# Patient Record
Sex: FEMALE | Race: WHITE | Employment: OTHER | URBAN - METROPOLITAN AREA
[De-identification: names, ages, dates, MRNs, and addresses within clinical notes are randomized per-mention and may not be internally consistent; named-entity substitution may affect disease eponyms.]

---

## 2017-04-06 PROBLEM — M17.12 PRIMARY OSTEOARTHRITIS OF LEFT KNEE: Status: ACTIVE | Noted: 2017-04-06

## 2017-04-29 PROBLEM — R07.9 CHEST PAIN: Status: ACTIVE | Noted: 2017-04-29

## 2017-04-29 PROBLEM — G45.9 TIA (TRANSIENT ISCHEMIC ATTACK): Status: ACTIVE | Noted: 2017-04-29

## 2017-06-19 PROBLEM — M17.31 POST-TRAUMATIC OSTEOARTHRITIS OF RIGHT KNEE: Status: ACTIVE | Noted: 2017-06-19

## 2018-03-14 ENCOUNTER — TELEPHONE (OUTPATIENT)
Dept: ORTHOPEDIC SURGERY | Age: 58
End: 2018-03-14

## 2018-04-12 ENCOUNTER — TELEPHONE (OUTPATIENT)
Dept: ORTHOPEDIC SURGERY | Age: 58
End: 2018-04-12

## 2018-05-10 ENCOUNTER — HOSPITAL ENCOUNTER (OUTPATIENT)
Dept: GENERAL RADIOLOGY | Age: 58
Discharge: HOME | End: 2018-05-12
Payer: MEDICARE

## 2018-05-10 ENCOUNTER — OFFICE VISIT (OUTPATIENT)
Dept: ORTHOPEDIC SURGERY | Age: 58
End: 2018-05-10
Payer: MEDICARE

## 2018-05-10 VITALS — HEIGHT: 65 IN | HEART RATE: 87 BPM | WEIGHT: 293 LBS | BODY MASS INDEX: 48.82 KG/M2 | RESPIRATION RATE: 20 BRPM

## 2018-05-10 DIAGNOSIS — Z96.641 STATUS POST TOTAL REPLACEMENT OF RIGHT HIP: ICD-10-CM

## 2018-05-10 PROCEDURE — 99212 OFFICE O/P EST SF 10 MIN: CPT | Performed by: ORTHOPAEDIC SURGERY

## 2018-05-10 PROCEDURE — 73502 X-RAY EXAM HIP UNI 2-3 VIEWS: CPT

## 2018-05-18 ENCOUNTER — APPOINTMENT (OUTPATIENT)
Dept: ULTRASOUND IMAGING | Age: 58
End: 2018-05-18
Payer: MEDICAID

## 2018-05-18 ENCOUNTER — APPOINTMENT (OUTPATIENT)
Dept: GENERAL RADIOLOGY | Age: 58
End: 2018-05-18
Payer: MEDICAID

## 2018-05-18 ENCOUNTER — HOSPITAL ENCOUNTER (EMERGENCY)
Age: 58
Discharge: HOME | End: 2018-05-18
Attending: EMERGENCY MEDICINE
Payer: MEDICAID

## 2018-05-18 VITALS
HEART RATE: 83 BPM | OXYGEN SATURATION: 97 % | BODY MASS INDEX: 48.82 KG/M2 | SYSTOLIC BLOOD PRESSURE: 131 MMHG | HEIGHT: 65 IN | TEMPERATURE: 98.5 F | WEIGHT: 293 LBS | DIASTOLIC BLOOD PRESSURE: 78 MMHG | RESPIRATION RATE: 18 BRPM

## 2018-05-18 DIAGNOSIS — N30.01 ACUTE CYSTITIS WITH HEMATURIA: ICD-10-CM

## 2018-05-18 DIAGNOSIS — K92.0 HEMATEMESIS WITH NAUSEA: Primary | ICD-10-CM

## 2018-05-18 LAB
ALBUMIN SERPL-MCNC: 3.6 G/DL (ref 3.5–5.2)
ALP BLD-CCNC: 152 U/L (ref 35–104)
ALT SERPL-CCNC: 17 U/L (ref 0–32)
ANION GAP SERPL CALCULATED.3IONS-SCNC: 15 MMOL/L (ref 7–16)
APTT: 30.8 SEC (ref 24.5–35.1)
AST SERPL-CCNC: 20 U/L (ref 0–31)
BACTERIA: ABNORMAL /HPF
BILIRUB SERPL-MCNC: 0.6 MG/DL (ref 0–1.2)
BILIRUBIN URINE: NEGATIVE
BLOOD, URINE: ABNORMAL
BUN BLDV-MCNC: 9 MG/DL (ref 6–20)
CALCIUM SERPL-MCNC: 9.2 MG/DL (ref 8.6–10.2)
CHLORIDE BLD-SCNC: 95 MMOL/L (ref 98–107)
CLARITY: CLEAR
CO2: 24 MMOL/L (ref 22–29)
COLOR: YELLOW
CREAT SERPL-MCNC: 0.6 MG/DL (ref 0.5–1)
EKG ATRIAL RATE: 81 BPM
EKG P AXIS: 72 DEGREES
EKG P-R INTERVAL: 160 MS
EKG Q-T INTERVAL: 410 MS
EKG QRS DURATION: 98 MS
EKG QTC CALCULATION (BAZETT): 476 MS
EKG R AXIS: 2 DEGREES
EKG T AXIS: 63 DEGREES
EKG VENTRICULAR RATE: 81 BPM
GFR AFRICAN AMERICAN: >60
GFR NON-AFRICAN AMERICAN: >60 ML/MIN/1.73
GLUCOSE BLD-MCNC: 121 MG/DL (ref 74–109)
GLUCOSE URINE: NEGATIVE MG/DL
HCT VFR BLD CALC: 38.4 % (ref 34–48)
HEMOGLOBIN: 12.7 G/DL (ref 11.5–15.5)
INR BLD: 1.2
KETONES, URINE: 40 MG/DL
LACTIC ACID: 0.9 MMOL/L (ref 0.5–2.2)
LEUKOCYTE ESTERASE, URINE: ABNORMAL
LIPASE: 17 U/L (ref 13–60)
MCH RBC QN AUTO: 28.3 PG (ref 26–35)
MCHC RBC AUTO-ENTMCNC: 33.1 % (ref 32–34.5)
MCV RBC AUTO: 85.5 FL (ref 80–99.9)
NITRITE, URINE: POSITIVE
PDW BLD-RTO: 19.9 FL (ref 11.5–15)
PH UA: 6.5 (ref 5–9)
PLATELET # BLD: 255 E9/L (ref 130–450)
PMV BLD AUTO: 9.1 FL (ref 7–12)
POTASSIUM SERPL-SCNC: 4 MMOL/L (ref 3.5–5)
PROTEIN UA: 100 MG/DL
PROTHROMBIN TIME: 13.4 SEC (ref 9.3–12.4)
RBC # BLD: 4.49 E12/L (ref 3.5–5.5)
RBC UA: >20 /HPF (ref 0–2)
SODIUM BLD-SCNC: 134 MMOL/L (ref 132–146)
SPECIFIC GRAVITY UA: 1.02 (ref 1–1.03)
TOTAL PROTEIN: 8 G/DL (ref 6.4–8.3)
UROBILINOGEN, URINE: 0.2 E.U./DL
WBC # BLD: 11 E9/L (ref 4.5–11.5)
WBC UA: >20 /HPF (ref 0–5)

## 2018-05-18 PROCEDURE — 6360000002 HC RX W HCPCS: Performed by: EMERGENCY MEDICINE

## 2018-05-18 PROCEDURE — 87186 SC STD MICRODIL/AGAR DIL: CPT

## 2018-05-18 PROCEDURE — 93005 ELECTROCARDIOGRAM TRACING: CPT | Performed by: EMERGENCY MEDICINE

## 2018-05-18 PROCEDURE — 85027 COMPLETE CBC AUTOMATED: CPT

## 2018-05-18 PROCEDURE — 83690 ASSAY OF LIPASE: CPT

## 2018-05-18 PROCEDURE — 83605 ASSAY OF LACTIC ACID: CPT

## 2018-05-18 PROCEDURE — 81001 URINALYSIS AUTO W/SCOPE: CPT

## 2018-05-18 PROCEDURE — 76770 US EXAM ABDO BACK WALL COMP: CPT

## 2018-05-18 PROCEDURE — 96365 THER/PROPH/DIAG IV INF INIT: CPT

## 2018-05-18 PROCEDURE — 71046 X-RAY EXAM CHEST 2 VIEWS: CPT

## 2018-05-18 PROCEDURE — 96375 TX/PRO/DX INJ NEW DRUG ADDON: CPT

## 2018-05-18 PROCEDURE — 99284 EMERGENCY DEPT VISIT MOD MDM: CPT

## 2018-05-18 PROCEDURE — 96376 TX/PRO/DX INJ SAME DRUG ADON: CPT

## 2018-05-18 PROCEDURE — 85610 PROTHROMBIN TIME: CPT

## 2018-05-18 PROCEDURE — C9113 INJ PANTOPRAZOLE SODIUM, VIA: HCPCS | Performed by: EMERGENCY MEDICINE

## 2018-05-18 PROCEDURE — 2580000003 HC RX 258: Performed by: EMERGENCY MEDICINE

## 2018-05-18 PROCEDURE — 80053 COMPREHEN METABOLIC PANEL: CPT

## 2018-05-18 PROCEDURE — 87088 URINE BACTERIA CULTURE: CPT

## 2018-05-18 PROCEDURE — 85730 THROMBOPLASTIN TIME PARTIAL: CPT

## 2018-05-18 PROCEDURE — 87077 CULTURE AEROBIC IDENTIFY: CPT

## 2018-05-18 RX ORDER — SUCRALFATE ORAL 1 G/10ML
1 SUSPENSION ORAL 4 TIMES DAILY
Status: ON HOLD | COMMUNITY
End: 2018-11-20 | Stop reason: HOSPADM

## 2018-05-18 RX ORDER — ONDANSETRON 2 MG/ML
4 INJECTION INTRAMUSCULAR; INTRAVENOUS ONCE
Status: COMPLETED | OUTPATIENT
Start: 2018-05-18 | End: 2018-05-18

## 2018-05-18 RX ORDER — LEVOTHYROXINE SODIUM 175 UG/1
262.5 TABLET ORAL WEEKLY
COMMUNITY
End: 2018-12-03 | Stop reason: SDUPTHER

## 2018-05-18 RX ORDER — ONDANSETRON 4 MG/1
4 TABLET, ORALLY DISINTEGRATING ORAL EVERY 8 HOURS PRN
Qty: 24 TABLET | Refills: 0 | Status: SHIPPED | OUTPATIENT
Start: 2018-05-18 | End: 2018-11-14

## 2018-05-18 RX ORDER — CEFTRIAXONE 2 G/1
INJECTION, POWDER, FOR SOLUTION INTRAMUSCULAR; INTRAVENOUS
Status: DISCONTINUED
Start: 2018-05-18 | End: 2018-05-18 | Stop reason: HOSPADM

## 2018-05-18 RX ORDER — OMEPRAZOLE 20 MG/1
20 CAPSULE, DELAYED RELEASE ORAL DAILY PRN
Status: ON HOLD | COMMUNITY
End: 2020-05-19 | Stop reason: HOSPADM

## 2018-05-18 RX ORDER — ARIPIPRAZOLE 10 MG/1
10 TABLET ORAL DAILY
COMMUNITY
End: 2021-11-18

## 2018-05-18 RX ORDER — OXYBUTYNIN CHLORIDE 5 MG/1
5 TABLET ORAL 2 TIMES DAILY PRN
COMMUNITY
End: 2018-11-14

## 2018-05-18 RX ORDER — CEPHALEXIN 500 MG/1
500 CAPSULE ORAL 3 TIMES DAILY
Qty: 21 CAPSULE | Refills: 0 | Status: SHIPPED | OUTPATIENT
Start: 2018-05-18 | End: 2018-05-25

## 2018-05-18 RX ORDER — PANTOPRAZOLE SODIUM 40 MG/10ML
40 INJECTION, POWDER, LYOPHILIZED, FOR SOLUTION INTRAVENOUS ONCE
Status: COMPLETED | OUTPATIENT
Start: 2018-05-18 | End: 2018-05-18

## 2018-05-18 RX ORDER — GABAPENTIN 300 MG/1
900 CAPSULE ORAL NIGHTLY
Status: ON HOLD | COMMUNITY
End: 2018-11-20 | Stop reason: HOSPADM

## 2018-05-18 RX ORDER — LEVOTHYROXINE SODIUM 175 UG/1
175 TABLET ORAL
COMMUNITY

## 2018-05-18 RX ADMIN — ONDANSETRON 4 MG: 2 INJECTION INTRAMUSCULAR; INTRAVENOUS at 16:30

## 2018-05-18 RX ADMIN — ONDANSETRON 4 MG: 2 INJECTION INTRAMUSCULAR; INTRAVENOUS at 12:06

## 2018-05-18 RX ADMIN — HYDROMORPHONE HYDROCHLORIDE 1 MG: 1 INJECTION, SOLUTION INTRAMUSCULAR; INTRAVENOUS; SUBCUTANEOUS at 16:30

## 2018-05-18 RX ADMIN — PANTOPRAZOLE SODIUM 40 MG: 40 INJECTION, POWDER, FOR SOLUTION INTRAVENOUS at 12:07

## 2018-05-18 RX ADMIN — CEFTRIAXONE SODIUM 2 G: 2 INJECTION, POWDER, FOR SOLUTION INTRAMUSCULAR; INTRAVENOUS at 16:04

## 2018-05-18 ASSESSMENT — PAIN DESCRIPTION - LOCATION: LOCATION: ABDOMEN

## 2018-05-18 ASSESSMENT — PAIN SCALES - GENERAL
PAINLEVEL_OUTOF10: 9
PAINLEVEL_OUTOF10: 3

## 2018-05-18 ASSESSMENT — PAIN DESCRIPTION - PAIN TYPE: TYPE: ACUTE PAIN

## 2018-05-18 ASSESSMENT — PAIN SCALES - WONG BAKER: WONGBAKER_NUMERICALRESPONSE: 8

## 2018-05-20 LAB
ORGANISM: ABNORMAL
URINE CULTURE, ROUTINE: ABNORMAL
URINE CULTURE, ROUTINE: ABNORMAL

## 2018-06-01 ENCOUNTER — TELEPHONE (OUTPATIENT)
Dept: ORTHOPEDIC SURGERY | Age: 58
End: 2018-06-01

## 2018-06-21 ENCOUNTER — HOSPITAL ENCOUNTER (OUTPATIENT)
Age: 58
Discharge: HOME | End: 2018-06-23
Payer: MEDICAID

## 2018-06-21 LAB
AMORPHOUS: ABNORMAL
BACTERIA: ABNORMAL /HPF
BILIRUBIN URINE: NEGATIVE
BLOOD, URINE: ABNORMAL
CLARITY: ABNORMAL
COLOR: YELLOW
GLUCOSE URINE: NEGATIVE MG/DL
KETONES, URINE: NEGATIVE MG/DL
LEUKOCYTE ESTERASE, URINE: ABNORMAL
NITRITE, URINE: POSITIVE
PH UA: 6.5 (ref 5–9)
PROTEIN UA: NEGATIVE MG/DL
RBC UA: ABNORMAL /HPF (ref 0–2)
SPECIFIC GRAVITY UA: <=1.005 (ref 1–1.03)
UROBILINOGEN, URINE: 0.2 E.U./DL
WBC UA: >20 /HPF (ref 0–5)

## 2018-06-21 PROCEDURE — 87077 CULTURE AEROBIC IDENTIFY: CPT

## 2018-06-21 PROCEDURE — 87088 URINE BACTERIA CULTURE: CPT

## 2018-06-21 PROCEDURE — 81001 URINALYSIS AUTO W/SCOPE: CPT

## 2018-06-21 PROCEDURE — 87186 SC STD MICRODIL/AGAR DIL: CPT

## 2018-06-29 LAB
ORGANISM: ABNORMAL
URINE CULTURE, ROUTINE: ABNORMAL

## 2018-10-18 ENCOUNTER — HOSPITAL ENCOUNTER (OUTPATIENT)
Age: 58
Discharge: HOME | End: 2018-10-20
Payer: MEDICARE

## 2018-10-18 LAB
ALBUMIN SERPL-MCNC: 3.3 G/DL (ref 3.5–5.2)
ALP BLD-CCNC: 92 U/L (ref 35–104)
ALT SERPL-CCNC: 14 U/L (ref 0–32)
ANION GAP SERPL CALCULATED.3IONS-SCNC: 12 MMOL/L (ref 7–16)
AST SERPL-CCNC: 20 U/L (ref 0–31)
BILIRUB SERPL-MCNC: 0.2 MG/DL (ref 0–1.2)
BUN BLDV-MCNC: 11 MG/DL (ref 6–20)
CALCIUM SERPL-MCNC: 8.9 MG/DL (ref 8.6–10.2)
CHLORIDE BLD-SCNC: 101 MMOL/L (ref 98–107)
CO2: 27 MMOL/L (ref 22–29)
CREAT SERPL-MCNC: 0.8 MG/DL (ref 0.5–1)
GFR AFRICAN AMERICAN: >60
GFR NON-AFRICAN AMERICAN: >60 ML/MIN/1.73
GLUCOSE BLD-MCNC: 86 MG/DL (ref 74–109)
POTASSIUM SERPL-SCNC: 4.4 MMOL/L (ref 3.5–5)
SODIUM BLD-SCNC: 140 MMOL/L (ref 132–146)
TOTAL PROTEIN: 6.6 G/DL (ref 6.4–8.3)
TSH SERPL DL<=0.05 MIU/L-ACNC: 0.45 UIU/ML (ref 0.27–4.2)
VITAMIN D 25-HYDROXY: 33 NG/ML (ref 30–100)

## 2018-10-18 PROCEDURE — 84443 ASSAY THYROID STIM HORMONE: CPT

## 2018-10-18 PROCEDURE — 36415 COLL VENOUS BLD VENIPUNCTURE: CPT

## 2018-10-18 PROCEDURE — 80053 COMPREHEN METABOLIC PANEL: CPT

## 2018-10-18 PROCEDURE — 82306 VITAMIN D 25 HYDROXY: CPT

## 2018-11-14 ENCOUNTER — HOSPITAL ENCOUNTER (INPATIENT)
Age: 58
LOS: 6 days | Discharge: NURSING, REHAB OR LONG-TERM CARE | DRG: 481 | End: 2018-11-20
Attending: INTERNAL MEDICINE | Admitting: INTERNAL MEDICINE
Payer: MEDICARE

## 2018-11-14 ENCOUNTER — HOSPITAL ENCOUNTER (OUTPATIENT)
Age: 58
Discharge: HOME | End: 2018-11-14
Payer: MEDICAID

## 2018-11-14 ENCOUNTER — APPOINTMENT (OUTPATIENT)
Dept: GENERAL RADIOLOGY | Age: 58
End: 2018-11-14
Payer: MEDICAID

## 2018-11-14 ENCOUNTER — HOSPITAL ENCOUNTER (EMERGENCY)
Age: 58
Discharge: ANOTHER HOSPITAL | End: 2018-11-14
Attending: EMERGENCY MEDICINE
Payer: MEDICAID

## 2018-11-14 VITALS
DIASTOLIC BLOOD PRESSURE: 85 MMHG | RESPIRATION RATE: 18 BRPM | HEART RATE: 74 BPM | WEIGHT: 293 LBS | OXYGEN SATURATION: 100 % | SYSTOLIC BLOOD PRESSURE: 150 MMHG | HEIGHT: 65 IN | BODY MASS INDEX: 48.82 KG/M2 | TEMPERATURE: 97.8 F

## 2018-11-14 DIAGNOSIS — S82.401A CLOSED FRACTURE OF RIGHT TIBIA AND FIBULA, INITIAL ENCOUNTER: ICD-10-CM

## 2018-11-14 DIAGNOSIS — G35 MS (MULTIPLE SCLEROSIS) (HCC): Chronic | ICD-10-CM

## 2018-11-14 DIAGNOSIS — S72.401A CLOSED FRACTURE OF DISTAL END OF RIGHT FEMUR, UNSPECIFIED FRACTURE MORPHOLOGY, INITIAL ENCOUNTER (HCC): Primary | ICD-10-CM

## 2018-11-14 DIAGNOSIS — S72.91XA CLOSED FRACTURE OF RIGHT FEMUR, UNSPECIFIED FRACTURE MORPHOLOGY, UNSPECIFIED PORTION OF FEMUR, INITIAL ENCOUNTER (HCC): Primary | ICD-10-CM

## 2018-11-14 DIAGNOSIS — S82.101A CLOSED FRACTURE OF PROXIMAL END OF RIGHT TIBIA, UNSPECIFIED FRACTURE MORPHOLOGY, INITIAL ENCOUNTER: ICD-10-CM

## 2018-11-14 DIAGNOSIS — S82.201A CLOSED FRACTURE OF RIGHT TIBIA AND FIBULA, INITIAL ENCOUNTER: ICD-10-CM

## 2018-11-14 PROBLEM — S72.90XA CLOSED FRACTURE OF FEMUR (HCC): Status: ACTIVE | Noted: 2018-11-14

## 2018-11-14 LAB
ANION GAP SERPL CALCULATED.3IONS-SCNC: 11 MMOL/L (ref 7–16)
APTT: 30.1 SEC (ref 24.5–35.1)
BASOPHILS ABSOLUTE: 0.03 E9/L (ref 0–0.2)
BASOPHILS RELATIVE PERCENT: 0.3 % (ref 0–2)
BUN BLDV-MCNC: 10 MG/DL (ref 6–20)
CALCIUM SERPL-MCNC: 9.3 MG/DL (ref 8.6–10.2)
CHLORIDE BLD-SCNC: 98 MMOL/L (ref 98–107)
CO2: 28 MMOL/L (ref 22–29)
CREAT SERPL-MCNC: 0.8 MG/DL (ref 0.5–1)
EOSINOPHILS ABSOLUTE: 0.03 E9/L (ref 0.05–0.5)
EOSINOPHILS RELATIVE PERCENT: 0.3 % (ref 0–6)
GFR AFRICAN AMERICAN: >60
GFR NON-AFRICAN AMERICAN: >60 ML/MIN/1.73
GLUCOSE BLD-MCNC: 104 MG/DL (ref 74–99)
HCT VFR BLD CALC: 35.2 % (ref 34–48)
HEMOGLOBIN: 11.6 G/DL (ref 11.5–15.5)
IMMATURE GRANULOCYTES #: 0.06 E9/L
IMMATURE GRANULOCYTES %: 0.6 % (ref 0–5)
INR BLD: 1.1
LYMPHOCYTES ABSOLUTE: 1.16 E9/L (ref 1.5–4)
LYMPHOCYTES RELATIVE PERCENT: 10.8 % (ref 20–42)
MCH RBC QN AUTO: 30.7 PG (ref 26–35)
MCHC RBC AUTO-ENTMCNC: 33 % (ref 32–34.5)
MCV RBC AUTO: 93.1 FL (ref 80–99.9)
MONOCYTES ABSOLUTE: 0.59 E9/L (ref 0.1–0.95)
MONOCYTES RELATIVE PERCENT: 5.5 % (ref 2–12)
NEUTROPHILS ABSOLUTE: 8.89 E9/L (ref 1.8–7.3)
NEUTROPHILS RELATIVE PERCENT: 82.5 % (ref 43–80)
PDW BLD-RTO: 13.1 FL (ref 11.5–15)
PLATELET # BLD: 299 E9/L (ref 130–450)
PMV BLD AUTO: 9.2 FL (ref 7–12)
POTASSIUM SERPL-SCNC: 4.7 MMOL/L (ref 3.5–5)
PROTHROMBIN TIME: 12.4 SEC (ref 9.3–12.4)
RBC # BLD: 3.78 E12/L (ref 3.5–5.5)
SODIUM BLD-SCNC: 137 MMOL/L (ref 132–146)
WBC # BLD: 10.8 E9/L (ref 4.5–11.5)

## 2018-11-14 PROCEDURE — 73610 X-RAY EXAM OF ANKLE: CPT

## 2018-11-14 PROCEDURE — 85610 PROTHROMBIN TIME: CPT

## 2018-11-14 PROCEDURE — 73552 X-RAY EXAM OF FEMUR 2/>: CPT

## 2018-11-14 PROCEDURE — 29505 APPLICATION LONG LEG SPLINT: CPT

## 2018-11-14 PROCEDURE — 1200000000 HC SEMI PRIVATE

## 2018-11-14 PROCEDURE — 99284 EMERGENCY DEPT VISIT MOD MDM: CPT

## 2018-11-14 PROCEDURE — 6360000002 HC RX W HCPCS: Performed by: PHYSICIAN ASSISTANT

## 2018-11-14 PROCEDURE — 2580000003 HC RX 258: Performed by: INTERNAL MEDICINE

## 2018-11-14 PROCEDURE — 85025 COMPLETE CBC W/AUTO DIFF WBC: CPT

## 2018-11-14 PROCEDURE — 80048 BASIC METABOLIC PNL TOTAL CA: CPT

## 2018-11-14 PROCEDURE — 6370000000 HC RX 637 (ALT 250 FOR IP): Performed by: INTERNAL MEDICINE

## 2018-11-14 PROCEDURE — 73502 X-RAY EXAM HIP UNI 2-3 VIEWS: CPT

## 2018-11-14 PROCEDURE — A0425 GROUND MILEAGE: HCPCS

## 2018-11-14 PROCEDURE — 73590 X-RAY EXAM OF LOWER LEG: CPT

## 2018-11-14 PROCEDURE — 85730 THROMBOPLASTIN TIME PARTIAL: CPT

## 2018-11-14 PROCEDURE — 6370000000 HC RX 637 (ALT 250 FOR IP): Performed by: PHYSICIAN ASSISTANT

## 2018-11-14 PROCEDURE — 73562 X-RAY EXAM OF KNEE 3: CPT

## 2018-11-14 PROCEDURE — A0428 BLS: HCPCS

## 2018-11-14 PROCEDURE — 96374 THER/PROPH/DIAG INJ IV PUSH: CPT

## 2018-11-14 RX ORDER — HYDROCODONE BITARTRATE AND ACETAMINOPHEN 5; 325 MG/1; MG/1
1 TABLET ORAL ONCE
Status: DISCONTINUED | OUTPATIENT
Start: 2018-11-14 | End: 2018-11-14

## 2018-11-14 RX ORDER — GABAPENTIN 300 MG/1
300 CAPSULE ORAL EVERY MORNING
Status: DISCONTINUED | OUTPATIENT
Start: 2018-11-15 | End: 2018-11-20 | Stop reason: HOSPADM

## 2018-11-14 RX ORDER — MORPHINE SULFATE 2 MG/ML
2 INJECTION, SOLUTION INTRAMUSCULAR; INTRAVENOUS EVERY 4 HOURS PRN
Status: DISCONTINUED | OUTPATIENT
Start: 2018-11-14 | End: 2018-11-16

## 2018-11-14 RX ORDER — SENNA PLUS 8.6 MG/1
2 TABLET ORAL NIGHTLY
Status: DISCONTINUED | OUTPATIENT
Start: 2018-11-14 | End: 2018-11-20 | Stop reason: HOSPADM

## 2018-11-14 RX ORDER — CLONAZEPAM 1 MG/1
1 TABLET ORAL NIGHTLY
COMMUNITY
End: 2022-08-17 | Stop reason: ALTCHOICE

## 2018-11-14 RX ORDER — ARIPIPRAZOLE 10 MG/1
10 TABLET ORAL DAILY
Status: DISCONTINUED | OUTPATIENT
Start: 2018-11-15 | End: 2018-11-20 | Stop reason: HOSPADM

## 2018-11-14 RX ORDER — ONDANSETRON 2 MG/ML
4 INJECTION INTRAMUSCULAR; INTRAVENOUS EVERY 6 HOURS PRN
Status: DISCONTINUED | OUTPATIENT
Start: 2018-11-14 | End: 2018-11-16 | Stop reason: SDUPTHER

## 2018-11-14 RX ORDER — OXYCODONE HYDROCHLORIDE AND ACETAMINOPHEN 5; 325 MG/1; MG/1
1 TABLET ORAL ONCE
Status: COMPLETED | OUTPATIENT
Start: 2018-11-14 | End: 2018-11-14

## 2018-11-14 RX ORDER — CYCLOBENZAPRINE HCL 10 MG
10 TABLET ORAL ONCE
Status: COMPLETED | OUTPATIENT
Start: 2018-11-14 | End: 2018-11-14

## 2018-11-14 RX ORDER — PANTOPRAZOLE SODIUM 40 MG/1
40 TABLET, DELAYED RELEASE ORAL EVERY MORNING
Status: DISCONTINUED | OUTPATIENT
Start: 2018-11-15 | End: 2018-11-20 | Stop reason: HOSPADM

## 2018-11-14 RX ORDER — ACETAMINOPHEN 500 MG
1000 TABLET ORAL 2 TIMES DAILY PRN
Status: DISCONTINUED | OUTPATIENT
Start: 2018-11-14 | End: 2018-11-16

## 2018-11-14 RX ORDER — SODIUM CHLORIDE 0.9 % (FLUSH) 0.9 %
10 SYRINGE (ML) INJECTION EVERY 12 HOURS SCHEDULED
Status: DISCONTINUED | OUTPATIENT
Start: 2018-11-14 | End: 2018-11-16 | Stop reason: SDUPTHER

## 2018-11-14 RX ORDER — DULOXETIN HYDROCHLORIDE 60 MG/1
60 CAPSULE, DELAYED RELEASE ORAL 2 TIMES DAILY
Status: DISCONTINUED | OUTPATIENT
Start: 2018-11-14 | End: 2018-11-20 | Stop reason: HOSPADM

## 2018-11-14 RX ORDER — CLONAZEPAM 0.5 MG/1
1 TABLET ORAL 2 TIMES DAILY PRN
Status: DISCONTINUED | OUTPATIENT
Start: 2018-11-14 | End: 2018-11-20 | Stop reason: HOSPADM

## 2018-11-14 RX ORDER — LEVOTHYROXINE SODIUM 175 UG/1
175 TABLET ORAL DAILY
Status: DISCONTINUED | OUTPATIENT
Start: 2018-11-15 | End: 2018-11-20 | Stop reason: HOSPADM

## 2018-11-14 RX ORDER — GABAPENTIN 300 MG/1
900 CAPSULE ORAL ONCE
Status: COMPLETED | OUTPATIENT
Start: 2018-11-14 | End: 2018-11-14

## 2018-11-14 RX ORDER — LANOLIN ALCOHOL/MO/W.PET/CERES
3 CREAM (GRAM) TOPICAL EVERY EVENING
Status: DISCONTINUED | OUTPATIENT
Start: 2018-11-14 | End: 2018-11-20 | Stop reason: HOSPADM

## 2018-11-14 RX ORDER — TRAZODONE HYDROCHLORIDE 50 MG/1
50 TABLET ORAL NIGHTLY
Status: DISCONTINUED | OUTPATIENT
Start: 2018-11-14 | End: 2018-11-20 | Stop reason: HOSPADM

## 2018-11-14 RX ORDER — SUMATRIPTAN 50 MG/1
100 TABLET, FILM COATED ORAL
Status: DISPENSED | OUTPATIENT
Start: 2018-11-14 | End: 2018-11-14

## 2018-11-14 RX ORDER — CYCLOBENZAPRINE HCL 10 MG
10 TABLET ORAL 3 TIMES DAILY
Status: DISCONTINUED | OUTPATIENT
Start: 2018-11-14 | End: 2018-11-20 | Stop reason: HOSPADM

## 2018-11-14 RX ORDER — OXYCODONE HYDROCHLORIDE AND ACETAMINOPHEN 5; 325 MG/1; MG/1
1 TABLET ORAL EVERY 4 HOURS PRN
Status: DISCONTINUED | OUTPATIENT
Start: 2018-11-14 | End: 2018-11-16

## 2018-11-14 RX ORDER — SODIUM CHLORIDE 0.9 % (FLUSH) 0.9 %
10 SYRINGE (ML) INJECTION PRN
Status: DISCONTINUED | OUTPATIENT
Start: 2018-11-14 | End: 2018-11-16 | Stop reason: SDUPTHER

## 2018-11-14 RX ADMIN — MELATONIN 3 MG ORAL TABLET 3 MG: 3 TABLET ORAL at 23:00

## 2018-11-14 RX ADMIN — HYDROMORPHONE HYDROCHLORIDE 0.5 MG: 1 INJECTION, SOLUTION INTRAMUSCULAR; INTRAVENOUS; SUBCUTANEOUS at 19:22

## 2018-11-14 RX ADMIN — TRAZODONE HYDROCHLORIDE 50 MG: 50 TABLET ORAL at 23:22

## 2018-11-14 RX ADMIN — CLONAZEPAM 1 MG: 0.5 TABLET ORAL at 23:05

## 2018-11-14 RX ADMIN — CYCLOBENZAPRINE HYDROCHLORIDE 10 MG: 10 TABLET, FILM COATED ORAL at 20:34

## 2018-11-14 RX ADMIN — SENNOSIDES 17.2 MG: 8.6 TABLET, FILM COATED ORAL at 23:00

## 2018-11-14 RX ADMIN — GABAPENTIN 900 MG: 300 CAPSULE ORAL at 20:35

## 2018-11-14 RX ADMIN — Medication 10 ML: at 23:12

## 2018-11-14 RX ADMIN — CYCLOBENZAPRINE 10 MG: 10 TABLET, FILM COATED ORAL at 23:00

## 2018-11-14 RX ADMIN — OXYCODONE AND ACETAMINOPHEN 1 TABLET: 5; 325 TABLET ORAL at 20:35

## 2018-11-14 ASSESSMENT — PAIN SCALES - GENERAL
PAINLEVEL_OUTOF10: 10

## 2018-11-14 ASSESSMENT — PAIN DESCRIPTION - DESCRIPTORS: DESCRIPTORS: SHARP

## 2018-11-14 ASSESSMENT — PAIN DESCRIPTION - PAIN TYPE: TYPE: ACUTE PAIN

## 2018-11-14 ASSESSMENT — PAIN DESCRIPTION - LOCATION: LOCATION: KNEE

## 2018-11-14 ASSESSMENT — PAIN DESCRIPTION - ORIENTATION: ORIENTATION: RIGHT

## 2018-11-15 ENCOUNTER — APPOINTMENT (OUTPATIENT)
Dept: GENERAL RADIOLOGY | Age: 58
DRG: 481 | End: 2018-11-15
Attending: INTERNAL MEDICINE
Payer: MEDICARE

## 2018-11-15 ENCOUNTER — ANESTHESIA EVENT (OUTPATIENT)
Dept: OPERATING ROOM | Age: 58
DRG: 481 | End: 2018-11-15
Payer: MEDICARE

## 2018-11-15 ENCOUNTER — TELEPHONE (OUTPATIENT)
Dept: ORTHOPEDIC SURGERY | Age: 58
End: 2018-11-15

## 2018-11-15 ENCOUNTER — APPOINTMENT (OUTPATIENT)
Dept: CT IMAGING | Age: 58
DRG: 481 | End: 2018-11-15
Attending: INTERNAL MEDICINE
Payer: MEDICARE

## 2018-11-15 PROBLEM — E66.01 MORBID OBESITY WITH BMI OF 50.0-59.9, ADULT (HCC): Status: ACTIVE | Noted: 2018-11-15

## 2018-11-15 PROBLEM — M17.31 POST-TRAUMATIC OSTEOARTHRITIS OF RIGHT KNEE: Status: RESOLVED | Noted: 2017-06-19 | Resolved: 2018-11-15

## 2018-11-15 PROBLEM — M17.12 PRIMARY OSTEOARTHRITIS OF LEFT KNEE: Status: RESOLVED | Noted: 2017-04-06 | Resolved: 2018-11-15

## 2018-11-15 PROBLEM — G45.9 TIA (TRANSIENT ISCHEMIC ATTACK): Status: RESOLVED | Noted: 2017-04-29 | Resolved: 2018-11-15

## 2018-11-15 PROBLEM — N30.01 ACUTE CYSTITIS WITH HEMATURIA: Status: RESOLVED | Noted: 2018-05-18 | Resolved: 2018-11-15

## 2018-11-15 PROBLEM — R07.9 CHEST PAIN: Status: RESOLVED | Noted: 2017-04-29 | Resolved: 2018-11-15

## 2018-11-15 PROBLEM — K92.0 HEMATEMESIS WITH NAUSEA: Status: RESOLVED | Noted: 2018-05-18 | Resolved: 2018-11-15

## 2018-11-15 LAB
ABO/RH: NORMAL
ANTIBODY SCREEN: NORMAL
BASOPHILS ABSOLUTE: 0.06 E9/L (ref 0–0.2)
BASOPHILS RELATIVE PERCENT: 1 % (ref 0–2)
EKG ATRIAL RATE: 94 BPM
EKG P AXIS: 65 DEGREES
EKG P-R INTERVAL: 154 MS
EKG Q-T INTERVAL: 370 MS
EKG QRS DURATION: 94 MS
EKG QTC CALCULATION (BAZETT): 462 MS
EKG R AXIS: 7 DEGREES
EKG T AXIS: 41 DEGREES
EKG VENTRICULAR RATE: 94 BPM
EOSINOPHILS ABSOLUTE: 0.08 E9/L (ref 0.05–0.5)
EOSINOPHILS RELATIVE PERCENT: 1.3 % (ref 0–6)
HCT VFR BLD CALC: 33.3 % (ref 34–48)
HEMOGLOBIN: 10.6 G/DL (ref 11.5–15.5)
IMMATURE GRANULOCYTES #: 0.02 E9/L
IMMATURE GRANULOCYTES %: 0.3 % (ref 0–5)
LYMPHOCYTES ABSOLUTE: 1.62 E9/L (ref 1.5–4)
LYMPHOCYTES RELATIVE PERCENT: 27 % (ref 20–42)
MCH RBC QN AUTO: 29.5 PG (ref 26–35)
MCHC RBC AUTO-ENTMCNC: 31.8 % (ref 32–34.5)
MCV RBC AUTO: 92.8 FL (ref 80–99.9)
MONOCYTES ABSOLUTE: 0.55 E9/L (ref 0.1–0.95)
MONOCYTES RELATIVE PERCENT: 9.2 % (ref 2–12)
NEUTROPHILS ABSOLUTE: 3.68 E9/L (ref 1.8–7.3)
NEUTROPHILS RELATIVE PERCENT: 61.2 % (ref 43–80)
PDW BLD-RTO: 13.2 FL (ref 11.5–15)
PLATELET # BLD: 307 E9/L (ref 130–450)
PMV BLD AUTO: 9.6 FL (ref 7–12)
RBC # BLD: 3.59 E12/L (ref 3.5–5.5)
WBC # BLD: 6 E9/L (ref 4.5–11.5)

## 2018-11-15 PROCEDURE — 6370000000 HC RX 637 (ALT 250 FOR IP): Performed by: INTERNAL MEDICINE

## 2018-11-15 PROCEDURE — 73700 CT LOWER EXTREMITY W/O DYE: CPT

## 2018-11-15 PROCEDURE — 1200000000 HC SEMI PRIVATE

## 2018-11-15 PROCEDURE — 2580000003 HC RX 258: Performed by: INTERNAL MEDICINE

## 2018-11-15 PROCEDURE — 36415 COLL VENOUS BLD VENIPUNCTURE: CPT

## 2018-11-15 PROCEDURE — 6360000002 HC RX W HCPCS: Performed by: INTERNAL MEDICINE

## 2018-11-15 PROCEDURE — 86850 RBC ANTIBODY SCREEN: CPT

## 2018-11-15 PROCEDURE — 93005 ELECTROCARDIOGRAM TRACING: CPT | Performed by: ORTHOPAEDIC SURGERY

## 2018-11-15 PROCEDURE — 86901 BLOOD TYPING SEROLOGIC RH(D): CPT

## 2018-11-15 PROCEDURE — 85025 COMPLETE CBC W/AUTO DIFF WBC: CPT

## 2018-11-15 PROCEDURE — 86900 BLOOD TYPING SEROLOGIC ABO: CPT

## 2018-11-15 PROCEDURE — 71045 X-RAY EXAM CHEST 1 VIEW: CPT

## 2018-11-15 RX ORDER — SODIUM CHLORIDE 9 MG/ML
INJECTION, SOLUTION INTRAVENOUS CONTINUOUS
Status: DISCONTINUED | OUTPATIENT
Start: 2018-11-16 | End: 2018-11-17

## 2018-11-15 RX ORDER — MORPHINE SULFATE 2 MG/ML
2 INJECTION, SOLUTION INTRAMUSCULAR; INTRAVENOUS ONCE
Status: DISCONTINUED | OUTPATIENT
Start: 2018-11-15 | End: 2018-11-16

## 2018-11-15 RX ADMIN — SENNOSIDES 17.2 MG: 8.6 TABLET, FILM COATED ORAL at 20:05

## 2018-11-15 RX ADMIN — ARIPIPRAZOLE 10 MG: 10 TABLET ORAL at 10:10

## 2018-11-15 RX ADMIN — Medication 10 ML: at 10:12

## 2018-11-15 RX ADMIN — GABAPENTIN 300 MG: 300 CAPSULE ORAL at 10:12

## 2018-11-15 RX ADMIN — CYCLOBENZAPRINE 10 MG: 10 TABLET, FILM COATED ORAL at 10:11

## 2018-11-15 RX ADMIN — Medication 10 ML: at 03:55

## 2018-11-15 RX ADMIN — ENOXAPARIN SODIUM 40 MG: 40 INJECTION SUBCUTANEOUS at 10:11

## 2018-11-15 RX ADMIN — MELATONIN 3 MG ORAL TABLET 3 MG: 3 TABLET ORAL at 20:04

## 2018-11-15 RX ADMIN — DULOXETINE HYDROCHLORIDE 60 MG: 60 CAPSULE, DELAYED RELEASE ORAL at 20:05

## 2018-11-15 RX ADMIN — OXYCODONE AND ACETAMINOPHEN 1 TABLET: 5; 325 TABLET ORAL at 20:05

## 2018-11-15 RX ADMIN — Medication 2 MG: at 04:10

## 2018-11-15 RX ADMIN — OXYCODONE AND ACETAMINOPHEN 1 TABLET: 5; 325 TABLET ORAL at 06:44

## 2018-11-15 RX ADMIN — LEVOTHYROXINE SODIUM 175 MCG: 175 TABLET ORAL at 06:44

## 2018-11-15 RX ADMIN — CYCLOBENZAPRINE 10 MG: 10 TABLET, FILM COATED ORAL at 13:31

## 2018-11-15 RX ADMIN — PANTOPRAZOLE SODIUM 40 MG: 40 TABLET, DELAYED RELEASE ORAL at 10:12

## 2018-11-15 RX ADMIN — Medication 2 MG: at 03:54

## 2018-11-15 RX ADMIN — Medication 10 ML: at 20:05

## 2018-11-15 RX ADMIN — TRAZODONE HYDROCHLORIDE 50 MG: 50 TABLET ORAL at 20:05

## 2018-11-15 RX ADMIN — CYCLOBENZAPRINE 10 MG: 10 TABLET, FILM COATED ORAL at 20:05

## 2018-11-15 RX ADMIN — Medication 2 MG: at 10:13

## 2018-11-15 RX ADMIN — DULOXETINE HYDROCHLORIDE 60 MG: 60 CAPSULE, DELAYED RELEASE ORAL at 10:11

## 2018-11-15 RX ADMIN — OXYCODONE AND ACETAMINOPHEN 1 TABLET: 5; 325 TABLET ORAL at 00:46

## 2018-11-15 ASSESSMENT — PAIN DESCRIPTION - DESCRIPTORS
DESCRIPTORS: ACHING;DISCOMFORT
DESCRIPTORS: DISCOMFORT
DESCRIPTORS: THROBBING

## 2018-11-15 ASSESSMENT — PAIN DESCRIPTION - ORIENTATION
ORIENTATION: RIGHT

## 2018-11-15 ASSESSMENT — PAIN DESCRIPTION - PAIN TYPE
TYPE: ACUTE PAIN

## 2018-11-15 ASSESSMENT — PAIN SCALES - GENERAL
PAINLEVEL_OUTOF10: 10
PAINLEVEL_OUTOF10: 8
PAINLEVEL_OUTOF10: 8
PAINLEVEL_OUTOF10: 9
PAINLEVEL_OUTOF10: 8
PAINLEVEL_OUTOF10: 5
PAINLEVEL_OUTOF10: 9
PAINLEVEL_OUTOF10: 6
PAINLEVEL_OUTOF10: 9
PAINLEVEL_OUTOF10: 6
PAINLEVEL_OUTOF10: 8

## 2018-11-15 ASSESSMENT — PAIN DESCRIPTION - LOCATION
LOCATION: LEG;KNEE
LOCATION: LEG
LOCATION: KNEE;LEG
LOCATION: LEG
LOCATION: KNEE;LEG
LOCATION: KNEE;LEG

## 2018-11-15 ASSESSMENT — PAIN DESCRIPTION - ONSET
ONSET: ON-GOING

## 2018-11-15 ASSESSMENT — PAIN DESCRIPTION - FREQUENCY
FREQUENCY: INTERMITTENT
FREQUENCY: CONTINUOUS

## 2018-11-15 ASSESSMENT — PAIN DESCRIPTION - DIRECTION: RADIATING_TOWARDS: ANKLE

## 2018-11-15 ASSESSMENT — ENCOUNTER SYMPTOMS: SHORTNESS OF BREATH: 1

## 2018-11-15 NOTE — PROGRESS NOTES
Ortho resident here  To splint her leg , medicated with morphine times 2. Patient tolerated fairly well rt leg splinted and ace wrapped .  Able to wiggle to toes freely, warm to touch

## 2018-11-15 NOTE — ANESTHESIA PRE PROCEDURE
Department of Anesthesiology  Preprocedure Note       Name:  Keri Jackson   Age:  60 y.o.  :  1960                                          MRN:  [id number]         Date:  11/15/2018      Surgeon: Candance Pottier):  Maddy Castellon,     Procedure: OPEN REDUCTION INTERNAL FIXATION RIGHT DISTAL FEMUR, RIGHT DISTAL TIBIA (Right )    Medications prior to admission:   Prior to Admission medications    Medication Sig Start Date End Date Taking? Authorizing Provider   clonazePAM (KLONOPIN) 1 MG tablet Take 1 mg by mouth 2 times daily as needed. Tarik Silver Historical Provider, MD   gabapentin (NEURONTIN) 300 MG capsule Take 900 mg by mouth nightly. Tarik Silvre     Historical Provider, MD   ARIPiprazole (ABILIFY) 10 MG tablet Take 10 mg by mouth daily    Historical Provider, MD   levothyroxine (SYNTHROID) 175 MCG tablet Take 175 mcg by mouth Six times weekly Monday through  58 Provider, MD   levothyroxine (SYNTHROID) 175 MCG tablet Take 262.5 mcg by mouth once a week On sundays    Historical Provider, MD   sucralfate (CARAFATE) 1 GM/10ML suspension Take 1 g by mouth 4 times daily    Historical Provider, MD   omeprazole (PRILOSEC) 20 MG delayed release capsule Take 20 mg by mouth daily as needed    Historical Provider, MD   traZODone (DESYREL) 50 MG tablet Take 50 mg by mouth nightly    Historical Provider, MD   cyclobenzaprine (FLEXERIL) 10 MG tablet Take 10 mg by mouth three times daily     Historical Provider, MD   DULoxetine (CYMBALTA) 60 MG extended release capsule Take 60 mg by mouth 2 times daily    Historical Provider, MD   Rectal Cleansers (F[employer] CLEANS[employer]EMA RE) Place 1 Dose rectally daily as needed    Historical Provider, MD   Interferon Beta-1a (AVONEX PEN) 30 MCG/0.5ML AJKT Inject 0.5 mLs into the muscle once a week Saturday    Historical Provider, MD   melatonin 3 MG TABS tablet Take 3 mg by mouth every evening     Historical Provider, MD   senna (SENOKOT) 8.6 MG tablet Take 2 tablets by mouth nightly     Historical Provider, MD   SUMAtriptan (IMITREX) 100 MG tablet Take 100 mg by mouth once as needed for Migraine    Historical Provider, MD   gabapentin (NEURONTIN) 300 MG capsule Take 3 capsules by mouth nightly  Patient taking differently: Take 300 mg by mouth every morning.  . 6/2/16   Panchito Mchugh DO   acetaminophen (TYLENOL) 500 MG tablet Take 1,000 mg by mouth 2 times daily as needed for Pain    Historical Provider, MD       Current medications:    Current Facility-Administered Medications   Medication Dose Route Frequency Provider Last Rate Last Dose    morphine injection 2 mg  2 mg Intravenous Once Will B Beucler, DO        acetaminophen (TYLENOL) tablet 1,000 mg  1,000 mg Oral BID PRN Kendal Quinonez MD        ARIPiprazole (ABILIFY) tablet 10 mg  10 mg Oral Daily Kendal Quinonez MD   10 mg at 11/15/18 1010    clonazePAM (KLONOPIN) tablet 1 mg  1 mg Oral BID PRN Kendal Quinonez MD   1 mg at 11/14/18 2305    cyclobenzaprine (FLEXERIL) tablet 10 mg  10 mg Oral TID Kendal Quinonez MD   10 mg at 11/15/18 1331    DULoxetine (CYMBALTA) extended release capsule 60 mg  60 mg Oral BID Kendal Quinonez MD   60 mg at 11/15/18 1011    gabapentin (NEURONTIN) capsule 300 mg  300 mg Oral QAM Kendal Quinonez MD   300 mg at 11/15/18 1012    levothyroxine (SYNTHROID) tablet 175 mcg  175 mcg Oral Daily Kendal Quinonez MD   175 mcg at 11/15/18 0644    melatonin tablet 3 mg  3 mg Oral QPM Kendal Quinonez MD   3 mg at 11/14/18 2300    pantoprazole (PROTONIX) tablet 40 mg  40 mg Oral QAM Kendal Quinonez MD   40 mg at 11/15/18 1012    senna (SENOKOT) tablet 17.2 mg  2 tablet Oral Nightly Kendal Quinonez MD   17.2 mg at 11/14/18 2300    traZODone (DESYREL) tablet 50 mg  50 mg Oral Nightly Kendal Quinonez MD   50 mg at 11/14/18 2322    sodium chloride flush 0.9 % injection 10 mL  10 mL Intravenous 2 times per day Kendal Quinonez MD   10 mL at 11/15/18 1012    sodium chloride flush 0.9 % injection 10 mL  10 mL Intravenous PRN Kendal Quinonez MD   10 mL at of lower limb 4/17/2014    Thyroid disease     Urinary incontinence     has a indwelling catheter    Varicose veins of lower extremities 4/17/2014       Past Surgical History:        Procedure Laterality Date    BLADDER SURGERY      COLONOSCOPY      DENTAL SURGERY      random teeth extraction    DILATION AND CURETTAGE OF UTERUS      ENDOSCOPY, COLON, DIAGNOSTIC      FRACTURE SURGERY      HERNIA REPAIR      OTHER SURGICAL HISTORY  09/15/2015    LAPAROSCOPIC HIATAL HERNIA REPAIR WITH FUNDOPLICATION WITH MYOFASCIAL FLAP    TIBIA FRACTURE SURGERY Right     TONSILLECTOMY      TOTAL HIP ARTHROPLASTY Right 09/13/2016    Right Total Hip Arthroplasty    UPPER GASTROINTESTINAL ENDOSCOPY         Social History:    Social History   Substance Use Topics    Smoking status: Former Smoker     Packs/day: 0.25     Start date: 5/31/1977     Quit date: 4/17/2004    Smokeless tobacco: Never Used    Alcohol use Yes      Comment: occassional                                Counseling given: Not Answered      Vital Signs (Current):   Vitals:    11/15/18 0603 11/15/18 0830 11/15/18 1013 11/15/18 1115   BP:  109/72  102/72   Pulse:  93  90   Resp:  20  19   Temp:  36.6 °C (97.8 °F)  36.9 °C (98.4 °F)   TempSrc:  Temporal  Oral   SpO2:   95% 91%   Weight: (!) 344 lb 8.4 oz (156.3 kg)      Height: 5' 5\" (1.651 m)                                                 BP Readings from Last 3 Encounters:   11/15/18 102/72   11/14/18 (!) 150/85   05/18/18 131/78       NPO Status:  Pt educated not to eat or drink anything after 23:59 11/15/2018. She verbalized understanding. BMI:   Wt Readings from Last 3 Encounters:   11/15/18 (!) 344 lb 8.4 oz (156.3 kg)   11/14/18 (!) 345 lb (156.5 kg)   05/18/18 (!) 320 lb (145.2 kg)     Body mass index is 57.33 kg/m².     CBC:   Lab Results   Component Value Date    WBC 6.0 11/15/2018    RBC 3.59 11/15/2018    HGB 10.6 ECG reviewed  Rhythm: regular  Rate: normal           Beta Blocker:  Not on Beta Blocker         Neuro/Psych:   (+) neuromuscular disease: multiple sclerosis, headaches: migraine headaches, psychiatric history: stable with treatmentdepression/anxiety              ROS comment: Hx: MS, anxiety, depression   GI/Hepatic/Renal:   (+) GERD: well controlled,           Endo/Other:    (+) hypothyroidism, blood dyscrasia: anemia:., .                 Abdominal:   (+) obese,         Vascular:   + PVD, aortic or cerebral, PE. Anesthesia Plan      general     ASA 3       Induction: intravenous. BIS  MIPS: Postoperative opioids intended and Prophylactic antiemetics administered. Anesthetic plan and risks discussed with patient. Use of blood products discussed with patient whom consented to blood products. Plan discussed with attending and CRNA. Cleopatra Barrientos RN   11/15/2018        Patient seen and examined on DOS. No interval changes in medical condition. I agree with the note written above and have made the appropriate addendums.     Fede Poole  11/16/2018  6:40 AM

## 2018-11-15 NOTE — PROGRESS NOTES
Access Center called @ 2037pm-Patient will be going to [address]/ [phone], relayed message to nurse.

## 2018-11-15 NOTE — CONSULTS
trauma. -TTP to fingers, hand, wrist, forearm, elbow, humerus, shoulder or clavicle. -- Patient able to flex/extend fingers, wrist, elbow and shoulder with active and passive ROM without pain, compartments soft and compressible. · leftLE: No obvious signs of trauma. -TTP to foot, ankle, leg, knee, thigh, hip.-- Patient able to flex/extend toes, ankle, knee and hip with active and passive ROM without pain, compartments soft and compressible. · Pelvis: -TTP, -Log roll     DATA:    CBC:   Lab Results   Component Value Date    WBC 10.8 11/14/2018    RBC 3.78 11/14/2018    HGB 11.6 11/14/2018    HCT 35.2 11/14/2018    MCV 93.1 11/14/2018    MCH 30.7 11/14/2018    MCHC 33.0 11/14/2018    RDW 13.1 11/14/2018     11/14/2018    MPV 9.2 11/14/2018     PT/INR:    Lab Results   Component Value Date    PROTIME 12.4 11/14/2018    INR 1.1 11/14/2018       Radiology Review:  XR right femur  Fracture or the distal femur in supracondylar region. Fracture is in metaphysial region with minimal displacement and no obvious fracture extending into the joint. Osteopenic bone. There is a fracture of proximal fibular diaphysis with  No fracture of proximal tibia  Hardware intact with no signs of failure to medial tibia plateau     XR right hip  No fracture or dislocations    XR right Knee  Fracture or the distal femur in supracondylar region. Fracture is in metaphysial region with minimal displacement and no obvious fracture extending into the joint. Osteopenic bone. There is a fracture of proximal fibular diaphysis with  no fracture of proximal tibia  Hardware intact with no signs of failure to medial tibia plateau    XR right Tib fib    There is a spiral distal third tibia shaft fracture extending into the metaphysis. There is an associated verticle medial malleolus fracture which extends into the plafond. There is a Fracture or the distal femur in supracondylar region.  Fracture is in metaphysial region with minimal displacement and no obvious fracture extending into the joint. Osteopenic bone. There is a fracture of proximal fibular diaphysis with  N=no fracture of proximal tibia  Hardware intact with no signs of failure       XR right ankle  There is a spiral distal third tibia shaft fracture extending into the metaphysis. There is an associated verticle medial malleolus fracture which extends into the plafond    IMPRESSION:  · Right Distal femur fracture  · Right distal tibia fracture   · Right Proximal fibula fracture    PLAN:  · Splint removed, skin and soft tissue examined. Compartments soft and compressible. Pt placed in well padded posterior splint with side slabs. Pt tolerated well with no complications. No change in Neurovascular status. · Will need medical clearance  · Plan for OR tomorrow 11/16 with Dr. Eli Garrett  · NPO after midnight  · Hold anticoags  · Pain control per primary  · NWB RLE  · Discuss with Dr. Rebecca Archuleta Attending    I have seen and evaluated the patient and agree with the above assessment. I have performed the key components of the history and physical examination and concur completely with the findings as documented. CC: Right leg pain    HPI:This is a 62yo female who sustained a mechanical fall at the doctor's office injuring the right leg. States she was getting off the scale and trying to be placed back in a wheelchair when she fell backwards twisting and falling on the left leg. Had immediate pain to the thigh as well as the ankle region. Was initially seen at [email or web address] diagnosed with multiple fracture the right lower extremity, she was splinted and transferred here for definitive management. Patient does have history of underlying MS. Should previous right GUY by myself for AVN in the past as well as previous right medial tibial plateau ORIF by Dr. Mckay Yousif. Her functional status at this point was 40 feet with the parallel bars.  She continues to be morbidly expected outcomes. I reviewed the possible complications from the injury itself despite treatment chosen. We also discussed treatment options including nonoperative managements versus surgical management, along with risks and benefits of each. Discussed how her body habitus and morbid obesity would make mobilization difficult and likely problematic for her femur fracture. They have elected for operative management at this time. Medical admit with surgical optimization  To OR today for right distal femur fracture ORIF, right distal tibia fracture ORIF  Maintain bedrest, splint immobilization, NPO    I have explained the risks and complications of the recommended surgery with the patient at length, as well as discussed potential treatment alternatives including nonoperative management. These risks include but are not limited to death or complication from anesthesia, continued pain, nerve tendon or vascular injury, infection, nonunion or malunion, symptomatic hardware or hardware failure, deep vein thrombosis or pulmonary embolism, and need for further surgery, etc.  Patient understood this, asked appropriate questions, which were all answered, and she has elected to proceed with the procedure.     Electronically signed by   Amanda Krishnamurthy DO  11/16/2018 at 8:53 AM

## 2018-11-16 ENCOUNTER — APPOINTMENT (OUTPATIENT)
Dept: GENERAL RADIOLOGY | Age: 58
DRG: 481 | End: 2018-11-16
Attending: INTERNAL MEDICINE
Payer: MEDICARE

## 2018-11-16 ENCOUNTER — ANESTHESIA (OUTPATIENT)
Dept: OPERATING ROOM | Age: 58
DRG: 481 | End: 2018-11-16
Payer: MEDICARE

## 2018-11-16 VITALS
OXYGEN SATURATION: 100 % | SYSTOLIC BLOOD PRESSURE: 113 MMHG | DIASTOLIC BLOOD PRESSURE: 90 MMHG | RESPIRATION RATE: 12 BRPM | TEMPERATURE: 98.8 F

## 2018-11-16 PROCEDURE — 27513 TREATMENT OF THIGH FRACTURE: CPT | Performed by: ORTHOPAEDIC SURGERY

## 2018-11-16 PROCEDURE — C1713 ANCHOR/SCREW BN/BN,TIS/BN: HCPCS | Performed by: ORTHOPAEDIC SURGERY

## 2018-11-16 PROCEDURE — 6360000002 HC RX W HCPCS: Performed by: STUDENT IN AN ORGANIZED HEALTH CARE EDUCATION/TRAINING PROGRAM

## 2018-11-16 PROCEDURE — 2580000003 HC RX 258: Performed by: INTERNAL MEDICINE

## 2018-11-16 PROCEDURE — 27780 TREATMENT OF FIBULA FRACTURE: CPT | Performed by: ORTHOPAEDIC SURGERY

## 2018-11-16 PROCEDURE — 7100000000 HC PACU RECOVERY - FIRST 15 MIN: Performed by: ORTHOPAEDIC SURGERY

## 2018-11-16 PROCEDURE — 73552 X-RAY EXAM OF FEMUR 2/>: CPT

## 2018-11-16 PROCEDURE — 2580000003 HC RX 258: Performed by: STUDENT IN AN ORGANIZED HEALTH CARE EDUCATION/TRAINING PROGRAM

## 2018-11-16 PROCEDURE — 6370000000 HC RX 637 (ALT 250 FOR IP): Performed by: INTERNAL MEDICINE

## 2018-11-16 PROCEDURE — 6360000002 HC RX W HCPCS: Performed by: ORTHOPAEDIC SURGERY

## 2018-11-16 PROCEDURE — 3209999900 FLUORO FOR SURGICAL PROCEDURES

## 2018-11-16 PROCEDURE — 2709999900 HC NON-CHARGEABLE SUPPLY: Performed by: ORTHOPAEDIC SURGERY

## 2018-11-16 PROCEDURE — C1769 GUIDE WIRE: HCPCS | Performed by: ORTHOPAEDIC SURGERY

## 2018-11-16 PROCEDURE — 73590 X-RAY EXAM OF LOWER LEG: CPT

## 2018-11-16 PROCEDURE — 6360000002 HC RX W HCPCS: Performed by: INTERNAL MEDICINE

## 2018-11-16 PROCEDURE — 1200000000 HC SEMI PRIVATE

## 2018-11-16 PROCEDURE — 2500000003 HC RX 250 WO HCPCS

## 2018-11-16 PROCEDURE — 7100000001 HC PACU RECOVERY - ADDTL 15 MIN: Performed by: ORTHOPAEDIC SURGERY

## 2018-11-16 PROCEDURE — 99221 1ST HOSP IP/OBS SF/LOW 40: CPT | Performed by: ORTHOPAEDIC SURGERY

## 2018-11-16 PROCEDURE — 2720000010 HC SURG SUPPLY STERILE: Performed by: ORTHOPAEDIC SURGERY

## 2018-11-16 PROCEDURE — 6370000000 HC RX 637 (ALT 250 FOR IP): Performed by: ORTHOPAEDIC SURGERY

## 2018-11-16 PROCEDURE — 3600000013 HC SURGERY LEVEL 3 ADDTL 15MIN: Performed by: ORTHOPAEDIC SURGERY

## 2018-11-16 PROCEDURE — 93010 ELECTROCARDIOGRAM REPORT: CPT | Performed by: INTERNAL MEDICINE

## 2018-11-16 PROCEDURE — 6360000002 HC RX W HCPCS

## 2018-11-16 PROCEDURE — 6370000000 HC RX 637 (ALT 250 FOR IP): Performed by: STUDENT IN AN ORGANIZED HEALTH CARE EDUCATION/TRAINING PROGRAM

## 2018-11-16 PROCEDURE — 3700000001 HC ADD 15 MINUTES (ANESTHESIA): Performed by: ORTHOPAEDIC SURGERY

## 2018-11-16 PROCEDURE — 64447 NJX AA&/STRD FEMORAL NRV IMG: CPT | Performed by: ANESTHESIOLOGY

## 2018-11-16 PROCEDURE — 2580000003 HC RX 258

## 2018-11-16 PROCEDURE — 3600000003 HC SURGERY LEVEL 3 BASE: Performed by: ORTHOPAEDIC SURGERY

## 2018-11-16 PROCEDURE — 27758 TREATMENT OF TIBIA FRACTURE: CPT | Performed by: ORTHOPAEDIC SURGERY

## 2018-11-16 PROCEDURE — 3700000000 HC ANESTHESIA ATTENDED CARE: Performed by: ORTHOPAEDIC SURGERY

## 2018-11-16 PROCEDURE — 73610 X-RAY EXAM OF ANKLE: CPT

## 2018-11-16 DEVICE — SCREW BNE L38MM DIA5MM S STL ST LOK FULL THRD T25 STARDRV: Type: IMPLANTABLE DEVICE | Site: FEMUR | Status: FUNCTIONAL

## 2018-11-16 DEVICE — SCREW BNE L36MM DIA5MM S STL ST LOK FULL THRD T25 STARDRV: Type: IMPLANTABLE DEVICE | Site: FEMUR | Status: FUNCTIONAL

## 2018-11-16 DEVICE — SCREW BNE L40MM DIA3.5MM CORT S STL ST LOK FULL THRD: Type: IMPLANTABLE DEVICE | Site: TIBIA | Status: FUNCTIONAL

## 2018-11-16 DEVICE — SCREW BNE L85MM DIA5MM PROX FEM S STL SELF DRL ST CONIC HD: Type: IMPLANTABLE DEVICE | Site: FEMUR | Status: FUNCTIONAL

## 2018-11-16 DEVICE — SCREW BNE L80MM DIA7.3MM PROX FEM S STL SELF DRL ST CONIC: Type: IMPLANTABLE DEVICE | Site: FEMUR | Status: FUNCTIONAL

## 2018-11-16 DEVICE — SCREW BNE L22MM DIA3.5MM CORT S STL ST NONCANNULATED LOK: Type: IMPLANTABLE DEVICE | Site: TIBIA | Status: FUNCTIONAL

## 2018-11-16 DEVICE — SCREW BNE L30MM DIA3.5MM CORT S STL ST NONCANNULATED LOK: Type: IMPLANTABLE DEVICE | Site: TIBIA | Status: FUNCTIONAL

## 2018-11-16 DEVICE — SCREW BNE L46MM DIA3.5MM S STL ST FULL THRD T15 STARDRV: Type: IMPLANTABLE DEVICE | Site: TIBIA | Status: FUNCTIONAL

## 2018-11-16 DEVICE — SCREW BNE L24MM DIA3.5MM CORT S STL ST NONCANNULATED LOK: Type: IMPLANTABLE DEVICE | Site: TIBIA | Status: FUNCTIONAL

## 2018-11-16 DEVICE — IMPLANTABLE DEVICE: Type: IMPLANTABLE DEVICE | Site: TIBIA | Status: FUNCTIONAL

## 2018-11-16 DEVICE — SCREW BNE L70MM DIA5MM S STL ST LOK FULL THRD T25 STARDRV: Type: IMPLANTABLE DEVICE | Site: FEMUR | Status: FUNCTIONAL

## 2018-11-16 DEVICE — SCREW BNE L75MM DIA5MM PROX FEM S STL SELF DRL ST CONIC HD: Type: IMPLANTABLE DEVICE | Site: FEMUR | Status: FUNCTIONAL

## 2018-11-16 DEVICE — SCREW BNE L40MM DIA4.5MM PROX CORT TIB S STL ST LOK FULL: Type: IMPLANTABLE DEVICE | Site: FEMUR | Status: FUNCTIONAL

## 2018-11-16 DEVICE — SCREW BNE L32MM DIA3.5MM CORT S STL ST LOK FULL THRD: Type: IMPLANTABLE DEVICE | Site: TIBIA | Status: FUNCTIONAL

## 2018-11-16 DEVICE — SCREW BNE L24MM DIA3.5MM CORT S STL ST LOK FULL THRD: Type: IMPLANTABLE DEVICE | Site: TIBIA | Status: FUNCTIONAL

## 2018-11-16 DEVICE — SCREW BNE L26MM DIA3.5MM CORT S STL ST NONCANNULATED LOK: Type: IMPLANTABLE DEVICE | Site: TIBIA | Status: FUNCTIONAL

## 2018-11-16 DEVICE — SCREW BNE L42MM DIA4.5MM PROX CORT TIB S STL ST LOK FULL: Type: IMPLANTABLE DEVICE | Site: FEMUR | Status: FUNCTIONAL

## 2018-11-16 DEVICE — IMPLANTABLE DEVICE: Type: IMPLANTABLE DEVICE | Site: FEMUR | Status: FUNCTIONAL

## 2018-11-16 RX ORDER — ACETAMINOPHEN 325 MG/1
650 TABLET ORAL EVERY 4 HOURS PRN
Status: DISCONTINUED | OUTPATIENT
Start: 2018-11-16 | End: 2018-11-20 | Stop reason: HOSPADM

## 2018-11-16 RX ORDER — NEOSTIGMINE METHYLSULFATE 1 MG/ML
INJECTION, SOLUTION INTRAVENOUS PRN
Status: DISCONTINUED | OUTPATIENT
Start: 2018-11-16 | End: 2018-11-16 | Stop reason: SDUPTHER

## 2018-11-16 RX ORDER — HYDROCODONE BITARTRATE AND ACETAMINOPHEN 5; 325 MG/1; MG/1
1 TABLET ORAL EVERY 4 HOURS PRN
Qty: 42 TABLET | Refills: 0 | Status: SHIPPED | OUTPATIENT
Start: 2018-11-16 | End: 2018-11-23

## 2018-11-16 RX ORDER — FENTANYL CITRATE 50 UG/ML
INJECTION, SOLUTION INTRAMUSCULAR; INTRAVENOUS PRN
Status: DISCONTINUED | OUTPATIENT
Start: 2018-11-16 | End: 2018-11-16 | Stop reason: SDUPTHER

## 2018-11-16 RX ORDER — DIAPER,BRIEF,INFANT-TODD,DISP
EACH MISCELLANEOUS PRN
Status: DISCONTINUED | OUTPATIENT
Start: 2018-11-16 | End: 2018-11-20 | Stop reason: HOSPADM

## 2018-11-16 RX ORDER — MORPHINE SULFATE 2 MG/ML
2 INJECTION, SOLUTION INTRAMUSCULAR; INTRAVENOUS EVERY 4 HOURS PRN
Status: DISCONTINUED | OUTPATIENT
Start: 2018-11-16 | End: 2018-11-20 | Stop reason: HOSPADM

## 2018-11-16 RX ORDER — GLYCOPYRROLATE 1 MG/5 ML
SYRINGE (ML) INTRAVENOUS PRN
Status: DISCONTINUED | OUTPATIENT
Start: 2018-11-16 | End: 2018-11-16 | Stop reason: SDUPTHER

## 2018-11-16 RX ORDER — VANCOMYCIN HYDROCHLORIDE 1 G/20ML
INJECTION, POWDER, LYOPHILIZED, FOR SOLUTION INTRAVENOUS PRN
Status: DISCONTINUED | OUTPATIENT
Start: 2018-11-16 | End: 2018-11-20 | Stop reason: HOSPADM

## 2018-11-16 RX ORDER — SODIUM CHLORIDE 9 MG/ML
INJECTION, SOLUTION INTRAVENOUS CONTINUOUS PRN
Status: DISCONTINUED | OUTPATIENT
Start: 2018-11-16 | End: 2018-11-16 | Stop reason: SDUPTHER

## 2018-11-16 RX ORDER — PROPOFOL 10 MG/ML
INJECTION, EMULSION INTRAVENOUS PRN
Status: DISCONTINUED | OUTPATIENT
Start: 2018-11-16 | End: 2018-11-16 | Stop reason: SDUPTHER

## 2018-11-16 RX ORDER — SODIUM CHLORIDE 0.9 % (FLUSH) 0.9 %
10 SYRINGE (ML) INJECTION PRN
Status: DISCONTINUED | OUTPATIENT
Start: 2018-11-16 | End: 2018-11-20 | Stop reason: HOSPADM

## 2018-11-16 RX ORDER — CEFAZOLIN SODIUM 1 G/3ML
INJECTION, POWDER, FOR SOLUTION INTRAMUSCULAR; INTRAVENOUS PRN
Status: DISCONTINUED | OUTPATIENT
Start: 2018-11-16 | End: 2018-11-16 | Stop reason: SDUPTHER

## 2018-11-16 RX ORDER — ONDANSETRON 2 MG/ML
INJECTION INTRAMUSCULAR; INTRAVENOUS PRN
Status: DISCONTINUED | OUTPATIENT
Start: 2018-11-16 | End: 2018-11-16 | Stop reason: SDUPTHER

## 2018-11-16 RX ORDER — ROCURONIUM BROMIDE 10 MG/ML
INJECTION, SOLUTION INTRAVENOUS PRN
Status: DISCONTINUED | OUTPATIENT
Start: 2018-11-16 | End: 2018-11-16 | Stop reason: SDUPTHER

## 2018-11-16 RX ORDER — SODIUM CHLORIDE 0.9 % (FLUSH) 0.9 %
10 SYRINGE (ML) INJECTION EVERY 12 HOURS SCHEDULED
Status: DISCONTINUED | OUTPATIENT
Start: 2018-11-16 | End: 2018-11-20 | Stop reason: HOSPADM

## 2018-11-16 RX ORDER — DOCUSATE SODIUM 100 MG/1
100 CAPSULE, LIQUID FILLED ORAL 2 TIMES DAILY
Status: DISCONTINUED | OUTPATIENT
Start: 2018-11-16 | End: 2018-11-20 | Stop reason: HOSPADM

## 2018-11-16 RX ORDER — DEXAMETHASONE SODIUM PHOSPHATE 10 MG/ML
INJECTION, SOLUTION INTRAMUSCULAR; INTRAVENOUS PRN
Status: DISCONTINUED | OUTPATIENT
Start: 2018-11-16 | End: 2018-11-16 | Stop reason: SDUPTHER

## 2018-11-16 RX ORDER — ONDANSETRON 2 MG/ML
4 INJECTION INTRAMUSCULAR; INTRAVENOUS EVERY 6 HOURS PRN
Status: DISCONTINUED | OUTPATIENT
Start: 2018-11-16 | End: 2018-11-20 | Stop reason: HOSPADM

## 2018-11-16 RX ORDER — HYDROCODONE BITARTRATE AND ACETAMINOPHEN 5; 325 MG/1; MG/1
1 TABLET ORAL EVERY 4 HOURS PRN
Status: DISCONTINUED | OUTPATIENT
Start: 2018-11-16 | End: 2018-11-17

## 2018-11-16 RX ORDER — MORPHINE SULFATE 4 MG/ML
4 INJECTION, SOLUTION INTRAMUSCULAR; INTRAVENOUS EVERY 4 HOURS PRN
Status: DISCONTINUED | OUTPATIENT
Start: 2018-11-16 | End: 2018-11-20 | Stop reason: HOSPADM

## 2018-11-16 RX ORDER — HYDROCODONE BITARTRATE AND ACETAMINOPHEN 5; 325 MG/1; MG/1
2 TABLET ORAL EVERY 4 HOURS PRN
Status: DISCONTINUED | OUTPATIENT
Start: 2018-11-16 | End: 2018-11-17

## 2018-11-16 RX ORDER — LIDOCAINE HYDROCHLORIDE 20 MG/ML
INJECTION, SOLUTION INFILTRATION; PERINEURAL PRN
Status: DISCONTINUED | OUTPATIENT
Start: 2018-11-16 | End: 2018-11-16 | Stop reason: SDUPTHER

## 2018-11-16 RX ADMIN — PHENYLEPHRINE HYDROCHLORIDE 100 MCG: 10 INJECTION INTRAVENOUS at 12:43

## 2018-11-16 RX ADMIN — ENOXAPARIN SODIUM 40 MG: 40 INJECTION SUBCUTANEOUS at 18:56

## 2018-11-16 RX ADMIN — SODIUM CHLORIDE: 9 INJECTION, SOLUTION INTRAVENOUS at 00:02

## 2018-11-16 RX ADMIN — CYCLOBENZAPRINE 10 MG: 10 TABLET, FILM COATED ORAL at 16:51

## 2018-11-16 RX ADMIN — PROPOFOL 130 MG: 10 INJECTION, EMULSION INTRAVENOUS at 10:58

## 2018-11-16 RX ADMIN — SODIUM CHLORIDE: 9 INJECTION, SOLUTION INTRAVENOUS at 10:58

## 2018-11-16 RX ADMIN — CEFAZOLIN SODIUM 3 G: 10 INJECTION, POWDER, FOR SOLUTION INTRAVENOUS at 20:40

## 2018-11-16 RX ADMIN — SODIUM CHLORIDE: 9 INJECTION, SOLUTION INTRAVENOUS at 23:49

## 2018-11-16 RX ADMIN — CEFAZOLIN 3000 MG: 1 INJECTION, POWDER, FOR SOLUTION INTRAVENOUS at 11:10

## 2018-11-16 RX ADMIN — ONDANSETRON HYDROCHLORIDE 4 MG: 2 INJECTION, SOLUTION INTRAMUSCULAR; INTRAVENOUS at 13:01

## 2018-11-16 RX ADMIN — ROCURONIUM BROMIDE 50 MG: 10 INJECTION INTRAVENOUS at 10:58

## 2018-11-16 RX ADMIN — FENTANYL CITRATE 50 MCG: 50 INJECTION, SOLUTION INTRAMUSCULAR; INTRAVENOUS at 10:58

## 2018-11-16 RX ADMIN — FENTANYL CITRATE 50 MCG: 50 INJECTION, SOLUTION INTRAMUSCULAR; INTRAVENOUS at 13:49

## 2018-11-16 RX ADMIN — TRAZODONE HYDROCHLORIDE 50 MG: 50 TABLET ORAL at 20:40

## 2018-11-16 RX ADMIN — Medication 3 MG: at 13:16

## 2018-11-16 RX ADMIN — HYDROCODONE BITARTRATE AND ACETAMINOPHEN 2 TABLET: 5; 325 TABLET ORAL at 23:48

## 2018-11-16 RX ADMIN — LIDOCAINE HYDROCHLORIDE 100 MG: 20 INJECTION, SOLUTION INFILTRATION; PERINEURAL at 10:58

## 2018-11-16 RX ADMIN — CYCLOBENZAPRINE 10 MG: 10 TABLET, FILM COATED ORAL at 20:40

## 2018-11-16 RX ADMIN — OXYCODONE AND ACETAMINOPHEN 1 TABLET: 5; 325 TABLET ORAL at 18:00

## 2018-11-16 RX ADMIN — Medication 10 ML: at 00:02

## 2018-11-16 RX ADMIN — Medication 2 MG: at 04:55

## 2018-11-16 RX ADMIN — DEXAMETHASONE SODIUM PHOSPHATE 10 MG: 10 INJECTION INTRAMUSCULAR; INTRAVENOUS at 10:58

## 2018-11-16 RX ADMIN — MORPHINE SULFATE 4 MG: 4 INJECTION INTRAVENOUS at 21:19

## 2018-11-16 RX ADMIN — Medication 0.6 MG: at 13:16

## 2018-11-16 RX ADMIN — DOCUSATE SODIUM 100 MG: 100 CAPSULE, LIQUID FILLED ORAL at 20:44

## 2018-11-16 RX ADMIN — MELATONIN 3 MG ORAL TABLET 3 MG: 3 TABLET ORAL at 18:56

## 2018-11-16 RX ADMIN — FENTANYL CITRATE 50 MCG: 50 INJECTION, SOLUTION INTRAMUSCULAR; INTRAVENOUS at 11:28

## 2018-11-16 RX ADMIN — SENNOSIDES 17.2 MG: 8.6 TABLET, FILM COATED ORAL at 20:40

## 2018-11-16 RX ADMIN — DULOXETINE HYDROCHLORIDE 60 MG: 60 CAPSULE, DELAYED RELEASE ORAL at 20:40

## 2018-11-16 RX ADMIN — Medication 2 MG: at 16:50

## 2018-11-16 RX ADMIN — SODIUM CHLORIDE: 9 INJECTION, SOLUTION INTRAVENOUS at 13:02

## 2018-11-16 RX ADMIN — FENTANYL CITRATE 50 MCG: 50 INJECTION, SOLUTION INTRAMUSCULAR; INTRAVENOUS at 11:25

## 2018-11-16 ASSESSMENT — PULMONARY FUNCTION TESTS
PIF_VALUE: 17
PIF_VALUE: 3
PIF_VALUE: 15
PIF_VALUE: 33
PIF_VALUE: 32
PIF_VALUE: 37
PIF_VALUE: 35
PIF_VALUE: 25
PIF_VALUE: 26
PIF_VALUE: 38
PIF_VALUE: 29
PIF_VALUE: 30
PIF_VALUE: 27
PIF_VALUE: 30
PIF_VALUE: 2
PIF_VALUE: 25
PIF_VALUE: 37
PIF_VALUE: 30
PIF_VALUE: 36
PIF_VALUE: 29
PIF_VALUE: 30
PIF_VALUE: 32
PIF_VALUE: 37
PIF_VALUE: 27
PIF_VALUE: 35
PIF_VALUE: 28
PIF_VALUE: 31
PIF_VALUE: 32
PIF_VALUE: 34
PIF_VALUE: 29
PIF_VALUE: 31
PIF_VALUE: 2
PIF_VALUE: 30
PIF_VALUE: 35
PIF_VALUE: 22
PIF_VALUE: 26
PIF_VALUE: 34
PIF_VALUE: 31
PIF_VALUE: 36
PIF_VALUE: 31
PIF_VALUE: 32
PIF_VALUE: 30
PIF_VALUE: 2
PIF_VALUE: 33
PIF_VALUE: 39
PIF_VALUE: 29
PIF_VALUE: 32
PIF_VALUE: 28
PIF_VALUE: 31
PIF_VALUE: 35
PIF_VALUE: 30
PIF_VALUE: 37
PIF_VALUE: 33
PIF_VALUE: 31
PIF_VALUE: 35
PIF_VALUE: 35
PIF_VALUE: 1
PIF_VALUE: 0
PIF_VALUE: 2
PIF_VALUE: 36
PIF_VALUE: 29
PIF_VALUE: 31
PIF_VALUE: 29
PIF_VALUE: 2
PIF_VALUE: 31
PIF_VALUE: 26
PIF_VALUE: 32
PIF_VALUE: 31
PIF_VALUE: 38
PIF_VALUE: 32
PIF_VALUE: 32
PIF_VALUE: 31
PIF_VALUE: 32
PIF_VALUE: 31
PIF_VALUE: 25
PIF_VALUE: 1
PIF_VALUE: 27
PIF_VALUE: 26
PIF_VALUE: 30
PIF_VALUE: 25
PIF_VALUE: 32
PIF_VALUE: 25
PIF_VALUE: 34
PIF_VALUE: 32
PIF_VALUE: 31
PIF_VALUE: 29
PIF_VALUE: 32
PIF_VALUE: 33
PIF_VALUE: 30
PIF_VALUE: 31
PIF_VALUE: 31
PIF_VALUE: 30
PIF_VALUE: 29
PIF_VALUE: 29
PIF_VALUE: 27
PIF_VALUE: 30
PIF_VALUE: 28
PIF_VALUE: 29
PIF_VALUE: 33
PIF_VALUE: 15
PIF_VALUE: 25
PIF_VALUE: 25
PIF_VALUE: 15
PIF_VALUE: 30
PIF_VALUE: 29
PIF_VALUE: 31
PIF_VALUE: 19
PIF_VALUE: 26
PIF_VALUE: 29
PIF_VALUE: 37
PIF_VALUE: 29
PIF_VALUE: 30
PIF_VALUE: 31
PIF_VALUE: 33
PIF_VALUE: 31
PIF_VALUE: 31
PIF_VALUE: 36
PIF_VALUE: 27
PIF_VALUE: 30
PIF_VALUE: 25
PIF_VALUE: 31
PIF_VALUE: 29
PIF_VALUE: 34
PIF_VALUE: 25
PIF_VALUE: 1
PIF_VALUE: 15
PIF_VALUE: 2
PIF_VALUE: 31
PIF_VALUE: 29
PIF_VALUE: 2
PIF_VALUE: 25
PIF_VALUE: 27
PIF_VALUE: 29
PIF_VALUE: 31
PIF_VALUE: 20
PIF_VALUE: 20
PIF_VALUE: 31
PIF_VALUE: 26
PIF_VALUE: 30
PIF_VALUE: 40
PIF_VALUE: 31
PIF_VALUE: 27
PIF_VALUE: 29
PIF_VALUE: 30
PIF_VALUE: 20
PIF_VALUE: 35
PIF_VALUE: 30
PIF_VALUE: 31
PIF_VALUE: 27
PIF_VALUE: 25
PIF_VALUE: 3
PIF_VALUE: 1
PIF_VALUE: 2
PIF_VALUE: 31
PIF_VALUE: 26
PIF_VALUE: 40
PIF_VALUE: 33
PIF_VALUE: 25
PIF_VALUE: 29
PIF_VALUE: 7
PIF_VALUE: 26
PIF_VALUE: 1
PIF_VALUE: 25
PIF_VALUE: 13

## 2018-11-16 ASSESSMENT — PAIN SCALES - GENERAL
PAINLEVEL_OUTOF10: 6
PAINLEVEL_OUTOF10: 3
PAINLEVEL_OUTOF10: 0
PAINLEVEL_OUTOF10: 9
PAINLEVEL_OUTOF10: 0
PAINLEVEL_OUTOF10: 0
PAINLEVEL_OUTOF10: 6
PAINLEVEL_OUTOF10: 8
PAINLEVEL_OUTOF10: 8
PAINLEVEL_OUTOF10: 5

## 2018-11-16 ASSESSMENT — PAIN DESCRIPTION - DESCRIPTORS
DESCRIPTORS: CONSTANT;SHARP;SHOOTING
DESCRIPTORS: ACHING;CONSTANT;SHOOTING
DESCRIPTORS: ACHING;DISCOMFORT;THROBBING
DESCRIPTORS: SHARP;SHOOTING;CONSTANT

## 2018-11-16 ASSESSMENT — PAIN DESCRIPTION - PAIN TYPE
TYPE: SURGICAL PAIN
TYPE: ACUTE PAIN
TYPE: SURGICAL PAIN

## 2018-11-16 ASSESSMENT — PAIN DESCRIPTION - LOCATION
LOCATION: LEG

## 2018-11-16 ASSESSMENT — PAIN DESCRIPTION - ORIENTATION
ORIENTATION: RIGHT

## 2018-11-16 ASSESSMENT — PAIN DESCRIPTION - FREQUENCY
FREQUENCY: INTERMITTENT
FREQUENCY: INTERMITTENT

## 2018-11-16 ASSESSMENT — PAIN DESCRIPTION - ONSET
ONSET: AWAKENED FROM SLEEP
ONSET: AWAKENED FROM SLEEP

## 2018-11-16 NOTE — PROGRESS NOTES
Subjective: The patient is awake and alert. No problems overnight. Denies chest pain, angina, and dyspnea. Denies abdominal pain. NPO for surgery. No nausea or vomiting. Objective:    BP (!) 114/58   Pulse 77   Temp 98.1 °F (36.7 °C) (Temporal)   Resp 18   Ht 5' 5\" (1.651 m)   Wt (!) 344 lb 8.4 oz (156.3 kg)   SpO2 94%   BMI 57.33 kg/m²     Current medications that patient is taking have been reviewed. Heart:  RRR, no murmurs, gallops, or rubs.   Lungs:  CTA bilaterally, no wheeze, rales or rhonchi  Abd: bowel sounds present, nontender, nondistended, no masses  Extrem:  No clubbing, cyanosis, or edema    CBC with Differential:    Lab Results   Component Value Date    WBC 6.0 11/15/2018    RBC 3.59 11/15/2018    HGB 10.6 11/15/2018    HCT 33.3 11/15/2018     11/15/2018    MCV 92.8 11/15/2018    MCH 29.5 11/15/2018    MCHC 31.8 11/15/2018    RDW 13.2 11/15/2018    SEGSPCT 70 02/27/2014    LYMPHOPCT 27.0 11/15/2018    MONOPCT 9.2 11/15/2018    BASOPCT 1.0 11/15/2018    MONOSABS 0.55 11/15/2018    LYMPHSABS 1.62 11/15/2018    EOSABS 0.08 11/15/2018    BASOSABS 0.06 11/15/2018     CMP:    Lab Results   Component Value Date     11/14/2018    K 4.7 11/14/2018    CL 98 11/14/2018    CO2 28 11/14/2018    BUN 10 11/14/2018    CREATININE 0.8 11/14/2018    GFRAA >60 11/14/2018    LABGLOM >60 11/14/2018    GLUCOSE 104 11/14/2018    GLUCOSE 94 05/30/2012    PROT 6.6 10/18/2018    LABALBU 3.3 10/18/2018    CALCIUM 9.3 11/14/2018    BILITOT 0.2 10/18/2018    ALKPHOS 92 10/18/2018    AST 20 10/18/2018    ALT 14 10/18/2018     BMP:    Lab Results   Component Value Date     11/14/2018    K 4.7 11/14/2018    CL 98 11/14/2018    CO2 28 11/14/2018    BUN 10 11/14/2018    LABALBU 3.3 10/18/2018    CREATININE 0.8 11/14/2018    CALCIUM 9.3 11/14/2018    GFRAA >60 11/14/2018    LABGLOM >60 11/14/2018    GLUCOSE 104 11/14/2018    GLUCOSE 94 05/30/2012     Magnesium:  No results found for: MG  Phosphorus: No results found for: PHOS  PT/INR:    Lab Results   Component Value Date    PROTIME 12.4 11/14/2018    INR 1.1 11/14/2018     PTT:    Lab Results   Component Value Date    APTT 30.1 11/14/2018   [APTT}     Assessment:    Patient Active Problem List   Diagnosis    MS (multiple sclerosis) ([facility]    History of pulmonary embolus (PE)    Acquired hypothyroidism    Closed fracture of femur ([facility]    Morbid obesity with BMI of 50.0-59.9, adult ([facility]       Plan:  Stable. DVT prophylaxis. Continue synthroid. Pt/Ot, pain management as per ortho. For surgery today. Continue to encourage weight loss.   Pt/Ot evaluations for discharge planning    Dann Madrigal MD  11:25 AM  11/16/2018

## 2018-11-17 LAB
ANION GAP SERPL CALCULATED.3IONS-SCNC: 13 MMOL/L (ref 7–16)
BACTERIA: ABNORMAL /HPF
BASOPHILS ABSOLUTE: 0.01 E9/L (ref 0–0.2)
BASOPHILS RELATIVE PERCENT: 0.2 % (ref 0–2)
BILIRUBIN URINE: NEGATIVE
BLOOD, URINE: NEGATIVE
BUN BLDV-MCNC: 8 MG/DL (ref 6–20)
CALCIUM SERPL-MCNC: 8.2 MG/DL (ref 8.6–10.2)
CHLORIDE BLD-SCNC: 100 MMOL/L (ref 98–107)
CLARITY: CLEAR
CO2: 24 MMOL/L (ref 22–29)
COLOR: YELLOW
CREAT SERPL-MCNC: 0.7 MG/DL (ref 0.5–1)
EOSINOPHILS ABSOLUTE: 0 E9/L (ref 0.05–0.5)
EOSINOPHILS RELATIVE PERCENT: 0 % (ref 0–6)
GFR AFRICAN AMERICAN: >60
GFR NON-AFRICAN AMERICAN: >60 ML/MIN/1.73
GLUCOSE BLD-MCNC: 117 MG/DL (ref 74–99)
GLUCOSE URINE: NEGATIVE MG/DL
HCT VFR BLD CALC: 25.2 % (ref 34–48)
HEMOGLOBIN: 8.1 G/DL (ref 11.5–15.5)
IMMATURE GRANULOCYTES #: 0.02 E9/L
IMMATURE GRANULOCYTES %: 0.3 % (ref 0–5)
KETONES, URINE: ABNORMAL MG/DL
LEUKOCYTE ESTERASE, URINE: ABNORMAL
LYMPHOCYTES ABSOLUTE: 0.8 E9/L (ref 1.5–4)
LYMPHOCYTES RELATIVE PERCENT: 13.8 % (ref 20–42)
MAGNESIUM: 1.9 MG/DL (ref 1.6–2.6)
MCH RBC QN AUTO: 30 PG (ref 26–35)
MCHC RBC AUTO-ENTMCNC: 32.1 % (ref 32–34.5)
MCV RBC AUTO: 93.3 FL (ref 80–99.9)
MONOCYTES ABSOLUTE: 0.76 E9/L (ref 0.1–0.95)
MONOCYTES RELATIVE PERCENT: 13.1 % (ref 2–12)
NEUTROPHILS ABSOLUTE: 4.22 E9/L (ref 1.8–7.3)
NEUTROPHILS RELATIVE PERCENT: 72.6 % (ref 43–80)
NITRITE, URINE: POSITIVE
PDW BLD-RTO: 13.2 FL (ref 11.5–15)
PH UA: 7.5 (ref 5–9)
PLATELET # BLD: 227 E9/L (ref 130–450)
PMV BLD AUTO: 9.9 FL (ref 7–12)
POTASSIUM SERPL-SCNC: 4 MMOL/L (ref 3.5–5)
PROTEIN UA: ABNORMAL MG/DL
RBC # BLD: 2.7 E12/L (ref 3.5–5.5)
RBC UA: ABNORMAL /HPF (ref 0–2)
SODIUM BLD-SCNC: 137 MMOL/L (ref 132–146)
SPECIFIC GRAVITY UA: 1.01 (ref 1–1.03)
UROBILINOGEN, URINE: 0.2 E.U./DL
WBC # BLD: 5.8 E9/L (ref 4.5–11.5)
WBC UA: ABNORMAL /HPF (ref 0–5)

## 2018-11-17 PROCEDURE — 97165 OT EVAL LOW COMPLEX 30 MIN: CPT

## 2018-11-17 PROCEDURE — G8987 SELF CARE CURRENT STATUS: HCPCS

## 2018-11-17 PROCEDURE — 36415 COLL VENOUS BLD VENIPUNCTURE: CPT

## 2018-11-17 PROCEDURE — 2580000003 HC RX 258: Performed by: STUDENT IN AN ORGANIZED HEALTH CARE EDUCATION/TRAINING PROGRAM

## 2018-11-17 PROCEDURE — 81001 URINALYSIS AUTO W/SCOPE: CPT

## 2018-11-17 PROCEDURE — 80048 BASIC METABOLIC PNL TOTAL CA: CPT

## 2018-11-17 PROCEDURE — 83735 ASSAY OF MAGNESIUM: CPT

## 2018-11-17 PROCEDURE — 2700000000 HC OXYGEN THERAPY PER DAY

## 2018-11-17 PROCEDURE — G8988 SELF CARE GOAL STATUS: HCPCS

## 2018-11-17 PROCEDURE — 87088 URINE BACTERIA CULTURE: CPT

## 2018-11-17 PROCEDURE — 6370000000 HC RX 637 (ALT 250 FOR IP): Performed by: INTERNAL MEDICINE

## 2018-11-17 PROCEDURE — 97530 THERAPEUTIC ACTIVITIES: CPT

## 2018-11-17 PROCEDURE — 97162 PT EVAL MOD COMPLEX 30 MIN: CPT

## 2018-11-17 PROCEDURE — 1200000000 HC SEMI PRIVATE

## 2018-11-17 PROCEDURE — 85025 COMPLETE CBC W/AUTO DIFF WBC: CPT

## 2018-11-17 PROCEDURE — 6370000000 HC RX 637 (ALT 250 FOR IP): Performed by: STUDENT IN AN ORGANIZED HEALTH CARE EDUCATION/TRAINING PROGRAM

## 2018-11-17 PROCEDURE — 6360000002 HC RX W HCPCS: Performed by: STUDENT IN AN ORGANIZED HEALTH CARE EDUCATION/TRAINING PROGRAM

## 2018-11-17 RX ORDER — METRONIDAZOLE 7.5 MG/G
GEL TOPICAL 2 TIMES DAILY
Status: DISCONTINUED | OUTPATIENT
Start: 2018-11-17 | End: 2018-11-17

## 2018-11-17 RX ORDER — OXYCODONE HYDROCHLORIDE AND ACETAMINOPHEN 5; 325 MG/1; MG/1
1 TABLET ORAL EVERY 4 HOURS PRN
Status: DISCONTINUED | OUTPATIENT
Start: 2018-11-17 | End: 2018-11-20 | Stop reason: HOSPADM

## 2018-11-17 RX ORDER — DIAPER,BRIEF,INFANT-TODD,DISP
EACH MISCELLANEOUS 2 TIMES DAILY
Status: DISCONTINUED | OUTPATIENT
Start: 2018-11-17 | End: 2018-11-20 | Stop reason: HOSPADM

## 2018-11-17 RX ADMIN — Medication 10 ML: at 14:03

## 2018-11-17 RX ADMIN — LEVOTHYROXINE SODIUM 175 MCG: 175 TABLET ORAL at 06:19

## 2018-11-17 RX ADMIN — HYDROCORTISONE: 1 CREAM TOPICAL at 20:45

## 2018-11-17 RX ADMIN — DULOXETINE HYDROCHLORIDE 60 MG: 60 CAPSULE, DELAYED RELEASE ORAL at 08:48

## 2018-11-17 RX ADMIN — Medication 10 ML: at 20:46

## 2018-11-17 RX ADMIN — CEFAZOLIN SODIUM 3 G: 10 INJECTION, POWDER, FOR SOLUTION INTRAVENOUS at 13:28

## 2018-11-17 RX ADMIN — OXYCODONE AND ACETAMINOPHEN 1 TABLET: 5; 325 TABLET ORAL at 18:10

## 2018-11-17 RX ADMIN — DOCUSATE SODIUM 100 MG: 100 CAPSULE, LIQUID FILLED ORAL at 20:45

## 2018-11-17 RX ADMIN — PANTOPRAZOLE SODIUM 40 MG: 40 TABLET, DELAYED RELEASE ORAL at 08:48

## 2018-11-17 RX ADMIN — SENNOSIDES 17.2 MG: 8.6 TABLET, FILM COATED ORAL at 20:45

## 2018-11-17 RX ADMIN — TRAZODONE HYDROCHLORIDE 50 MG: 50 TABLET ORAL at 20:45

## 2018-11-17 RX ADMIN — ARIPIPRAZOLE 10 MG: 10 TABLET ORAL at 08:48

## 2018-11-17 RX ADMIN — GABAPENTIN 300 MG: 300 CAPSULE ORAL at 08:48

## 2018-11-17 RX ADMIN — DULOXETINE HYDROCHLORIDE 60 MG: 60 CAPSULE, DELAYED RELEASE ORAL at 20:45

## 2018-11-17 RX ADMIN — ENOXAPARIN SODIUM 40 MG: 40 INJECTION SUBCUTANEOUS at 08:48

## 2018-11-17 RX ADMIN — HYDROCORTISONE: 1 CREAM TOPICAL at 12:22

## 2018-11-17 RX ADMIN — DOCUSATE SODIUM 100 MG: 100 CAPSULE, LIQUID FILLED ORAL at 08:48

## 2018-11-17 RX ADMIN — Medication 10 ML: at 19:46

## 2018-11-17 RX ADMIN — OXYCODONE AND ACETAMINOPHEN 1 TABLET: 5; 325 TABLET ORAL at 10:50

## 2018-11-17 RX ADMIN — MORPHINE SULFATE 4 MG: 4 INJECTION INTRAVENOUS at 14:03

## 2018-11-17 RX ADMIN — MELATONIN 3 MG ORAL TABLET 3 MG: 3 TABLET ORAL at 17:54

## 2018-11-17 RX ADMIN — MORPHINE SULFATE 4 MG: 4 INJECTION INTRAVENOUS at 07:48

## 2018-11-17 RX ADMIN — CYCLOBENZAPRINE 10 MG: 10 TABLET, FILM COATED ORAL at 13:35

## 2018-11-17 RX ADMIN — CYCLOBENZAPRINE 10 MG: 10 TABLET, FILM COATED ORAL at 07:48

## 2018-11-17 RX ADMIN — MORPHINE SULFATE 4 MG: 4 INJECTION INTRAVENOUS at 19:45

## 2018-11-17 RX ADMIN — HYDROCODONE BITARTRATE AND ACETAMINOPHEN 2 TABLET: 5; 325 TABLET ORAL at 04:49

## 2018-11-17 RX ADMIN — CYCLOBENZAPRINE 10 MG: 10 TABLET, FILM COATED ORAL at 20:45

## 2018-11-17 RX ADMIN — CEFAZOLIN SODIUM 3 G: 10 INJECTION, POWDER, FOR SOLUTION INTRAVENOUS at 04:41

## 2018-11-17 ASSESSMENT — PAIN SCALES - GENERAL
PAINLEVEL_OUTOF10: 5
PAINLEVEL_OUTOF10: 6
PAINLEVEL_OUTOF10: 6
PAINLEVEL_OUTOF10: 3
PAINLEVEL_OUTOF10: 8
PAINLEVEL_OUTOF10: 5
PAINLEVEL_OUTOF10: 4
PAINLEVEL_OUTOF10: 9
PAINLEVEL_OUTOF10: 8
PAINLEVEL_OUTOF10: 4
PAINLEVEL_OUTOF10: 8
PAINLEVEL_OUTOF10: 2
PAINLEVEL_OUTOF10: 8

## 2018-11-17 ASSESSMENT — PAIN DESCRIPTION - PAIN TYPE
TYPE: SURGICAL PAIN

## 2018-11-17 ASSESSMENT — PAIN DESCRIPTION - DESCRIPTORS
DESCRIPTORS: ACHING;CONSTANT;DISCOMFORT;SORE;SHOOTING
DESCRIPTORS: CONSTANT;CRAMPING;SPASM
DESCRIPTORS: ACHING;CONSTANT;DISCOMFORT;SHARP;SORE
DESCRIPTORS: ACHING;SHARP;SHOOTING
DESCRIPTORS: ACHING;CONSTANT;SHARP

## 2018-11-17 ASSESSMENT — PAIN DESCRIPTION - LOCATION
LOCATION: LEG

## 2018-11-17 ASSESSMENT — PAIN DESCRIPTION - ORIENTATION
ORIENTATION: RIGHT

## 2018-11-17 ASSESSMENT — PAIN DESCRIPTION - FREQUENCY
FREQUENCY: INTERMITTENT
FREQUENCY: INTERMITTENT

## 2018-11-17 ASSESSMENT — PAIN DESCRIPTION - ONSET
ONSET: ON-GOING
ONSET: ON-GOING

## 2018-11-18 LAB
ANION GAP SERPL CALCULATED.3IONS-SCNC: 12 MMOL/L (ref 7–16)
BUN BLDV-MCNC: 10 MG/DL (ref 6–20)
CALCIUM SERPL-MCNC: 8.3 MG/DL (ref 8.6–10.2)
CHLORIDE BLD-SCNC: 99 MMOL/L (ref 98–107)
CO2: 26 MMOL/L (ref 22–29)
CREAT SERPL-MCNC: 0.7 MG/DL (ref 0.5–1)
GFR AFRICAN AMERICAN: >60
GFR NON-AFRICAN AMERICAN: >60 ML/MIN/1.73
GLUCOSE BLD-MCNC: 98 MG/DL (ref 74–99)
HCT VFR BLD CALC: 24.6 % (ref 34–48)
HEMOGLOBIN: 7.7 G/DL (ref 11.5–15.5)
POTASSIUM SERPL-SCNC: 4.2 MMOL/L (ref 3.5–5)
SODIUM BLD-SCNC: 137 MMOL/L (ref 132–146)

## 2018-11-18 PROCEDURE — 85014 HEMATOCRIT: CPT

## 2018-11-18 PROCEDURE — 1200000000 HC SEMI PRIVATE

## 2018-11-18 PROCEDURE — 6370000000 HC RX 637 (ALT 250 FOR IP): Performed by: STUDENT IN AN ORGANIZED HEALTH CARE EDUCATION/TRAINING PROGRAM

## 2018-11-18 PROCEDURE — 36415 COLL VENOUS BLD VENIPUNCTURE: CPT

## 2018-11-18 PROCEDURE — 80048 BASIC METABOLIC PNL TOTAL CA: CPT

## 2018-11-18 PROCEDURE — 6370000000 HC RX 637 (ALT 250 FOR IP): Performed by: INTERNAL MEDICINE

## 2018-11-18 PROCEDURE — 85018 HEMOGLOBIN: CPT

## 2018-11-18 PROCEDURE — 6360000002 HC RX W HCPCS: Performed by: STUDENT IN AN ORGANIZED HEALTH CARE EDUCATION/TRAINING PROGRAM

## 2018-11-18 PROCEDURE — 2580000003 HC RX 258: Performed by: STUDENT IN AN ORGANIZED HEALTH CARE EDUCATION/TRAINING PROGRAM

## 2018-11-18 RX ADMIN — DOCUSATE SODIUM 100 MG: 100 CAPSULE, LIQUID FILLED ORAL at 21:39

## 2018-11-18 RX ADMIN — OXYCODONE AND ACETAMINOPHEN 1 TABLET: 5; 325 TABLET ORAL at 19:22

## 2018-11-18 RX ADMIN — CLONAZEPAM 1 MG: 0.5 TABLET ORAL at 21:38

## 2018-11-18 RX ADMIN — OXYCODONE AND ACETAMINOPHEN 1 TABLET: 5; 325 TABLET ORAL at 04:38

## 2018-11-18 RX ADMIN — DOCUSATE SODIUM 100 MG: 100 CAPSULE, LIQUID FILLED ORAL at 10:43

## 2018-11-18 RX ADMIN — Medication 10 ML: at 02:10

## 2018-11-18 RX ADMIN — HYDROCORTISONE: 1 CREAM TOPICAL at 10:44

## 2018-11-18 RX ADMIN — SENNOSIDES 17.2 MG: 8.6 TABLET, FILM COATED ORAL at 21:39

## 2018-11-18 RX ADMIN — MORPHINE SULFATE 4 MG: 4 INJECTION INTRAVENOUS at 21:39

## 2018-11-18 RX ADMIN — TRAZODONE HYDROCHLORIDE 50 MG: 50 TABLET ORAL at 21:39

## 2018-11-18 RX ADMIN — HYDROCORTISONE: 1 CREAM TOPICAL at 21:39

## 2018-11-18 RX ADMIN — ENOXAPARIN SODIUM 40 MG: 40 INJECTION SUBCUTANEOUS at 10:43

## 2018-11-18 RX ADMIN — PANTOPRAZOLE SODIUM 40 MG: 40 TABLET, DELAYED RELEASE ORAL at 10:44

## 2018-11-18 RX ADMIN — Medication 10 ML: at 10:44

## 2018-11-18 RX ADMIN — DULOXETINE HYDROCHLORIDE 60 MG: 60 CAPSULE, DELAYED RELEASE ORAL at 21:39

## 2018-11-18 RX ADMIN — CYCLOBENZAPRINE 10 MG: 10 TABLET, FILM COATED ORAL at 21:39

## 2018-11-18 RX ADMIN — MORPHINE SULFATE 4 MG: 4 INJECTION INTRAVENOUS at 02:10

## 2018-11-18 RX ADMIN — Medication 10 ML: at 06:21

## 2018-11-18 RX ADMIN — OXYCODONE AND ACETAMINOPHEN 1 TABLET: 5; 325 TABLET ORAL at 13:59

## 2018-11-18 RX ADMIN — CLONAZEPAM 1 MG: 0.5 TABLET ORAL at 02:15

## 2018-11-18 RX ADMIN — Medication 10 ML: at 21:39

## 2018-11-18 RX ADMIN — GABAPENTIN 300 MG: 300 CAPSULE ORAL at 10:43

## 2018-11-18 RX ADMIN — MELATONIN 3 MG ORAL TABLET 3 MG: 3 TABLET ORAL at 21:39

## 2018-11-18 RX ADMIN — DULOXETINE HYDROCHLORIDE 60 MG: 60 CAPSULE, DELAYED RELEASE ORAL at 10:43

## 2018-11-18 RX ADMIN — LEVOTHYROXINE SODIUM 175 MCG: 175 TABLET ORAL at 06:21

## 2018-11-18 RX ADMIN — ARIPIPRAZOLE 10 MG: 10 TABLET ORAL at 10:43

## 2018-11-18 RX ADMIN — CYCLOBENZAPRINE 10 MG: 10 TABLET, FILM COATED ORAL at 10:43

## 2018-11-18 RX ADMIN — MORPHINE SULFATE 4 MG: 4 INJECTION INTRAVENOUS at 06:21

## 2018-11-18 RX ADMIN — CYCLOBENZAPRINE 10 MG: 10 TABLET, FILM COATED ORAL at 14:02

## 2018-11-18 RX ADMIN — MELATONIN 3 MG ORAL TABLET 3 MG: 3 TABLET ORAL at 19:22

## 2018-11-18 ASSESSMENT — PAIN DESCRIPTION - FREQUENCY
FREQUENCY: INTERMITTENT
FREQUENCY: CONTINUOUS
FREQUENCY: INTERMITTENT

## 2018-11-18 ASSESSMENT — PAIN SCALES - GENERAL
PAINLEVEL_OUTOF10: 6
PAINLEVEL_OUTOF10: 10
PAINLEVEL_OUTOF10: 5
PAINLEVEL_OUTOF10: 6
PAINLEVEL_OUTOF10: 4
PAINLEVEL_OUTOF10: 10
PAINLEVEL_OUTOF10: 4
PAINLEVEL_OUTOF10: 5
PAINLEVEL_OUTOF10: 8
PAINLEVEL_OUTOF10: 4
PAINLEVEL_OUTOF10: 8

## 2018-11-18 ASSESSMENT — PAIN DESCRIPTION - ONSET
ONSET: ON-GOING
ONSET: AWAKENED FROM SLEEP
ONSET: ON-GOING

## 2018-11-18 ASSESSMENT — PAIN DESCRIPTION - LOCATION
LOCATION: LEG

## 2018-11-18 ASSESSMENT — PAIN DESCRIPTION - DESCRIPTORS
DESCRIPTORS: ACHING;CONSTANT;DISCOMFORT;SORE
DESCRIPTORS: ACHING;CONSTANT;DISCOMFORT;SHARP;SORE
DESCRIPTORS: ACHING;DISCOMFORT
DESCRIPTORS: ACHING;CONSTANT;DISCOMFORT;SORE

## 2018-11-18 ASSESSMENT — PAIN DESCRIPTION - PAIN TYPE
TYPE: SURGICAL PAIN

## 2018-11-18 ASSESSMENT — PAIN DESCRIPTION - ORIENTATION
ORIENTATION: RIGHT

## 2018-11-19 PROBLEM — D62 ACUTE BLOOD LOSS AS CAUSE OF POSTOPERATIVE ANEMIA: Status: ACTIVE | Noted: 2018-11-19

## 2018-11-19 LAB
ABO/RH: NORMAL
ANTIBODY SCREEN: NORMAL
HCT VFR BLD CALC: 23.2 % (ref 34–48)
HEMOGLOBIN: 7.3 G/DL (ref 11.5–15.5)
URINE CULTURE, ROUTINE: NORMAL

## 2018-11-19 PROCEDURE — 97530 THERAPEUTIC ACTIVITIES: CPT

## 2018-11-19 PROCEDURE — 85014 HEMATOCRIT: CPT

## 2018-11-19 PROCEDURE — 2500000003 HC RX 250 WO HCPCS: Performed by: INTERNAL MEDICINE

## 2018-11-19 PROCEDURE — 36415 COLL VENOUS BLD VENIPUNCTURE: CPT

## 2018-11-19 PROCEDURE — 86850 RBC ANTIBODY SCREEN: CPT

## 2018-11-19 PROCEDURE — 1200000000 HC SEMI PRIVATE

## 2018-11-19 PROCEDURE — 0QSG04Z REPOSITION RIGHT TIBIA WITH INTERNAL FIXATION DEVICE, OPEN APPROACH: ICD-10-PCS | Performed by: ORTHOPAEDIC SURGERY

## 2018-11-19 PROCEDURE — 86900 BLOOD TYPING SEROLOGIC ABO: CPT

## 2018-11-19 PROCEDURE — 85018 HEMOGLOBIN: CPT

## 2018-11-19 PROCEDURE — 2580000003 HC RX 258: Performed by: STUDENT IN AN ORGANIZED HEALTH CARE EDUCATION/TRAINING PROGRAM

## 2018-11-19 PROCEDURE — 86901 BLOOD TYPING SEROLOGIC RH(D): CPT

## 2018-11-19 PROCEDURE — 6370000000 HC RX 637 (ALT 250 FOR IP): Performed by: INTERNAL MEDICINE

## 2018-11-19 PROCEDURE — 6360000002 HC RX W HCPCS: Performed by: STUDENT IN AN ORGANIZED HEALTH CARE EDUCATION/TRAINING PROGRAM

## 2018-11-19 PROCEDURE — 0QSJ04Z REPOSITION RIGHT FIBULA WITH INTERNAL FIXATION DEVICE, OPEN APPROACH: ICD-10-PCS | Performed by: ORTHOPAEDIC SURGERY

## 2018-11-19 PROCEDURE — 6370000000 HC RX 637 (ALT 250 FOR IP): Performed by: STUDENT IN AN ORGANIZED HEALTH CARE EDUCATION/TRAINING PROGRAM

## 2018-11-19 PROCEDURE — 0QSB04Z REPOSITION RIGHT LOWER FEMUR WITH INTERNAL FIXATION DEVICE, OPEN APPROACH: ICD-10-PCS | Performed by: ORTHOPAEDIC SURGERY

## 2018-11-19 RX ADMIN — DOCUSATE SODIUM 100 MG: 100 CAPSULE, LIQUID FILLED ORAL at 10:47

## 2018-11-19 RX ADMIN — TRAZODONE HYDROCHLORIDE 50 MG: 50 TABLET ORAL at 21:09

## 2018-11-19 RX ADMIN — HYDROCORTISONE: 1 CREAM TOPICAL at 10:30

## 2018-11-19 RX ADMIN — ARIPIPRAZOLE 10 MG: 10 TABLET ORAL at 10:21

## 2018-11-19 RX ADMIN — CYCLOBENZAPRINE 10 MG: 10 TABLET, FILM COATED ORAL at 21:09

## 2018-11-19 RX ADMIN — Medication 10 ML: at 21:10

## 2018-11-19 RX ADMIN — HYDROCORTISONE: 1 CREAM TOPICAL at 21:10

## 2018-11-19 RX ADMIN — OXYCODONE AND ACETAMINOPHEN 1 TABLET: 5; 325 TABLET ORAL at 04:50

## 2018-11-19 RX ADMIN — CYCLOBENZAPRINE 10 MG: 10 TABLET, FILM COATED ORAL at 17:37

## 2018-11-19 RX ADMIN — DULOXETINE HYDROCHLORIDE 60 MG: 60 CAPSULE, DELAYED RELEASE ORAL at 21:09

## 2018-11-19 RX ADMIN — OXYCODONE AND ACETAMINOPHEN 1 TABLET: 5; 325 TABLET ORAL at 17:37

## 2018-11-19 RX ADMIN — MELATONIN 3 MG ORAL TABLET 3 MG: 3 TABLET ORAL at 21:09

## 2018-11-19 RX ADMIN — Medication 10 ML: at 10:48

## 2018-11-19 RX ADMIN — MICONAZOLE NITRATE: 20.6 POWDER TOPICAL at 17:37

## 2018-11-19 RX ADMIN — LEVOTHYROXINE SODIUM 175 MCG: 175 TABLET ORAL at 06:44

## 2018-11-19 RX ADMIN — GABAPENTIN 300 MG: 300 CAPSULE ORAL at 10:27

## 2018-11-19 RX ADMIN — CYCLOBENZAPRINE 10 MG: 10 TABLET, FILM COATED ORAL at 10:27

## 2018-11-19 RX ADMIN — DULOXETINE HYDROCHLORIDE 60 MG: 60 CAPSULE, DELAYED RELEASE ORAL at 10:27

## 2018-11-19 RX ADMIN — ENOXAPARIN SODIUM 40 MG: 40 INJECTION SUBCUTANEOUS at 10:47

## 2018-11-19 RX ADMIN — MICONAZOLE NITRATE: 20.6 POWDER TOPICAL at 21:10

## 2018-11-19 RX ADMIN — CLONAZEPAM 1 MG: 0.5 TABLET ORAL at 21:21

## 2018-11-19 RX ADMIN — PANTOPRAZOLE SODIUM 40 MG: 40 TABLET, DELAYED RELEASE ORAL at 10:47

## 2018-11-19 RX ADMIN — DOCUSATE SODIUM 100 MG: 100 CAPSULE, LIQUID FILLED ORAL at 21:10

## 2018-11-19 RX ADMIN — OXYCODONE AND ACETAMINOPHEN 1 TABLET: 5; 325 TABLET ORAL at 10:46

## 2018-11-19 RX ADMIN — SENNOSIDES 17.2 MG: 8.6 TABLET, FILM COATED ORAL at 21:10

## 2018-11-19 ASSESSMENT — PAIN DESCRIPTION - FREQUENCY: FREQUENCY: CONTINUOUS

## 2018-11-19 ASSESSMENT — PAIN DESCRIPTION - DESCRIPTORS
DESCRIPTORS: ACHING;DISCOMFORT
DESCRIPTORS: ACHING;DISCOMFORT

## 2018-11-19 ASSESSMENT — PAIN SCALES - GENERAL
PAINLEVEL_OUTOF10: 6
PAINLEVEL_OUTOF10: 4
PAINLEVEL_OUTOF10: 6
PAINLEVEL_OUTOF10: 8

## 2018-11-19 ASSESSMENT — PAIN DESCRIPTION - LOCATION
LOCATION: LEG
LOCATION: LEG

## 2018-11-19 ASSESSMENT — PAIN DESCRIPTION - ONSET: ONSET: ON-GOING

## 2018-11-19 ASSESSMENT — PAIN DESCRIPTION - ORIENTATION
ORIENTATION: RIGHT
ORIENTATION: RIGHT

## 2018-11-19 ASSESSMENT — PAIN DESCRIPTION - PROGRESSION: CLINICAL_PROGRESSION: NOT CHANGED

## 2018-11-19 ASSESSMENT — PAIN DESCRIPTION - PAIN TYPE
TYPE: SURGICAL PAIN
TYPE: SURGICAL PAIN

## 2018-11-19 NOTE — CARE COORDINATION
I went in and talked to the patient about Skilled care and offered a list and she declined it and chose 1) [facility] they declined the patient 2) [facility] ref made I will await to see if accepted and then she will require a precert with her insurance.  I will continue to follow Thanks Patience-Dennise

## 2018-11-19 NOTE — PROGRESS NOTES
closely  3) will need ALEIDA on discharge    11/19/18-  Inner dry to skin folds  Miconazole powder to skin folds  Follow H&H  Patient agrees to transfusion if needed  Stool for occult blood            [address]  10:16 AM  11/19/2018

## 2018-11-19 NOTE — PROGRESS NOTES
Physical Therapy    Facility/Department: [address] NEURO SPINE  Treatment Note   NAME: Cornelia Edward  : 1960  MRN: [id number]     Date of Service: 2018     Patient Diagnosis(es): The primary encounter diagnosis was Closed fracture of distal end of right femur, unspecified fracture morphology, initial encounter ([facility] A diagnosis of Closed fracture of proximal end of right tibia, unspecified fracture morphology, initial encounter was also pertinent to this visit.      has a past medical history of Acquired hypothyroidism; Anemia; Anticoagulant long-term use; Anxiety; Arthritis; Blood transfusion; Chronic back pain; Depression; GERD (gastroesophageal reflux disease); Hx of blood clots; Lymphedema of lower extremity; Migraine; Multiple sclerosis ([facility] Obesity; Osteoarthritis; PE (pulmonary thromboembolism) ([facility] Peripheral vascular disease ([facility] Prominent abdominal aortic pulse; Pulmonary embolism ([facility] Swelling of lower limb; Thyroid disease; Urinary incontinence; and Varicose veins of lower extremities. has a past surgical history that includes Bladder surgery; hernia repair; Dental surgery; Tonsillectomy; Dilation and curettage of uterus; other surgical history (09/15/2015); Tibia fracture surgery (Right); fracture surgery; Upper gastrointestinal endoscopy; Colonoscopy; Endoscopy, colon, diagnostic; and Total hip arthroplasty (Right, 2016).    Evaluating Therapist: Fiona Regalado PT        Room #: 2361/8773-E  DIAGNOSIS: fall, Right Distal femur fracture, Right distal tibia fracture extending into articular surface, Right Proximal fibula fracture  Procedure:  OPEN REDUCTION INTERNAL FIXATION RIGHT DISTAL FEMUR, RIGHT DISTAL TIBIA (Right )     PRECAUTIONS: fall risk, NWB right LE, knee immobilizer right LE     Social:  Pt lives with her son in a 2 floor plan 3 steps to enter. Bed is on first floor. Pt reported she is unable to get into the restroom.   Pt reported she has a lift that she

## 2018-11-19 NOTE — OP NOTE
outlined in detail. The patient opted for operative  intervention understanding that due to her morbid obesity mobilization  of any form will be very difficult for the distal femur fracture. Risks  and benefits of the surgery were outlined in detail with the patient. She verbalized understanding of all that was discussed. All of her  questions were addressed to her satisfaction. She did elect to proceed  with the procedure as outlined. DESCRIPTION OF PROCEDURE:  The patient was brought to the operating  suite, where she was placed on the operating table in supine position. She received a general anesthetic by the Department of Anesthesia as  well as 2 gm of Ancef intravenously. The right lower extremity was now  sterilely prepped and draped out in the usual fashion using Chloraprep  scrub. Surgical timeout was now performed per protocol by all members  of the surgical team.  The leg was placed over a radiolucent triangle. Approached again for the right distal femur fracture. A lateral  longitudinal incision was made. Dissection was taken down to the  iliotibial band and vastus lateralis, bone elevated anteriorly. Distal  footprint was exposed on the distal femur. The patient had a comminuted  transverse fracture, large anterior butterfly fragment as well as  intercondylar extension down into intercondylar notch.  _____ muscularly  using the Aiming Arm. This was positioned in the distal femoral block. Compression across the articular surfaces. Provisional wires were  placed across the distal femoral articular block. Plate was now pinned  into position onto the articular block. Appropriate position was  confirmed by fluoroscopy.  _____ wires utilized to visualize the  appropriate coronal and sagittal plane alignments. Two additional large  plates were used to place into the articular segment. Leg was then  manipulated with traction.   The plate was now fixated to the intact  shaft proximally onto  the hospital [facility] to the postanesthesia care unit in stable condition. POSTOPERATIVE PLAN:  The patient will be strict nonweightbearing on the  right lower extremity. She will maintain her dressings and will change  as needed. She will be started on chemical DVT prophylaxis on  postoperative day #1. Once she is discharged from the hospital, she  will follow up in the orthopedic office in two weeks for repeat  evaluation.         Sepideh Larsen DO    D: 11/17/2018 10:28:39       T: 11/17/2018 20:50:47     [email or web address]  Job#: [id number]     Doc#: [id number]    CC:

## 2018-11-20 VITALS
BODY MASS INDEX: 48.82 KG/M2 | HEIGHT: 65 IN | RESPIRATION RATE: 16 BRPM | DIASTOLIC BLOOD PRESSURE: 82 MMHG | OXYGEN SATURATION: 98 % | TEMPERATURE: 98.2 F | SYSTOLIC BLOOD PRESSURE: 123 MMHG | WEIGHT: 293 LBS | HEART RATE: 82 BPM

## 2018-11-20 PROBLEM — S82.244A CLOSED NONDISPLACED SPIRAL FRACTURE OF SHAFT OF RIGHT TIBIA: Status: ACTIVE | Noted: 2018-11-20

## 2018-11-20 LAB
HCT VFR BLD CALC: 23.4 % (ref 34–48)
HEMOGLOBIN: 7.4 G/DL (ref 11.5–15.5)

## 2018-11-20 PROCEDURE — 85014 HEMATOCRIT: CPT

## 2018-11-20 PROCEDURE — 6360000002 HC RX W HCPCS: Performed by: STUDENT IN AN ORGANIZED HEALTH CARE EDUCATION/TRAINING PROGRAM

## 2018-11-20 PROCEDURE — 36415 COLL VENOUS BLD VENIPUNCTURE: CPT

## 2018-11-20 PROCEDURE — 6370000000 HC RX 637 (ALT 250 FOR IP): Performed by: INTERNAL MEDICINE

## 2018-11-20 PROCEDURE — 2580000003 HC RX 258: Performed by: STUDENT IN AN ORGANIZED HEALTH CARE EDUCATION/TRAINING PROGRAM

## 2018-11-20 PROCEDURE — 6370000000 HC RX 637 (ALT 250 FOR IP): Performed by: STUDENT IN AN ORGANIZED HEALTH CARE EDUCATION/TRAINING PROGRAM

## 2018-11-20 PROCEDURE — 85018 HEMOGLOBIN: CPT

## 2018-11-20 RX ORDER — PETROLATUM 42 G/100G
OINTMENT TOPICAL 2 TIMES DAILY PRN
Status: DISCONTINUED | OUTPATIENT
Start: 2018-11-20 | End: 2018-11-20 | Stop reason: HOSPADM

## 2018-11-20 RX ADMIN — MICONAZOLE NITRATE: 20.6 POWDER TOPICAL at 09:30

## 2018-11-20 RX ADMIN — ARIPIPRAZOLE 10 MG: 10 TABLET ORAL at 09:29

## 2018-11-20 RX ADMIN — CYCLOBENZAPRINE 10 MG: 10 TABLET, FILM COATED ORAL at 06:22

## 2018-11-20 RX ADMIN — OXYCODONE AND ACETAMINOPHEN 1 TABLET: 5; 325 TABLET ORAL at 06:22

## 2018-11-20 RX ADMIN — GABAPENTIN 300 MG: 300 CAPSULE ORAL at 09:29

## 2018-11-20 RX ADMIN — DOCUSATE SODIUM 100 MG: 100 CAPSULE, LIQUID FILLED ORAL at 09:29

## 2018-11-20 RX ADMIN — LEVOTHYROXINE SODIUM 175 MCG: 175 TABLET ORAL at 06:22

## 2018-11-20 RX ADMIN — MORPHINE SULFATE 4 MG: 4 INJECTION INTRAVENOUS at 00:34

## 2018-11-20 RX ADMIN — PANTOPRAZOLE SODIUM 40 MG: 40 TABLET, DELAYED RELEASE ORAL at 09:29

## 2018-11-20 RX ADMIN — DULOXETINE HYDROCHLORIDE 60 MG: 60 CAPSULE, DELAYED RELEASE ORAL at 09:29

## 2018-11-20 RX ADMIN — HYDROCORTISONE: 1 CREAM TOPICAL at 09:31

## 2018-11-20 RX ADMIN — OXYCODONE AND ACETAMINOPHEN 1 TABLET: 5; 325 TABLET ORAL at 10:55

## 2018-11-20 RX ADMIN — OXYCODONE AND ACETAMINOPHEN 1 TABLET: 5; 325 TABLET ORAL at 14:56

## 2018-11-20 RX ADMIN — CYCLOBENZAPRINE 10 MG: 10 TABLET, FILM COATED ORAL at 14:56

## 2018-11-20 RX ADMIN — ENOXAPARIN SODIUM 40 MG: 40 INJECTION SUBCUTANEOUS at 09:30

## 2018-11-20 RX ADMIN — Medication 10 ML: at 00:34

## 2018-11-20 RX ADMIN — Medication 10 ML: at 09:29

## 2018-11-20 ASSESSMENT — PAIN DESCRIPTION - ORIENTATION
ORIENTATION: RIGHT

## 2018-11-20 ASSESSMENT — PAIN DESCRIPTION - LOCATION
LOCATION: LEG

## 2018-11-20 ASSESSMENT — PAIN SCALES - GENERAL
PAINLEVEL_OUTOF10: 0
PAINLEVEL_OUTOF10: 0
PAINLEVEL_OUTOF10: 10
PAINLEVEL_OUTOF10: 0
PAINLEVEL_OUTOF10: 6

## 2018-11-20 ASSESSMENT — PAIN DESCRIPTION - PAIN TYPE
TYPE: SURGICAL PAIN

## 2018-11-20 ASSESSMENT — PAIN DESCRIPTION - DESCRIPTORS: DESCRIPTORS: ACHING;SPASM

## 2018-11-20 NOTE — CARE COORDINATION
11/20/2018 social work transition of care/discharge planning  Sw notified by [employer] ,Pamela Martínez, that transportation is set up for 3:30 via [facility] ambulance to [facility]. Sw notified nursing, pt,facility liaison,left message for Kingsley Mcmahan and spoke to Vanita Grijalva.    Electronically signed by PRABHJOT Crump on 11/20/2018 at 1:16 PM

## 2018-11-20 NOTE — PROGRESS NOTES
Nurse to nurse called to [facility].

## 2018-11-20 NOTE — PLAN OF CARE
Problem: Falls - Risk of:  Goal: Will remain free from falls  Will remain free from falls    Outcome: Met This Shift      Problem: Safety:  Goal: Free from accidental physical injury  Free from accidental physical injury   Outcome: Met This Shift      Problem: Pain:  Goal: Control of acute pain  Control of acute pain   Outcome: Met This Shift      Problem: Skin Integrity:  Goal: Skin integrity will stabilize  Skin integrity will stabilize   Outcome: Met This Shift

## 2018-11-20 NOTE — DISCHARGE INSTR - COC
Discharge: Stable    Rehab Potential (if transferring to Rehab): Fair    Recommended Labs or Other Treatments After Discharge: CBC, CMP weekly ×3    Physician Certification: I certify the above information and transfer of Martha Dominguez  is necessary for the continuing treatment of the diagnosis listed and that she requires [facility] for less 30 days. Update Admission H&P: No change in H&P    PHYSICIAN SIGNATURE: Electronically signed by Maribeth Elils MD on 11/20/2018 at 5:38 AM    Follow up with dr Sebastian Garcia in 1 week. Follow up with dr Olya Ryder in 1 week.

## 2018-11-29 ENCOUNTER — TELEPHONE (OUTPATIENT)
Dept: ORTHOPEDIC SURGERY | Age: 58
End: 2018-11-29

## 2018-11-29 DIAGNOSIS — S72.354D CLOSED NONDISPLACED COMMINUTED FRACTURE OF SHAFT OF RIGHT FEMUR WITH ROUTINE HEALING, SUBSEQUENT ENCOUNTER: ICD-10-CM

## 2018-11-29 DIAGNOSIS — S82.244D CLOSED NONDISPLACED SPIRAL FRACTURE OF SHAFT OF RIGHT TIBIA WITH ROUTINE HEALING, SUBSEQUENT ENCOUNTER: Primary | ICD-10-CM

## 2018-11-29 NOTE — TELEPHONE ENCOUNTER
Ferdinand @ [facility] called re: ARJUN surgical dressing & brace. He reports patient has appt on 12/3/18. After reviewing patient's record returned the call and spoke to [place]. He reports patient has a hinged knee brace on and leg is wrapped with an ace. He stated that the ace is clean with no sign of drainage. Does it need to be changed? Communicated to him that AVS states to keep dressings clean and dry. Op note states to change dressings as needed. He stated patient just went to PT for upper extremities and he will assess the leg when she comes back. If everything is good he will just wrap leg back up with the ace.

## 2018-11-30 NOTE — DISCHARGE SUMMARY
2018 at 1719 FINDINGS: Partially imaged distal femoral side plate and multiple screws. Redemonstrated medial tibial fixation plate and multiple screws, extending from tibial plateau to mid diaphyseal shaft. Newly apparent medial mid to distal tibial fixation plate and multiple screws, extending from mid-distal diaphyseal shaft to medial malleolar base. Previously noted proximal fibular diaphyseal shaft fracture reduced into grossly anatomic alignment. Osseous detail obscured partially by overlying cast material. Images should be seen directly for osseous and hardware configuration and alignment. Status post interval distal tibial ORIF. Xr Tibia Fibula Right (2 Views)    Result Date: 2018  Patient MRN:  [id number] : 1960 Age: 60 years Gender: Female Order Date:  2018 4:45 PM EXAM: XR TIBIA FIBULA RIGHT (2 VIEWS) NUMBER OF IMAGES:  5 views INDICATION: Right lower leg pain following injury COMPARISON: Ankle images were obtained contemporaneously, and reported separately FINDINGS: There is an oblique fracture through the proximal fibular shaft, which is very minimally displaced with slight telescoping/shortening, and there is a nondisplaced oblique fracture through the distal metadiaphysis of the tibia. The proximal tibial subarticular fixation with a medial fixation plate and numerous screws is intact. No focal soft tissue abnormalities are identified. Relatively large habitus could obscure localized soft tissue swelling at the fracture sites. Proximal fibular shaft fracture and distal tibial shaft fracture with negligible displacement. Fractures may indicate a tear of the syndesmosis. ALERT:  THIS IS AN ABNORMAL REPORT-tibial and fibular fractures.     Xr Ankle Right (min 3 Views)    Result Date: 2018  Patient MRN: [id number] : 1960 Age:  60 years Gender: Female Order Date: 2018 6:15 PM Exam: XR ANKLE RIGHT (MIN 3 VIEWS) Number of Views: 4 Indication:  Postoperative Pelvis Right    Result Date: 2018  Patient MRN: [id number] : 1960 Age:  60 years Gender: Female Order Date: 2018 6:30 PM Exam: XR HIP 2-3 VW W PELVIS RIGHT Number of Images: 4 views Indication:  Fall, injury, pain. COMPARISON: 5/10/2018 FINDINGS: Status post right total hip arthroplasty. No obvious new interval hardware loosening, migration, or breakage appreciable. No definite acute fracture or dislocation seen. Partially imaged left hip degenerative arthritic change and lower lumbar spondylosis changes. Question generalized low bone density. No acute osseous abnormality appreciable. Chronic and degenerative findings as noted. Continued follow-up advised should symptoms persist in any event. Discharge Exam:  See progress note from today    Discharge Medication List as of 2018  3:03 PM      START taking these medications    Details   HYDROcodone-acetaminophen (NORCO) 5-325 MG per tablet Take 1 tablet by mouth every 4 hours as needed for Pain for up to 7 days. Intended supply: 7 days. Take lowest dose possible to manage pain., Disp-42 tablet, R-0Print      enoxaparin (LOVENOX) 40 MG/0.4ML injection Inject 0.4 mLs into the skin daily for 28 days, Disp-28 Syringe, R-0Print         CONTINUE these medications which have NOT CHANGED    Details   clonazePAM (KLONOPIN) 1 MG tablet Take 1 mg by mouth 2 times daily as needed. Cris Morris Historical Med      ARIPiprazole (ABILIFY) 10 MG tablet Take 10 mg by mouth dailyHistorical Med      !! levothyroxine (SYNTHROID) 175 MCG tablet Take 175 mcg by mouth Six times weekly Monday through  [facility]      !! levothyroxine (SYNTHROID) 175 MCG tablet Take 262.5 mcg by mouth once a week On sundaysHistorical Med      omeprazole (PRILOSEC) 20 MG delayed release capsule Take 20 mg by mouth daily as neededHistorical Med      traZODone (DESYREL) 50 MG tablet Take 50 mg by mouth nightlyHistorical Med      cyclobenzaprine (FLEXERIL) 10 MG tablet Take 10 mg by

## 2018-12-03 ENCOUNTER — HOSPITAL ENCOUNTER (OUTPATIENT)
Dept: GENERAL RADIOLOGY | Age: 58
Discharge: HOME | End: 2018-12-05
Payer: MEDICARE

## 2018-12-03 ENCOUNTER — HOSPITAL ENCOUNTER (OUTPATIENT)
Dept: ULTRASOUND IMAGING | Age: 58
Discharge: HOME | End: 2018-12-05
Payer: MEDICARE

## 2018-12-03 ENCOUNTER — OFFICE VISIT (OUTPATIENT)
Dept: ORTHOPEDIC SURGERY | Age: 58
End: 2018-12-03
Payer: MEDICARE

## 2018-12-03 VITALS — HEART RATE: 83 BPM | SYSTOLIC BLOOD PRESSURE: 107 MMHG | DIASTOLIC BLOOD PRESSURE: 71 MMHG | TEMPERATURE: 97.2 F

## 2018-12-03 DIAGNOSIS — S82.244D CLOSED NONDISPLACED SPIRAL FRACTURE OF SHAFT OF RIGHT TIBIA WITH ROUTINE HEALING, SUBSEQUENT ENCOUNTER: ICD-10-CM

## 2018-12-03 DIAGNOSIS — S82.244D CLOSED NONDISPLACED SPIRAL FRACTURE OF SHAFT OF RIGHT TIBIA WITH ROUTINE HEALING, SUBSEQUENT ENCOUNTER: Primary | ICD-10-CM

## 2018-12-03 DIAGNOSIS — S72.354D CLOSED NONDISPLACED COMMINUTED FRACTURE OF SHAFT OF RIGHT FEMUR WITH ROUTINE HEALING, SUBSEQUENT ENCOUNTER: ICD-10-CM

## 2018-12-03 DIAGNOSIS — M79.661 PAIN OF RIGHT CALF: ICD-10-CM

## 2018-12-03 DIAGNOSIS — S82.444D: ICD-10-CM

## 2018-12-03 DIAGNOSIS — Z96.641 STATUS POST TOTAL REPLACEMENT OF RIGHT HIP: ICD-10-CM

## 2018-12-03 DIAGNOSIS — S72.491D OTHER CLOSED FRACTURE OF DISTAL END OF RIGHT FEMUR WITH ROUTINE HEALING, SUBSEQUENT ENCOUNTER: ICD-10-CM

## 2018-12-03 PROBLEM — S72.401D CLOSED FRACTURE OF DISTAL END OF RIGHT FEMUR WITH ROUTINE HEALING: Status: ACTIVE | Noted: 2018-11-14

## 2018-12-03 PROCEDURE — 99024 POSTOP FOLLOW-UP VISIT: CPT | Performed by: NURSE PRACTITIONER

## 2018-12-03 PROCEDURE — 99214 OFFICE O/P EST MOD 30 MIN: CPT

## 2018-12-03 PROCEDURE — 73552 X-RAY EXAM OF FEMUR 2/>: CPT

## 2018-12-03 PROCEDURE — 93971 EXTREMITY STUDY: CPT

## 2018-12-03 PROCEDURE — 73610 X-RAY EXAM OF ANKLE: CPT

## 2018-12-03 PROCEDURE — 73590 X-RAY EXAM OF LOWER LEG: CPT

## 2018-12-03 PROCEDURE — 99213 OFFICE O/P EST LOW 20 MIN: CPT

## 2019-01-02 DIAGNOSIS — S82.444D: ICD-10-CM

## 2019-01-02 DIAGNOSIS — S72.491D OTHER CLOSED FRACTURE OF DISTAL END OF RIGHT FEMUR WITH ROUTINE HEALING, SUBSEQUENT ENCOUNTER: Primary | ICD-10-CM

## 2019-01-04 ENCOUNTER — OFFICE VISIT (OUTPATIENT)
Dept: ORTHOPEDIC SURGERY | Age: 59
End: 2019-01-04
Payer: MEDICAID

## 2019-01-04 ENCOUNTER — HOSPITAL ENCOUNTER (OUTPATIENT)
Dept: GENERAL RADIOLOGY | Age: 59
Discharge: HOME | End: 2019-01-06
Payer: MEDICAID

## 2019-01-04 VITALS
HEART RATE: 82 BPM | RESPIRATION RATE: 18 BRPM | DIASTOLIC BLOOD PRESSURE: 67 MMHG | TEMPERATURE: 97.6 F | SYSTOLIC BLOOD PRESSURE: 109 MMHG

## 2019-01-04 DIAGNOSIS — S82.244G CLOSED NONDISPLACED SPIRAL FRACTURE OF SHAFT OF RIGHT TIBIA WITH DELAYED HEALING, SUBSEQUENT ENCOUNTER: ICD-10-CM

## 2019-01-04 DIAGNOSIS — S82.444D: ICD-10-CM

## 2019-01-04 DIAGNOSIS — S72.491D OTHER CLOSED FRACTURE OF DISTAL END OF RIGHT FEMUR WITH ROUTINE HEALING, SUBSEQUENT ENCOUNTER: ICD-10-CM

## 2019-01-04 DIAGNOSIS — S72.491D OTHER CLOSED FRACTURE OF DISTAL END OF RIGHT FEMUR WITH ROUTINE HEALING, SUBSEQUENT ENCOUNTER: Primary | ICD-10-CM

## 2019-01-04 PROCEDURE — 73610 X-RAY EXAM OF ANKLE: CPT

## 2019-01-04 PROCEDURE — 99212 OFFICE O/P EST SF 10 MIN: CPT | Performed by: ORTHOPAEDIC SURGERY

## 2019-01-04 PROCEDURE — 73552 X-RAY EXAM OF FEMUR 2/>: CPT

## 2019-01-04 PROCEDURE — 73590 X-RAY EXAM OF LOWER LEG: CPT

## 2019-01-04 PROCEDURE — 99024 POSTOP FOLLOW-UP VISIT: CPT | Performed by: ORTHOPAEDIC SURGERY

## 2019-02-15 ENCOUNTER — TELEPHONE (OUTPATIENT)
Dept: ORTHOPEDIC SURGERY | Age: 59
End: 2019-02-15

## 2019-02-15 NOTE — TELEPHONE ENCOUNTER
Margo Smith said she was being transferred to the office to reschedule missed post-op from this morning but the call dropped. Call transferred to front office.

## 2019-03-04 ENCOUNTER — HOSPITAL ENCOUNTER (OUTPATIENT)
Dept: GENERAL RADIOLOGY | Age: 59
Discharge: HOME | End: 2019-03-06
Payer: MEDICARE

## 2019-03-04 ENCOUNTER — OFFICE VISIT (OUTPATIENT)
Dept: ORTHOPEDIC SURGERY | Age: 59
End: 2019-03-04
Payer: MEDICARE

## 2019-03-04 VITALS
HEART RATE: 93 BPM | BODY MASS INDEX: 48.82 KG/M2 | DIASTOLIC BLOOD PRESSURE: 62 MMHG | SYSTOLIC BLOOD PRESSURE: 128 MMHG | WEIGHT: 293 LBS | HEIGHT: 65 IN

## 2019-03-04 DIAGNOSIS — S82.244K: ICD-10-CM

## 2019-03-04 DIAGNOSIS — S72.491K OTHER CLOSED FRACTURE OF DISTAL END OF RIGHT FEMUR WITH NONUNION, SUBSEQUENT ENCOUNTER: ICD-10-CM

## 2019-03-04 DIAGNOSIS — S72.491D OTHER CLOSED FRACTURE OF DISTAL END OF RIGHT FEMUR WITH ROUTINE HEALING, SUBSEQUENT ENCOUNTER: ICD-10-CM

## 2019-03-04 DIAGNOSIS — S82.444D: Primary | ICD-10-CM

## 2019-03-04 PROBLEM — S72.401K CLOSED FRACTURE OF DISTAL END OF RIGHT FEMUR WITH NONUNION: Status: ACTIVE | Noted: 2018-11-14

## 2019-03-04 PROCEDURE — 73600 X-RAY EXAM OF ANKLE: CPT

## 2019-03-04 PROCEDURE — G8427 DOCREV CUR MEDS BY ELIG CLIN: HCPCS | Performed by: ORTHOPAEDIC SURGERY

## 2019-03-04 PROCEDURE — 99213 OFFICE O/P EST LOW 20 MIN: CPT | Performed by: ORTHOPAEDIC SURGERY

## 2019-03-04 PROCEDURE — 73590 X-RAY EXAM OF LOWER LEG: CPT

## 2019-03-04 PROCEDURE — 73552 X-RAY EXAM OF FEMUR 2/>: CPT

## 2019-03-04 PROCEDURE — G8417 CALC BMI ABV UP PARAM F/U: HCPCS | Performed by: ORTHOPAEDIC SURGERY

## 2019-03-04 PROCEDURE — 1036F TOBACCO NON-USER: CPT | Performed by: ORTHOPAEDIC SURGERY

## 2019-03-04 PROCEDURE — 99212 OFFICE O/P EST SF 10 MIN: CPT

## 2019-03-04 PROCEDURE — G8484 FLU IMMUNIZE NO ADMIN: HCPCS | Performed by: ORTHOPAEDIC SURGERY

## 2019-03-04 PROCEDURE — 3017F COLORECTAL CA SCREEN DOC REV: CPT | Performed by: ORTHOPAEDIC SURGERY

## 2019-05-17 ENCOUNTER — HOSPITAL ENCOUNTER (OUTPATIENT)
Dept: GENERAL RADIOLOGY | Age: 59
Discharge: HOME | End: 2019-05-19
Payer: MEDICARE

## 2019-05-17 ENCOUNTER — OFFICE VISIT (OUTPATIENT)
Dept: ORTHOPEDIC SURGERY | Age: 59
End: 2019-05-17
Payer: MEDICARE

## 2019-05-17 VITALS
WEIGHT: 293 LBS | TEMPERATURE: 98.4 F | DIASTOLIC BLOOD PRESSURE: 85 MMHG | HEART RATE: 70 BPM | HEIGHT: 65 IN | RESPIRATION RATE: 20 BRPM | BODY MASS INDEX: 48.82 KG/M2 | SYSTOLIC BLOOD PRESSURE: 140 MMHG

## 2019-05-17 DIAGNOSIS — S72.491K OTHER CLOSED FRACTURE OF DISTAL END OF RIGHT FEMUR WITH NONUNION, SUBSEQUENT ENCOUNTER: ICD-10-CM

## 2019-05-17 DIAGNOSIS — S82.444D: ICD-10-CM

## 2019-05-17 DIAGNOSIS — S82.244K: ICD-10-CM

## 2019-05-17 DIAGNOSIS — S82.244K: Primary | ICD-10-CM

## 2019-05-17 PROCEDURE — 99212 OFFICE O/P EST SF 10 MIN: CPT | Performed by: PHYSICIAN ASSISTANT

## 2019-05-17 PROCEDURE — 73590 X-RAY EXAM OF LOWER LEG: CPT

## 2019-05-17 PROCEDURE — G8417 CALC BMI ABV UP PARAM F/U: HCPCS | Performed by: PHYSICIAN ASSISTANT

## 2019-05-17 PROCEDURE — 1036F TOBACCO NON-USER: CPT | Performed by: PHYSICIAN ASSISTANT

## 2019-05-17 PROCEDURE — G8427 DOCREV CUR MEDS BY ELIG CLIN: HCPCS | Performed by: PHYSICIAN ASSISTANT

## 2019-05-17 PROCEDURE — 73610 X-RAY EXAM OF ANKLE: CPT

## 2019-05-17 PROCEDURE — 99213 OFFICE O/P EST LOW 20 MIN: CPT | Performed by: PHYSICIAN ASSISTANT

## 2019-05-17 PROCEDURE — 73552 X-RAY EXAM OF FEMUR 2/>: CPT

## 2019-05-17 PROCEDURE — 3017F COLORECTAL CA SCREEN DOC REV: CPT | Performed by: PHYSICIAN ASSISTANT

## 2019-05-17 NOTE — PATIENT INSTRUCTIONS
Orders for [facility]:   Reapply brace/boot combination, continue to use and remove for skin checks, bathing and therapy. Boot/Brace combo MUST be on for any weightbearing or ambulation  WB:Therapy may start to progress patient towards Partial Weightbearing on RLE. Recommend starting with for transfers, progressing patient to being able to stand, then if able to FWB on LLE and maintain PWB on RLE can work with gait training. Patient has NO restrictions to the left leg. IF patient having significant increase pain at fracture site with increased WB, please back off WB status to pain free amount. PT: Therapy to work with patient on knee and ankle ROM, A/PROM. Continue to work global BLE strengthening and conditioning. Strongly recommend that patient continues with therapy for upper extremity    Work on safety training with patient on maintaining no more than PWB on RLE at this time   OK to use modalities with patient as well as standing assist machine  DVT: Prophylaxis per facility physician but strongly recommended that patient continue DVT prophlyaxis due to immobility and history of PE  Can increase calcium so patient gets a total of 750mg - 1000 mg daily. Continue with Vitamin D supplementation.   Continue to use bone stimulator- reviewed with patient locations to use  Pain control per facility physician     Patient to follow up in 8 weeks 7/12/19 at 8:00 AM  Call sooner with any questions or clarifications

## 2019-05-20 NOTE — PROGRESS NOTES
OP: DATE OF PROCEDURE:  11/16/2018           PROVIDER: Lindsay Parish DO    OPERATION PERFORMED:  1.  Right supracondylar femur fracture with intercondylar extension open reduction and internal fixation. 2.  Right distal tibial metadiaphyseal fracture, open reduction with internal fixation. 3.  Closed treatment of right fibular fracture. Subjective:  Diane Reid is approximately 6 months follow-up from the above surgery. Patient is NWB on that extremity. Patient has not really ambulated since her surgery. Patient has not been bearing weight on her LEFT leg despite no restrictions to this. Patient states that she minimally ambulated prior to her fall. Patient has history of MS. She is in a knee brace, [employer] boot combo with 0-90 degrees as todays settings. Pain is controlled with current medications. Patient states she can now actively lift her leg off the bed a few inches and has been working on actively bending her knee Patient continues to have decreased sensation in her right leg. Denies calf pain, chest pain, shortness of breath. She denies fevers or chills. Patient denies any new falls or injuries. Patient has been using calcium, vitamin D as well has bone stimulator. The patient's facility comprehensive medical history transfer sheets were evaluated, and their history, medications, allergies, treatments were updated in [facility] today.      Review of Systems -    General ROS: negative for - chills, fatigue, fever or night sweats  Respiratory ROS: no cough, shortness of breath, or wheezing  Cardiovascular ROS: no chest pain or dyspnea on exertion  Gastrointestinal ROS: no abdominal pain, nausea, vomiting, diarrhea, constipation,or black or bloody stools  Genitourinary: no hematuria, dysuria, or incontinence   Musculoskeletal ROS: negative for -back or neck pain or stiffness, also see HPI  Neurological ROS: no TIA or stroke symptoms       Objective:    General: Alert and oriented X 3, normocephalic atraumatic, external ears and eye normal, sclera clear, no acute distress, respirations easy and unlabored with no audible wheezes, skin warm and dry, speech and dress appropriate for noted age, affect euthymic. Extremity:  Right Lower Extremity  Skin clean dry and intact, without signs of infection  Incisions well healed without signs of redness, warmth or drainage  Moderated edema noted BLE  Compartments supple throughout thigh and leg,   Calf supple and nontender  Demonstrates active knee flexion/extension minimally, ankle plantar/dorsiflexion/great toe extension. States sensation intact to touch in sural/deep peroneal/superficial peroneal/saphenous/posterior tibial nerve distributions to foot/ankle. Palpable dorsalis pedis and posterior tibialis pulses, cap refill brisk in toes, foot warm/perfused. Patient's brace set 0-90 today  No TTP over distal femur and distal tibia shaft fracture sites    BP (!) 140/85 (Site: Left Upper Arm, Position: Sitting, Cuff Size: Medium Adult)   Pulse 70   Temp 98.4 °F (36.9 °C) (Oral)   Resp 20   Ht 5' 5\" (1.651 m)   Wt (!) 350 lb (158.8 kg)   BMI 58.24 kg/m²     XR:   X-rays of right femur, right tibia and fibula, right ankle demonstrate tibial shaft fracture maintaining alignment, hardware is intact. There is no signs of hardware failure. Femur x-rays demonstrate total hip arthroplasty seated well in the joint without evidence of subsidence. Distal femur fracture without change in alignment. Small amount of healing noted on todays xray  There is diffuse disuse osteopenia throughout the right lower extremity    Assessment:   Diagnosis Orders   1. Closed nondisplaced spiral fracture of shaft of right fibula with routine healing       Plan:  Orders for [facility]:   Reapply brace/boot combination, continue to use and remove for skin checks, bathing and therapy.  Boot/Brace combo MUST be on for any weightbearing or ambulation  WB:Therapy may start to progress patient towards Partial Weightbearing on RLE. Recommend starting with for transfers, progressing patient to being able to stand, then if able to FWB on LLE and maintain PWB on RLE can work with gait training. Patient has NO restrictions to the left leg. IF patient having significant increase pain at fracture site with increased WB, please back off WB status to pain free amount. PT: Therapy to work with patient on knee and ankle ROM, A/PROM. Continue to work global BLE strengthening and conditioning. Strongly recommend that patient continues with therapy for upper extremity    Work on safety training with patient on maintaining no more than PWB on RLE at this time   OK to use modalities with patient as well as standing assist machine  DVT: Prophylaxis per facility physician but strongly recommended that patient continue DVT prophlyaxis due to immobility and history of PE  Can increase calcium so patient gets a total of 750mg - 1000 mg daily. Continue with Vitamin D supplementation. Continue to use bone stimulator- reviewed with patient locations to use  Pain control per facility physician     Patient to follow up in 8 weeks 7/12/19 at 8:00 AM  Call sooner with any questions or clarifications     Electronically signed by Lizbeth Wesley PA-C on 5/20/2019 at 7:27 AM  Note: This report was completed using computerize voiced recognition Claude 86 effort has been made to ensure accuracy; however, inadvertent computerized transcription errors may be present.

## 2019-06-28 ENCOUNTER — APPOINTMENT (OUTPATIENT)
Dept: GENERAL RADIOLOGY | Age: 59
End: 2019-06-28
Payer: MEDICARE

## 2019-06-28 ENCOUNTER — HOSPITAL ENCOUNTER (OUTPATIENT)
Age: 59
Setting detail: OBSERVATION
Discharge: NURSING, REHAB OR LONG-TERM CARE | End: 2019-06-29
Attending: EMERGENCY MEDICINE | Admitting: INTERNAL MEDICINE
Payer: MEDICARE

## 2019-06-28 DIAGNOSIS — R07.9 CHEST PAIN, UNSPECIFIED TYPE: Primary | ICD-10-CM

## 2019-06-28 PROCEDURE — 84484 ASSAY OF TROPONIN QUANT: CPT

## 2019-06-28 PROCEDURE — 71045 X-RAY EXAM CHEST 1 VIEW: CPT

## 2019-06-28 PROCEDURE — 83880 ASSAY OF NATRIURETIC PEPTIDE: CPT

## 2019-06-28 PROCEDURE — 93005 ELECTROCARDIOGRAM TRACING: CPT | Performed by: EMERGENCY MEDICINE

## 2019-06-28 PROCEDURE — 85025 COMPLETE CBC W/AUTO DIFF WBC: CPT

## 2019-06-28 PROCEDURE — 80048 BASIC METABOLIC PNL TOTAL CA: CPT

## 2019-06-28 PROCEDURE — 99285 EMERGENCY DEPT VISIT HI MDM: CPT

## 2019-06-28 ASSESSMENT — ENCOUNTER SYMPTOMS
BACK PAIN: 0
BLOOD IN STOOL: 0
NAUSEA: 0
SHORTNESS OF BREATH: 1
VOMITING: 0
COUGH: 0
ABDOMINAL PAIN: 0

## 2019-06-28 ASSESSMENT — PAIN DESCRIPTION - PAIN TYPE: TYPE: ACUTE PAIN

## 2019-06-28 ASSESSMENT — PAIN DESCRIPTION - LOCATION: LOCATION: CHEST

## 2019-06-28 ASSESSMENT — PAIN SCALES - GENERAL: PAINLEVEL_OUTOF10: 5

## 2019-06-29 VITALS
RESPIRATION RATE: 18 BRPM | WEIGHT: 293 LBS | HEIGHT: 65 IN | SYSTOLIC BLOOD PRESSURE: 140 MMHG | TEMPERATURE: 98.3 F | HEART RATE: 77 BPM | DIASTOLIC BLOOD PRESSURE: 61 MMHG | OXYGEN SATURATION: 97 % | BODY MASS INDEX: 48.82 KG/M2

## 2019-06-29 PROBLEM — R07.9 CHEST PAIN: Status: ACTIVE | Noted: 2019-06-29

## 2019-06-29 LAB
ANION GAP SERPL CALCULATED.3IONS-SCNC: 10 MMOL/L (ref 7–16)
ANISOCYTOSIS: ABNORMAL
BASOPHILS ABSOLUTE: 0.07 E9/L (ref 0–0.2)
BASOPHILS RELATIVE PERCENT: 1.6 % (ref 0–2)
BUN BLDV-MCNC: 16 MG/DL (ref 8–23)
CALCIUM SERPL-MCNC: 9.1 MG/DL (ref 8.6–10.2)
CHLORIDE BLD-SCNC: 94 MMOL/L (ref 98–107)
CO2: 29 MMOL/L (ref 22–29)
CREAT SERPL-MCNC: 0.9 MG/DL (ref 0.5–1)
EOSINOPHILS ABSOLUTE: 0.3 E9/L (ref 0.05–0.5)
EOSINOPHILS RELATIVE PERCENT: 7 % (ref 0–6)
GFR AFRICAN AMERICAN: >60
GFR NON-AFRICAN AMERICAN: >60 ML/MIN/1.73
GLUCOSE BLD-MCNC: 103 MG/DL (ref 74–99)
HCT VFR BLD CALC: 32.4 % (ref 34–48)
HEMOGLOBIN: 10.1 G/DL (ref 11.5–15.5)
IMMATURE GRANULOCYTES #: 0.02 E9/L
IMMATURE GRANULOCYTES %: 0.5 % (ref 0–5)
LYMPHOCYTES ABSOLUTE: 1.45 E9/L (ref 1.5–4)
LYMPHOCYTES RELATIVE PERCENT: 33.8 % (ref 20–42)
MCH RBC QN AUTO: 25.3 PG (ref 26–35)
MCHC RBC AUTO-ENTMCNC: 31.2 % (ref 32–34.5)
MCV RBC AUTO: 81 FL (ref 80–99.9)
MONOCYTES ABSOLUTE: 0.47 E9/L (ref 0.1–0.95)
MONOCYTES RELATIVE PERCENT: 11 % (ref 2–12)
NEUTROPHILS ABSOLUTE: 1.98 E9/L (ref 1.8–7.3)
NEUTROPHILS RELATIVE PERCENT: 46.1 % (ref 43–80)
OVALOCYTES: ABNORMAL
PDW BLD-RTO: 23.6 FL (ref 11.5–15)
PLATELET # BLD: 332 E9/L (ref 130–450)
PMV BLD AUTO: 8.5 FL (ref 7–12)
POIKILOCYTES: ABNORMAL
POLYCHROMASIA: ABNORMAL
POTASSIUM SERPL-SCNC: 4.3 MMOL/L (ref 3.5–5)
PRO-BNP: 32 PG/ML (ref 0–125)
RBC # BLD: 4 E12/L (ref 3.5–5.5)
SODIUM BLD-SCNC: 133 MMOL/L (ref 132–146)
TROPONIN: <0.01 NG/ML (ref 0–0.03)
TROPONIN: <0.01 NG/ML (ref 0–0.03)
WBC # BLD: 4.3 E9/L (ref 4.5–11.5)

## 2019-06-29 PROCEDURE — 2580000003 HC RX 258: Performed by: EMERGENCY MEDICINE

## 2019-06-29 PROCEDURE — 84484 ASSAY OF TROPONIN QUANT: CPT

## 2019-06-29 PROCEDURE — 36415 COLL VENOUS BLD VENIPUNCTURE: CPT

## 2019-06-29 PROCEDURE — G0378 HOSPITAL OBSERVATION PER HR: HCPCS

## 2019-06-29 PROCEDURE — 6370000000 HC RX 637 (ALT 250 FOR IP): Performed by: INTERNAL MEDICINE

## 2019-06-29 RX ORDER — GABAPENTIN 300 MG/1
300 CAPSULE ORAL EVERY MORNING
Status: DISCONTINUED | OUTPATIENT
Start: 2019-06-29 | End: 2019-06-29 | Stop reason: HOSPADM

## 2019-06-29 RX ORDER — PANTOPRAZOLE SODIUM 40 MG/1
40 TABLET, DELAYED RELEASE ORAL
Status: DISCONTINUED | OUTPATIENT
Start: 2019-06-29 | End: 2019-06-29 | Stop reason: HOSPADM

## 2019-06-29 RX ORDER — ERGOCALCIFEROL 1.25 MG/1
50000 CAPSULE ORAL
Status: DISCONTINUED | OUTPATIENT
Start: 2019-07-02 | End: 2019-06-29 | Stop reason: HOSPADM

## 2019-06-29 RX ORDER — OYSTER SHELL CALCIUM WITH VITAMIN D 500; 200 MG/1; [IU]/1
1 TABLET, FILM COATED ORAL 2 TIMES DAILY
Status: DISCONTINUED | OUTPATIENT
Start: 2019-06-29 | End: 2019-06-29 | Stop reason: HOSPADM

## 2019-06-29 RX ORDER — ARIPIPRAZOLE 10 MG/1
10 TABLET ORAL DAILY
Status: DISCONTINUED | OUTPATIENT
Start: 2019-06-29 | End: 2019-06-29 | Stop reason: HOSPADM

## 2019-06-29 RX ORDER — CYCLOBENZAPRINE HCL 10 MG
10 TABLET ORAL 3 TIMES DAILY
Status: DISCONTINUED | OUTPATIENT
Start: 2019-06-29 | End: 2019-06-29 | Stop reason: HOSPADM

## 2019-06-29 RX ORDER — SUMATRIPTAN 50 MG/1
100 TABLET, FILM COATED ORAL
Status: COMPLETED | OUTPATIENT
Start: 2019-06-29 | End: 2019-06-29

## 2019-06-29 RX ORDER — SODIUM CHLORIDE 0.9 % (FLUSH) 0.9 %
10 SYRINGE (ML) INJECTION EVERY 12 HOURS SCHEDULED
Status: DISCONTINUED | OUTPATIENT
Start: 2019-06-29 | End: 2019-06-29 | Stop reason: HOSPADM

## 2019-06-29 RX ORDER — TRAZODONE HYDROCHLORIDE 50 MG/1
50 TABLET ORAL NIGHTLY
Status: DISCONTINUED | OUTPATIENT
Start: 2019-06-29 | End: 2019-06-29 | Stop reason: HOSPADM

## 2019-06-29 RX ORDER — SODIUM CHLORIDE 0.9 % (FLUSH) 0.9 %
10 SYRINGE (ML) INJECTION PRN
Status: DISCONTINUED | OUTPATIENT
Start: 2019-06-29 | End: 2019-06-29 | Stop reason: HOSPADM

## 2019-06-29 RX ORDER — HYDROXYZINE HYDROCHLORIDE 25 MG/1
25 TABLET, FILM COATED ORAL EVERY 8 HOURS PRN
Status: ON HOLD | COMMUNITY
End: 2020-05-17

## 2019-06-29 RX ORDER — CLONAZEPAM 0.5 MG/1
1 TABLET ORAL DAILY
Status: DISCONTINUED | OUTPATIENT
Start: 2019-06-29 | End: 2019-06-29 | Stop reason: HOSPADM

## 2019-06-29 RX ORDER — ONDANSETRON 4 MG/1
4 TABLET, FILM COATED ORAL EVERY 8 HOURS PRN
Status: DISCONTINUED | OUTPATIENT
Start: 2019-06-29 | End: 2019-06-29 | Stop reason: HOSPADM

## 2019-06-29 RX ORDER — ACETAMINOPHEN 500 MG
1000 TABLET ORAL EVERY 4 HOURS PRN
Status: DISCONTINUED | OUTPATIENT
Start: 2019-06-29 | End: 2019-06-29 | Stop reason: HOSPADM

## 2019-06-29 RX ORDER — ONDANSETRON 4 MG/1
4 TABLET, FILM COATED ORAL EVERY 8 HOURS PRN
COMMUNITY
End: 2021-11-18

## 2019-06-29 RX ORDER — SENNA PLUS 8.6 MG/1
2 TABLET ORAL NIGHTLY
Status: DISCONTINUED | OUTPATIENT
Start: 2019-06-29 | End: 2019-06-29 | Stop reason: HOSPADM

## 2019-06-29 RX ORDER — POLYETHYLENE GLYCOL 3350 17 G/17G
17 POWDER, FOR SOLUTION ORAL DAILY PRN
Status: DISCONTINUED | OUTPATIENT
Start: 2019-06-29 | End: 2019-06-29 | Stop reason: HOSPADM

## 2019-06-29 RX ORDER — ACETAMINOPHEN 325 MG/1
650 TABLET ORAL EVERY 4 HOURS PRN
Status: DISCONTINUED | OUTPATIENT
Start: 2019-06-29 | End: 2019-06-29

## 2019-06-29 RX ORDER — ERGOCALCIFEROL 1.25 MG/1
50000 CAPSULE ORAL
Status: DISCONTINUED | OUTPATIENT
Start: 2019-06-29 | End: 2019-06-29 | Stop reason: CLARIF

## 2019-06-29 RX ORDER — OYSTER SHELL CALCIUM WITH VITAMIN D 500; 200 MG/1; [IU]/1
1 TABLET, FILM COATED ORAL 2 TIMES DAILY
COMMUNITY
End: 2022-08-17 | Stop reason: ALTCHOICE

## 2019-06-29 RX ORDER — POLYETHYLENE GLYCOL 3350 17 G/17G
17 POWDER, FOR SOLUTION ORAL DAILY PRN
COMMUNITY

## 2019-06-29 RX ORDER — HYDROXYZINE HYDROCHLORIDE 10 MG/1
25 TABLET, FILM COATED ORAL EVERY 8 HOURS PRN
Status: DISCONTINUED | OUTPATIENT
Start: 2019-06-29 | End: 2019-06-29 | Stop reason: HOSPADM

## 2019-06-29 RX ORDER — DULOXETIN HYDROCHLORIDE 60 MG/1
60 CAPSULE, DELAYED RELEASE ORAL 2 TIMES DAILY
Status: DISCONTINUED | OUTPATIENT
Start: 2019-06-29 | End: 2019-06-29 | Stop reason: HOSPADM

## 2019-06-29 RX ORDER — LANOLIN ALCOHOL/MO/W.PET/CERES
3 CREAM (GRAM) TOPICAL EVERY EVENING
Status: DISCONTINUED | OUTPATIENT
Start: 2019-06-29 | End: 2019-06-29 | Stop reason: HOSPADM

## 2019-06-29 RX ORDER — CHOLECALCIFEROL (VITAMIN D3) 1250 MCG
50000 CAPSULE ORAL
Status: ON HOLD | COMMUNITY
End: 2020-05-17

## 2019-06-29 RX ORDER — ACETAMINOPHEN 325 MG/1
650 TABLET ORAL EVERY 4 HOURS PRN
Status: DISCONTINUED | OUTPATIENT
Start: 2019-06-29 | End: 2019-06-29 | Stop reason: HOSPADM

## 2019-06-29 RX ORDER — LEVOTHYROXINE SODIUM 175 UG/1
262.5 TABLET ORAL WEEKLY
COMMUNITY

## 2019-06-29 RX ADMIN — SUMATRIPTAN SUCCINATE 100 MG: 50 TABLET ORAL at 09:59

## 2019-06-29 RX ADMIN — CYCLOBENZAPRINE HYDROCHLORIDE 10 MG: 10 TABLET, FILM COATED ORAL at 10:02

## 2019-06-29 RX ADMIN — ARIPIPRAZOLE 10 MG: 10 TABLET ORAL at 10:04

## 2019-06-29 RX ADMIN — CALCIUM CARBONATE-VITAMIN D TAB 500 MG-200 UNIT 1 TABLET: 500-200 TAB at 10:04

## 2019-06-29 RX ADMIN — LEVOTHYROXINE SODIUM 175 MCG: 125 TABLET ORAL at 10:01

## 2019-06-29 RX ADMIN — DULOXETINE HYDROCHLORIDE 60 MG: 60 CAPSULE, DELAYED RELEASE ORAL at 10:04

## 2019-06-29 RX ADMIN — Medication 10 ML: at 09:59

## 2019-06-29 RX ADMIN — RIVAROXABAN 20 MG: 20 TABLET, FILM COATED ORAL at 10:04

## 2019-06-29 RX ADMIN — PANTOPRAZOLE SODIUM 40 MG: 40 TABLET, DELAYED RELEASE ORAL at 10:02

## 2019-06-29 RX ADMIN — CLONAZEPAM 1 MG: 0.5 TABLET ORAL at 10:02

## 2019-06-29 RX ADMIN — GABAPENTIN 300 MG: 300 CAPSULE ORAL at 10:02

## 2019-06-29 ASSESSMENT — PAIN DESCRIPTION - ONSET: ONSET: GRADUAL

## 2019-06-29 ASSESSMENT — PAIN SCALES - GENERAL
PAINLEVEL_OUTOF10: 0
PAINLEVEL_OUTOF10: 8

## 2019-06-29 ASSESSMENT — PAIN - FUNCTIONAL ASSESSMENT: PAIN_FUNCTIONAL_ASSESSMENT: ACTIVITIES ARE NOT PREVENTED

## 2019-06-29 ASSESSMENT — PAIN DESCRIPTION - LOCATION: LOCATION: HEAD

## 2019-06-29 ASSESSMENT — PAIN DESCRIPTION - DESCRIPTORS: DESCRIPTORS: HEADACHE

## 2019-06-29 ASSESSMENT — PAIN DESCRIPTION - PAIN TYPE: TYPE: ACUTE PAIN

## 2019-06-29 ASSESSMENT — PAIN DESCRIPTION - FREQUENCY: FREQUENCY: CONTINUOUS

## 2019-06-29 ASSESSMENT — PAIN DESCRIPTION - ORIENTATION: ORIENTATION: INNER

## 2019-06-29 NOTE — PROGRESS NOTES
Message left with Dr Yaron Starr answering service notifying of cardiology having cleared patient for discharge and requesting diet order.

## 2019-06-29 NOTE — PROGRESS NOTES
Anticipating discharge today. Per cardiology, patient can be discharged if second troponin is negative. Bedside RN notified. Notified SW of patient's transportation needs.

## 2019-06-29 NOTE — CONSULTS
The [address]    Name: Peder Dandy    Age: 60 y.o. Date of Admission: 6/28/2019 10:35 PM    Date of Service: 6/29/2019    Reason for Consultation: Chest pressure    Referring Physician: Dr. Best Britton    History of Present Illness: The patient is a 60y.o. year old female with known history of prior pulmonary embolism maintained on chronic anticoagulation, who is morbidly obese and functionally deconditioned multiple sclerosis and November 2018 had a fall and sustained right femur fracture, right tibia and fibula fracture and underwent orthopedic surgery at that time. Has resided at [facility] over the last couple of months undergoing limited physical therapy as tolerated. She reports intermittent chest symptoms over the last week or 10 days That occur at rest and no associated diaphoresis or shortness of breath when she has the chest symptoms. She describes both chest pressure and a pinching feeling. No indication of  for associated nausea or vomiting or bleeding issues. By her report she denies any known cardiovascular disease, no CAD or stroke. Past Medical History: Multiple sclerosis, prior history of pulmonary embolism. Past surgical history: Right hip surgery, tonsillectomy hernia repair bladder surgery    Review of Systems:     Constitutional: No fever, chills, sweats  Cardiac: As per HPI  Pulmonary: No cough, wheeze, hemoptysis  HEENT: No visual disturbances or difficult swallowing  GI: No nausea, vomiting, diarrhea, abdominal pain, rectal bleeding  : No dysuria or hematuria  Endocrine: No excessive thirst, heat or cold intolerance. Musculoskeletal: No joint pain or muscle aches.  No claudication  Skin: No skin breakdown or rashes  Neuro: No headache, confusion, or seizures  Psych: No depression, anxiety      Family History:  Family History   Problem Relation Age of Onset    Cancer Mother     Dementia Mother     Obesity mg Oral Daily with breakfast Sheree Dane, DO   20 mg at 06/29/19 1004    senna (SENOKOT) tablet 17.2 mg  2 tablet Oral Nightly Sheree Dane, DO        traZODone (DESYREL) tablet 50 mg  50 mg Oral Nightly Sheree Dane, DO        acetaminophen (TYLENOL) tablet 650 mg  650 mg Oral Q4H PRN Sheree Dane, DO        [START ON 7/2/2019] vitamin D (ERGOCALCIFEROL) capsule 50,000 Units  50,000 Units Oral Q7 Days Sheree Dane, DO         Facility-Administered Medications Ordered in Other Encounters   Medication Dose Route Frequency Provider Last Rate Last Dose    0.9 % sodium chloride bolus  250 mL Intravenous Once Sam Leigh MD             Physical Exam:  BP (!) 140/61   Pulse 77   Temp 98.3 °F (36.8 °C) (Oral)   Resp 18   Ht 5' 5\" (1.651 m)   Wt (!) 360 lb (163.3 kg)   SpO2 97%   BMI 59.91 kg/m²   Weight change: Wt Readings from Last 3 Encounters:   06/28/19 (!) 360 lb (163.3 kg)   05/17/19 (!) 350 lb (158.8 kg)   03/04/19 (!) 350 lb (158.8 kg)         General: Awake, alert, oriented x3, no acute distress  HEENT: Unremarkable  Neck: No JVD or bruits. Cardiac: Regular rate and rhythm, normal S1 and S2, no extra heart sounds, murmurs, heaves, thrills  Resp: Lungs clear without wheezing or crackles. No accessory muscle use or retraction  Abdomen: soft, nontender, nondistended, no gross organomegaly or mass  Skin: Warm and dry, no cyanosis.   Musculoskeletal: normal tone and strength in the upper and lower extremities bilaterally  Neuro: Grossly unremarkable  Psych: Cooperative, and normal affect    Intake/Output:    Intake/Output Summary (Last 24 hours) at 6/29/2019 1124  Last data filed at 6/29/2019 0846  Gross per 24 hour   Intake --   Output 1950 ml   Net -1950 ml     I/O this shift:  In: -   Out: 1950 [Urine:1950]    Laboratory Tests:  Lab Results   Component Value Date    CREATININE 0.9 06/28/2019    BUN 16 06/28/2019     06/28/2019    K 4.3 06/28/2019    CL 94 (L) 06/28/2019    CO2 29

## 2019-06-29 NOTE — PROGRESS NOTES
Dr. Isabel Ortiz who is covering for Dr. Adrianne Rossi called for admission orders. Awaiting call back at this time.

## 2019-06-29 NOTE — CARE COORDINATION
Social Work / Discharge Planning :SW was able to [employer] transport through [employer]. Patient will be discharged to [facility] today at 3:30 via [address] ambulance. Charge RN ,RN and Ebony from [facility]. JASON to follow.  Electronically signed by PRABHJOT Arriola on 6/29/19 at 10:49 AM Social Work / Discharge Planning : Patient will be discharged ICF back to [place] today. However,SW did have to pre-cert her transport before [facility] ambulance would approve. SW called [facility] Med trans [phone]. SW spoke to [place] who did not understand how to approve patient for trip and after 15  minutes with him, he transferred SW and SW continues on Hold. Without approval through patient insurance, ambulance CANNOT be set up SW continues to be on hold. Electronically signed by PRABHJOT Lanza on 6/29/19 at 10:31 AM    Addendum : SW got disconnected from [facility] med trans. SW had to call back Med trans to pre-cert trip: SW continues on hold. If SW cannot speak to someone in regards to ambulance pre-cert, [facility] or any other ambulance cannot transport and patient will remain in hospital.////////////////////////////////////////////////

## 2019-06-29 NOTE — ED PROVIDER NOTES
note and vitals reviewed. Procedures    MDM  Number of Diagnoses or Management Options  Chest pain, unspecified type:   Diagnosis management comments: Patient presented with chest pain. Plan is for labs, imaging, EKG. She was given aspirin, nitro by EMS improving symptoms. EKG with no ischemic changes. Labs reviewed with troponin less than 0.01 and BNP within appropriate range. Patient has not had any cardiac evaluation. Plan is for admission for chest pain rule out. Dr. Sebastian Montemayor consulted and Dr. Tilda Boast returned phone call and accepts admission. ED Course as of Jun 29 0102 Fri Jun 28, 2019   6949 EKG: This EKG is signed and interpreted by me. Rate: 76  Rhythm: Sinus  Interpretation: NSR  Comparison: stable as compared to patient's most recent EKG      [JA]      ED Course User Index  [JA] Boogie Hills MD       --------------------------------------------- PAST HISTORY ---------------------------------------------  Past Medical History:  has a past medical history of Acquired hypothyroidism, Acute blood loss as cause of postoperative anemia, Anemia, Anticoagulant long-term use, Anxiety, Arthritis, Blood transfusion, Chronic back pain, Depression, GERD (gastroesophageal reflux disease), Hx of blood clots, Lymphedema of lower extremity, Migraine, Multiple sclerosis ([address] Obesity, Osteoarthritis, PE (pulmonary thromboembolism) ([address] Peripheral vascular disease ([address] Prominent abdominal aortic pulse, Pulmonary embolism (HCC), Swelling of lower limb, Thyroid disease, Urinary incontinence, and Varicose veins of lower extremities. Past Surgical History:  has a past surgical history that includes Bladder surgery; hernia repair; Dental surgery; Tonsillectomy; Dilation and curettage of uterus; other surgical history (09/15/2015); Tibia fracture surgery (Right); fracture surgery; Upper gastrointestinal endoscopy; Colonoscopy; Endoscopy, colon, diagnostic;  Total hip arthroplasty (Right, 09/13/2016); and open treatment fracture distal tibia & fibula (Right, 11/16/2018). Social History:  reports that she quit smoking about 15 years ago. She started smoking about 42 years ago. She smoked 0.25 packs per day. She has never used smokeless tobacco. She reports that she drinks alcohol. She reports that she does not use drugs. Family History: family history includes Arthritis in her sister; Cancer in her mother; Dementia in her mother; Diabetes in her father and sister; High Blood Pressure in her father and sister; Lupus in her sister; Obesity in her father; Stroke in her father. The patients home medications have been reviewed. Allergies: Fentanyl and Ferritin    -------------------------------------------------- RESULTS -------------------------------------------------    LABS:  Results for orders placed or performed during the hospital encounter of 06/28/19   CBC Auto Differential   Result Value Ref Range    WBC 4.3 (L) 4.5 - 11.5 E9/L    RBC 4.00 3.50 - 5.50 E12/L    Hemoglobin 10.1 (L) 11.5 - 15.5 g/dL    Hematocrit 32.4 (L) 34.0 - 48.0 %    MCV 81.0 80.0 - 99.9 fL    MCH 25.3 (L) 26.0 - 35.0 pg    MCHC 31.2 (L) 32.0 - 34.5 %    RDW 23.6 (H) 11.5 - 15.0 fL    Platelets 761 669 - 775 E9/L    MPV 8.5 7.0 - 12.0 fL   Basic Metabolic Panel   Result Value Ref Range    Sodium 133 132 - 146 mmol/L    Potassium 4.3 3.5 - 5.0 mmol/L    Chloride 94 (L) 98 - 107 mmol/L    CO2 29 22 - 29 mmol/L    Anion Gap 10 7 - 16 mmol/L    Glucose 103 (H) 74 - 99 mg/dL    BUN 16 8 - 23 mg/dL    CREATININE 0.9 0.5 - 1.0 mg/dL    GFR Non-African American >60 >=60 mL/min/1.73    GFR African American >60     Calcium 9.1 8.6 - 10.2 mg/dL   Troponin   Result Value Ref Range    Troponin <0.01 0.00 - 0.03 ng/mL   Brain Natriuretic Peptide   Result Value Ref Range    Pro-BNP 32 0 - 125 pg/mL       RADIOLOGY:  XR CHEST PORTABLE   Final Result      Chronic scarring versus atelectasis left lung base. EKG:   This EKG is signed and interpreted by me. Rate: 76  Rhythm: Sinus  Interpretation: NSR  Comparison: stable as compared to patient's most recent EKG      ------------------------- NURSING NOTES AND VITALS REVIEWED ---------------------------  Date / Time Roomed:  6/28/2019 10:35 PM  ED Bed Assignment:  24/24    The nursing notes within the ED encounter and vital signs as below have been reviewed. Patient Vitals for the past 24 hrs:   BP Temp Temp src Pulse Resp SpO2 Height Weight   06/29/19 0027 124/78 97.8 °F (36.6 °C) Oral 77 16 96 % -- --   06/28/19 2247 118/70 -- -- -- -- -- -- --   06/28/19 2244 -- 97.5 °F (36.4 °C) Oral 82 16 95 % 5' 5\" (1.651 m) (!) 360 lb (163.3 kg)       Oxygen Saturation Interpretation: Normal    ------------------------------------------ PROGRESS NOTES ------------------------------------------  Re-evaluation(s):  Time: 6075  Patients symptoms show no change  Repeat physical examination is not changed    Counseling:  I have spoken with the patient and discussed todays results, in addition to providing specific details for the plan of care and counseling regarding the diagnosis and prognosis. Their questions are answered at this time and they are agreeable with the plan of admission.    --------------------------------- ADDITIONAL PROVIDER NOTES ---------------------------------  Consultations:  Time: 0100. Spoke with Dr. Lázaro Canada.  Discussed case. They will admit the patient. This patient's ED course included: a personal history and physicial examination, multiple bedside re-evaluations, IV medications, cardiac monitoring, continuous pulse oximetry and complex medical decision making and emergency management    This patient has remained hemodynamically stable during their ED course. Diagnosis:  1. Chest pain, unspecified type        Disposition:  Patient's disposition: Admit to telemetry  Patient's condition is stable.        Leidy Haas DO  Resident  06/29/19 0911

## 2019-06-30 LAB
EKG ATRIAL RATE: 76 BPM
EKG P AXIS: 75 DEGREES
EKG P-R INTERVAL: 174 MS
EKG Q-T INTERVAL: 422 MS
EKG QRS DURATION: 100 MS
EKG QTC CALCULATION (BAZETT): 474 MS
EKG R AXIS: 3 DEGREES
EKG T AXIS: 50 DEGREES
EKG VENTRICULAR RATE: 76 BPM

## 2019-06-30 PROCEDURE — 93010 ELECTROCARDIOGRAM REPORT: CPT | Performed by: INTERNAL MEDICINE

## 2019-07-02 NOTE — DISCHARGE SUMMARY
Patient ID:  Tashi Jackman  [id number]  61 y.o.  1960    Admit date: 6/28/2019    Discharge date and time: 6/29/2019  4:45 PM     Admission Diagnoses: Chest pain [R07.9]  Chest pain [R07.9]    Discharge Diagnoses: Active Problems:    Chest pain  Resolved Problems:    * No resolved hospital problems. *        Consults: cardiology    Procedures: none    Hospital Course: Patient admitted with atypical chest pain from SNF. She has history of pulmonary embolism and is on Xarelto. She was brought to observation. She had several sets of cardiac enyzmes that were negative. She was seen by cardiology and felt no further work up needed. She was discharged back to [facility] in stable condition.     Discharge Exam:  See progress note from today    Disposition: SNF    Patient Instructions:   Discharge Medication List as of 6/29/2019  5:08 PM      CONTINUE these medications which have NOT CHANGED    Details   Menthol (BENGAY ULTRA STRENGTH) 5 % PTCH Apply 1 patch topically every 8 hours as needed Indications: Right knee for painHistorical Med      hydrOXYzine (ATARAX) 25 MG tablet Take 25 mg by mouth every 8 hours as needed for Itching or AnxietyHistorical Med      !! levothyroxine (SYNTHROID) 200 MCG tablet Take 262.5 mcg by mouth once a week Indications: SundaysHistorical Med      polyethylene glycol (GLYCOLAX) packet Take 17 g by mouth daily as needed for ConstipationHistorical Med      ondansetron (ZOFRAN) 4 MG tablet Take 4 mg by mouth every 8 hours as needed for Nausea or VomitingHistorical Med      calcium-vitamin D (OSCAL-500) 500-200 MG-UNIT per tablet Take 1 tablet by mouth 2 times dailyHistorical Med      Cholecalciferol (VITAMIN D3) 70783 units CAPS Take 50,000 Units by mouth every 7 daysHistorical Med      rivaroxaban (XARELTO) 20 MG TABS tablet Take 20 mg by mouth daily (with breakfast)Historical Med      Bone Growth Stimulator (E) MISC Starting Mon 3/4/2019, Disp-1 each, R-0, PrintExogen      clonazePAM (Paulie Chaves) 1 MG tablet Take 1 mg by mouth daily. Historical Med      ARIPiprazole (ABILIFY) 10 MG tablet Take 10 mg by mouth dailyHistorical Med      !! levothyroxine (SYNTHROID) 175 MCG tablet Take 175 mcg by mouth Six times weekly Monday through saturdayHistorical Med      omeprazole (PRILOSEC) 20 MG delayed release capsule Take 20 mg by mouth daily as neededHistorical Med      traZODone (DESYREL) 50 MG tablet Take 50 mg by mouth nightlyHistorical Med      cyclobenzaprine (FLEXERIL) 10 MG tablet Take 10 mg by mouth three times daily Historical Med      DULoxetine (CYMBALTA) 60 MG extended release capsule Take 60 mg by mouth 2 times dailyHistorical Med      Rectal Cleansers (FLEET [employer]URALS CLEANSING ENEMA RE) Place 1 Dose rectally daily as neededHistorical Med      Interferon Beta-1a (AVONEX PEN) 30 MCG/0.5ML AJKT Inject 0.5 mLs into the muscle once a week SaturdayHistorical Med      melatonin 3 MG TABS tablet Take 3 mg by mouth every evening Historical Med      senna (SENOKOT) 8.6 MG tablet Take 2 tablets by mouth nightly Historical Med      SUMAtriptan (IMITREX) 100 MG tablet Take 100 mg by mouth once as needed for MigraineHistorical Med      gabapentin (NEURONTIN) 300 MG capsule Take 3 capsules by mouth nightly, Disp-90 capsule, R-3Normal      acetaminophen (TYLENOL) 500 MG tablet Take 1,000 mg by mouth 2 times daily as needed for PainHistorical Med       !! - Potential duplicate medications found. Please discuss with provider. Activity: activity as tolerated  Diet: regular diet    Follow-up with SNF doctor in 2 days.     Note that over 30 minutes was spent in preparing discharge papers, discussing discharge with patient, medication review, etc.    Signed:  Anu La  7/2/2019  12:36 PM

## 2019-07-30 ENCOUNTER — HOSPITAL ENCOUNTER (OUTPATIENT)
Dept: GENERAL RADIOLOGY | Age: 59
Discharge: HOME | End: 2019-08-01
Payer: MEDICARE

## 2019-07-30 ENCOUNTER — TELEPHONE (OUTPATIENT)
Dept: ORTHOPEDIC SURGERY | Age: 59
End: 2019-07-30

## 2019-07-30 ENCOUNTER — APPOINTMENT (OUTPATIENT)
Dept: ORTHOPEDIC SURGERY | Age: 59
End: 2019-07-30
Payer: MEDICARE

## 2019-07-30 DIAGNOSIS — S82.244K: ICD-10-CM

## 2019-07-30 DIAGNOSIS — S72.491K OTHER CLOSED FRACTURE OF DISTAL END OF RIGHT FEMUR WITH NONUNION, SUBSEQUENT ENCOUNTER: ICD-10-CM

## 2019-07-30 DIAGNOSIS — S82.444D: ICD-10-CM

## 2019-07-30 PROCEDURE — 73590 X-RAY EXAM OF LOWER LEG: CPT

## 2019-07-30 PROCEDURE — 73552 X-RAY EXAM OF FEMUR 2/>: CPT

## 2019-07-30 NOTE — TELEPHONE ENCOUNTER
[facility] transport brought Foster Dinero for appointment. Per transport, they got another call and had to leave with her. Foster Dinero did get x-rays but was not seen by a provider.

## 2019-08-09 DIAGNOSIS — S72.491K OTHER CLOSED FRACTURE OF DISTAL END OF RIGHT FEMUR WITH NONUNION, SUBSEQUENT ENCOUNTER: Primary | ICD-10-CM

## 2019-08-09 DIAGNOSIS — S72.491K OTHER CLOSED FRACTURE OF DISTAL END OF RIGHT FEMUR WITH NONUNION, SUBSEQUENT ENCOUNTER: ICD-10-CM

## 2019-08-29 ENCOUNTER — OFFICE VISIT (OUTPATIENT)
Dept: ORTHOPEDIC SURGERY | Age: 59
End: 2019-08-29
Payer: MEDICARE

## 2019-08-29 ENCOUNTER — HOSPITAL ENCOUNTER (OUTPATIENT)
Dept: GENERAL RADIOLOGY | Age: 59
Discharge: HOME | End: 2019-08-31
Payer: MEDICARE

## 2019-08-29 VITALS
HEIGHT: 65 IN | HEART RATE: 82 BPM | DIASTOLIC BLOOD PRESSURE: 72 MMHG | TEMPERATURE: 98 F | WEIGHT: 293 LBS | SYSTOLIC BLOOD PRESSURE: 126 MMHG | BODY MASS INDEX: 48.82 KG/M2

## 2019-08-29 DIAGNOSIS — S72.491K OTHER CLOSED FRACTURE OF DISTAL END OF RIGHT FEMUR WITH NONUNION, SUBSEQUENT ENCOUNTER: ICD-10-CM

## 2019-08-29 DIAGNOSIS — S72.491K OTHER CLOSED FRACTURE OF DISTAL END OF RIGHT FEMUR WITH NONUNION, SUBSEQUENT ENCOUNTER: Primary | ICD-10-CM

## 2019-08-29 DIAGNOSIS — S82.444D: ICD-10-CM

## 2019-08-29 PROCEDURE — 73552 X-RAY EXAM OF FEMUR 2/>: CPT

## 2019-08-29 PROCEDURE — 99212 OFFICE O/P EST SF 10 MIN: CPT | Performed by: NURSE PRACTITIONER

## 2019-08-29 PROCEDURE — 99213 OFFICE O/P EST LOW 20 MIN: CPT | Performed by: NURSE PRACTITIONER

## 2019-08-29 PROCEDURE — 73590 X-RAY EXAM OF LOWER LEG: CPT

## 2019-10-14 ENCOUNTER — HOSPITAL ENCOUNTER (EMERGENCY)
Age: 59
Discharge: HOME | End: 2019-10-15
Attending: EMERGENCY MEDICINE
Payer: MEDICARE

## 2019-10-14 DIAGNOSIS — R10.31 RIGHT LOWER QUADRANT ABDOMINAL PAIN: Primary | ICD-10-CM

## 2019-10-14 DIAGNOSIS — R11.0 NAUSEA: ICD-10-CM

## 2019-10-14 DIAGNOSIS — N30.00 ACUTE CYSTITIS WITHOUT HEMATURIA: ICD-10-CM

## 2019-10-14 DIAGNOSIS — R19.7 DIARRHEA, UNSPECIFIED TYPE: ICD-10-CM

## 2019-10-14 PROCEDURE — 99285 EMERGENCY DEPT VISIT HI MDM: CPT

## 2019-10-14 RX ORDER — 0.9 % SODIUM CHLORIDE 0.9 %
1000 INTRAVENOUS SOLUTION INTRAVENOUS ONCE
Status: COMPLETED | OUTPATIENT
Start: 2019-10-14 | End: 2019-10-15

## 2019-10-14 RX ORDER — MORPHINE SULFATE 4 MG/ML
4 INJECTION, SOLUTION INTRAMUSCULAR; INTRAVENOUS ONCE
Status: COMPLETED | OUTPATIENT
Start: 2019-10-14 | End: 2019-10-15

## 2019-10-14 RX ORDER — ONDANSETRON 2 MG/ML
4 INJECTION INTRAMUSCULAR; INTRAVENOUS EVERY 6 HOURS PRN
Status: DISCONTINUED | OUTPATIENT
Start: 2019-10-14 | End: 2019-10-15 | Stop reason: HOSPADM

## 2019-10-14 ASSESSMENT — PAIN SCALES - GENERAL: PAINLEVEL_OUTOF10: 8

## 2019-10-14 ASSESSMENT — PAIN DESCRIPTION - FREQUENCY: FREQUENCY: INTERMITTENT

## 2019-10-14 ASSESSMENT — PAIN DESCRIPTION - DESCRIPTORS: DESCRIPTORS: DISCOMFORT

## 2019-10-14 ASSESSMENT — PAIN DESCRIPTION - ONSET: ONSET: ON-GOING

## 2019-10-14 ASSESSMENT — PAIN DESCRIPTION - PAIN TYPE: TYPE: ACUTE PAIN

## 2019-10-14 ASSESSMENT — PAIN DESCRIPTION - ORIENTATION: ORIENTATION: RIGHT;LOWER

## 2019-10-14 ASSESSMENT — PAIN DESCRIPTION - PROGRESSION: CLINICAL_PROGRESSION: GRADUALLY WORSENING

## 2019-10-14 ASSESSMENT — PAIN DESCRIPTION - LOCATION: LOCATION: ABDOMEN

## 2019-10-15 ENCOUNTER — APPOINTMENT (OUTPATIENT)
Dept: CT IMAGING | Age: 59
End: 2019-10-15
Payer: MEDICARE

## 2019-10-15 VITALS
SYSTOLIC BLOOD PRESSURE: 114 MMHG | TEMPERATURE: 97.8 F | OXYGEN SATURATION: 98 % | RESPIRATION RATE: 16 BRPM | BODY MASS INDEX: 48.82 KG/M2 | HEIGHT: 65 IN | DIASTOLIC BLOOD PRESSURE: 65 MMHG | HEART RATE: 90 BPM | WEIGHT: 293 LBS

## 2019-10-15 LAB
ALBUMIN SERPL-MCNC: 3.8 G/DL (ref 3.5–5.2)
ALP BLD-CCNC: 100 U/L (ref 35–104)
ALT SERPL-CCNC: 33 U/L (ref 0–32)
ANION GAP SERPL CALCULATED.3IONS-SCNC: 13 MMOL/L (ref 7–16)
AST SERPL-CCNC: 37 U/L (ref 0–31)
BACTERIA: ABNORMAL /HPF
BASOPHILS ABSOLUTE: 0.07 E9/L (ref 0–0.2)
BASOPHILS RELATIVE PERCENT: 0.8 % (ref 0–2)
BILIRUB SERPL-MCNC: 0.4 MG/DL (ref 0–1.2)
BILIRUBIN DIRECT: <0.2 MG/DL (ref 0–0.3)
BILIRUBIN URINE: NEGATIVE
BILIRUBIN, INDIRECT: ABNORMAL MG/DL (ref 0–1)
BLOOD, URINE: ABNORMAL
BUN BLDV-MCNC: 10 MG/DL (ref 8–23)
CALCIUM SERPL-MCNC: 9.4 MG/DL (ref 8.6–10.2)
CHLORIDE BLD-SCNC: 97 MMOL/L (ref 98–107)
CLARITY: CLEAR
CO2: 22 MMOL/L (ref 22–29)
COLOR: YELLOW
CREAT SERPL-MCNC: 0.6 MG/DL (ref 0.5–1)
EOSINOPHILS ABSOLUTE: 0.06 E9/L (ref 0.05–0.5)
EOSINOPHILS RELATIVE PERCENT: 0.7 % (ref 0–6)
GFR AFRICAN AMERICAN: >60
GFR NON-AFRICAN AMERICAN: >60 ML/MIN/1.73
GLUCOSE BLD-MCNC: 103 MG/DL (ref 74–99)
GLUCOSE URINE: NEGATIVE MG/DL
HCT VFR BLD CALC: 32.8 % (ref 34–48)
HEMOGLOBIN: 10.5 G/DL (ref 11.5–15.5)
IMMATURE GRANULOCYTES #: 0.04 E9/L
IMMATURE GRANULOCYTES %: 0.5 % (ref 0–5)
KETONES, URINE: ABNORMAL MG/DL
LEUKOCYTE ESTERASE, URINE: ABNORMAL
LYMPHOCYTES ABSOLUTE: 1.17 E9/L (ref 1.5–4)
LYMPHOCYTES RELATIVE PERCENT: 13.4 % (ref 20–42)
MCH RBC QN AUTO: 26.3 PG (ref 26–35)
MCHC RBC AUTO-ENTMCNC: 32 % (ref 32–34.5)
MCV RBC AUTO: 82.2 FL (ref 80–99.9)
MONOCYTES ABSOLUTE: 0.58 E9/L (ref 0.1–0.95)
MONOCYTES RELATIVE PERCENT: 6.6 % (ref 2–12)
NEUTROPHILS ABSOLUTE: 6.83 E9/L (ref 1.8–7.3)
NEUTROPHILS RELATIVE PERCENT: 78 % (ref 43–80)
NITRITE, URINE: NEGATIVE
PDW BLD-RTO: 14.7 FL (ref 11.5–15)
PH UA: 6.5 (ref 5–9)
PLATELET # BLD: 504 E9/L (ref 130–450)
PMV BLD AUTO: 8.9 FL (ref 7–12)
POTASSIUM REFLEX MAGNESIUM: 3.8 MMOL/L (ref 3.5–5)
PROTEIN UA: NEGATIVE MG/DL
RBC # BLD: 3.99 E12/L (ref 3.5–5.5)
RBC UA: ABNORMAL /HPF (ref 0–2)
SODIUM BLD-SCNC: 132 MMOL/L (ref 132–146)
SPECIFIC GRAVITY UA: <=1.005 (ref 1–1.03)
TOTAL PROTEIN: 7.9 G/DL (ref 6.4–8.3)
UROBILINOGEN, URINE: 0.2 E.U./DL
WBC # BLD: 8.8 E9/L (ref 4.5–11.5)
WBC UA: ABNORMAL /HPF (ref 0–5)

## 2019-10-15 PROCEDURE — 87088 URINE BACTERIA CULTURE: CPT

## 2019-10-15 PROCEDURE — 85025 COMPLETE CBC W/AUTO DIFF WBC: CPT

## 2019-10-15 PROCEDURE — 2580000003 HC RX 258: Performed by: GENERAL PRACTICE

## 2019-10-15 PROCEDURE — 74177 CT ABD & PELVIS W/CONTRAST: CPT

## 2019-10-15 PROCEDURE — 80048 BASIC METABOLIC PNL TOTAL CA: CPT

## 2019-10-15 PROCEDURE — 81001 URINALYSIS AUTO W/SCOPE: CPT

## 2019-10-15 PROCEDURE — 87186 SC STD MICRODIL/AGAR DIL: CPT

## 2019-10-15 PROCEDURE — 96374 THER/PROPH/DIAG INJ IV PUSH: CPT

## 2019-10-15 PROCEDURE — 6360000004 HC RX CONTRAST MEDICATION: Performed by: RADIOLOGY

## 2019-10-15 PROCEDURE — 80076 HEPATIC FUNCTION PANEL: CPT

## 2019-10-15 PROCEDURE — 87077 CULTURE AEROBIC IDENTIFY: CPT

## 2019-10-15 PROCEDURE — 6360000002 HC RX W HCPCS: Performed by: GENERAL PRACTICE

## 2019-10-15 RX ORDER — CEFDINIR 300 MG/1
300 CAPSULE ORAL 2 TIMES DAILY
Qty: 14 CAPSULE | Refills: 0 | Status: SHIPPED | OUTPATIENT
Start: 2019-10-15 | End: 2019-10-22

## 2019-10-15 RX ORDER — ONDANSETRON 2 MG/ML
4 INJECTION INTRAMUSCULAR; INTRAVENOUS ONCE
Status: DISCONTINUED | OUTPATIENT
Start: 2019-10-15 | End: 2019-10-15

## 2019-10-15 RX ORDER — SODIUM CHLORIDE 0.9 % (FLUSH) 0.9 %
10 SYRINGE (ML) INJECTION ONCE
Status: DISCONTINUED | OUTPATIENT
Start: 2019-10-15 | End: 2019-10-15 | Stop reason: HOSPADM

## 2019-10-15 RX ADMIN — SODIUM CHLORIDE 1000 ML: 9 INJECTION, SOLUTION INTRAVENOUS at 00:24

## 2019-10-15 RX ADMIN — MORPHINE SULFATE 4 MG: 4 INJECTION, SOLUTION INTRAMUSCULAR; INTRAVENOUS at 00:16

## 2019-10-15 RX ADMIN — IOPAMIDOL 110 ML: 755 INJECTION, SOLUTION INTRAVENOUS at 03:00

## 2019-10-15 RX ADMIN — ONDANSETRON 4 MG: 2 INJECTION INTRAMUSCULAR; INTRAVENOUS at 00:17

## 2019-10-15 RX ADMIN — ONDANSETRON 4 MG: 2 INJECTION INTRAMUSCULAR; INTRAVENOUS at 09:31

## 2019-10-15 ASSESSMENT — ENCOUNTER SYMPTOMS
CHOKING: 0
WHEEZING: 0
VOMITING: 0
SINUS PAIN: 0
EYE PAIN: 0
CHEST TIGHTNESS: 0
DIARRHEA: 1
SHORTNESS OF BREATH: 0
COUGH: 0
SORE THROAT: 0
ABDOMINAL PAIN: 1
NAUSEA: 1

## 2019-10-15 ASSESSMENT — PAIN DESCRIPTION - PROGRESSION: CLINICAL_PROGRESSION: GRADUALLY WORSENING

## 2019-10-15 ASSESSMENT — PAIN SCALES - GENERAL: PAINLEVEL_OUTOF10: 9

## 2019-10-19 LAB
ORGANISM: ABNORMAL
ORGANISM: ABNORMAL
URINE CULTURE, ROUTINE: ABNORMAL
URINE CULTURE, ROUTINE: ABNORMAL

## 2019-10-29 DIAGNOSIS — S82.444D: Primary | ICD-10-CM

## 2019-10-29 DIAGNOSIS — S82.244K: ICD-10-CM

## 2019-10-29 DIAGNOSIS — S72.491K OTHER CLOSED FRACTURE OF DISTAL END OF RIGHT FEMUR WITH NONUNION, SUBSEQUENT ENCOUNTER: ICD-10-CM

## 2020-05-17 ENCOUNTER — APPOINTMENT (OUTPATIENT)
Dept: CT IMAGING | Age: 60
DRG: 381 | End: 2020-05-17
Payer: MEDICARE

## 2020-05-17 ENCOUNTER — HOSPITAL ENCOUNTER (INPATIENT)
Age: 60
LOS: 1 days | Discharge: HOME | DRG: 381 | End: 2020-05-19
Attending: EMERGENCY MEDICINE | Admitting: INTERNAL MEDICINE
Payer: MEDICARE

## 2020-05-17 ENCOUNTER — APPOINTMENT (OUTPATIENT)
Dept: GENERAL RADIOLOGY | Age: 60
DRG: 381 | End: 2020-05-17
Payer: MEDICARE

## 2020-05-17 PROBLEM — K92.0 HEMATEMESIS: Status: ACTIVE | Noted: 2020-05-17

## 2020-05-17 LAB
ALBUMIN SERPL-MCNC: 3.4 G/DL (ref 3.5–5.2)
ALP BLD-CCNC: 94 U/L (ref 35–104)
ALT SERPL-CCNC: 11 U/L (ref 0–32)
ANION GAP SERPL CALCULATED.3IONS-SCNC: 14 MMOL/L (ref 7–16)
APTT: 29.3 SEC (ref 24.5–35.1)
AST SERPL-CCNC: 20 U/L (ref 0–31)
BASOPHILS ABSOLUTE: 0.09 E9/L (ref 0–0.2)
BASOPHILS RELATIVE PERCENT: 1.4 % (ref 0–2)
BILIRUB SERPL-MCNC: 0.4 MG/DL (ref 0–1.2)
BILIRUBIN DIRECT: <0.2 MG/DL (ref 0–0.3)
BILIRUBIN, INDIRECT: ABNORMAL MG/DL (ref 0–1)
BUN BLDV-MCNC: 8 MG/DL (ref 8–23)
CALCIUM SERPL-MCNC: 9 MG/DL (ref 8.6–10.2)
CHLORIDE BLD-SCNC: 96 MMOL/L (ref 98–107)
CO2: 22 MMOL/L (ref 22–29)
CREAT SERPL-MCNC: 0.5 MG/DL (ref 0.5–1)
EOSINOPHILS ABSOLUTE: 0.09 E9/L (ref 0.05–0.5)
EOSINOPHILS RELATIVE PERCENT: 1.4 % (ref 0–6)
GFR AFRICAN AMERICAN: >60
GFR NON-AFRICAN AMERICAN: >60 ML/MIN/1.73
GLUCOSE BLD-MCNC: 92 MG/DL (ref 74–99)
HCT VFR BLD CALC: 37.2 % (ref 34–48)
HEMOGLOBIN: 12.2 G/DL (ref 11.5–15.5)
IMMATURE GRANULOCYTES #: 0.02 E9/L
IMMATURE GRANULOCYTES %: 0.3 % (ref 0–5)
INR BLD: 1
LACTIC ACID: 0.9 MMOL/L (ref 0.5–2.2)
LIPASE: 32 U/L (ref 13–60)
LYMPHOCYTES ABSOLUTE: 1.56 E9/L (ref 1.5–4)
LYMPHOCYTES RELATIVE PERCENT: 23.9 % (ref 20–42)
MCH RBC QN AUTO: 28.6 PG (ref 26–35)
MCHC RBC AUTO-ENTMCNC: 32.8 % (ref 32–34.5)
MCV RBC AUTO: 87.1 FL (ref 80–99.9)
MONOCYTES ABSOLUTE: 0.54 E9/L (ref 0.1–0.95)
MONOCYTES RELATIVE PERCENT: 8.3 % (ref 2–12)
NEUTROPHILS ABSOLUTE: 4.23 E9/L (ref 1.8–7.3)
NEUTROPHILS RELATIVE PERCENT: 64.7 % (ref 43–80)
PDW BLD-RTO: 16.6 FL (ref 11.5–15)
PLATELET # BLD: 290 E9/L (ref 130–450)
PMV BLD AUTO: 9.1 FL (ref 7–12)
POTASSIUM SERPL-SCNC: 3.4 MMOL/L (ref 3.5–5)
PROTHROMBIN TIME: 11.7 SEC (ref 9.3–12.4)
RBC # BLD: 4.27 E12/L (ref 3.5–5.5)
SODIUM BLD-SCNC: 132 MMOL/L (ref 132–146)
TOTAL PROTEIN: 7.7 G/DL (ref 6.4–8.3)
WBC # BLD: 6.5 E9/L (ref 4.5–11.5)

## 2020-05-17 PROCEDURE — 85730 THROMBOPLASTIN TIME PARTIAL: CPT

## 2020-05-17 PROCEDURE — 99285 EMERGENCY DEPT VISIT HI MDM: CPT

## 2020-05-17 PROCEDURE — 80076 HEPATIC FUNCTION PANEL: CPT

## 2020-05-17 PROCEDURE — 2580000003 HC RX 258: Performed by: INTERNAL MEDICINE

## 2020-05-17 PROCEDURE — 6370000000 HC RX 637 (ALT 250 FOR IP): Performed by: INTERNAL MEDICINE

## 2020-05-17 PROCEDURE — 83690 ASSAY OF LIPASE: CPT

## 2020-05-17 PROCEDURE — 71045 X-RAY EXAM CHEST 1 VIEW: CPT

## 2020-05-17 PROCEDURE — 93005 ELECTROCARDIOGRAM TRACING: CPT | Performed by: EMERGENCY MEDICINE

## 2020-05-17 PROCEDURE — 85025 COMPLETE CBC W/AUTO DIFF WBC: CPT

## 2020-05-17 PROCEDURE — 6360000002 HC RX W HCPCS: Performed by: EMERGENCY MEDICINE

## 2020-05-17 PROCEDURE — 83605 ASSAY OF LACTIC ACID: CPT

## 2020-05-17 PROCEDURE — 74176 CT ABD & PELVIS W/O CONTRAST: CPT

## 2020-05-17 PROCEDURE — 6370000000 HC RX 637 (ALT 250 FOR IP): Performed by: EMERGENCY MEDICINE

## 2020-05-17 PROCEDURE — 94760 N-INVAS EAR/PLS OXIMETRY 1: CPT

## 2020-05-17 PROCEDURE — C9113 INJ PANTOPRAZOLE SODIUM, VIA: HCPCS | Performed by: EMERGENCY MEDICINE

## 2020-05-17 PROCEDURE — 96375 TX/PRO/DX INJ NEW DRUG ADDON: CPT

## 2020-05-17 PROCEDURE — 80048 BASIC METABOLIC PNL TOTAL CA: CPT

## 2020-05-17 PROCEDURE — G0378 HOSPITAL OBSERVATION PER HR: HCPCS

## 2020-05-17 PROCEDURE — 96374 THER/PROPH/DIAG INJ IV PUSH: CPT

## 2020-05-17 PROCEDURE — 85610 PROTHROMBIN TIME: CPT

## 2020-05-17 RX ORDER — ARIPIPRAZOLE 10 MG/1
10 TABLET ORAL DAILY
Status: DISCONTINUED | OUTPATIENT
Start: 2020-05-18 | End: 2020-05-19 | Stop reason: HOSPADM

## 2020-05-17 RX ORDER — ACETAMINOPHEN 325 MG/1
650 TABLET ORAL EVERY 4 HOURS PRN
Status: DISCONTINUED | OUTPATIENT
Start: 2020-05-17 | End: 2020-05-19 | Stop reason: HOSPADM

## 2020-05-17 RX ORDER — CLONAZEPAM 0.5 MG/1
1 TABLET ORAL NIGHTLY
Status: DISCONTINUED | OUTPATIENT
Start: 2020-05-17 | End: 2020-05-19 | Stop reason: HOSPADM

## 2020-05-17 RX ORDER — ONDANSETRON 2 MG/ML
4 INJECTION INTRAMUSCULAR; INTRAVENOUS ONCE
Status: COMPLETED | OUTPATIENT
Start: 2020-05-17 | End: 2020-05-17

## 2020-05-17 RX ORDER — POLYETHYLENE GLYCOL 3350 17 G/17G
17 POWDER, FOR SOLUTION ORAL DAILY PRN
Status: DISCONTINUED | OUTPATIENT
Start: 2020-05-17 | End: 2020-05-19 | Stop reason: HOSPADM

## 2020-05-17 RX ORDER — GABAPENTIN 300 MG/1
300 CAPSULE ORAL EVERY MORNING
Status: DISCONTINUED | OUTPATIENT
Start: 2020-05-18 | End: 2020-05-18

## 2020-05-17 RX ORDER — TRAZODONE HYDROCHLORIDE 50 MG/1
50 TABLET ORAL NIGHTLY
Status: DISCONTINUED | OUTPATIENT
Start: 2020-05-17 | End: 2020-05-19 | Stop reason: HOSPADM

## 2020-05-17 RX ORDER — DULOXETIN HYDROCHLORIDE 60 MG/1
60 CAPSULE, DELAYED RELEASE ORAL 2 TIMES DAILY
Status: DISCONTINUED | OUTPATIENT
Start: 2020-05-17 | End: 2020-05-19 | Stop reason: HOSPADM

## 2020-05-17 RX ORDER — PANTOPRAZOLE SODIUM 40 MG/10ML
40 INJECTION, POWDER, LYOPHILIZED, FOR SOLUTION INTRAVENOUS ONCE
Status: COMPLETED | OUTPATIENT
Start: 2020-05-17 | End: 2020-05-17

## 2020-05-17 RX ORDER — SODIUM CHLORIDE 0.9 % (FLUSH) 0.9 %
10 SYRINGE (ML) INJECTION EVERY 12 HOURS SCHEDULED
Status: DISCONTINUED | OUTPATIENT
Start: 2020-05-17 | End: 2020-05-19 | Stop reason: HOSPADM

## 2020-05-17 RX ORDER — PANTOPRAZOLE SODIUM 40 MG/1
40 TABLET, DELAYED RELEASE ORAL
Status: DISCONTINUED | OUTPATIENT
Start: 2020-05-18 | End: 2020-05-18

## 2020-05-17 RX ORDER — ONDANSETRON 4 MG/1
4 TABLET, ORALLY DISINTEGRATING ORAL EVERY 8 HOURS PRN
Qty: 10 TABLET | Refills: 0 | Status: SHIPPED | OUTPATIENT
Start: 2020-05-17 | End: 2020-05-19 | Stop reason: SDUPTHER

## 2020-05-17 RX ORDER — SENNA PLUS 8.6 MG/1
2 TABLET ORAL NIGHTLY
Status: DISCONTINUED | OUTPATIENT
Start: 2020-05-17 | End: 2020-05-19 | Stop reason: HOSPADM

## 2020-05-17 RX ORDER — ACETAMINOPHEN 325 MG/1
650 TABLET ORAL 2 TIMES DAILY
Status: DISCONTINUED | OUTPATIENT
Start: 2020-05-17 | End: 2020-05-19 | Stop reason: HOSPADM

## 2020-05-17 RX ORDER — PANTOPRAZOLE SODIUM 40 MG/1
40 TABLET, DELAYED RELEASE ORAL 2 TIMES DAILY
Qty: 20 TABLET | Refills: 0 | Status: SHIPPED | OUTPATIENT
Start: 2020-05-17 | End: 2020-05-19 | Stop reason: SDUPTHER

## 2020-05-17 RX ORDER — POTASSIUM CHLORIDE 20 MEQ/1
40 TABLET, EXTENDED RELEASE ORAL ONCE
Status: COMPLETED | OUTPATIENT
Start: 2020-05-17 | End: 2020-05-17

## 2020-05-17 RX ORDER — SODIUM CHLORIDE 0.9 % (FLUSH) 0.9 %
10 SYRINGE (ML) INJECTION PRN
Status: DISCONTINUED | OUTPATIENT
Start: 2020-05-17 | End: 2020-05-19 | Stop reason: HOSPADM

## 2020-05-17 RX ORDER — ONDANSETRON 4 MG/1
4 TABLET, FILM COATED ORAL EVERY 8 HOURS PRN
Status: DISCONTINUED | OUTPATIENT
Start: 2020-05-17 | End: 2020-05-19 | Stop reason: HOSPADM

## 2020-05-17 RX ORDER — SUCRALFATE 1 G/1
1 TABLET ORAL 4 TIMES DAILY
Qty: 120 TABLET | Refills: 3 | Status: SHIPPED | OUTPATIENT
Start: 2020-05-17 | End: 2020-05-19 | Stop reason: SDUPTHER

## 2020-05-17 RX ADMIN — Medication 10 ML: at 23:44

## 2020-05-17 RX ADMIN — TRAZODONE HYDROCHLORIDE 50 MG: 50 TABLET ORAL at 23:44

## 2020-05-17 RX ADMIN — ONDANSETRON 4 MG: 2 INJECTION INTRAMUSCULAR; INTRAVENOUS at 17:02

## 2020-05-17 RX ADMIN — CLONAZEPAM 1 MG: 0.5 TABLET ORAL at 23:44

## 2020-05-17 RX ADMIN — PANTOPRAZOLE SODIUM 40 MG: 40 INJECTION, POWDER, FOR SOLUTION INTRAVENOUS at 17:02

## 2020-05-17 RX ADMIN — POTASSIUM CHLORIDE 40 MEQ: 20 TABLET, EXTENDED RELEASE ORAL at 18:58

## 2020-05-17 ASSESSMENT — ENCOUNTER SYMPTOMS
SORE THROAT: 0
BLOOD IN STOOL: 0
BACK PAIN: 0
SHORTNESS OF BREATH: 0
COUGH: 0
ABDOMINAL PAIN: 1
VOMITING: 1
NAUSEA: 1
DIARRHEA: 0

## 2020-05-17 ASSESSMENT — PAIN SCALES - GENERAL: PAINLEVEL_OUTOF10: 8

## 2020-05-17 NOTE — ED NOTES
Bed: 28  Expected date:   Expected time:   Means of arrival:   Comments:  EMS     [facility].  Marcial James RN  05/17/20 [id number]

## 2020-05-17 NOTE — ED PROVIDER NOTES
68-year-old female presents to the emergency department with epigastric pain and nausea vomiting with coffee-ground emesis. Patient states she has been seen before for similar symptoms and they thought she had an ulcer at that time. Patient states she has had nausea and vomiting for 3 days and over the last 2 days has developed this coffee-ground emesis. Denies chest pain or shortness of breath she denies history of liver issues she denies history of esophageal varices. The patient also states no diarrhea or constipation no use anticoagulation and no leg swelling. The history is provided by the patient. Nausea & Vomiting   Severity:  Mild  Duration:  3 days  Timing:  Intermittent  Quality:  Coffee grounds  Progression:  Unchanged  Chronicity:  New  Recent urination:  Normal  Relieved by:  Nothing  Worsened by:  Nothing  Ineffective treatments:  None tried  Associated symptoms: abdominal pain    Associated symptoms: no arthralgias, no chills, no cough, no diarrhea, no fever, no headaches and no sore throat    Abdominal pain:     Location:  Epigastric    Quality: aching      Severity:  Mild    Onset quality:  Gradual    Duration:  3 days    Timing:  Intermittent    Progression:  Waxing and waning    Chronicity:  New  Risk factors: no suspect food intake and no travel to endemic areas         Review of Systems   Constitutional: Negative for chills and fever. HENT: Negative for sore throat. Respiratory: Negative for cough and shortness of breath. Cardiovascular: Negative for chest pain. Gastrointestinal: Positive for abdominal pain, nausea and vomiting. Negative for blood in stool and diarrhea. Genitourinary: Negative for dysuria and frequency. Musculoskeletal: Negative for arthralgias and back pain. Skin: Negative for rash. Neurological: Negative for weakness and headaches. All other systems reviewed and are negative.        Physical Exam  Constitutional:       Appearance: Normal appearance. Comments: Patient holding bag with what appears to be coffee-ground emesis. HENT:      Head: Normocephalic and atraumatic. Nose: Nose normal.      Mouth/Throat:      Mouth: Mucous membranes are moist.   Eyes:      Extraocular Movements: Extraocular movements intact. Pupils: Pupils are equal, round, and reactive to light. Cardiovascular:      Rate and Rhythm: Normal rate and regular rhythm. Pulses: Normal pulses. Heart sounds: Normal heart sounds. No murmur. Pulmonary:      Effort: Pulmonary effort is normal.      Breath sounds: Normal breath sounds. Abdominal:      General: Abdomen is flat. Bowel sounds are normal. There is no distension. Palpations: Abdomen is soft. Tenderness: There is abdominal tenderness in the epigastric area. Neurological:      Mental Status: She is alert and oriented to person, place, and time. Procedures     MDM  Number of Diagnoses or Management Options  Abdominal pain, epigastric:   Hematemesis with nausea:   Hiatal hernia:   Diagnosis management comments: Patient seen and examined labs imaging Protonix and Zofran were initiated. Work-up was found to be reassuring. However after patient was tried with a p.o. challenge she had worsening of nausea. Given patient's recurrence of symptoms I am uncomfortable discharging the patient case was discussed with Dr. Anastasia Soto. Given patient's recurrence of symptoms and was discussed with Dr. Mally Beckwith who will admit for Dr. Giselle Gupta. ED Course as of May 17 1733   Sun May 17, 2020   1732 Patient seen and examined concern is for gastric ulcer but cannot rule out things such as esophageal varices and other concerns. Patient appears to be resting comfortably she is given antiemetic and Protonix labs and imaging were ordered.     [CF]      ED Course User Index  [CF] Lorin Gosselin,         --------------------------------------------- PAST HISTORY ---------------------------------------------  Past Medical History:  has a past medical history of Acquired hypothyroidism, Acute blood loss as cause of postoperative anemia, Anemia, Anticoagulant long-term use, Anxiety, Arthritis, Blood transfusion, Chronic back pain, Depression, GERD (gastroesophageal reflux disease), Hx of blood clots, Lymphedema of lower extremity, Migraine, Multiple sclerosis ([facility] Obesity, Osteoarthritis, PE (pulmonary thromboembolism) ([facility] Peripheral vascular disease ([facility] Prominent abdominal aortic pulse, Pulmonary embolism (HCC), Swelling of lower limb, Thyroid disease, Urinary incontinence, and Varicose veins of lower extremities. Past Surgical History:  has a past surgical history that includes Bladder surgery; hernia repair; Dental surgery; Tonsillectomy; Dilation and curettage of uterus; other surgical history (09/15/2015); Tibia fracture surgery (Right); fracture surgery; Upper gastrointestinal endoscopy; Colonoscopy; Endoscopy, colon, diagnostic; Total hip arthroplasty (Right, 09/13/2016); and open treatment fracture distal tibia & fibula (Right, 11/16/2018). Social History:  reports that she quit smoking about 16 years ago. Her smoking use included cigarettes. She started smoking about 42 years ago. She smoked 0.25 packs per day. She has never used smokeless tobacco. She reports current alcohol use. She reports that she does not use drugs. Family History: family history includes Arthritis in her sister; Cancer in her mother; Dementia in her mother; Diabetes in her father and sister; High Blood Pressure in her father and sister; Lupus in her sister; Obesity in her father; Stroke in her father. The patients home medications have been reviewed.     Allergies: Fentanyl and Ferritin    -------------------------------------------------- RESULTS -------------------------------------------------    LABS:  Results for orders placed or performed during the hospital encounter of 05/17/20   CBC Auto Differential   Result Value Ref Range    WBC 6.5 4.5 - 11.5 E9/L    RBC 4.27 3.50 - 5.50 E12/L    Hemoglobin 12.2 11.5 - 15.5 g/dL    Hematocrit 37.2 34.0 - 48.0 %    MCV 87.1 80.0 - 99.9 fL    MCH 28.6 26.0 - 35.0 pg    MCHC 32.8 32.0 - 34.5 %    RDW 16.6 (H) 11.5 - 15.0 fL    Platelets 598 729 - 991 E9/L    MPV 9.1 7.0 - 12.0 fL    Neutrophils % 64.7 43.0 - 80.0 %    Immature Granulocytes % 0.3 0.0 - 5.0 %    Lymphocytes % 23.9 20.0 - 42.0 %    Monocytes % 8.3 2.0 - 12.0 %    Eosinophils % 1.4 0.0 - 6.0 %    Basophils % 1.4 0.0 - 2.0 %    Neutrophils Absolute 4.23 1.80 - 7.30 E9/L    Immature Granulocytes # 0.02 E9/L    Lymphocytes Absolute 1.56 1.50 - 4.00 E9/L    Monocytes Absolute 0.54 0.10 - 0.95 E9/L    Eosinophils Absolute 0.09 0.05 - 0.50 E9/L    Basophils Absolute 0.09 0.00 - 0.20 B0/F   Basic Metabolic Panel   Result Value Ref Range    Sodium 132 132 - 146 mmol/L    Potassium 3.4 (L) 3.5 - 5.0 mmol/L    Chloride 96 (L) 98 - 107 mmol/L    CO2 22 22 - 29 mmol/L    Anion Gap 14 7 - 16 mmol/L    Glucose 92 74 - 99 mg/dL    BUN 8 8 - 23 mg/dL    CREATININE 0.5 0.5 - 1.0 mg/dL    GFR Non-African American >60 >=60 mL/min/1.73    GFR African American >60     Calcium 9.0 8.6 - 10.2 mg/dL   Hepatic Function Panel   Result Value Ref Range    Total Protein 7.7 6.4 - 8.3 g/dL    Alb 3.4 (L) 3.5 - 5.2 g/dL    Alkaline Phosphatase 94 35 - 104 U/L    ALT 11 0 - 32 U/L    AST 20 0 - 31 U/L    Total Bilirubin 0.4 0.0 - 1.2 mg/dL    Bilirubin, Direct <0.2 0.0 - 0.3 mg/dL    Bilirubin, Indirect see below 0.0 - 1.0 mg/dL   Protime-INR   Result Value Ref Range    Protime 11.7 9.3 - 12.4 sec    INR 1.0    APTT   Result Value Ref Range    aPTT 29.3 24.5 - 35.1 sec   Lipase   Result Value Ref Range    Lipase 32 13 - 60 U/L   Lactic Acid, Plasma   Result Value Ref Range    Lactic Acid 0.9 0.5 - 2.2 mmol/L   EKG 12 Lead   Result Value Ref Range    Ventricular Rate 72 BPM    Atrial Rate 72 BPM    P-R Interval 162 ms    QRS Duration 98 ms    Q-T Interval 422 ms    QTc Calculation (Bazett) 462 ms    P Axis 79 degrees    T Axis 45 degrees       RADIOLOGY:  Ct Abdomen Pelvis Wo Contrast Additional Contrast? None    Result Date: 2020  Patient MRN: [id number] : 1960 Age:  60 years Gender: Female Order Date: 2020 5:45 PM Exam: CT ABDOMEN PELVIS WO CONTRAST Number of Images: 882 views Indication:   epigastric abdominal pain epigastric abdominal pain Comparison: None. Technique: The CT of the scan of the abdomen and pelvis was obtained with axial images from base of the diaphragm to the pubic symphysis with multiplanar reformat. The study was obtained without IV contrast. Radiation Output: CTDIvol 24.17 (mGy); DLP 1493.55 (mGy-cm) Finding: The lung bases are clear. The heart and pericardium appear normal. There is moderate to large hiatal hernia with air-fluid level. The non-opacified liver demonstrates no evidence of parenchymal lesions or intrahepatic biliary dilatation. The gallbladder is well distended without radiopaque stones. The spleen, pancreas and adrenal glands do not show any appreciable abnormality. The kidneys show normal findings. There is no stone, mass or hydronephrosis. The urinary bladder is not fully distended. No evidence of intestinal obstruction is seen. There is no evidence of appendicitis seen. No retroperitoneal or mesenteric lymphadenopathy is detected. The abdominal aorta and iliac arteries demonstrate no evidence of atherosclerotic changes or aneurysmal dilatation. The osseous structures of the abdomen and pelvis demonstrate grade 2 compression fracture of superior endplate of L2 vertebral body. There is mild levoscoliosis. Patient is status post left hip arthroplasty which appears to be anatomical position. . The urinary bladder is not well seen due to Lloyd catheter in place. The GYN structures appears to be normal. There is no mass or free fluid seen in the pelvis. .     NO ACUTE included: a personal history and physicial examination, re-evaluation prior to disposition, multiple bedside re-evaluations, IV medications, cardiac monitoring and continuous pulse oximetry    This patient has remained hemodynamically stable during their ED course. Diagnosis:  1. Abdominal pain, epigastric    2. Hematemesis with nausea    3. Hiatal hernia        Disposition:  Patient's disposition: Admit to telemetry  Patient's condition is stable.            Sharon Rolling, DO  05/17/20 Jimmy 7, DO  05/17/20 [id number]

## 2020-05-18 ENCOUNTER — ANESTHESIA EVENT (OUTPATIENT)
Dept: ENDOSCOPY | Age: 60
DRG: 381 | End: 2020-05-18
Payer: MEDICARE

## 2020-05-18 ENCOUNTER — ANESTHESIA (OUTPATIENT)
Dept: ENDOSCOPY | Age: 60
DRG: 381 | End: 2020-05-18
Payer: MEDICARE

## 2020-05-18 VITALS — SYSTOLIC BLOOD PRESSURE: 168 MMHG | OXYGEN SATURATION: 97 % | DIASTOLIC BLOOD PRESSURE: 77 MMHG

## 2020-05-18 LAB
ANION GAP SERPL CALCULATED.3IONS-SCNC: 13 MMOL/L (ref 7–16)
BUN BLDV-MCNC: 8 MG/DL (ref 8–23)
CALCIUM SERPL-MCNC: 8.9 MG/DL (ref 8.6–10.2)
CHLORIDE BLD-SCNC: 96 MMOL/L (ref 98–107)
CO2: 23 MMOL/L (ref 22–29)
CREAT SERPL-MCNC: 0.4 MG/DL (ref 0.5–1)
EKG ATRIAL RATE: 72 BPM
EKG P AXIS: 79 DEGREES
EKG P-R INTERVAL: 162 MS
EKG Q-T INTERVAL: 422 MS
EKG QRS DURATION: 98 MS
EKG QTC CALCULATION (BAZETT): 462 MS
EKG T AXIS: 45 DEGREES
EKG VENTRICULAR RATE: 72 BPM
GFR AFRICAN AMERICAN: >60
GFR NON-AFRICAN AMERICAN: >60 ML/MIN/1.73
GLUCOSE BLD-MCNC: 86 MG/DL (ref 74–99)
HCT VFR BLD CALC: 35.9 % (ref 34–48)
HEMOGLOBIN: 11.9 G/DL (ref 11.5–15.5)
MCH RBC QN AUTO: 28.9 PG (ref 26–35)
MCHC RBC AUTO-ENTMCNC: 33.1 % (ref 32–34.5)
MCV RBC AUTO: 87.1 FL (ref 80–99.9)
PDW BLD-RTO: 16.5 FL (ref 11.5–15)
PLATELET # BLD: 280 E9/L (ref 130–450)
PMV BLD AUTO: 9.3 FL (ref 7–12)
POTASSIUM SERPL-SCNC: 4.1 MMOL/L (ref 3.5–5)
RBC # BLD: 4.12 E12/L (ref 3.5–5.5)
SODIUM BLD-SCNC: 132 MMOL/L (ref 132–146)
T4 TOTAL: 7.7 MCG/DL (ref 4.5–11.7)
TSH SERPL DL<=0.05 MIU/L-ACNC: 2.84 UIU/ML (ref 0.27–4.2)
WBC # BLD: 6.7 E9/L (ref 4.5–11.5)

## 2020-05-18 PROCEDURE — 85027 COMPLETE CBC AUTOMATED: CPT

## 2020-05-18 PROCEDURE — G0378 HOSPITAL OBSERVATION PER HR: HCPCS

## 2020-05-18 PROCEDURE — 88305 TISSUE EXAM BY PATHOLOGIST: CPT

## 2020-05-18 PROCEDURE — 6360000002 HC RX W HCPCS: Performed by: INTERNAL MEDICINE

## 2020-05-18 PROCEDURE — 6370000000 HC RX 637 (ALT 250 FOR IP): Performed by: INTERNAL MEDICINE

## 2020-05-18 PROCEDURE — 0DB78ZX EXCISION OF STOMACH, PYLORUS, VIA NATURAL OR ARTIFICIAL OPENING ENDOSCOPIC, DIAGNOSTIC: ICD-10-PCS | Performed by: SURGERY

## 2020-05-18 PROCEDURE — 3700000000 HC ANESTHESIA ATTENDED CARE: Performed by: SURGERY

## 2020-05-18 PROCEDURE — 7100000010 HC PHASE II RECOVERY - FIRST 15 MIN: Performed by: SURGERY

## 2020-05-18 PROCEDURE — 93010 ELECTROCARDIOGRAM REPORT: CPT | Performed by: INTERNAL MEDICINE

## 2020-05-18 PROCEDURE — 88342 IMHCHEM/IMCYTCHM 1ST ANTB: CPT

## 2020-05-18 PROCEDURE — 7100000011 HC PHASE II RECOVERY - ADDTL 15 MIN: Performed by: SURGERY

## 2020-05-18 PROCEDURE — 3609012400 HC EGD TRANSORAL BIOPSY SINGLE/MULTIPLE: Performed by: SURGERY

## 2020-05-18 PROCEDURE — 84443 ASSAY THYROID STIM HORMONE: CPT

## 2020-05-18 PROCEDURE — 80048 BASIC METABOLIC PNL TOTAL CA: CPT

## 2020-05-18 PROCEDURE — 2580000003 HC RX 258: Performed by: NURSE ANESTHETIST, CERTIFIED REGISTERED

## 2020-05-18 PROCEDURE — 2709999900 HC NON-CHARGEABLE SUPPLY: Performed by: SURGERY

## 2020-05-18 PROCEDURE — 6370000000 HC RX 637 (ALT 250 FOR IP): Performed by: STUDENT IN AN ORGANIZED HEALTH CARE EDUCATION/TRAINING PROGRAM

## 2020-05-18 PROCEDURE — 6360000002 HC RX W HCPCS: Performed by: NURSE ANESTHETIST, CERTIFIED REGISTERED

## 2020-05-18 PROCEDURE — 36415 COLL VENOUS BLD VENIPUNCTURE: CPT

## 2020-05-18 PROCEDURE — 96376 TX/PRO/DX INJ SAME DRUG ADON: CPT

## 2020-05-18 PROCEDURE — 2580000003 HC RX 258: Performed by: INTERNAL MEDICINE

## 2020-05-18 PROCEDURE — 3700000001 HC ADD 15 MINUTES (ANESTHESIA): Performed by: SURGERY

## 2020-05-18 PROCEDURE — 84436 ASSAY OF TOTAL THYROXINE: CPT

## 2020-05-18 RX ORDER — GABAPENTIN 300 MG/1
900 CAPSULE ORAL NIGHTLY
Status: DISCONTINUED | OUTPATIENT
Start: 2020-05-18 | End: 2020-05-19 | Stop reason: HOSPADM

## 2020-05-18 RX ORDER — SODIUM CHLORIDE 9 MG/ML
INJECTION, SOLUTION INTRAVENOUS CONTINUOUS PRN
Status: DISCONTINUED | OUTPATIENT
Start: 2020-05-18 | End: 2020-05-18 | Stop reason: SDUPTHER

## 2020-05-18 RX ORDER — PROPOFOL 10 MG/ML
INJECTION, EMULSION INTRAVENOUS PRN
Status: DISCONTINUED | OUTPATIENT
Start: 2020-05-18 | End: 2020-05-18 | Stop reason: SDUPTHER

## 2020-05-18 RX ORDER — FLUCONAZOLE 100 MG/1
400 TABLET ORAL DAILY
Status: DISCONTINUED | OUTPATIENT
Start: 2020-05-18 | End: 2020-05-19 | Stop reason: HOSPADM

## 2020-05-18 RX ORDER — ONDANSETRON 2 MG/ML
4 INJECTION INTRAMUSCULAR; INTRAVENOUS EVERY 6 HOURS PRN
Status: DISCONTINUED | OUTPATIENT
Start: 2020-05-18 | End: 2020-05-19 | Stop reason: HOSPADM

## 2020-05-18 RX ORDER — SUCRALFATE 1 G/1
1 TABLET ORAL EVERY 6 HOURS SCHEDULED
Status: DISCONTINUED | OUTPATIENT
Start: 2020-05-18 | End: 2020-05-19 | Stop reason: HOSPADM

## 2020-05-18 RX ORDER — GABAPENTIN 300 MG/1
300 CAPSULE ORAL EVERY MORNING
Status: DISCONTINUED | OUTPATIENT
Start: 2020-05-18 | End: 2020-05-19 | Stop reason: HOSPADM

## 2020-05-18 RX ORDER — PANTOPRAZOLE SODIUM 40 MG/1
40 TABLET, DELAYED RELEASE ORAL
Status: DISCONTINUED | OUTPATIENT
Start: 2020-05-18 | End: 2020-05-19 | Stop reason: HOSPADM

## 2020-05-18 RX ADMIN — SODIUM CHLORIDE: 9 INJECTION, SOLUTION INTRAVENOUS at 10:20

## 2020-05-18 RX ADMIN — PROPOFOL 140 MG: 10 INJECTION, EMULSION INTRAVENOUS at 10:33

## 2020-05-18 RX ADMIN — Medication 10 ML: at 07:35

## 2020-05-18 RX ADMIN — CLONAZEPAM 1 MG: 0.5 TABLET ORAL at 20:39

## 2020-05-18 RX ADMIN — FLUCONAZOLE 400 MG: 100 TABLET ORAL at 15:15

## 2020-05-18 RX ADMIN — SUCRALFATE 1 G: 1 TABLET ORAL at 20:39

## 2020-05-18 RX ADMIN — Medication 10 ML: at 20:39

## 2020-05-18 RX ADMIN — OYSTER SHELL CALCIUM WITH VITAMIN D 1 TABLET: 500; 200 TABLET, FILM COATED ORAL at 20:39

## 2020-05-18 RX ADMIN — ACETAMINOPHEN 650 MG: 325 TABLET ORAL at 20:39

## 2020-05-18 RX ADMIN — GABAPENTIN 900 MG: 300 CAPSULE ORAL at 20:38

## 2020-05-18 RX ADMIN — PANTOPRAZOLE SODIUM 40 MG: 40 TABLET, DELAYED RELEASE ORAL at 15:15

## 2020-05-18 RX ADMIN — DULOXETINE HYDROCHLORIDE 60 MG: 60 CAPSULE, DELAYED RELEASE ORAL at 20:39

## 2020-05-18 RX ADMIN — SENNOSIDES 17.2 MG: 8.6 TABLET, FILM COATED ORAL at 20:38

## 2020-05-18 RX ADMIN — TRAZODONE HYDROCHLORIDE 50 MG: 50 TABLET ORAL at 20:39

## 2020-05-18 RX ADMIN — ONDANSETRON 4 MG: 2 INJECTION INTRAMUSCULAR; INTRAVENOUS at 20:52

## 2020-05-18 RX ADMIN — ONDANSETRON 4 MG: 2 INJECTION INTRAMUSCULAR; INTRAVENOUS at 13:55

## 2020-05-18 RX ADMIN — SUCRALFATE 1 G: 1 TABLET ORAL at 13:55

## 2020-05-18 ASSESSMENT — ENCOUNTER SYMPTOMS: SHORTNESS OF BREATH: 1

## 2020-05-18 ASSESSMENT — PAIN SCALES - GENERAL
PAINLEVEL_OUTOF10: 8
PAINLEVEL_OUTOF10: 0

## 2020-05-18 ASSESSMENT — PAIN DESCRIPTION - LOCATION: LOCATION: ABDOMEN

## 2020-05-18 ASSESSMENT — PAIN - FUNCTIONAL ASSESSMENT: PAIN_FUNCTIONAL_ASSESSMENT: PREVENTS OR INTERFERES SOME ACTIVE ACTIVITIES AND ADLS

## 2020-05-18 ASSESSMENT — PAIN DESCRIPTION - ONSET: ONSET: ON-GOING

## 2020-05-18 ASSESSMENT — PAIN DESCRIPTION - FREQUENCY: FREQUENCY: INTERMITTENT

## 2020-05-18 ASSESSMENT — PAIN DESCRIPTION - PAIN TYPE: TYPE: ACUTE PAIN

## 2020-05-18 ASSESSMENT — PAIN DESCRIPTION - PROGRESSION: CLINICAL_PROGRESSION: GRADUALLY WORSENING

## 2020-05-18 ASSESSMENT — PAIN DESCRIPTION - DESCRIPTORS: DESCRIPTORS: BURNING

## 2020-05-18 NOTE — ANESTHESIA POSTPROCEDURE EVALUATION
Department of Anesthesiology  Postprocedure Note    Patient: Silva Tinajero  MRN: [id number]  YOB: 1960  Date of evaluation: 5/18/2020  Time:  1:30 PM     Procedure Summary     Date:  05/18/20 Room / Location:  [facility] 02 / [facility]    Anesthesia Start:  1030 Anesthesia Stop:  0432    Procedure:  EGD BIOPSY (N/A ) Diagnosis:  (/)    Surgeon:  Mary Clark MD Responsible Provider:  Baudilio Dhaliwal DO    Anesthesia Type:  MAC ASA Status:  3          Anesthesia Type: MAC    Jessee Phase I:      Jessee Phase II: Jessee Score: 10    Last vitals: Reviewed and per EMR flowsheets.        Anesthesia Post Evaluation    Patient location during evaluation: PACU  Patient participation: complete - patient participated  Level of consciousness: awake and alert  Airway patency: patent  Nausea & Vomiting: no nausea and no vomiting  Complications: no  Cardiovascular status: hemodynamically stable  Respiratory status: acceptable  Hydration status: euvolemic

## 2020-05-18 NOTE — ANESTHESIA PRE PROCEDURE
Department of Anesthesiology  Preprocedure Note       Name:  Faisal Mulligan   Age:  60 y.o.  :  1960                                          MRN:  [id number]         Date:  2020      Surgeon: Irasema Reeder):  Phoebe Coello MD    Procedure: EGD ESOPHAGOGASTRODUODENOSCOPY (N/A )    Medications prior to admission:   Prior to Admission medications    Medication Sig Start Date End Date Taking? Authorizing Provider   pantoprazole (PROTONIX) 40 MG tablet Take 1 tablet by mouth 2 times daily for 10 days 20 Yes Olga Greer DO   sucralfate (CARAFATE) 1 GM tablet Take 1 tablet by mouth 4 times daily 20  Yes Olga Greer DO   ondansetron (ZOFRAN ODT) 4 MG disintegrating tablet Take 1 tablet by mouth every 8 hours as needed for Nausea or Vomiting 20 Yes Olga Greer DO   levothyroxine (SYNTHROID) 200 MCG tablet Take 262.5 mcg by mouth once a week Indications: Sundays    Historical Provider, MD   polyethylene glycol (GLYCOLAX) packet Take 17 g by mouth daily as needed for Constipation    Historical Provider, MD   ondansetron (ZOFRAN) 4 MG tablet Take 4 mg by mouth every 8 hours as needed for Nausea or Vomiting    Historical Provider, MD   calcium-vitamin D (OSCAL-500) 500-200 MG-UNIT per tablet Take 1 tablet by mouth 2 times daily    Historical Provider, MD   clonazePAM (KLONOPIN) 1 MG tablet Take 1 mg by mouth nightly.      Historical Provider, MD   ARIPiprazole (ABILIFY) 10 MG tablet Take 10 mg by mouth daily    Historical Provider, MD   levothyroxine (SYNTHROID) 175 MCG tablet Take 175 mcg by mouth Six times weekly Monday through  58 Provider, MD   omeprazole (PRILOSEC) 20 MG delayed release capsule Take 20 mg by mouth daily as needed    Historical Provider, MD   traZODone (DESYREL) 50 MG tablet Take 50 mg by mouth nightly    Historical Provider, MD   cyclobenzaprine (FLEXERIL) 10 MG tablet Take 10 mg by mouth three times daily     Historical Provider, MD   DULoxetine (CYMBALTA) 60 MG extended release capsule Take 60 mg by mouth 2 times daily    Historical Provider, MD   Rectal Cleansers (FLEET [employer]UR[employer] CLEANSING ENEMA RE) Place 1 Dose rectally daily as needed    Historical Provider, MD   Interferon Beta-1a (AVONEX PEN) 30 MCG/0.5ML AJKT Inject 0.5 mLs into the muscle once a week Saturday    Historical Provider, MD   senna (SENOKOT) 8.6 MG tablet Take 2 tablets by mouth nightly     Historical Provider, MD   SUMAtriptan (IMITREX) 100 MG tablet Take 100 mg by mouth once as needed for Migraine    Historical Provider, MD   gabapentin (NEURONTIN) 300 MG capsule Take 3 capsules by mouth nightly  Patient taking differently: Take 300 mg by mouth every morning.  . 6/2/16   Radha Favors, DO   acetaminophen (TYLENOL) 500 MG tablet Take 650 mg by mouth 2 times daily     Historical Provider, MD       Current medications:    Current Facility-Administered Medications   Medication Dose Route Frequency Provider Last Rate Last Dose    gabapentin (NEURONTIN) capsule 300 mg  300 mg Oral QAM Juan Pablo London MD   Stopped at 05/18/20 0734    gabapentin (NEURONTIN) capsule 900 mg  900 mg Oral Nightly Juan Pablo London MD        sodium chloride flush 0.9 % injection 10 mL  10 mL Intravenous 2 times per day Amara Guerra MD   10 mL at 05/18/20 0735    sodium chloride flush 0.9 % injection 10 mL  10 mL Intravenous PRN Amara Guerra MD        acetaminophen (TYLENOL) tablet 650 mg  650 mg Oral Q4H PRN Amara Guerra MD        acetaminophen (TYLENOL) tablet 650 mg  650 mg Oral BID Amara Guerra MD   Stopped at 05/18/20 0734    ARIPiprazole (ABILIFY) tablet 10 mg  10 mg Oral Daily Amara Guerra MD   Stopped at 05/18/20 0734    calcium-vitamin D 500-200 MG-UNIT per tablet 1 tablet  1 tablet Oral BID Amara Guerra MD   Stopped at 05/18/20 0734    clonazePAM (KLONOPIN) tablet 1 mg  1 mg Oral Nightly Amara Guerra MD   1 mg at 05/17/20 5210    DULoxetine (CYMBALTA) extended release capsule 60 mg  60 mg Oral BID Omayra Florez MD   Stopped at 05/18/20 4310    levothyroxine (SYNTHROID) tablet 262.5 mcg  262.5 mcg Oral Weekly Omayra Florez MD        pantoprazole (PROTONIX) tablet 40 mg  40 mg Oral QAM AC Omayra Florez MD        ondansetron [facility]) tablet 4 mg  4 mg Oral Q8H PRN Omayra Florez MD        polyethylene glycol [facility]) packet 17 g  17 g Oral Daily PRN Omayra Florez MD        [facility]) tablet 17.2 mg  2 tablet Oral Nightly Omayra Florez MD        traZODone (DESYREL) tablet 50 mg  50 mg Oral Nightly Omayra Florez MD   50 mg at 05/17/20 2344    levothyroxine (SYNTHROID) tablet 175 mcg  175 mcg Oral Once per day on Mon Tue Wed Thu Fri Sat Omayra Florez MD         Facility-Administered Medications Ordered in Other Encounters   Medication Dose Route Frequency Provider Last Rate Last Dose    0.9 % sodium chloride bolus  250 mL Intravenous Once Chuyamelia Martinez MD           Allergies: Allergies   Allergen Reactions    Fentanyl Itching     Fentanyl patch--- itchy and red \"dots\" all over back and arms    Ferritin Other (See Comments)     Broke out \"into a sweat, my blood pressure shot up, I felt like lead on my chest and hard to breath. 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 E68.659H    Closed nondisplaced spiral fracture of shaft of right fibula with routine healing S82.444D    Chest pain R07.9    Hematemesis K92.0       Past Medical History:        Diagnosis Date    Acquired hypothyroidism 9/14/2016    Acute blood loss as cause of postoperative anemia 2018    Anemia     Anticoagulant long-term use     Anxiety     Arthritis     Blood transfusion     Chronic back pain     Depression     GERD (gastroesophageal reflux disease)     Hx of blood clots     Lymphedema of lower extremity 2014    Migraine     [facility]     Obesity     Osteoarthritis     PE (pulmonary thromboembolism) ([facility] 2016    Peripheral vascular disease (HCC)     vericose veins    Prominent abdominal aortic pulse 2014    Pulmonary embolism ([facility] 2016    Swelling of lower limb 2014    Thyroid disease     Urinary incontinence     has a indwelling catheter    Varicose veins of lower extremities 2014       Past Surgical History:        Procedure Laterality Date    BLADDER SURGERY      COLONOSCOPY      DENTAL SURGERY      random teeth extraction    DILATION AND CURETTAGE OF UTERUS      ENDOSCOPY, COLON, DIAGNOSTIC      FRACTURE SURGERY      HERNIA REPAIR      OPEN TREATMENT FRACTURE DISTAL TIBIA & FIBULA Right 2018    OPEN REDUCTION INTERNAL FIXATION RIGHT DISTAL FEMUR, RIGHT DISTAL TIBIA performed by Shaji Covarrubias DO at [place] [id number]  09/15/2015    LAPAROSCOPIC HIATAL HERNIA REPAIR WITH FUNDOPLICATION WITH MYOFASCIAL FLAP    TIBIA FRACTURE SURGERY Right     TONSILLECTOMY      TOTAL HIP ARTHROPLASTY Right 2016    Right Total Hip Arthroplasty    UPPER GASTROINTESTINAL ENDOSCOPY         Social History:    Social History     Tobacco Use    Smoking status: Former Smoker     Packs/day: 0.25     Types: Cigarettes     Start date: 1977     Last attempt to quit: 2004     Years since quittin.0    Smokeless tobacco: Never Used   Substance Use Topics    Alcohol use: Yes     Comment: occassional                                Counseling given: Not Answered      Vital Signs (Current):   Vitals:    20 1945 20 2058 20 2200 20 0730   BP: (!) 153/86

## 2020-05-18 NOTE — CONSULTS
GENERAL SURGERY  CONSULT NOTE  5/18/2020    Physician Consulted: Dr. Cody Perez  Reason for Consult: epigastric adriel  Referring Physician: Dr. Milinda Spurling    HPI  Chelsea Henderson is a 60 y.o. female w/ PMH of anemia, GERD, PE on Xarelto, MS presents for evaluation of epigastric pain. She states she has had pain for a couple of weeks but over the past 2 days she has had worsening pain and not able to take anything PO. She denies bloody or black BMs but yesterday had emesis that she states was blood tinged. She had a previous lap HH repair in 2015 and EGDs before that just showing some gastritis.  She denies taking ibuprofen or ASA      Past Medical History:   Diagnosis Date    Acquired hypothyroidism 9/14/2016    Acute blood loss as cause of postoperative anemia 11/19/2018    Anemia     Anticoagulant long-term use     Anxiety     Arthritis     Blood transfusion     Chronic back pain     Depression     GERD (gastroesophageal reflux disease)     Hx of blood clots     Lymphedema of lower extremity 4/17/2014    Migraine     Multiple sclerosis ([address] 2000    Obesity     Osteoarthritis     PE (pulmonary thromboembolism) ([address] 02/2016    Peripheral vascular disease (HCC)     vericose veins    Prominent abdominal aortic pulse 4/17/2014    Pulmonary embolism ([address] 05/2016    Swelling of lower limb 4/17/2014    Thyroid disease     Urinary incontinence     has a indwelling catheter    Varicose veins of lower extremities 4/17/2014       Past Surgical History:   Procedure Laterality Date    BLADDER SURGERY      COLONOSCOPY      DENTAL SURGERY      random teeth extraction    DILATION AND CURETTAGE OF UTERUS      ENDOSCOPY, COLON, DIAGNOSTIC      FRACTURE SURGERY      HERNIA REPAIR      OPEN TREATMENT FRACTURE DISTAL TIBIA & FIBULA Right 11/16/2018    OPEN REDUCTION INTERNAL FIXATION RIGHT DISTAL FEMUR, RIGHT DISTAL TIBIA performed by Margot Morris DO at [address],[facility] [address] 09/15/2015    LAPAROSCOPIC HIATAL HERNIA REPAIR WITH FUNDOPLICATION WITH MYOFASCIAL FLAP    TIBIA FRACTURE SURGERY Right     TONSILLECTOMY      TOTAL HIP ARTHROPLASTY Right 09/13/2016    Right Total Hip Arthroplasty    UPPER GASTROINTESTINAL ENDOSCOPY         Medications Prior to Admission:    Prior to Admission medications    Medication Sig Start Date End Date Taking? Authorizing Provider   pantoprazole (PROTONIX) 40 MG tablet Take 1 tablet by mouth 2 times daily for 10 days 5/17/20 5/27/20 Yes Jelena Ates, DO   sucralfate (CARAFATE) 1 GM tablet Take 1 tablet by mouth 4 times daily 5/17/20  Yes Jelena Ates, DO   ondansetron (ZOFRAN ODT) 4 MG disintegrating tablet Take 1 tablet by mouth every 8 hours as needed for Nausea or Vomiting 5/17/20 5/17/21 Yes Jelena Ates, DO   levothyroxine (SYNTHROID) 200 MCG tablet Take 262.5 mcg by mouth once a week Indications: Sundays    Historical Provider, MD   polyethylene glycol (GLYCOLAX) packet Take 17 g by mouth daily as needed for Constipation    Historical Provider, MD   ondansetron (ZOFRAN) 4 MG tablet Take 4 mg by mouth every 8 hours as needed for Nausea or Vomiting    Historical Provider, MD   calcium-vitamin D (OSCAL-500) 500-200 MG-UNIT per tablet Take 1 tablet by mouth 2 times daily    Historical Provider, MD   clonazePAM (KLONOPIN) 1 MG tablet Take 1 mg by mouth nightly.      Historical Provider, MD   ARIPiprazole (ABILIFY) 10 MG tablet Take 10 mg by mouth daily    Historical Provider, MD   levothyroxine (SYNTHROID) 175 MCG tablet Take 175 mcg by mouth Six times weekly Monday through [address] Provider, MD   omeprazole (PRILOSEC) 20 MG delayed release capsule Take 20 mg by mouth daily as needed    Historical Provider, MD   traZODone (DESYREL) 50 MG tablet Take 50 mg by mouth nightly    Historical Provider, MD   cyclobenzaprine (FLEXERIL) 10 MG tablet Take 10 mg by mouth three times daily     Historical Provider, MD   DULoxetine (CYMBALTA) 60 bruising  Endocrine ROS: negative  lethargy, mood swings, palpitations or polydipsia/polyuria  Respiratory ROS: negative sputum changes, stridor, tachypnea or wheezing  Cardiovascular ROS: negative for - loss of consciousness, murmur or orthopnea  Gastrointestinal ROS: epigastric pain, hematemesis   Genito-Urinary ROS: negative for -  genital discharge or hematuria  Musculoskeletal ROS: negative for - focal weakness, gangrene  Psych/Neuro ROS: negative for - visual or auditory hallucinations, suicidal ideation      PHYSICAL EXAM:    Vitals:    20 2200   BP: 135/77   Pulse: 76   Resp: 18   Temp: 98.6 °F (37 °C)   SpO2: 97%       General appearance:  NAD, appears stated age  Head: NCAT, PERRLA, EOMI, red conjunctiva  Neck: supple, no masses, trachea midline  Lungs: Equal chest rise bilateral, no retractions, no wheezing  Heart: Reg rate  Abdomen: soft, mild epigastric tenderness, non distended, obese  Skin; warm and dry, no cyanosis  Gu: no cva tenderness  Extremities: atraumatic, no focal motor deficits, no open wounds  Psych: No tremor, visual hallucinations    LABS:    CBC  Recent Labs     20  0430   WBC 6.7   HGB 11.9   HCT 35.9        BMP  Recent Labs     20  0430      K 4.1   CL 96*   CO2 23   BUN 8   CREATININE 0.4*   CALCIUM 8.9     Liver Function  Recent Labs     20  1732   LIPASE 32   BILITOT 0.4   BILIDIR <0.2   AST 20   ALT 11   ALKPHOS 94   PROT 7.7   LABALBU 3.4*     No results for input(s): LACTATE in the last 72 hours. Recent Labs     20  1732   INR 1.0       RADIOLOGY  Ct Abdomen Pelvis Wo Contrast Additional Contrast? None    Result Date: 2020  Patient MRN: [id number] : 1960 Age:  60 years Gender: Female Order Date: 2020 5:45 PM Exam: CT ABDOMEN PELVIS WO CONTRAST Number of Images: 115 views Indication:   epigastric abdominal pain epigastric abdominal pain Comparison: None. Technique:  The CT of the scan of the abdomen and pelvis was obtained with axial images from base of the diaphragm to the pubic symphysis with multiplanar reformat. The study was obtained without IV contrast. Radiation Output: CTDIvol 24.17 (mGy); DLP 1493.55 (mGy-cm) Finding: The lung bases are clear. The heart and pericardium appear normal. There is moderate to large hiatal hernia with air-fluid level. The non-opacified liver demonstrates no evidence of parenchymal lesions or intrahepatic biliary dilatation. The gallbladder is well distended without radiopaque stones. The spleen, pancreas and adrenal glands do not show any appreciable abnormality. The kidneys show normal findings. There is no stone, mass or hydronephrosis. The urinary bladder is not fully distended. No evidence of intestinal obstruction is seen. There is no evidence of appendicitis seen. No retroperitoneal or mesenteric lymphadenopathy is detected. The abdominal aorta and iliac arteries demonstrate no evidence of atherosclerotic changes or aneurysmal dilatation. The osseous structures of the abdomen and pelvis demonstrate grade 2 compression fracture of superior endplate of L2 vertebral body. There is mild levoscoliosis. Patient is status post left hip arthroplasty which appears to be anatomical position. . The urinary bladder is not well seen due to Lloyd catheter in place. The GYN structures appears to be normal. There is no mass or free fluid seen in the pelvis. .     NO ACUTE PATHOLOGY SEEN IN ABDOMEN OR PELVIS Moderate to large hiatal hernia Grade 2 compression fracture superior endplate of L2 vertebral body which is amenable for vertebral body augmentation. [facility] Chest Portable    Result Date: 2020  Patient MRN: [id number] : 1960 Age:  60 years Gender: Female Order Date: 2020 5:00 PM Exam: XR CHEST PORTABLE Number of Images: 1 view Indication:   chest pain chest pain Comparison: Prior study from 2019 is available. Findings: The lungs are clear.   There is no evidence of pulmonary infiltrate or pleural effusion. The pulmonary vascularity is unremarkable. The cardiac, hilar and mediastinal silhouettes are satisfactory. The bony thorax demonstrates osteopenia.      NO ACUTE CARDIOPULMONARY PROCESS         ASSESSMENT:  60 y.o. female with epigastric pain, hematemesis     PLAN:  PPI BID  Plan for EGD today  Discussed with Dr. Melissa Daniel     Electronically signed by Alexus Leigh DO on 5/18/20 at 6:44 AM EDT

## 2020-05-18 NOTE — PROGRESS NOTES
Nursing Transfer Note    Data:  Summary of patients progress: general anesthesia recovery    Reason for transfer: PACU discharge criteria met, transferred to next level of care. Action:  Explained reason for transfer to Patient/Family  Report given to: RN, using RN Handoff Navigator.   Mode of transportation: Cart    Response:  RN Recommendations:CONTINUATION OF CARE

## 2020-05-18 NOTE — PLAN OF CARE
Problem: Pain:  Goal: Pain level will decrease  Description: Pain level will decrease  Outcome: Met This Shift  Goal: Control of acute pain  Description: Control of acute pain  Outcome: Met This Shift  Goal: Control of chronic pain  Description: Control of chronic pain  Outcome: Met This Shift     Problem: Falls - Risk of:  Goal: Will remain free from falls  Description: Will remain free from falls  Outcome: Met This Shift  Goal: Absence of physical injury  Description: Absence of physical injury  Outcome: Met This Shift     Problem: Fluid Volume:  Goal: Ability to achieve a balanced intake and output will improve  Description: Ability to achieve a balanced intake and output will improve  Outcome: Met This Shift     Problem: Physical Regulation:  Goal: Ability to maintain clinical measurements within normal limits will improve  Description: Ability to maintain clinical measurements within normal limits will improve  Outcome: Met This Shift  Goal: Will show no signs and symptoms of electrolyte imbalance  Description: Will show no signs and symptoms of electrolyte imbalance  Outcome: Met This Shift     Problem: Skin Integrity:  Goal: Will show no infection signs and symptoms  Description: Will show no infection signs and symptoms  Outcome: Met This Shift  Goal: Absence of new skin breakdown  Description: Absence of new skin breakdown  Outcome: Met This Shift

## 2020-05-18 NOTE — H&P
[address] [address]                              HISTORY AND PHYSICAL    PATIENT NAME: Sean Pendleton                 :        1960  MED REC NO:   [id number]                            ROOM:       [id number]  ACCOUNT NO:   [de-identified]                           ADMIT DATE: 2020  PROVIDER:     Margret Welch MD    CHIEF COMPLAINT:  Abdominal pain and coffee-ground emesis. HISTORY OF PRESENT ILLNESS:  This is a 69-year-old woman who presented  to the emergency room with several days of midepigastric discomfort with  finding of some mild coffee-ground emesis on the day of admission. This  prompted her trip to the emergency room. At the time of evaluation in  the emergency room, her hemoglobin was noted to be about 12 and is 11.9  today. Previously, her hemoglobin, which has been chronically low, was  about 10.5. At this point, there does not appear to be any evidence of  brisk bleeding. She was on omeprazole in the past as well as Carafate,  but that she has not taken these medicines in over six weeks. They were  not helpful for some of her queasiness that she was taking for. She  does not use any anti-inflammatories in the form of Advil or _____. She  does use medication Tylenol for discomfort. She denies any chest pain,  palpitations, shortness of breath, or worsening leg swelling. She has a  history of chronic MS and has been on Avonex for years, although has  been unable to get a dose for this in the last several weeks due to  insurance not covering it. She is basically bed and electric wheelchair  bound due to a combination of her MS, obesity, and significant injuries  to her right leg, which have included an ankle fracture, a right tibial  fracture, and a prior right hip replacement. This morning, she is  sitting in bed comfortably. She is not in any acute distress.     PAST MEDICAL HISTORY: Significant for multiple sclerosis, chronic  depression, history of gastroesophageal reflux disease, hypothyroidism,  and arthritis. ALLERGIES:  INCLUDE IRON PRODUCTS. PAST SURGICAL HISTORY:  Includes ORIF of her right ankle and right tibia  and right total hip arthroplasty. She has also had a gastric volvulus  repair in the past.    SOCIAL HISTORY:  She does not smoke. She does not drink any significant  amounts of alcohol. She lives at home. MEDICATIONS:  Include baclofen 10 mg b.i.d.; Cymbalta 60 mg two tablets  daily; trazodone 50 mg at h.s.; Abilify 10 mg daily; Avonex, which she  is supposed to take weekly; levothyroxine 175 mcg one tablet Monday  through Saturday, 1-1/2 tablets on Sunday; clonazepam 1 mg at h.s.;  gabapentin 300 mg one capsule in the a.m., three capsules at h.s. REVIEW OF SYSTEMS:  SKIN:  Reveals no new or changing moles, rashes, or lesions. LUNGS:  No shortness breath, cough, or wheezing. CARDIOVASCULAR:  No history of angina, MI, or CHF. No orthopnea or PND  or worsening leg swelling. She does have some mild chronic leg swelling  due to venous insufficiency. GI:  As above. :  No problems with dysuria or hematuria. She does have some  occasional urinary tract infections. She said she was treated with an  antibiotic, perhaps Macrodantin, approximately two weeks ago. MUSCULOSKELETAL:  She has had significant musculoskeletal issues with  the right total hip arthroplasty the past.  She had a traumatic injury  to her right leg from a fall resulting in need for internal fixation of  a right ankle fracture and a right tibial fracture. NEUROLOGIC:  No history of CVA or TIA symptoms. No paresthesias. No  headaches. She does have a history of multiple sclerosis. PHYSICAL EXAMINATION:  GENERAL:  This is a middle-aged woman lying in bed comfortably. She is  not presently in any acute distress. VITAL SIGNS:  Stable. She is afebrile.   HEAD:  Normocephalic, atraumatic. EYES:  PERRLA. Extraocular movements intact without nystagmus. THROAT:  Oral and buccal mucosa moist without oral ulcer, exudate, or  lesion. NECK:  No goiter, bruit, or lymphadenopathy. CHEST:  Symmetrical excursions with inspiration. BACK:  No CVA or spine tenderness. HEART:  Has a regular rate and rhythm without murmur, rub, gallop, or  JVD. LUNGS:  Clear to auscultation and percussion bilaterally with no  wheezes, rubs, rales, or use of accessory respiratory muscles. ABDOMEN:  Obese, soft. Positive bowel sounds in all four quadrants. She does have some mild tenderness in her midepigastrium without  guarding or rebound. EXTREMITIES:  No clubbing or cyanosis. She has trace lower extremity  edema. She does have some chronic edema due to the chronic venous  insufficiency. She has limited range of motion in her right leg. NEUROLOGIC:  Cranial nerves II through XII are grossly intact. She does  not ambulate, so obviously her gait was not assessed. She does have  chronic right leg injury, some weakness in her leg due to her injuries  and prior surgeries. Otherwise, no focal weakness was appreciated. ASSESSMENT AND PLAN:  1. Upper GI bleed. The patient has evidence of coffee-ground emesis. This is associated with some midepigastric discomfort and the likelihood  of gastric _____. She does not use any anti-inflammatories. She used  to be on omeprazole and sucralfate, but has not taken either of these  for at least the last six weeks or so. Dr. Dorie Marr has been consulted  and she will likely need at least an EGD. Her hemoglobin is presently  11.9 and this is stable. 2.  Midepigastric abdominal pain as above. 3.  Hypothyroidism, clinically euthyroid. Continue her usual dose of  Synthroid.         Gilda Cotto MD    D: 05/18/2020 7:33:26       T: 05/18/2020 7:39:29     [email or web address]  Job#: [id number]     Doc#: [id number]    CC:

## 2020-05-19 VITALS
HEART RATE: 91 BPM | WEIGHT: 293 LBS | BODY MASS INDEX: 48.82 KG/M2 | SYSTOLIC BLOOD PRESSURE: 110 MMHG | OXYGEN SATURATION: 94 % | DIASTOLIC BLOOD PRESSURE: 60 MMHG | TEMPERATURE: 98.4 F | HEIGHT: 65 IN | RESPIRATION RATE: 18 BRPM

## 2020-05-19 PROBLEM — K92.2 UPPER GI BLEED: Status: ACTIVE | Noted: 2020-05-17

## 2020-05-19 PROBLEM — K22.10 ESOPHAGEAL ULCER: Status: ACTIVE | Noted: 2020-05-19

## 2020-05-19 LAB
ANION GAP SERPL CALCULATED.3IONS-SCNC: 11 MMOL/L (ref 7–16)
BUN BLDV-MCNC: 10 MG/DL (ref 8–23)
CALCIUM SERPL-MCNC: 9 MG/DL (ref 8.6–10.2)
CHLORIDE BLD-SCNC: 100 MMOL/L (ref 98–107)
CO2: 24 MMOL/L (ref 22–29)
CREAT SERPL-MCNC: 0.6 MG/DL (ref 0.5–1)
GFR AFRICAN AMERICAN: >60
GFR NON-AFRICAN AMERICAN: >60 ML/MIN/1.73
GLUCOSE BLD-MCNC: 97 MG/DL (ref 74–99)
HCT VFR BLD CALC: 36.2 % (ref 34–48)
HEMOGLOBIN: 12 G/DL (ref 11.5–15.5)
MCH RBC QN AUTO: 29.3 PG (ref 26–35)
MCHC RBC AUTO-ENTMCNC: 33.1 % (ref 32–34.5)
MCV RBC AUTO: 88.5 FL (ref 80–99.9)
PDW BLD-RTO: 16.7 FL (ref 11.5–15)
PLATELET # BLD: 266 E9/L (ref 130–450)
PMV BLD AUTO: 9.3 FL (ref 7–12)
POTASSIUM SERPL-SCNC: 3.9 MMOL/L (ref 3.5–5)
RBC # BLD: 4.09 E12/L (ref 3.5–5.5)
SODIUM BLD-SCNC: 135 MMOL/L (ref 132–146)
WBC # BLD: 5.9 E9/L (ref 4.5–11.5)

## 2020-05-19 PROCEDURE — 6360000002 HC RX W HCPCS: Performed by: INTERNAL MEDICINE

## 2020-05-19 PROCEDURE — 80048 BASIC METABOLIC PNL TOTAL CA: CPT

## 2020-05-19 PROCEDURE — 1200000000 HC SEMI PRIVATE

## 2020-05-19 PROCEDURE — 96376 TX/PRO/DX INJ SAME DRUG ADON: CPT

## 2020-05-19 PROCEDURE — 36415 COLL VENOUS BLD VENIPUNCTURE: CPT

## 2020-05-19 PROCEDURE — 6370000000 HC RX 637 (ALT 250 FOR IP): Performed by: INTERNAL MEDICINE

## 2020-05-19 PROCEDURE — 85027 COMPLETE CBC AUTOMATED: CPT

## 2020-05-19 PROCEDURE — G0378 HOSPITAL OBSERVATION PER HR: HCPCS

## 2020-05-19 PROCEDURE — 6370000000 HC RX 637 (ALT 250 FOR IP): Performed by: STUDENT IN AN ORGANIZED HEALTH CARE EDUCATION/TRAINING PROGRAM

## 2020-05-19 PROCEDURE — 2580000003 HC RX 258: Performed by: INTERNAL MEDICINE

## 2020-05-19 RX ORDER — PANTOPRAZOLE SODIUM 40 MG/1
40 TABLET, DELAYED RELEASE ORAL 2 TIMES DAILY
Qty: 20 TABLET | Refills: 0 | Status: SHIPPED | OUTPATIENT
Start: 2020-05-19 | End: 2022-08-17

## 2020-05-19 RX ORDER — SUCRALFATE 1 G/1
1 TABLET ORAL 4 TIMES DAILY
Qty: 120 TABLET | Refills: 3 | Status: SHIPPED | OUTPATIENT
Start: 2020-05-19 | End: 2022-03-17

## 2020-05-19 RX ORDER — FLUCONAZOLE 200 MG/1
400 TABLET ORAL DAILY
Qty: 14 TABLET | Refills: 0 | Status: SHIPPED | OUTPATIENT
Start: 2020-05-19 | End: 2020-05-26

## 2020-05-19 RX ORDER — ONDANSETRON 4 MG/1
4 TABLET, ORALLY DISINTEGRATING ORAL EVERY 8 HOURS PRN
Qty: 10 TABLET | Refills: 0 | Status: SHIPPED | OUTPATIENT
Start: 2020-05-19 | End: 2021-05-19

## 2020-05-19 RX ADMIN — PANTOPRAZOLE SODIUM 40 MG: 40 TABLET, DELAYED RELEASE ORAL at 15:09

## 2020-05-19 RX ADMIN — ONDANSETRON 4 MG: 2 INJECTION INTRAMUSCULAR; INTRAVENOUS at 08:25

## 2020-05-19 RX ADMIN — FLUCONAZOLE 400 MG: 100 TABLET ORAL at 08:24

## 2020-05-19 RX ADMIN — PANTOPRAZOLE SODIUM 40 MG: 40 TABLET, DELAYED RELEASE ORAL at 06:40

## 2020-05-19 RX ADMIN — ACETAMINOPHEN 650 MG: 325 TABLET ORAL at 08:25

## 2020-05-19 RX ADMIN — LEVOTHYROXINE SODIUM 175 MCG: 125 TABLET ORAL at 06:40

## 2020-05-19 RX ADMIN — Medication 10 ML: at 08:25

## 2020-05-19 RX ADMIN — GABAPENTIN 300 MG: 300 CAPSULE ORAL at 08:25

## 2020-05-19 RX ADMIN — DULOXETINE HYDROCHLORIDE 60 MG: 60 CAPSULE, DELAYED RELEASE ORAL at 08:24

## 2020-05-19 RX ADMIN — SUCRALFATE 1 G: 1 TABLET ORAL at 02:32

## 2020-05-19 RX ADMIN — OYSTER SHELL CALCIUM WITH VITAMIN D 1 TABLET: 500; 200 TABLET, FILM COATED ORAL at 08:24

## 2020-05-19 RX ADMIN — SUCRALFATE 1 G: 1 TABLET ORAL at 08:24

## 2020-05-19 RX ADMIN — ARIPIPRAZOLE 10 MG: 10 TABLET ORAL at 08:24

## 2020-05-19 RX ADMIN — SUCRALFATE 1 G: 1 TABLET ORAL at 15:00

## 2020-05-19 ASSESSMENT — PAIN SCALES - GENERAL: PAINLEVEL_OUTOF10: 4

## 2020-05-19 NOTE — PROGRESS NOTES
Patients bojorquez due to be changed today by Tory Samano. Dr Sara Jaramillo ordered to change bojorquez prior to discharge.  New bojorquez placed

## 2020-05-19 NOTE — DISCHARGE SUMMARY
are planned and her hemoglobin is  stable. She is stable for discharge to home. She will have an  outpatient transitional care visit _____ likely be done via  telemedicine. DISCHARGE MEDICATIONS:  Are as follows: She is on Zofran 4 mg every six  hours as needed, baclofen 10 mg b.i.d., Cymbalta 60 mg two tablets  daily, trazodone 50 mg at h.s., Avonex weekly for her MS, although she  states she has been unable to get this medicine for a while due to  insurance costs, Abilify 10 mg daily, Klonopin 1 mg at h.s., gabapentin  300 mg one tablet in the morning, three at bedtime, levothyroxine 175  mcg one daily with an extra half tablet on Sunday. Additional  medications at the time of discharge include Diflucan 200 mg two tablets  daily for seven days, pantoprazole 40 mg b.i.d., and sucralfate 1 gm  q.i.d.        Thais Horton MD    D: 05/19/2020 7:41:29       T: 05/19/2020 7:46:28     [email or web address]  Job#: [id number]     Doc#: [id number]    CC:   Henna Cevallos MD

## 2020-05-19 NOTE — PATIENT CARE CONFERENCE
[facility] Quality Flow/Interdisciplinary Rounds Progress Note        Quality Flow Rounds held on May 19, 2020    Disciplines Attending:  Bedside Nurse, ,  and Nursing Unit Leadership    Chepe Quiroz was admitted on 5/17/2020  4:45 PM    Anticipated Discharge Date:       Disposition:    Agustin Score:  Agustin Scale Score: 13    Readmission Score:         Discussed patient goal for the day, patient clinical progression, and barriers to discharge.   The following Goal(s) of the Day/Commitment(s) have been identified:  Discharge planning       Jay Speaker  May 19, 2020

## 2020-05-19 NOTE — PROGRESS NOTES
Subjective: The patient is awake and alert. No problems overnight. Denies chest pain, angina, and dyspnea. Denies abdominal pain. Tolerating diet. Some nausea. No vomiting. No further hematemesis. EGD showed distal esophageal ulcers and some candida. She s on pantoprazole and diflucan. Hb is stable at 12.0. No evidence of ongoing bleeding. Objective:    /82   Pulse 81   Temp 99.2 °F (37.3 °C) (Oral)   Resp 18   Ht 5' 5\" (1.651 m)   Wt (!) 333 lb 3 oz (151.1 kg)   SpO2 96%   BMI 55.45 kg/m²   Neck: No goiter, bruit, or LA  Heart:  RRR, no murmurs, gallops, or rubs.   Lungs:  CTA bilaterally, no wheeze, rales or rhonchi  Abd: bowel sounds present, nontender, nondistended, no masses  Extrem:  No clubbing, cyanosis,1+ lower legr edema, 2+ peripheral pulses, FROM    CBC:   Lab Results   Component Value Date    WBC 5.9 05/19/2020    RBC 4.09 05/19/2020    HGB 12.0 05/19/2020    HCT 36.2 05/19/2020    MCV 88.5 05/19/2020    MCH 29.3 05/19/2020    MCHC 33.1 05/19/2020    RDW 16.7 05/19/2020     05/19/2020    MPV 9.3 05/19/2020     CMP:    Lab Results   Component Value Date     05/19/2020    K 3.9 05/19/2020    K 3.8 10/15/2019     05/19/2020    CO2 24 05/19/2020    BUN 10 05/19/2020    CREATININE 0.6 05/19/2020    GFRAA >60 05/19/2020    LABGLOM >60 05/19/2020    GLUCOSE 97 05/19/2020    GLUCOSE 94 05/30/2012    PROT 7.7 05/17/2020    LABALBU 3.4 05/17/2020    CALCIUM 9.0 05/19/2020    BILITOT 0.4 05/17/2020    ALKPHOS 94 05/17/2020    AST 20 05/17/2020    ALT 11 05/17/2020          Current Facility-Administered Medications:     gabapentin (NEURONTIN) capsule 300 mg, 300 mg, Oral, Maricruz COLLIER MD, Stopped at 05/18/20 0734    gabapentin (NEURONTIN) capsule 900 mg, 900 mg, Oral, Nightly, Maricruz Scales MD, 900 mg at 05/18/20 2038    ondansetron [facility]) injection 4 mg, 4 mg, Intravenous, Q6H PRN, Maricruz Scales MD, 4 mg at 05/18/20 2052    pantoprazole (PROTONIX) tablet 40 mg, 40 mg, Oral, BID AC, Angela Okeefe DO, 40 mg at 05/19/20 0640    sucralfate (CARAFATE) tablet 1 g, 1 g, Oral, 4 times per day, Angela Okeefe DO, 1 g at 05/19/20 0232    fluconazole (DIFLUCAN) tablet 400 mg, 400 mg, Oral, Daily, Angela Okeefe DO, 400 mg at 05/18/20 1515    sodium chloride flush 0.9 % injection 10 mL, 10 mL, Intravenous, 2 times per day, Kathryne Epley, MD, 10 mL at 05/18/20 2039    sodium chloride flush 0.9 % injection 10 mL, 10 mL, Intravenous, PRN, Kathryne Epley, MD Margene Raymond  acetaminophen (TYLENOL) tablet 650 mg, 650 mg, Oral, Q4H PRN, Kathryne Epley, MD    acetaminophen (TYLENOL) tablet 650 mg, 650 mg, Oral, BID, Kathryne Epley, MD, 650 mg at 05/18/20 2039    ARIPiprazole (ABILIFY) tablet 10 mg, 10 mg, Oral, Daily, Kathryne Epley, MD, Stopped at 05/18/20 0734    calcium-vitamin D 500-200 MG-UNIT per tablet 1 tablet, 1 tablet, Oral, BID, Kathryne Epley, MD, 1 tablet at 05/18/20 2039    clonazePAM (KLONOPIN) tablet 1 mg, 1 mg, Oral, Nightly, Kathryne Epley, MD, 1 mg at 05/18/20 2039    DULoxetine (CYMBALTA) extended release capsule 60 mg, 60 mg, Oral, BID, Kathryne Epley, MD, 60 mg at 05/18/20 2039    levothyroxine (SYNTHROID) tablet 262.5 mcg, 262.5 mcg, Oral, Weekly, Kathryne Epley, MD    ondansetron [facility]) tablet 4 mg, 4 mg, Oral, Q8H PRN, Kathryne Epley, MD    polyethylene glycol [facility]) packet 17 g, 17 g, Oral, Daily PRN, Kathryne Epley, MD    [facility]) tablet 17.2 mg, 2 tablet, Oral, Nightly, Kathryne Epley, MD, 17.2 mg at 05/18/20 2038    traZODone (DESYREL) tablet 50 mg, 50 mg, Oral, Nightly, Kathryne Epley, MD, 50 mg at 05/18/20 2039    levothyroxine (SYNTHROID) tablet 175 mcg, 175 mcg, Oral, Once per day on Mon Tue Wed Thu Fri Sat, Kathryne Epley, MD, 175 mcg at 05/19/20 8087    Facility-Administered Medications Ordered in Other Encounters:     0.9 % sodium chloride bolus, 250 mL, Intravenous, Once, Yenifer Mckinley MD    Assessment:    Patient Active Problem List   Diagnosis    MS (multiple [address]) [address]    History of pulmonary embolus (PE)    Acquired hypothyroidism    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 UGI bleed- no evidence of ongoing bleeding, Hb is stable  2. Esophageal ulcers- treate with BID pantoprazole. Avoid all NSAID's (she has been). 3. Esophageal candidiasis- treat with fluconazole  4. MS- stable  5. D/C home.  Will set her up for a transitional care OV next week via telemedicine format        Alycia Voss  7:12 AM  5/19/2020

## 2020-05-19 NOTE — PROGRESS NOTES
GENERAL SURGERY  DAILY PROGRESS NOTE  5/19/2020    Subjective:  Patient reports she is doing better today. Had nausea last night but is tolerating her diet. No acute overnight events. Objective:  /82   Pulse 81   Temp 99.2 °F (37.3 °C) (Oral)   Resp 18   Ht 5' 5\" (1.651 m)   Wt (!) 333 lb 3 oz (151.1 kg)   SpO2 96%   BMI 55.45 kg/m²     GENERAL:  Laying in bed, no apparent distress, alert and oriented  LUNGS:  No increased work of breathing, no cyanosis, no wheezing  CARDIOVASCULAR:  Extremities warm and well perfused, regular rate  ABDOMEN:  Soft, non-tender, non-distended, no guarding or rigidity  SKIN: Warm and dry    Assessment/Plan:  60 y.o. female with hematemesis secondary to esophageal ulcer s/p EGD with candida esophagus and recurrent hiatal hernia.   - continue diflucan daily and protonix BID  - on PUD  - pending surgical pathology     Electronically signed by Jose Juan Feliz MD on 5/19/2020 at 7:03 AM

## 2020-05-19 NOTE — CARE COORDINATION
Discharge order noted; pt to transport via stretcher (physician's ambulance) 5pm today. Transport forms on chart. Pt aware. Rhea Lacy.

## 2020-06-22 ENCOUNTER — APPOINTMENT (OUTPATIENT)
Dept: GENERAL RADIOLOGY | Age: 60
End: 2020-06-22
Payer: MEDICARE

## 2020-06-22 ENCOUNTER — HOSPITAL ENCOUNTER (EMERGENCY)
Age: 60
Discharge: HOME | End: 2020-06-23
Attending: EMERGENCY MEDICINE
Payer: MEDICARE

## 2020-06-22 ENCOUNTER — APPOINTMENT (OUTPATIENT)
Dept: CT IMAGING | Age: 60
End: 2020-06-22
Payer: MEDICARE

## 2020-06-22 LAB
ALBUMIN SERPL-MCNC: 3.1 G/DL (ref 3.5–5.2)
ALP BLD-CCNC: 86 U/L (ref 35–104)
ALT SERPL-CCNC: 18 U/L (ref 0–32)
ANION GAP SERPL CALCULATED.3IONS-SCNC: 10 MMOL/L (ref 7–16)
AST SERPL-CCNC: 43 U/L (ref 0–31)
BACTERIA: ABNORMAL /HPF
BASOPHILS ABSOLUTE: 0.05 E9/L (ref 0–0.2)
BASOPHILS RELATIVE PERCENT: 0.5 % (ref 0–2)
BILIRUB SERPL-MCNC: 0.4 MG/DL (ref 0–1.2)
BILIRUBIN URINE: NEGATIVE
BLOOD, URINE: NEGATIVE
BUN BLDV-MCNC: 5 MG/DL (ref 8–23)
CALCIUM SERPL-MCNC: 9.1 MG/DL (ref 8.6–10.2)
CHLORIDE BLD-SCNC: 90 MMOL/L (ref 98–107)
CLARITY: ABNORMAL
CO2: 25 MMOL/L (ref 22–29)
COLOR: YELLOW
CREAT SERPL-MCNC: 0.6 MG/DL (ref 0.5–1)
EOSINOPHILS ABSOLUTE: 0.05 E9/L (ref 0.05–0.5)
EOSINOPHILS RELATIVE PERCENT: 0.5 % (ref 0–6)
GFR AFRICAN AMERICAN: >60
GFR NON-AFRICAN AMERICAN: >60 ML/MIN/1.73
GLUCOSE BLD-MCNC: 102 MG/DL (ref 74–99)
GLUCOSE URINE: NEGATIVE MG/DL
HCT VFR BLD CALC: 35.6 % (ref 34–48)
HEMOGLOBIN: 11.5 G/DL (ref 11.5–15.5)
IMMATURE GRANULOCYTES #: 0.05 E9/L
IMMATURE GRANULOCYTES %: 0.5 % (ref 0–5)
INR BLD: 1.1
KETONES, URINE: NEGATIVE MG/DL
LEUKOCYTE ESTERASE, URINE: ABNORMAL
LYMPHOCYTES ABSOLUTE: 1.05 E9/L (ref 1.5–4)
LYMPHOCYTES RELATIVE PERCENT: 11.2 % (ref 20–42)
MCH RBC QN AUTO: 29.6 PG (ref 26–35)
MCHC RBC AUTO-ENTMCNC: 32.3 % (ref 32–34.5)
MCV RBC AUTO: 91.5 FL (ref 80–99.9)
MONOCYTES ABSOLUTE: 0.71 E9/L (ref 0.1–0.95)
MONOCYTES RELATIVE PERCENT: 7.6 % (ref 2–12)
NEUTROPHILS ABSOLUTE: 7.46 E9/L (ref 1.8–7.3)
NEUTROPHILS RELATIVE PERCENT: 79.7 % (ref 43–80)
NITRITE, URINE: POSITIVE
PDW BLD-RTO: 15.3 FL (ref 11.5–15)
PH UA: 7.5 (ref 5–9)
PLATELET # BLD: 297 E9/L (ref 130–450)
PMV BLD AUTO: 9.9 FL (ref 7–12)
POTASSIUM SERPL-SCNC: 5.1 MMOL/L (ref 3.5–5)
PROTEIN UA: NEGATIVE MG/DL
PROTHROMBIN TIME: 12.2 SEC (ref 9.3–12.4)
RBC # BLD: 3.89 E12/L (ref 3.5–5.5)
RBC UA: ABNORMAL /HPF (ref 0–2)
SODIUM BLD-SCNC: 125 MMOL/L (ref 132–146)
SPECIFIC GRAVITY UA: 1.01 (ref 1–1.03)
TOTAL PROTEIN: 7.6 G/DL (ref 6.4–8.3)
TROPONIN: <0.01 NG/ML (ref 0–0.03)
UROBILINOGEN, URINE: 0.2 E.U./DL
WBC # BLD: 9.4 E9/L (ref 4.5–11.5)
WBC UA: ABNORMAL /HPF (ref 0–5)

## 2020-06-22 PROCEDURE — 85610 PROTHROMBIN TIME: CPT

## 2020-06-22 PROCEDURE — 80053 COMPREHEN METABOLIC PANEL: CPT

## 2020-06-22 PROCEDURE — 93005 ELECTROCARDIOGRAM TRACING: CPT | Performed by: EMERGENCY MEDICINE

## 2020-06-22 PROCEDURE — 2580000003 HC RX 258: Performed by: EMERGENCY MEDICINE

## 2020-06-22 PROCEDURE — 6360000002 HC RX W HCPCS: Performed by: EMERGENCY MEDICINE

## 2020-06-22 PROCEDURE — 70450 CT HEAD/BRAIN W/O DYE: CPT

## 2020-06-22 PROCEDURE — 96374 THER/PROPH/DIAG INJ IV PUSH: CPT

## 2020-06-22 PROCEDURE — 81001 URINALYSIS AUTO W/SCOPE: CPT

## 2020-06-22 PROCEDURE — 85025 COMPLETE CBC W/AUTO DIFF WBC: CPT

## 2020-06-22 PROCEDURE — 73502 X-RAY EXAM HIP UNI 2-3 VIEWS: CPT

## 2020-06-22 PROCEDURE — 84484 ASSAY OF TROPONIN QUANT: CPT

## 2020-06-22 PROCEDURE — 71045 X-RAY EXAM CHEST 1 VIEW: CPT

## 2020-06-22 PROCEDURE — 99285 EMERGENCY DEPT VISIT HI MDM: CPT

## 2020-06-22 RX ORDER — MORPHINE SULFATE 4 MG/ML
4 INJECTION, SOLUTION INTRAMUSCULAR; INTRAVENOUS ONCE
Status: COMPLETED | OUTPATIENT
Start: 2020-06-22 | End: 2020-06-22

## 2020-06-22 RX ORDER — SODIUM CHLORIDE 9 MG/ML
INJECTION, SOLUTION INTRAVENOUS CONTINUOUS
Status: DISCONTINUED | OUTPATIENT
Start: 2020-06-22 | End: 2020-06-23 | Stop reason: HOSPADM

## 2020-06-22 RX ORDER — ONDANSETRON 2 MG/ML
4 INJECTION INTRAMUSCULAR; INTRAVENOUS EVERY 6 HOURS PRN
Status: DISCONTINUED | OUTPATIENT
Start: 2020-06-22 | End: 2020-06-23 | Stop reason: HOSPADM

## 2020-06-22 RX ADMIN — SODIUM CHLORIDE: 9 INJECTION, SOLUTION INTRAVENOUS at 22:00

## 2020-06-22 RX ADMIN — ONDANSETRON 4 MG: 2 INJECTION INTRAMUSCULAR; INTRAVENOUS at 21:57

## 2020-06-22 RX ADMIN — MORPHINE SULFATE 4 MG: 4 INJECTION, SOLUTION INTRAMUSCULAR; INTRAVENOUS at 21:57

## 2020-06-22 ASSESSMENT — PAIN SCALES - GENERAL
PAINLEVEL_OUTOF10: 9
PAINLEVEL_OUTOF10: 8

## 2020-06-23 ENCOUNTER — TELEPHONE (OUTPATIENT)
Dept: ORTHOPEDIC SURGERY | Age: 60
End: 2020-06-23

## 2020-06-23 VITALS
RESPIRATION RATE: 15 BRPM | TEMPERATURE: 98.6 F | SYSTOLIC BLOOD PRESSURE: 135 MMHG | HEIGHT: 65 IN | HEART RATE: 86 BPM | DIASTOLIC BLOOD PRESSURE: 79 MMHG | WEIGHT: 293 LBS | OXYGEN SATURATION: 98 % | BODY MASS INDEX: 48.82 KG/M2

## 2020-06-23 LAB
ANION GAP SERPL CALCULATED.3IONS-SCNC: 11 MMOL/L (ref 7–16)
BUN BLDV-MCNC: 4 MG/DL (ref 8–23)
CALCIUM SERPL-MCNC: 9.3 MG/DL (ref 8.6–10.2)
CHLORIDE BLD-SCNC: 94 MMOL/L (ref 98–107)
CO2: 24 MMOL/L (ref 22–29)
CREAT SERPL-MCNC: 0.7 MG/DL (ref 0.5–1)
EKG ATRIAL RATE: 74 BPM
EKG P AXIS: 69 DEGREES
EKG P-R INTERVAL: 172 MS
EKG Q-T INTERVAL: 426 MS
EKG QRS DURATION: 108 MS
EKG QTC CALCULATION (BAZETT): 472 MS
EKG R AXIS: 4 DEGREES
EKG T AXIS: 38 DEGREES
EKG VENTRICULAR RATE: 74 BPM
GFR AFRICAN AMERICAN: >60
GFR NON-AFRICAN AMERICAN: >60 ML/MIN/1.73
GLUCOSE BLD-MCNC: 120 MG/DL (ref 74–99)
POTASSIUM SERPL-SCNC: 4.4 MMOL/L (ref 3.5–5)
SODIUM BLD-SCNC: 129 MMOL/L (ref 132–146)

## 2020-06-23 PROCEDURE — 6370000000 HC RX 637 (ALT 250 FOR IP): Performed by: EMERGENCY MEDICINE

## 2020-06-23 PROCEDURE — 93010 ELECTROCARDIOGRAM REPORT: CPT | Performed by: INTERNAL MEDICINE

## 2020-06-23 PROCEDURE — 80048 BASIC METABOLIC PNL TOTAL CA: CPT

## 2020-06-23 RX ORDER — CEFDINIR 300 MG/1
300 CAPSULE ORAL 2 TIMES DAILY
Qty: 14 CAPSULE | Refills: 0 | Status: SHIPPED | OUTPATIENT
Start: 2020-06-23 | End: 2020-06-30

## 2020-06-23 RX ORDER — OXYCODONE HYDROCHLORIDE AND ACETAMINOPHEN 5; 325 MG/1; MG/1
1 TABLET ORAL ONCE
Status: COMPLETED | OUTPATIENT
Start: 2020-06-23 | End: 2020-06-23

## 2020-06-23 RX ADMIN — OXYCODONE HYDROCHLORIDE AND ACETAMINOPHEN 1 TABLET: 5; 325 TABLET ORAL at 00:46

## 2020-06-23 ASSESSMENT — PAIN SCALES - GENERAL
PAINLEVEL_OUTOF10: 10
PAINLEVEL_OUTOF10: 3

## 2020-06-23 NOTE — TELEPHONE ENCOUNTER
Orders faxed to [facility];     Richard Teresa to let her know that [facility] would be contacting her for a CT scan. She will call us once the apt has been set up.        Electronically signed by Luann Akbarcherna on 6/23/2020 at 2:26 PM

## 2020-06-23 NOTE — ED PROVIDER NOTES
HPI:  6/22/20, Time: 9:02 PM EDT         Cliff Vasquez is a 63 y.o. female presenting to the ED for history of fall with right hip pain, beginning short time ago. The complaint has been persistent, moderate in severity, and worsened by movement of right hip. She reports she was standing with Hyun strap around her. Patient reporting that she got dizzy and fell and landed on her right hip. Patient reporting no head injury she reports no neck pain. Patient reports she fell back and landed on wheelchair. Patient reporting no pleuritic chest pain she reports no shortness of breath. There is no fever chills or cough. There is no abdominal pain or vomiting or diarrhea. ROS:   Pertinent positives and negatives are stated within HPI, all other systems reviewed and are negative.  --------------------------------------------- PAST HISTORY ---------------------------------------------  Past Medical History:  has a past medical history of Acquired hypothyroidism, Acute blood loss as cause of postoperative anemia, Anemia, Anticoagulant long-term use, Anxiety, Arthritis, Blood transfusion, Chronic back pain, Depression, GERD (gastroesophageal reflux disease), Hx of blood clots, Lymphedema of lower extremity, Migraine, Multiple sclerosis ([address] Obesity, Osteoarthritis, PE (pulmonary thromboembolism) ([address] Peripheral vascular disease ([address] Prominent abdominal aortic pulse, Pulmonary embolism (HCC), Swelling of lower limb, Thyroid disease, Urinary incontinence, and Varicose veins of lower extremities. Past Surgical History:  has a past surgical history that includes Bladder surgery; hernia repair; Dental surgery; Tonsillectomy; Dilation and curettage of uterus; other surgical history (09/15/2015); Tibia fracture surgery (Right); fracture surgery; Upper gastrointestinal endoscopy; Colonoscopy; Endoscopy, colon, diagnostic;  Total hip arthroplasty (Right, 09/13/2016); open treatment fracture distal tibia & fibula -------------------------------------------------  I have personally reviewed all laboratory and imaging results for this patient. Results are listed below.      LABS:  Results for orders placed or performed during the hospital encounter of 06/22/20   CBC auto differential   Result Value Ref Range    WBC 9.4 4.5 - 11.5 E9/L    RBC 3.89 3.50 - 5.50 E12/L    Hemoglobin 11.5 11.5 - 15.5 g/dL    Hematocrit 35.6 34.0 - 48.0 %    MCV 91.5 80.0 - 99.9 fL    MCH 29.6 26.0 - 35.0 pg    MCHC 32.3 32.0 - 34.5 %    RDW 15.3 (H) 11.5 - 15.0 fL    Platelets 942 113 - 759 E9/L    MPV 9.9 7.0 - 12.0 fL    Neutrophils % 79.7 43.0 - 80.0 %    Immature Granulocytes % 0.5 0.0 - 5.0 %    Lymphocytes % 11.2 (L) 20.0 - 42.0 %    Monocytes % 7.6 2.0 - 12.0 %    Eosinophils % 0.5 0.0 - 6.0 %    Basophils % 0.5 0.0 - 2.0 %    Neutrophils Absolute 7.46 (H) 1.80 - 7.30 E9/L    Immature Granulocytes # 0.05 E9/L    Lymphocytes Absolute 1.05 (L) 1.50 - 4.00 E9/L    Monocytes Absolute 0.71 0.10 - 0.95 E9/L    Eosinophils Absolute 0.05 0.05 - 0.50 E9/L    Basophils Absolute 0.05 0.00 - 0.20 E9/L   Comprehensive Metabolic Panel   Result Value Ref Range    Sodium 125 (L) 132 - 146 mmol/L    Potassium 5.1 (H) 3.5 - 5.0 mmol/L    Chloride 90 (L) 98 - 107 mmol/L    CO2 25 22 - 29 mmol/L    Anion Gap 10 7 - 16 mmol/L    Glucose 102 (H) 74 - 99 mg/dL    BUN 5 (L) 8 - 23 mg/dL    CREATININE 0.6 0.5 - 1.0 mg/dL    GFR Non-African American >60 >=60 mL/min/1.73    GFR African American >60     Calcium 9.1 8.6 - 10.2 mg/dL    Total Protein 7.6 6.4 - 8.3 g/dL    Alb 3.1 (L) 3.5 - 5.2 g/dL    Total Bilirubin 0.4 0.0 - 1.2 mg/dL    Alkaline Phosphatase 86 35 - 104 U/L    ALT 18 0 - 32 U/L    AST 43 (H) 0 - 31 U/L   Protime-INR   Result Value Ref Range    Protime 12.2 9.3 - 12.4 sec    INR 1.1    Troponin   Result Value Ref Range    Troponin <0.01 0.00 - 0.03 ng/mL   Urinalysis   Result Value Ref Range    Color, UA Yellow Straw/Yellow    Clarity, UA SL CLOUDY Clear --------------------------------- IMPRESSION AND DISPOSITION ---------------------------------    IMPRESSION  1. Right hip pain    2. Dizziness    3. Urinary tract infection without hematuria, site unspecified        DISPOSITION  Disposition: Discharged home  Patient condition is stable        NOTE: This report was transcribed using voice recognition software.  Every effort was made to ensure accuracy; however, inadvertent computerized transcription errors may be present          Marie Hi MD  06/22/20 [id number]       Marie Hi MD  06/23/20 0062

## 2020-06-23 NOTE — TELEPHONE ENCOUNTER
Definitely looks like fracture, she just had CT abdomen in May, unsure why she wouldn't be able to get one now? Would likely treat nonop as I do not think she is an ambulator.

## 2020-06-24 ENCOUNTER — TELEPHONE (OUTPATIENT)
Dept: ORTHOPEDIC SURGERY | Age: 60
End: 2020-06-24

## 2020-07-07 ENCOUNTER — TELEPHONE (OUTPATIENT)
Dept: ORTHOPEDIC SURGERY | Age: 60
End: 2020-07-07

## 2020-07-07 NOTE — TELEPHONE ENCOUNTER
Patient called office to advise she has transportation issues and could not make it to her CT scan. CT was rescheduled for this Saturday. Future Appointments   Date Time Provider Karlie Masters   7/11/2020 12:30 PM [facility] CT2 [facility]     Recommended patient call office with any further questions or concerns.

## 2020-07-19 ENCOUNTER — HOSPITAL ENCOUNTER (OUTPATIENT)
Dept: CT IMAGING | Age: 60
Discharge: HOME | End: 2020-07-21
Payer: MEDICARE

## 2020-07-19 PROCEDURE — 73700 CT LOWER EXTREMITY W/O DYE: CPT

## 2020-07-21 ENCOUNTER — TELEPHONE (OUTPATIENT)
Dept: ORTHOPEDIC SURGERY | Age: 60
End: 2020-07-21

## 2020-07-21 NOTE — TELEPHONE ENCOUNTER
Apt made for 07/30 at 1:15 on [facility] schedule.        Electronically signed by Neda Tripp on 7/21/2020 at 9:00 AM

## 2020-07-21 NOTE — TELEPHONE ENCOUNTER
I'd like patient to come to office next Thursday to see Dr Harpal Dorman for follow up.  Patient to remain NWB on the RLE until seen if office  Electronically signed by Juma Clark PA-C on 7/21/2020 at 7:32 AM

## 2020-07-22 ENCOUNTER — TELEPHONE (OUTPATIENT)
Dept: ORTHOPEDIC SURGERY | Age: 60
End: 2020-07-22

## 2020-07-22 NOTE — TELEPHONE ENCOUNTER
Notified patient of her CT results and she will be on 7-30-20 for a follow up appointment.   Electronically signed by Cody Bowen MA on 7/22/2020 at 11:47 AM

## 2020-07-22 NOTE — TELEPHONE ENCOUNTER
CTwas read as no fracture by radiologist, still clinically suspicious of one given description of injury, requested that patient have follow up appointment on 7/30 with Dr. Rhoda Andujar in office from previous telephone encounter  Electronically signed by Lamar Eng PA-C on 7/22/2020 at 11:08 AM

## 2020-07-22 NOTE — TELEPHONE ENCOUNTER
Yelena Elaine was told she would be called with the results from her CT of her hip. She has not heard from anyone and would like a call back with those results. Please advise.   Electronically signed by Gricelda Bowen MA on 7/22/2020 at 10:14 AM

## 2020-07-30 ENCOUNTER — TELEPHONE (OUTPATIENT)
Dept: ORTHOPEDIC SURGERY | Age: 60
End: 2020-07-30

## 2020-08-11 ENCOUNTER — OFFICE VISIT (OUTPATIENT)
Dept: ORTHOPEDIC SURGERY | Age: 60
End: 2020-08-11
Payer: MEDICARE

## 2020-08-11 ENCOUNTER — HOSPITAL ENCOUNTER (OUTPATIENT)
Dept: GENERAL RADIOLOGY | Age: 60
Discharge: HOME | End: 2020-08-13
Payer: MEDICARE

## 2020-08-11 VITALS — HEART RATE: 74 BPM | TEMPERATURE: 97.8 F | SYSTOLIC BLOOD PRESSURE: 132 MMHG | DIASTOLIC BLOOD PRESSURE: 80 MMHG

## 2020-08-11 PROCEDURE — 27238 TREAT THIGH FRACTURE: CPT | Performed by: ORTHOPAEDIC SURGERY

## 2020-08-11 PROCEDURE — G8417 CALC BMI ABV UP PARAM F/U: HCPCS | Performed by: ORTHOPAEDIC SURGERY

## 2020-08-11 PROCEDURE — 73502 X-RAY EXAM HIP UNI 2-3 VIEWS: CPT

## 2020-08-11 PROCEDURE — 3017F COLORECTAL CA SCREEN DOC REV: CPT | Performed by: ORTHOPAEDIC SURGERY

## 2020-08-11 PROCEDURE — 99213 OFFICE O/P EST LOW 20 MIN: CPT | Performed by: ORTHOPAEDIC SURGERY

## 2020-08-11 PROCEDURE — 99212 OFFICE O/P EST SF 10 MIN: CPT | Performed by: ORTHOPAEDIC SURGERY

## 2020-08-11 PROCEDURE — 1036F TOBACCO NON-USER: CPT | Performed by: ORTHOPAEDIC SURGERY

## 2020-08-11 PROCEDURE — G8427 DOCREV CUR MEDS BY ELIG CLIN: HCPCS | Performed by: ORTHOPAEDIC SURGERY

## 2020-08-17 NOTE — PROGRESS NOTES
Ortho Clinic Note    Subjective:  Hema Thompson is a patient well-known to my office. She had multiple surgical seizures on the right leg including distal femur ORIF and right total hip arthroplasty. More recently back in June patient apparently fell at her facility. She is significantly debilitated due to her underlying MS and global weakness and morbid obesity. She is maximal assist to stand for transfers even prior to recent fall. Imaging was obtained at her facility but due to her obesity was difficult to conclude fracture of the proximal femur. Therefore a CT scan was obtained and she is here today for her first follow-up for this. Imaging does demonstrate a periprosthetic fracture about a previous total hip arthroplasty on the right. Pain is tolerable at this point. She denies any other issues or injuries. Son accompanies her today. Review of Systems -    General ROS: negative for - chills, fatigue, fever or night sweats  Respiratory ROS: no cough, shortness of breath, or wheezing  Cardiovascular ROS: no chest pain or dyspnea on exertion  Gastrointestinal ROS: no abdominal pain, nausea, vomiting, diarrhea, constipation,or black or bloody stools  Genitourinary: no hematuria, dysuria, or incontinence   Musculoskeletal ROS: negative for -back or neck pain or stiffness, also see HPI  Neurological ROS: no TIA or stroke symptoms       Objective:    General: Alert and oriented X 3, normocephalic atraumatic, external ears and eye normal, sclera clear, no acute distress, respirations easy and unlabored with no audible wheezes, skin warm and dry, speech and dress appropriate for noted age, affect euthymic. Morbidly obese.     Extremity:  Right Lower Extremity  Skin clean dry and intact, without signs of infection  Incisions well healed without signs of redness, warmth or drainage  Moderated edema noted BLE  Compartments supple throughout thigh and leg,   Calf supple and nontender  Global weakness throughout the entire limb including hip knee and ankle joints  States sensation intact to touch in sural/deep peroneal/superficial peroneal/saphenous/posterior tibial nerve distributions to foot/ankle. Palpable dorsalis pedis and posterior tibialis pulses, cap refill brisk in toes, foot warm/perfused. Discomfort with attempted logroll difficult to obtain due to her obesity  Remainder of limb is nontender    /80 (Site: Left Lower Arm, Position: Supine)   Pulse 74   Temp 97.8 °F (36.6 °C)     XR:   Right femur [place] B periprosthetic fracture about previous ML stem of a GUY. There is varus alignment of the fracture. Assessment:   Diagnosis Orders   1. 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 Discussed he does have a periprosthetic fracture of the hip. Did discuss there is non-anatomic alignment of her fracture. We did discuss her medical comorbidities and limited mobility prior to recent fracture. Discussed plans to try and treat this conservatively to see if this will heal on its own otherwise we would be looking at an involved revision surgery of the GUY components as well as ORIF of her fracture, and she is at high risk for perioperative complications due to her medical comorbidities and obesity and frankly she is nonambulatory and has been that way. They verbalized understanding and are in full agreement with the plans. Patient to continue with nonweightbearing and follow-up in office in 6 weeks for repeat evaluation with repeat x-rays of the right hip or sooner if needed    Electronically signed by Otto Horta DO on 8/11/2020     Note: This report was completed using [employer] voiced recognition software.  Every effort has been made to ensure accuracy; however, inadvertent computerized transcription errors may be present.

## 2020-10-01 ENCOUNTER — HOSPITAL ENCOUNTER (OUTPATIENT)
Dept: GENERAL RADIOLOGY | Age: 60
Discharge: HOME | End: 2020-10-03
Payer: MEDICARE

## 2020-10-01 ENCOUNTER — OFFICE VISIT (OUTPATIENT)
Dept: ORTHOPEDIC SURGERY | Age: 60
End: 2020-10-01
Payer: MEDICARE

## 2020-10-01 VITALS — HEART RATE: 65 BPM | DIASTOLIC BLOOD PRESSURE: 87 MMHG | SYSTOLIC BLOOD PRESSURE: 143 MMHG

## 2020-10-01 PROCEDURE — 99212 OFFICE O/P EST SF 10 MIN: CPT | Performed by: ORTHOPAEDIC SURGERY

## 2020-10-01 PROCEDURE — 73552 X-RAY EXAM OF FEMUR 2/>: CPT

## 2020-10-01 PROCEDURE — 73502 X-RAY EXAM HIP UNI 2-3 VIEWS: CPT

## 2020-10-01 PROCEDURE — 99024 POSTOP FOLLOW-UP VISIT: CPT | Performed by: ORTHOPAEDIC SURGERY

## 2020-10-01 PROCEDURE — 73590 X-RAY EXAM OF LOWER LEG: CPT

## 2020-10-05 NOTE — PROGRESS NOTES
Ortho Clinic Note    Subjective:  Susu Leblanc is a patient well-known to my office. She had multiple surgical procedures on the right leg including distal femur ORIF and right total hip arthroplasty. More recently back in June patient apparently fell at her facility. She is significantly debilitated due to her underlying MS and global weakness and morbid obesity. She is maximal assist to stand for transfers even prior to recent fall. Imaging was obtained at her facility but due to her obesity was difficult to conclude fracture of the proximal femur. This was best demonstrated on CT. WE decided to treat this fracture nonoperatively. Patient is here today for follow-up. Denies any significant pain. Son accompanies her again today. She denies any other issues or injuries. Review of Systems -    General ROS: negative for - chills, fatigue, fever or night sweats  Respiratory ROS: no cough, shortness of breath, or wheezing  Cardiovascular ROS: no chest pain or dyspnea on exertion  Gastrointestinal ROS: no abdominal pain, nausea, vomiting, diarrhea, constipation,or black or bloody stools  Genitourinary: no hematuria, dysuria, or incontinence   Musculoskeletal ROS: negative for -back or neck pain or stiffness, also see HPI  Neurological ROS: no TIA or stroke symptoms       Objective:    General: Alert and oriented X 3, normocephalic atraumatic, external ears and eye normal, sclera clear, no acute distress, respirations easy and unlabored with no audible wheezes, skin warm and dry, speech and dress appropriate for noted age, affect euthymic. Morbidly obese.     Extremity:  Right Lower Extremity  Skin clean dry and intact, without signs of infection  Incisions well healed without signs of redness, warmth or drainage  Moderated edema noted BLE  Compartments supple throughout thigh and leg,   Calf supple and nontender  Global weakness throughout the entire limb including hip knee and ankle joints  States sensation intact to touch in sural/deep peroneal/superficial peroneal/saphenous/posterior tibial nerve distributions to foot/ankle. Palpable dorsalis pedis and posterior tibialis pulses, cap refill brisk in toes, foot warm/perfused. Discomfort with attempted logroll difficult to obtain due to her obesity  Remainder of limb is nontender    BP (!) 143/87   Pulse 65     XR:   Right femur [place] B periprosthetic fracture about previous ML stem of a GUY. There is varus alignment of the fracture, interval callus     Assessment:   Diagnosis Orders   1. Other closed fracture of distal end of right femur with nonunion, subsequent encounter  [facility]:  Reviewed imaging and diagnosis with patient and her son in detail today. Discussed the does appear to be some interval callus and healing however I do not feel this is healed enough for her to attempt any weightbearing for transfers  Recommend continued therapy at her facility, can work on range of motion strengthening 3 other extremities and gentle range of motion for the affected extremity  Patient to continue with pain management DVT prophylaxis per medical physician  She will see his back in office in 4-6 weeks for repeat evaluation with repeat x-rays of the right hip or sooner if needed. They verbalized understanding and are in full agreement with the plans. Electronically signed by Florina Downey DO on 10/1/2020     Note: This report was completed using [employer] voiced recognition software.  Every effort has been made to ensure accuracy; however, inadvertent computerized transcription errors may be present.

## 2020-10-28 ENCOUNTER — HOSPITAL ENCOUNTER (OUTPATIENT)
Age: 60
Discharge: HOME | End: 2020-10-30
Payer: MEDICARE

## 2020-10-28 PROCEDURE — 80053 COMPREHEN METABOLIC PANEL: CPT

## 2020-10-28 PROCEDURE — 85027 COMPLETE CBC AUTOMATED: CPT

## 2020-10-28 PROCEDURE — 84443 ASSAY THYROID STIM HORMONE: CPT

## 2020-10-29 LAB
ALBUMIN SERPL-MCNC: 3.3 G/DL (ref 3.5–5.2)
ALP BLD-CCNC: 102 U/L (ref 35–104)
ALT SERPL-CCNC: 15 U/L (ref 0–32)
ANION GAP SERPL CALCULATED.3IONS-SCNC: 11 MMOL/L (ref 7–16)
AST SERPL-CCNC: 17 U/L (ref 0–31)
BILIRUB SERPL-MCNC: 0.3 MG/DL (ref 0–1.2)
BUN BLDV-MCNC: 9 MG/DL (ref 8–23)
CALCIUM SERPL-MCNC: 9.6 MG/DL (ref 8.6–10.2)
CHLORIDE BLD-SCNC: 96 MMOL/L (ref 98–107)
CO2: 30 MMOL/L (ref 22–29)
CREAT SERPL-MCNC: 0.6 MG/DL (ref 0.5–1)
GFR AFRICAN AMERICAN: >60
GFR NON-AFRICAN AMERICAN: >60 ML/MIN/1.73
GLUCOSE BLD-MCNC: 65 MG/DL (ref 74–99)
HCT VFR BLD CALC: 35.7 % (ref 34–48)
HEMOGLOBIN: 11.4 G/DL (ref 11.5–15.5)
MCH RBC QN AUTO: 30.2 PG (ref 26–35)
MCHC RBC AUTO-ENTMCNC: 31.9 % (ref 32–34.5)
MCV RBC AUTO: 94.7 FL (ref 80–99.9)
PDW BLD-RTO: 16.7 FL (ref 11.5–15)
PLATELET # BLD: 337 E9/L (ref 130–450)
PMV BLD AUTO: 10.6 FL (ref 7–12)
POTASSIUM SERPL-SCNC: 3.9 MMOL/L (ref 3.5–5)
RBC # BLD: 3.77 E12/L (ref 3.5–5.5)
SODIUM BLD-SCNC: 137 MMOL/L (ref 132–146)
TOTAL PROTEIN: 7.2 G/DL (ref 6.4–8.3)
TSH SERPL DL<=0.05 MIU/L-ACNC: 2.02 UIU/ML (ref 0.27–4.2)
WBC # BLD: 5.4 E9/L (ref 4.5–11.5)

## 2020-12-10 ENCOUNTER — HOSPITAL ENCOUNTER (OUTPATIENT)
Dept: GENERAL RADIOLOGY | Age: 60
Discharge: HOME | End: 2020-12-12
Payer: MEDICARE

## 2020-12-10 ENCOUNTER — OFFICE VISIT (OUTPATIENT)
Dept: ORTHOPEDIC SURGERY | Age: 60
End: 2020-12-10
Payer: MEDICARE

## 2020-12-10 VITALS
HEART RATE: 71 BPM | HEIGHT: 65 IN | RESPIRATION RATE: 14 BRPM | SYSTOLIC BLOOD PRESSURE: 117 MMHG | WEIGHT: 293 LBS | TEMPERATURE: 97.3 F | DIASTOLIC BLOOD PRESSURE: 76 MMHG | BODY MASS INDEX: 48.82 KG/M2

## 2020-12-10 PROCEDURE — 1036F TOBACCO NON-USER: CPT | Performed by: ORTHOPAEDIC SURGERY

## 2020-12-10 PROCEDURE — 99212 OFFICE O/P EST SF 10 MIN: CPT

## 2020-12-10 PROCEDURE — G8484 FLU IMMUNIZE NO ADMIN: HCPCS | Performed by: ORTHOPAEDIC SURGERY

## 2020-12-10 PROCEDURE — G8417 CALC BMI ABV UP PARAM F/U: HCPCS | Performed by: ORTHOPAEDIC SURGERY

## 2020-12-10 PROCEDURE — 99213 OFFICE O/P EST LOW 20 MIN: CPT | Performed by: ORTHOPAEDIC SURGERY

## 2020-12-10 PROCEDURE — G8427 DOCREV CUR MEDS BY ELIG CLIN: HCPCS | Performed by: ORTHOPAEDIC SURGERY

## 2020-12-10 PROCEDURE — 3017F COLORECTAL CA SCREEN DOC REV: CPT | Performed by: ORTHOPAEDIC SURGERY

## 2020-12-10 PROCEDURE — 73502 X-RAY EXAM HIP UNI 2-3 VIEWS: CPT

## 2020-12-10 NOTE — PATIENT INSTRUCTIONS
Can continue with your sit to stand with your nursing aide at your household. Can follow-up with us in 3 months for repeat evaluation.

## 2020-12-10 NOTE — PROGRESS NOTES
Ortho Clinic Note    Subjective:  Jacqueline Millan is a patient well-known to my office. She had multiple surgical procedures on the right leg including distal femur ORIF and right total hip arthroplasty. More recently back in June patient apparently fell at her facility. She is significantly debilitated due to her underlying MS and global weakness and morbid obesity. She is maximal assist to stand for transfers even prior to recent fall. Imaging was obtained at her facility but due to her obesity was difficult to conclude fracture of the proximal femur. This was best demonstrated on CT. We decided to treat this fracture nonoperatively. Patient is here today for follow-up. Denies any significant pain. She is at home now states she has been doing her sit to stand with the nursing aide daily and has been having no issues or pain with this. She denies any other issues or injuries. Review of Systems -    General ROS: negative for - chills, fatigue, fever or night sweats  Respiratory ROS: no cough, shortness of breath, or wheezing  Cardiovascular ROS: no chest pain or dyspnea on exertion  Gastrointestinal ROS: no abdominal pain, nausea, vomiting, diarrhea, constipation,or black or bloody stools  Genitourinary: no hematuria, dysuria, or incontinence   Musculoskeletal ROS: negative for -back or neck pain or stiffness, also see HPI  Neurological ROS: no TIA or stroke symptoms       Objective:    General: Alert and oriented X 3, normocephalic atraumatic, external ears and eye normal, sclera clear, no acute distress, respirations easy and unlabored with no audible wheezes, skin warm and dry, speech and dress appropriate for noted age, affect euthymic. Morbidly obese.     Extremity:  Right Lower Extremity  Skin clean dry and intact, without signs of infection  Incisions well healed without signs of redness, warmth or drainage  Moderated edema noted BLE  Compartments supple throughout thigh and leg,   Calf

## 2021-04-09 DIAGNOSIS — S72.491K OTHER CLOSED FRACTURE OF DISTAL END OF RIGHT FEMUR WITH NONUNION, SUBSEQUENT ENCOUNTER: Primary | ICD-10-CM

## 2021-05-07 NOTE — PROGRESS NOTES
Occupational Therapy  Date:2018  Patient Name: Hilda Caldwell  MRN: [id number]  : 1960  Room: [address] [place] [address]-A     OT order received and appreciated. Chart reviewed. Hold OT evaluation, pt scheduled for surgery today . Will follow.     Jade Phelan OTR/L #[id number] Yes - the patient is able to be screened

## 2021-06-25 ENCOUNTER — TELEPHONE (OUTPATIENT)
Dept: ORTHOPEDIC SURGERY | Age: 61
End: 2021-06-25

## 2021-06-25 NOTE — TELEPHONE ENCOUNTER
If patient is agreeable we could consider [facility] mobile x-rays to be done. Patient unable to transfer and we have no Hyun lift capabilities so she has to be transported on a gurney.

## 2021-06-25 NOTE — TELEPHONE ENCOUNTER
Patient rescheduled missed appt on 6/24/21 to 7/15/21 @ 1:30 pm.  She indicated that transport told her they cannot bring her to her appt by Ambulance d/t no approval from insurance and that the provider needs to submit an auth to the insurance. She indicated the reason she has to be transported that way is because she cannot stand for x-rays. Review needed and call back required.

## 2021-07-01 NOTE — TELEPHONE ENCOUNTER
I can call her insurance and see if they will approve transportation for her. I don't know if there has to be a certain diagnosis or what criteria she must meet for transportation to be approved or not.

## 2021-07-01 NOTE — TELEPHONE ENCOUNTER
Patient called back, advised patient Margie Quiroz is reaching out to insurance co. To find out if transportation can be covered/ BTR X-ray will be considered if insurance doesn't approve. Wants to speak w/ Dr. Cecilia Bey re: pain in tailbone when sitting in chair. She has to take extra Tylenol to relieve the pain.

## 2021-07-07 NOTE — TELEPHONE ENCOUNTER
Spoke to [facility] @ [address] [facility] [address] #[phone]. Patient does have transportation benefits on her plan but she does not know how many trips are left. Advised to call Stefano Garcia since that is the  for transportation. I called [employer] and spoke to  Kyree Gorman. Phone # [phone]. Patient has unlimited number of trips allowed. I asked for a gurney to be available to transport her to her appointment here and return trip back home. Confirmation number for 7- trip [id number].  will pick her up between 12:15 pm and 12:45 pm for scheduled time of 12:30 pm. She needs to be ready by 12:15 pm. Appointment time with Dr. Christofer Contreras is 1:30 pm. Transportation is going to pick her up at our office @ 2:30 pm scheduled time to return her back home.

## 2021-07-07 NOTE — TELEPHONE ENCOUNTER
Called patient and advised: Confirmation number for 7- trip [id number].  will pick her up between 12:15 pm and 12:45 pm for scheduled time of 12:30 pm. She needs to be ready by 12:15 pm. Appointment time with Dr. Jl Hayden is 1:30 pm. Transportation is going to pick her up at our office @ 2:30 pm scheduled time to return her back home.

## 2021-07-07 NOTE — TELEPHONE ENCOUNTER
Please contact patient to let her know of the insurance approval and to let her know what time she needs to be ready for pick-up.

## 2021-07-15 ENCOUNTER — HOSPITAL ENCOUNTER (OUTPATIENT)
Dept: GENERAL RADIOLOGY | Age: 61
Discharge: HOME | End: 2021-07-17
Payer: MEDICARE

## 2021-07-15 ENCOUNTER — OFFICE VISIT (OUTPATIENT)
Dept: ORTHOPEDIC SURGERY | Age: 61
End: 2021-07-15
Payer: MEDICARE

## 2021-07-15 VITALS — TEMPERATURE: 98.5 F

## 2021-07-15 DIAGNOSIS — S72.491K OTHER CLOSED FRACTURE OF DISTAL END OF RIGHT FEMUR WITH NONUNION, SUBSEQUENT ENCOUNTER: ICD-10-CM

## 2021-07-15 DIAGNOSIS — S82.244K: Primary | ICD-10-CM

## 2021-07-15 DIAGNOSIS — S82.244K: ICD-10-CM

## 2021-07-15 PROCEDURE — 3017F COLORECTAL CA SCREEN DOC REV: CPT | Performed by: PHYSICIAN ASSISTANT

## 2021-07-15 PROCEDURE — G8427 DOCREV CUR MEDS BY ELIG CLIN: HCPCS | Performed by: PHYSICIAN ASSISTANT

## 2021-07-15 PROCEDURE — 99214 OFFICE O/P EST MOD 30 MIN: CPT | Performed by: PHYSICIAN ASSISTANT

## 2021-07-15 PROCEDURE — 99213 OFFICE O/P EST LOW 20 MIN: CPT | Performed by: PHYSICIAN ASSISTANT

## 2021-07-15 PROCEDURE — 1036F TOBACCO NON-USER: CPT | Performed by: PHYSICIAN ASSISTANT

## 2021-07-15 PROCEDURE — 73502 X-RAY EXAM HIP UNI 2-3 VIEWS: CPT

## 2021-07-15 PROCEDURE — G8417 CALC BMI ABV UP PARAM F/U: HCPCS | Performed by: PHYSICIAN ASSISTANT

## 2021-07-15 PROCEDURE — 73552 X-RAY EXAM OF FEMUR 2/>: CPT

## 2021-07-15 PROCEDURE — 73590 X-RAY EXAM OF LOWER LEG: CPT

## 2021-07-15 NOTE — PROGRESS NOTES
Radha Horta is a 57 y.o. female who presents for follow up of R STAN    SURGEON: Dr. Alex Mcdonald DO  Date of Injury/Surgery: 11/17/2018  Date last seen in office: 12/10/2020    Symptoms: unchanged  New complaints: patient is experiencing pain on her tailbone that makes her unable to sit up in the wheelchair. She states that she has some pain in the right hip and knee at times. Weightbearing: right lower Non-weight bearing      Assistive device No Device  Participating in therapy (location if yes)?  no    Refills Needed: None  Order/Referral Needed: N/A
noted age, affect euthymic. Extremity:  Right Lower Extremity  Skin clean dry and intact, without signs of infection  Incisions well approximated without signs of redness, warmth or drainage- sutures intact  Mild edema noted   Compartments supple throughout thigh and leg  Calf supple and nontender  Demonstrates active knee flexion/extension, ankle plantar/dorsiflexion/great toe extension. States sensation intact to touch in sural/deep peroneal/superficial peroneal/saphenous/posterior tibial nerve distributions to foot/ankle. Palpable dorsalis pedis and posterior tibialis pulses, cap refill brisk in toes, foot warm/perfused. No skin breakdown or signs of infection at the R leg and no TTP about the R leg or knee  Mild TTP about the anterior R hip   Patient presents on gourney today due to morbid obesity and limited ambulation at baseline and is impossible to turn her to assess skin breakdown. There is no susan lift in the office to transfer her to the bed. Temp 98.5 °F (36.9 °C) (Oral)     XR: R tib/fib, R hip and pelvis, R femur 7/15/21:      Right hip and femur: Anatomically aligned right hip arthroplasty. Subtrochanteric fracture at the right femur demonstrates substantial healing   which has progressed since the prior study.  No pelvic findings.  Intact   fixation plate extending from the proximal femoral shaft through the condyles   with a stable appearing substantially healed distal metaphyseal fracture. There is osteoarthritis at the right knee.       Right tib fib: 2 intact fixation plates at the tibia with substantially   healed underlying fractures and no appreciable change in appearance.  Healed   fibular fracture. Assessment:   Diagnosis Orders   1.  Closed nondisplaced spiral fracture of shaft of right tibia with nonunion  XR TIBIA FIBULA RIGHT (2 VIEWS)       Plan:   Reviewed x-rays with patient today in office    Patient nonambulatory at baseline   Continue susan for

## 2021-11-18 ENCOUNTER — TELEPHONE (OUTPATIENT)
Dept: NEUROLOGY | Age: 61
End: 2021-11-18

## 2021-11-18 ENCOUNTER — OFFICE VISIT (OUTPATIENT)
Dept: NEUROLOGY | Age: 61
End: 2021-11-18
Payer: MEDICARE

## 2021-11-18 VITALS
BODY MASS INDEX: 48.82 KG/M2 | OXYGEN SATURATION: 99 % | HEART RATE: 81 BPM | TEMPERATURE: 97.3 F | HEIGHT: 65 IN | WEIGHT: 293 LBS | SYSTOLIC BLOOD PRESSURE: 149 MMHG | DIASTOLIC BLOOD PRESSURE: 88 MMHG

## 2021-11-18 DIAGNOSIS — R13.10 DYSPHAGIA, UNSPECIFIED TYPE: ICD-10-CM

## 2021-11-18 DIAGNOSIS — G35 MULTIPLE SCLEROSIS (HCC): Primary | ICD-10-CM

## 2021-11-18 PROCEDURE — 1036F TOBACCO NON-USER: CPT | Performed by: NURSE PRACTITIONER

## 2021-11-18 PROCEDURE — 99204 OFFICE O/P NEW MOD 45 MIN: CPT | Performed by: NURSE PRACTITIONER

## 2021-11-18 PROCEDURE — G8417 CALC BMI ABV UP PARAM F/U: HCPCS | Performed by: NURSE PRACTITIONER

## 2021-11-18 PROCEDURE — G8427 DOCREV CUR MEDS BY ELIG CLIN: HCPCS | Performed by: NURSE PRACTITIONER

## 2021-11-18 PROCEDURE — G8484 FLU IMMUNIZE NO ADMIN: HCPCS | Performed by: NURSE PRACTITIONER

## 2021-11-18 PROCEDURE — 3017F COLORECTAL CA SCREEN DOC REV: CPT | Performed by: NURSE PRACTITIONER

## 2021-11-18 NOTE — TELEPHONE ENCOUNTER
Referral for MRI's C spine and Brain sent to [facility] open.   .Electronically signed by Holly Williamson MA on 11/18/21 at 2:20 PM EST

## 2021-11-18 NOTE — PROGRESS NOTES
at [facility] ([facility]  09/15/2015    LAPAROSCOPIC HIATAL HERNIA REPAIR WITH FUNDOPLICATION WITH MYOFASCIAL FLAP    TIBIA FRACTURE SURGERY Right     TONSILLECTOMY      TOTAL HIP ARTHROPLASTY Right 09/13/2016    Right Total Hip Arthroplasty    UPPER GASTROINTESTINAL ENDOSCOPY      UPPER GASTROINTESTINAL ENDOSCOPY N/A 5/18/2020    EGD BIOPSY performed by Florin Castellanos MD at [facility] ENDOSCOPY     Allergies:       Fentanyl and Ferritin    Medications:     Prior to Admission medications    Medication Sig Start Date End Date Taking? Authorizing Provider   pantoprazole (PROTONIX) 40 MG tablet Take 1 tablet by mouth 2 times daily for 10 days . 5/19/20 11/18/21 Yes Yohana Simpson MD   sucralfate (CARAFATE) 1 GM tablet Take 1 tablet by mouth 4 times daily . 5/19/20  Yes Yohana Simpson MD   levothyroxine (SYNTHROID) 200 MCG tablet Take 262.5 mcg by mouth once a week Indications: Sundays   Yes Historical Provider, MD   polyethylene glycol (GLYCOLAX) packet Take 17 g by mouth daily as needed for Constipation   Yes Historical Provider, MD   calcium-vitamin D (OSCAL-500) 500-200 MG-UNIT per tablet Take 1 tablet by mouth 2 times daily   Yes Historical Provider, MD   clonazePAM (KLONOPIN) 1 MG tablet Take 1 mg by mouth nightly.     Yes Historical Provider, MD   levothyroxine (SYNTHROID) 175 MCG tablet Take 175 mcg by mouth Six times weekly Monday through saturday   Yes Historical Provider, MD   traZODone (DESYREL) 50 MG tablet Take 50 mg by mouth nightly   Yes Historical Provider, MD   cyclobenzaprine (FLEXERIL) 10 MG tablet Take 10 mg by mouth three times daily    Yes Historical Provider, MD   DULoxetine (CYMBALTA) 60 MG extended release capsule Take 60 mg by mouth 2 times daily   Yes Historical Provider, MD   Rectal Cleansers (FLEET [employer]UR[employer] CLEANSING ENEMA RE) Place 1 Dose rectally daily as needed   Yes Historical Provider, MD   senna (SENOKOT) 8.6 MG tablet Take 2 tablets by mouth nightly    Yes Historical Provider, MD   SUMAtriptan (IMITREX) 100 MG tablet Take 100 mg by mouth once as needed for Migraine   Yes Historical Provider, MD   gabapentin (NEURONTIN) 300 MG capsule Take 3 capsules by mouth nightly  Patient taking differently: Take 300 mg by mouth every morning. . 6/2/16  Yes Sudha Broussard,    acetaminophen (TYLENOL) 500 MG tablet Take 650 mg by mouth 2 times daily    Yes Historical Provider, MD     Social History:       She reports that she quit smoking about 17 years ago. Her smoking use included cigarettes. She started smoking about 44 years ago. She smoked 0.25 packs per day. She has never used smokeless tobacco. She reports current alcohol use. She reports that she does not use drugs. Review of Systems:     + difficulty swallowing  No chest pain or palpitations  No SOB  + incontinence of bowels and bladder  + numbness and tingling   + right side weakness    ROS is otherwise negative    Family History:     Family History   Problem Relation Age of Onset    Cancer Mother     Dementia Mother     Obesity Father     Diabetes Father     High Blood Pressure Father     Stroke Father     Lupus Sister     High Blood Pressure Sister     Arthritis Sister     Diabetes Sister         History of Present Illness:     Patient referred for further management of history of MS. She was a longtime patient of Dr. Benedict Black in [place]. Previous to that she was seeing Dr. Ana Franco at the [place] [facility]. She was diagnosed with MS back in March 16, 2000. For the past 19 years she was on Avelox and steroids. She is no longer on DMT therapy. She believes her last MS exacerbation was 6 weeks ago when her vision became blurry and her right side became numb and tingly. She also had some issues with numbness to the right side of her tongue but she did not seek medical treatment and her symptoms did resolve. Patient has been in a wheelchair for the past 5 years.   She is able to move her bilateral upper extremities but her right arm is weaker than her left. She is unable to lift or move her right leg but she is able to lift and move her left leg. She notes no vision changes since being diagnosed with MS and no red desaturation. She does have bowel/bladder incontinence due to her MS. She has seen urology in the past along with having a bladder sling but still notes incontinence at times. She can tell when she has to use bathroom she just has no control with holding it in. She lives with her son and daughter-in-law, her daughter-in-law is her caregiver. Her last MRI brain was back in April 2017 which was noted to have several MS lesions bilaterally but no active lesions. She cannot remember the last time she had any MRIs of her spine, but does note that she does have MS lesions in her cervical spine. She also has complaints of difficulty swallowing. She has not really noticed with types of textures that she has difficulty swallowing. She has had a couple coughing bouts after eating, with concerns from her son.     Objective:     BP (!) 149/88 (Site: Right Upper Arm)   Pulse 81   Temp 97.3 °F (36.3 °C)   Ht 5' 5\" (1.651 m)   Wt (!) 340 lb (154.2 kg)   SpO2 99%   BMI 56.58 kg/m²     General appearance: alert, appears stated age, cooperative and in no distress, laying in scooter  Head: normocephalic, without obvious abnormality, atraumatic  Eyes: conjunctivae/corneas clear; no drainage  Neck: supple, symmetrical, trachea midline   Lungs: Diminished to auscultation bilaterally  Heart: regular rate and rhythm  Abdomen: Obese, bowel sounds hypoactive  Skin:  color, texture, turgor normal--no rashes or lesions      Mental Status: alert and oriented x 4    Appropriate attention/concentration  Intact fundus of knowledge      Speech: no dysarthria  Language: no aphasias    Cranial Nerves:  I: smell    II: visual acuity  No red desaturation   II: visual fields Full    II: pupils SHIRA   III,VII: ptosis None III,IV,VI: extraocular muscles  EOMI without nystagmus   V: mastication Normal   V: facial light touch sensation  Normal   V,VII: corneal reflex     VII: facial muscle function - upper  Normal   VII: facial muscle function - lower Normal   VIII: hearing Normal   IX: soft palate elevation  Normal   IX,X: gag reflex    XI: trapezius strength  5/5   XI: sternocleidomastoid strength 5/5   XI: neck extension strength  5/5   XII: tongue strength  Normal     Motor:  4/5 RUE strength, 5/5 strength LUE strength, 1/5 strength RLE, 4/5 strength LLE   Obese bulk and tone  No drift to BUE  No abnormal movements    Sensory:  LT normal  Vibration normal throughout except diminished at right ankle    Coordination:   FN, FFM and NAKIA normal  HS unable to perform    DTR:   BE throughout    Laboratory/Radiology:  ry/Radiology:     No recent labs or imaging studies to review at this time    Assessment:     History of MS > 20 years  --- Not currently on DMT therapy  --- Was on Avonex and steroids  --- EDSS 8.5--- in wheelchair/scooter bound for most of the day and is cared for  --- Incontinent of bladder/bowel  --- Right side weakness > left side  --- We will obtain medical records from previous neurologist  --- Recent MS exacerbation versus stroke will need to rule out with MRI brain    Dysphagia related to MS  --- Difficulty swallowing  --- Need to check with swallow study    Plan:     MRI brain with/without contrast    MRI of C-spine with and without contrast    Swallow study ordered    CMP and CBC with differential    Obtain records from previous neurologist    Follow-up in 3 months    Call with any questions or concerns      ERICA Young CNP  1:19 PM  11/18/2021    I spent 45 minutes with this patient obtaining the HPI and discussing the exam with greater than 50% of the time providing counseling and education on medications and other treatment plans. All questions were answered prior to leaving my office.

## 2021-11-23 ENCOUNTER — TELEPHONE (OUTPATIENT)
Dept: NEUROLOGY | Age: 61
End: 2021-11-23

## 2021-11-23 NOTE — TELEPHONE ENCOUNTER
Patient scheduled for MRI Cervical spine on 12/2/21 at 1:15pm [place] open [facility].   Electronically signed by Giovanna Virk MA on 11/23/21 at 8:50 AM EST

## 2021-12-13 ENCOUNTER — TELEPHONE (OUTPATIENT)
Dept: NEUROLOGY | Age: 61
End: 2021-12-13

## 2021-12-13 DIAGNOSIS — G35 MULTIPLE SCLEROSIS (HCC): ICD-10-CM

## 2021-12-13 NOTE — TELEPHONE ENCOUNTER
Patient notified of Terra's response.   Electronically signed by Rk Hurley MA on 12/13/21 at 11:40 AM EST

## 2021-12-13 NOTE — RESULT ENCOUNTER NOTE
Let patient know her MRI reports no enhancing MS lesions but I would still like to see them myself if she can provide the MRI disc for me to review.   Thanks

## 2022-02-02 ENCOUNTER — TELEPHONE (OUTPATIENT)
Dept: ADMINISTRATIVE | Age: 62
End: 2022-02-02

## 2022-03-03 ENCOUNTER — APPOINTMENT (OUTPATIENT)
Dept: MRI IMAGING | Age: 62
End: 2022-03-03
Payer: MEDICAID

## 2022-03-03 ENCOUNTER — HOSPITAL ENCOUNTER (OUTPATIENT)
Dept: GENERAL RADIOLOGY | Age: 62
Discharge: HOME | End: 2022-03-05
Payer: MEDICAID

## 2022-03-03 DIAGNOSIS — R13.10 DYSPHAGIA, UNSPECIFIED TYPE: ICD-10-CM

## 2022-03-03 PROCEDURE — 92526 ORAL FUNCTION THERAPY: CPT

## 2022-03-03 PROCEDURE — 74230 X-RAY XM SWLNG FUNCJ C+: CPT

## 2022-03-03 PROCEDURE — 92611 MOTION FLUOROSCOPY/SWALLOW: CPT

## 2022-03-03 NOTE — PROGRESS NOTES
SPEECH/LANGUAGE PATHOLOGY  VIDEOFLUOROSCOPIC STUDY OF SWALLOWING (MBS)   and PLAN OF CARE    PATIENT NAME:  Camacho Helms  (female)     MRN:  [id number]    :  1960  (57 y.o.)  STATUS:  Outpatient    TODAY'S DATE:  3/3/2022  REFERRING PROVIDER:   Nery QUINTERO   SPECIFIC PROVIDER ORDER: FL modified barium swallow with video  Date of order:  22   REASON FOR REFERRAL: dysphagia due to MS   EVALUATING THERAPIST: Melodie Morin SLP      RESULTS:      DYSPHAGIA DIAGNOSIS:  normal swallow function     DIET RECOMMENDATIONS:  Easy to chew consistency solids (IDDSI level 7, transitional) with  thin liquids (IDDSI level 0)    FEEDING RECOMMENDATIONS:    Assistance level:  No assistance needed     Compensatory strategies recommended: No strategies are recommended at this time     Discussed recommendations with nursing and/or faxed report to referring provider: Yes    SPEECH THERAPY  PLAN OF CARE   The dysphagia POC is established based on physician order and dysphagia diagnosis    Dysphagia therapy is not recommended       Conditions Requiring Skilled Therapeutic Intervention for dysphagia:    not applicable    SPECIFIC DYSPHAGIA INTERVENTIONS TO INCLUDE:     Not applicable    Specific instructions for next treatment:  not applicable   Treatment Goals:    Short Term Goals:  Not applicable no therapy warranted     Long Term Goals:   Not applicable no therapy warranted      Patient/family Goal:    not applicable                    ADMITTING DIAGNOSIS: Dysphagia, unspecified type [R13.10]     VISIT DIAGNOSIS:   Visit Diagnoses       Codes    Dysphagia, unspecified type     R13.10              PATIENT REPORT/COMPLAINT: occasional cough    PRIOR LEVEL OF SWALLOW FUNCTION:    Past History of Dysphagia?:  none reported    Home diet: Regular consistency solids (IDDSI level 7) with  thin liquids (IDDSI level 0)    PROCEDURE:  Consistencies Administered During the Evaluation   Liquids: thin liquid and nectar thick liquid   Solids:  pureed foods and solid foods      Method of Intake:   cup, straw, spoon  Self fed, Fed by clinician      Position:   Seated, upright, Lateral plane    INSTRUMENTAL ASSESSMENT:    ORAL PREPARATION PHASE:      Oral dysphagia secondary to dentition    ORAL PHASE:   Within functional limits    PHARYNGEAL PHASE:     ONSET TIME       Onset time of the pharyngeal swallow was adequate       PHARYNGEAL RESIDUALS        Vallecula/Pharyngeal Wall           No significant residuals were noted in the vallecula      Pyriform Sinuses      No significant residuals were noted in the pyriform sinuses     LARYNGEAL PENETRATION   Laryngeal penetration was not present during this evaluation    ASPIRATION  Aspiration was not present during this evaluation    PENETRATION-ASPIRATION SCALE (PAS):  THIN 1 = Material does not enter the airway  MILDLY THICK 1 = Material does not enter the airway  MODERATELY THICK item not administered  PUREE 1 = Material does not enter the airway  HARD SOLID 1 = Material does not enter the airway       COMPENSATORY STRATEGIES    Compensatory strategies were not attempted      STRUCTURAL/FUNCTIONAL ANOMALIES   No structural/functional anomalies were noted    CERVICAL ESOPHAGEAL STAGE :     The cervical esophagus appeared adequate          ___________    Cognition:   Within functional limits for this exam    Oral Peripheral Examination   Generalized oral weakness    Current Respiratory Status   room air     Parameters of Speech Production  Respiration:  Adequate for speech production  Quality:   Within functional limits  Intensity: Within functional limits    Pain: No pain reported. EDUCATION:   The Speech Language Pathologist (SLP) completed education regarding results of evaluation and that intervention is not warranted at this time.   Learner: Patient  Education: Reviewed results and recommendations of this evaluation and Reviewed diet and strategies  Evaluation of Education: Verbalizes understanding    This plan may be re-evaluated and revised as warranted. Evaluation Time includes thorough review of current medical information, gathering information on past medical history/social history and prior level of function, completion of standardized testing/informal observation of tasks, assessment of data and education on plan of care and goals. [x]The admitting diagnosis and active problem list, have been reviewed prior to initiation of this evaluation.     CPT Code: 63948  dysphagia study    ACTIVE PROBLEM LIST:   Patient Active Problem List   Diagnosis    MS (multiple sclerosis) ([facility] [address]    History of pulmonary embolus (PE)    Acquired hypothyroidism    Closed fracture of distal end of right femur with nonunion    Morbid obesity with BMI of 50.0-59.9, adult ([facility] [address]    Acute blood loss as cause of postoperative anemia    Multiple sclerosis (HCC)    Closed nondisplaced spiral fracture of shaft of right tibia with nonunion    Closed nondisplaced spiral fracture of shaft of right fibula with routine healing    Chest pain    Upper GI bleed    Esophageal ulcer

## 2022-03-17 ENCOUNTER — HOSPITAL ENCOUNTER (EMERGENCY)
Age: 62
Discharge: HOME | End: 2022-03-18
Attending: STUDENT IN AN ORGANIZED HEALTH CARE EDUCATION/TRAINING PROGRAM
Payer: MEDICAID

## 2022-03-17 ENCOUNTER — APPOINTMENT (OUTPATIENT)
Dept: CT IMAGING | Age: 62
End: 2022-03-17
Payer: MEDICAID

## 2022-03-17 ENCOUNTER — APPOINTMENT (OUTPATIENT)
Dept: GENERAL RADIOLOGY | Age: 62
End: 2022-03-17
Payer: MEDICAID

## 2022-03-17 DIAGNOSIS — K92.0 HEMATEMESIS WITH NAUSEA: Primary | ICD-10-CM

## 2022-03-17 DIAGNOSIS — R10.13 EPIGASTRIC PAIN: ICD-10-CM

## 2022-03-17 DIAGNOSIS — K44.9 HIATAL HERNIA: ICD-10-CM

## 2022-03-17 DIAGNOSIS — D64.9 ANEMIA, UNSPECIFIED TYPE: ICD-10-CM

## 2022-03-17 DIAGNOSIS — R91.8 LUNG NODULES: ICD-10-CM

## 2022-03-17 DIAGNOSIS — R06.02 SHORTNESS OF BREATH: ICD-10-CM

## 2022-03-17 DIAGNOSIS — Z87.81 HISTORY OF COMPRESSION FRACTURE OF SPINE: ICD-10-CM

## 2022-03-17 LAB
ALBUMIN SERPL-MCNC: 3.5 G/DL (ref 3.5–5.2)
ALP BLD-CCNC: 109 U/L (ref 35–104)
ALT SERPL-CCNC: 28 U/L (ref 0–32)
ANION GAP SERPL CALCULATED.3IONS-SCNC: 11 MMOL/L (ref 7–16)
AST SERPL-CCNC: 30 U/L (ref 0–31)
BASOPHILS ABSOLUTE: 0.07 E9/L (ref 0–0.2)
BASOPHILS RELATIVE PERCENT: 1.5 % (ref 0–2)
BILIRUB SERPL-MCNC: 0.3 MG/DL (ref 0–1.2)
BUN BLDV-MCNC: 9 MG/DL (ref 6–23)
CALCIUM SERPL-MCNC: 9.4 MG/DL (ref 8.6–10.2)
CHLORIDE BLD-SCNC: 102 MMOL/L (ref 98–107)
CO2: 25 MMOL/L (ref 22–29)
CREAT SERPL-MCNC: 0.7 MG/DL (ref 0.5–1)
EOSINOPHILS ABSOLUTE: 0.09 E9/L (ref 0.05–0.5)
EOSINOPHILS RELATIVE PERCENT: 1.9 % (ref 0–6)
GFR AFRICAN AMERICAN: >60
GFR NON-AFRICAN AMERICAN: >60 ML/MIN/1.73
GLUCOSE BLD-MCNC: 101 MG/DL (ref 74–99)
HCT VFR BLD CALC: 33.5 % (ref 34–48)
HEMOGLOBIN: 10.5 G/DL (ref 11.5–15.5)
IMMATURE GRANULOCYTES #: 0.02 E9/L
IMMATURE GRANULOCYTES %: 0.4 % (ref 0–5)
LACTIC ACID: 1.1 MMOL/L (ref 0.5–2.2)
LIPASE: 44 U/L (ref 13–60)
LYMPHOCYTES ABSOLUTE: 1.19 E9/L (ref 1.5–4)
LYMPHOCYTES RELATIVE PERCENT: 25.1 % (ref 20–42)
MCH RBC QN AUTO: 26.4 PG (ref 26–35)
MCHC RBC AUTO-ENTMCNC: 31.3 % (ref 32–34.5)
MCV RBC AUTO: 84.4 FL (ref 80–99.9)
MONOCYTES ABSOLUTE: 0.53 E9/L (ref 0.1–0.95)
MONOCYTES RELATIVE PERCENT: 11.2 % (ref 2–12)
NEUTROPHILS ABSOLUTE: 2.85 E9/L (ref 1.8–7.3)
NEUTROPHILS RELATIVE PERCENT: 59.9 % (ref 43–80)
PDW BLD-RTO: 15.9 FL (ref 11.5–15)
PLATELET # BLD: 321 E9/L (ref 130–450)
PMV BLD AUTO: 9.3 FL (ref 7–12)
POTASSIUM REFLEX MAGNESIUM: 4.2 MMOL/L (ref 3.5–5)
PRO-BNP: 143 PG/ML (ref 0–125)
RBC # BLD: 3.97 E12/L (ref 3.5–5.5)
SODIUM BLD-SCNC: 138 MMOL/L (ref 132–146)
TOTAL PROTEIN: 7.9 G/DL (ref 6.4–8.3)
TROPONIN, HIGH SENSITIVITY: 7 NG/L (ref 0–9)
TROPONIN, HIGH SENSITIVITY: 7 NG/L (ref 0–9)
WBC # BLD: 4.8 E9/L (ref 4.5–11.5)

## 2022-03-17 PROCEDURE — 85025 COMPLETE CBC W/AUTO DIFF WBC: CPT

## 2022-03-17 PROCEDURE — 6370000000 HC RX 637 (ALT 250 FOR IP): Performed by: PHYSICIAN ASSISTANT

## 2022-03-17 PROCEDURE — 6360000002 HC RX W HCPCS: Performed by: PHYSICIAN ASSISTANT

## 2022-03-17 PROCEDURE — C9113 INJ PANTOPRAZOLE SODIUM, VIA: HCPCS | Performed by: PHYSICIAN ASSISTANT

## 2022-03-17 PROCEDURE — 96374 THER/PROPH/DIAG INJ IV PUSH: CPT

## 2022-03-17 PROCEDURE — 36415 COLL VENOUS BLD VENIPUNCTURE: CPT

## 2022-03-17 PROCEDURE — 71275 CT ANGIOGRAPHY CHEST: CPT

## 2022-03-17 PROCEDURE — 74177 CT ABD & PELVIS W/CONTRAST: CPT

## 2022-03-17 PROCEDURE — 80053 COMPREHEN METABOLIC PANEL: CPT

## 2022-03-17 PROCEDURE — 6360000004 HC RX CONTRAST MEDICATION: Performed by: RADIOLOGY

## 2022-03-17 PROCEDURE — 83880 ASSAY OF NATRIURETIC PEPTIDE: CPT

## 2022-03-17 PROCEDURE — 84484 ASSAY OF TROPONIN QUANT: CPT

## 2022-03-17 PROCEDURE — 96375 TX/PRO/DX INJ NEW DRUG ADDON: CPT

## 2022-03-17 PROCEDURE — 96376 TX/PRO/DX INJ SAME DRUG ADON: CPT

## 2022-03-17 PROCEDURE — 83690 ASSAY OF LIPASE: CPT

## 2022-03-17 PROCEDURE — 83605 ASSAY OF LACTIC ACID: CPT

## 2022-03-17 PROCEDURE — 2580000003 HC RX 258: Performed by: RADIOLOGY

## 2022-03-17 PROCEDURE — 71045 X-RAY EXAM CHEST 1 VIEW: CPT

## 2022-03-17 PROCEDURE — 2580000003 HC RX 258: Performed by: PHYSICIAN ASSISTANT

## 2022-03-17 PROCEDURE — 99284 EMERGENCY DEPT VISIT MOD MDM: CPT

## 2022-03-17 PROCEDURE — 93005 ELECTROCARDIOGRAM TRACING: CPT | Performed by: PHYSICIAN ASSISTANT

## 2022-03-17 RX ORDER — PANTOPRAZOLE SODIUM 40 MG/10ML
40 INJECTION, POWDER, LYOPHILIZED, FOR SOLUTION INTRAVENOUS ONCE
Status: COMPLETED | OUTPATIENT
Start: 2022-03-17 | End: 2022-03-17

## 2022-03-17 RX ORDER — SUCRALFATE 1 G/1
1 TABLET ORAL 4 TIMES DAILY
Qty: 28 TABLET | Refills: 0 | Status: SHIPPED | OUTPATIENT
Start: 2022-03-17 | End: 2022-08-17 | Stop reason: ALTCHOICE

## 2022-03-17 RX ORDER — 0.9 % SODIUM CHLORIDE 0.9 %
250 INTRAVENOUS SOLUTION INTRAVENOUS ONCE
Status: COMPLETED | OUTPATIENT
Start: 2022-03-17 | End: 2022-03-17

## 2022-03-17 RX ORDER — SODIUM CHLORIDE 0.9 % (FLUSH) 0.9 %
10 SYRINGE (ML) INJECTION PRN
Status: COMPLETED | OUTPATIENT
Start: 2022-03-17 | End: 2022-03-17

## 2022-03-17 RX ORDER — SUCRALFATE 1 G/1
1 TABLET ORAL ONCE
Status: COMPLETED | OUTPATIENT
Start: 2022-03-17 | End: 2022-03-17

## 2022-03-17 RX ADMIN — IOPAMIDOL 75 ML: 755 INJECTION, SOLUTION INTRAVENOUS at 16:58

## 2022-03-17 RX ADMIN — SODIUM CHLORIDE 250 ML: 9 INJECTION, SOLUTION INTRAVENOUS at 15:24

## 2022-03-17 RX ADMIN — SODIUM CHLORIDE, PRESERVATIVE FREE 10 ML: 5 INJECTION INTRAVENOUS at 16:53

## 2022-03-17 RX ADMIN — LIDOCAINE HYDROCHLORIDE: 20 SOLUTION ORAL; TOPICAL at 18:01

## 2022-03-17 RX ADMIN — SUCRALFATE 1 G: 1 TABLET ORAL at 19:52

## 2022-03-17 RX ADMIN — PANTOPRAZOLE SODIUM 40 MG: 40 INJECTION, POWDER, FOR SOLUTION INTRAVENOUS at 15:25

## 2022-03-17 NOTE — ED PROVIDER NOTES
ED Attending shared visit  [facility]  [facility] of Emergency Medicine   ED  Encounter Note  Admit Date/RoomTime: 3/17/2022  2:33 PM  ED Room: [facility] [place] [address]    NAME: Addie Tavares  : 1960  MRN: [id number]     Chief Complaint:  Nausea and Hematemesis (hx of ulcers)    HISTORY OF PRESENT ILLNESS        Addie Tavares is a 57 y.o. female who presents to the ED by private vehicle for nausea, SOB, epigastric burning, and hematemesis. Pt notes that last night she started having some epigastric burning that radiates into her entire abdomen. She states that this morning she started throwing up and noticed some blood in her vomit. She states that at the most she noticed about 1/4 cup of blood in her vomit. She is not on any blood thinners. She notes that she has had this happen before with a history of an epigastric ulcer. She states that she is supposed to take Protonix and follows with Dr. Jonathan Sparks but was unable to take the Protonix this morning due to the vomiting. She also notes that she is having some shortness of breath that is positional and she notices it when she lays down which began a couple days ago. She does note a history of a PE but states she is no longer on a blood thinner. The complaint has been remaining constant and are moderate in severity. The epigastric burning has been constant since yesterday as well as the SOB. Nothing aggravates the symptoms and nothing alleviates the symptoms. She notes that the burning radiates into her entire abdomen. Patient denies history of CHF or COPD. She states she only drinks 1x/month. She denies recent travel, surgery, calf pain, hormone replacement therapy, or personal history of cancer. She does note BLE swelling but states she gets this occasionally. Pt is wheelchair bound due to [address]  She denies fever, chills, cough, hemoptysis, dizziness, lightheadedness, chest pain, diarrhea, constipation, blood in stool, black stool, dysuria, hematuria, urinary frequency, or urinary urgency. ROS   Pertinent positives and negatives are stated within HPI, all other systems reviewed and are negative. Past Medical History:  has a past medical history of Acquired hypothyroidism, Acute blood loss as cause of postoperative anemia, Anemia, Anticoagulant long-term use, Anxiety, Arthritis, Blood transfusion, Chronic back pain, Depression, GERD (gastroesophageal reflux disease), Hx of blood clots, Lymphedema of lower extremity, Migraine, Multiple sclerosis ([address] Obesity, Osteoarthritis, PE (pulmonary thromboembolism) ([address] Peripheral vascular disease ([address] Prominent abdominal aortic pulse, Pulmonary embolism (HCC), Swelling of lower limb, Thyroid disease, Urinary incontinence, and Varicose veins of lower extremities. Surgical History:  has a past surgical history that includes Bladder surgery; hernia repair; Dental surgery; Tonsillectomy; Dilation and curettage of uterus; other surgical history (09/15/2015); Tibia fracture surgery (Right); fracture surgery; Upper gastrointestinal endoscopy; Colonoscopy; Endoscopy, colon, diagnostic; Total hip arthroplasty (Right, 09/13/2016); open treatment fracture distal tibia & fibula (Right, 11/16/2018); and Upper gastrointestinal endoscopy (N/A, 5/18/2020). Social History:  reports that she quit smoking about 17 years ago. Her smoking use included cigarettes. She started smoking about 44 years ago. She smoked 0.25 packs per day. She has never used smokeless tobacco. She reports current alcohol use. She reports that she does not use drugs. Family History: family history includes Arthritis in her sister; Cancer in her mother; Dementia in her mother; Diabetes in her father and sister; High Blood Pressure in her father and sister; Lupus in her sister; Obesity in her father; Stroke in her father.      Allergies: Fentanyl and Ferritin    PHYSICAL EXAM   Oxygen Saturation Interpretation: Normal on room air analysis. ED Triage Vitals   BP Temp Temp Source Pulse Resp SpO2 Height Weight   03/17/22 1442 03/17/22 1442 03/17/22 1442 03/17/22 1442 03/17/22 1442 03/17/22 1442 03/17/22 1502 03/17/22 1502   116/84 98.1 °F (36.7 °C) Oral 75 16 98 % 5' 5\" (1.651 m) (!) 340 lb (154.2 kg)       Vitals:    03/17/22 1442 03/17/22 1502 03/17/22 1946   BP: 116/84  130/63   Pulse: 75  73   Resp: 16  16   Temp: 98.1 °F (36.7 °C)     TempSrc: Oral     SpO2: 98%  97%   Weight:  (!) 340 lb (154.2 kg)    Height:  5' 5\" (1.651 m)      Physical Exam  Constitutional/General: Alert and oriented x3, well appearing, non toxic. HEENT:  NC/NT. PERRLA,  Airway patent. Neck: Supple, full ROM, non tender to palpation in the midline, no stridor, no crepitus, no meningeal signs  Respiratory: Lungs clear to auscultation bilaterally, no wheezes, rales, or rhonchi. Not in respiratory distress  CV:  Regular rate. Regular rhythm. No murmurs, gallops, or rubs. 2+ distal pulses  Chest: No chest wall tenderness  GI: Epigastric tenderness to palpation; abdomen Soft, Non distended. +BS. No rebound tenderness, guarding, or rigidity. No pulsatile masses. No CVA tenderness bilaterally  Musculoskeletal: Moves all extremities x 4. Warm and well perfused, no clubbing, cyanosis, or edema. Capillary refill <3 seconds; no calf TTP bilaterally; mild swelling to BLE; no redness, increased warmth, or rash/wounds  Integument: skin warm and dry. No rashes.    Lymphatic: no lymphadenopathy noted  Neurologic: GCS 15, no focal deficits, symmetric strength 5/5 in the upper and lower extremities bilaterally  Psychiatric: Normal Affect    Lab / Imaging Results   (All laboratory and radiology results have been personally reviewed by myself)  Labs:  Results for orders placed or performed during the hospital encounter of 03/17/22   Troponin   Result Value Ref Range    Troponin, High Sensitivity 7 0 - 9 ng/L   CBC with Auto Differential   Result Value Ref Range    WBC 4.8 4.5 - 11.5 E9/L    RBC 3.97 3.50 - 5.50 E12/L    Hemoglobin 10.5 (L) 11.5 - 15.5 g/dL    Hematocrit 33.5 (L) 34.0 - 48.0 %    MCV 84.4 80.0 - 99.9 fL    MCH 26.4 26.0 - 35.0 pg    MCHC 31.3 (L) 32.0 - 34.5 %    RDW 15.9 (H) 11.5 - 15.0 fL    Platelets 016 737 - 680 E9/L    MPV 9.3 7.0 - 12.0 fL    Neutrophils % 59.9 43.0 - 80.0 %    Immature Granulocytes % 0.4 0.0 - 5.0 %    Lymphocytes % 25.1 20.0 - 42.0 %    Monocytes % 11.2 2.0 - 12.0 %    Eosinophils % 1.9 0.0 - 6.0 %    Basophils % 1.5 0.0 - 2.0 %    Neutrophils Absolute 2.85 1.80 - 7.30 E9/L    Immature Granulocytes # 0.02 E9/L    Lymphocytes Absolute 1.19 (L) 1.50 - 4.00 E9/L    Monocytes Absolute 0.53 0.10 - 0.95 E9/L    Eosinophils Absolute 0.09 0.05 - 0.50 E9/L    Basophils Absolute 0.07 0.00 - 0.20 E9/L   Comprehensive Metabolic Panel w/ Reflex to MG   Result Value Ref Range    Sodium 138 132 - 146 mmol/L    Potassium reflex Magnesium 4.2 3.5 - 5.0 mmol/L    Chloride 102 98 - 107 mmol/L    CO2 25 22 - 29 mmol/L    Anion Gap 11 7 - 16 mmol/L    Glucose 101 (H) 74 - 99 mg/dL    BUN 9 6 - 23 mg/dL    CREATININE 0.7 0.5 - 1.0 mg/dL    GFR Non-African American >60 >=60 mL/min/1.73    GFR African American >60     Calcium 9.4 8.6 - 10.2 mg/dL    Total Protein 7.9 6.4 - 8.3 g/dL    Albumin 3.5 3.5 - 5.2 g/dL    Total Bilirubin 0.3 0.0 - 1.2 mg/dL    Alkaline Phosphatase 109 (H) 35 - 104 U/L    ALT 28 0 - 32 U/L    AST 30 0 - 31 U/L   Lipase   Result Value Ref Range    Lipase 44 13 - 60 U/L   Lactic Acid   Result Value Ref Range    Lactic Acid 1.1 0.5 - 2.2 mmol/L   Brain Natriuretic Peptide   Result Value Ref Range    Pro- (H) 0 - 125 pg/mL   Troponin   Result Value Ref Range    Troponin, High Sensitivity 7 0 - 9 ng/L   EKG 12 Lead   Result Value Ref Range    Ventricular Rate 79 BPM    Atrial Rate 79 BPM    P-R Interval 180 ms    QRS Duration 98 ms    Q-T Interval 420 ms    QTc Calculation (Bazett) 481 ms    P Axis 76 degrees    R Axis 3 degrees    T Axis 65 degrees     Imaging: All Radiology results interpreted by Radiologist unless otherwise noted. CTA PULMONARY W CONTRAST   Final Result   CTA OF THE CHEST:      1. No pulmonary embolism. 2.  Two somewhat ill-defined nodular opacities in the left lower lobe, with   the larger measuring proximally 2.0 x 0.7 cm. An infectious/inflammatory   etiology is favored. Malignancy cannot be excluded. Per the recommendations   of the Fleischner society, follow-up CT of the chest is recommended in 3-6   months. 3. Moderate sized hiatal hernia. CT OF THE ABDOMEN AND PELVIS:      1.  No acute abnormality is seen in the abdomen or the pelvis. 2. Chronic compression fracture deformity of the L1 vertebral body. RECOMMENDATIONS:   Unavailable         CT ABDOMEN PELVIS W IV CONTRAST Additional Contrast? None   Final Result   CTA OF THE CHEST:      1. No pulmonary embolism. 2.  Two somewhat ill-defined nodular opacities in the left lower lobe, with   the larger measuring proximally 2.0 x 0.7 cm. An infectious/inflammatory   etiology is favored. Malignancy cannot be excluded. Per the recommendations   of the Fleischner society, follow-up CT of the chest is recommended in 3-6   months. 3. Moderate sized hiatal hernia. CT OF THE ABDOMEN AND PELVIS:      1.  No acute abnormality is seen in the abdomen or the pelvis. 2. Chronic compression fracture deformity of the L1 vertebral body. RECOMMENDATIONS:   Unavailable         XR CHEST PORTABLE   Final Result   No acute process. EKG: This EKG is signed and interpreted by Dr. Kameron Kurtz.     Rate: 1609  Rhythm: Sinus  Interpretation: non-specific EKG  Comparison: stable as compared to patient's most recent EKG    ED Course / Medical Decision Making     Medications   trimethobenzamide (TIGAN) injection 200 mg (200 mg IntraMUSCular Not Given 3/17/22 1804)   pantoprazole (PROTONIX) injection 40 mg (40 mg IntraVENous Given 3/17/22 1525)   0.9 % sodium chloride bolus (0 mLs IntraVENous Stopped 3/17/22 1755)   aluminum & magnesium hydroxide-simethicone (MAALOX) 30 mL, lidocaine viscous hcl (XYLOCAINE) 5 mL (GI COCKTAIL) ( Oral Given 3/17/22 1801)   iopamidol (ISOVUE-370) 76 % injection 75 mL (75 mLs IntraVENous Given 3/17/22 1658)   sodium chloride flush 0.9 % injection 10 mL (10 mLs IntraVENous Given 3/17/22 1653)   sucralfate (CARAFATE) tablet 1 g (1 g Oral Given 3/17/22 1952)        Re-Evaluations:  3/17/22      Time: 0303    Patients condition is improving after treatment. Patient re-evaluated prior to discharge. Non-surgical abdomen upon re-examination. No guarding, rigidity, or rebound tenderness. Pt given fluids and had worsening pain. Ordered carafate. Time: 2035  Pt said the carafate helped with pain some. Will give her fluids again. Time: prior to discharge  Pt tolerating PO fluids. Said she feels good enough to go home. Patient re-evaluated prior to discharge. Non-surgical abdomen upon re-examination. No guarding, rigidity, or rebound tenderness. Consultations:             Reema Woods spoke with Dr. Stevie Mueller. He states that 10.5 is pt's baseline Hgb. He states if she is doing better and no black/bloody stools then she can be discharged with antiemetic to follow-up in his office. Procedures:   none    MDM: Patient presents for epigastric pain and burning and hematemesis. Symptoms started last night with vomiting this morning. She has not vomited since noon today. She has not vomited in the ER. Also notes some shortness of breath for couple days when she lays down. Notes some bilateral lower extremity swelling, but has lymphedema per her PMH. She has a history of a PE. She also has a history of an epigastric ulcer/GI bleed. Not on any blood thinners. Vitals within normal limits. Patient given Protonix, GI cocktail, and Tigan due to QTc of 481 ms. Labs and imaging ordered and reviewed above.  No PE. No acute process on imaging. No acute process on labs. UA ordered but never sent prior to discharge. Pt not having any urinary sxs/complaints. Abdominal pain is epigastric. Dr. Mario Davis spoke with her PCP who said that the hemoglobin of 10.5 is her baseline and that if she is feeling better she can follow-up on an outpatient basis. No acute process on labs or imaging. She has not thrown up while in the ER. Tolerating p.o. fluids. Improvement in pain after the Carafate. She states she no longer has Carafate for the past month. We will send her home with Carafate and Tigan. Follow-up with Dr. Rebecca Beth and PCP. Vitals WNL. All questions answered. Patient agreeable with the plan. Patient is in no acute distress, non-toxic, and appropriate for outpatient management. Pt educated on reasons to return to the ER, including need to return for new or worsening symptoms. Patient re-evaluated prior to discharge. Non-surgical abdomen upon re-examination. No guarding, rigidity, or rebound tenderness. Plan of Care/Counseling:  DEVORAH MCCABE PA-C and EM Attending Physician reviewed today's visit with the patient in addition to providing specific details for the plan of care and counseling regarding the diagnosis and prognosis. Questions are answered at this time and are agreeable with the plan. ASSESSMENT     1. Hematemesis with nausea    2. Epigastric pain    3. Shortness of breath    4. Lung nodules    5. Anemia, unspecified type    6. Hiatal hernia    7. History of compression fracture of spine      This patient's ED course included: a personal history and physicial examination, re-evaluation prior to disposition, multiple bedside re-evaluations, IV medications and complex medical decision making and emergency management  This patient has remained hemodynamically stable and improved during their ED course. PLAN   Discharged home. Patient condition is stable.     New Medications     New Prescriptions SUCRALFATE (CARAFATE) 1 GM TABLET    Take 1 tablet by mouth 4 times daily for 7 days    TRIMETHOBENZAMIDE (TIGAN) 300 MG CAPSULE    Take 1 capsule by mouth 3 times daily as needed (nausea and vomiting)     Electronically signed by Galindo Harrington PA-C   DD: 3/17/22  **This report was transcribed using voice recognition software. Every effort was made to ensure accuracy; however, inadvertent computerized transcription errors may be present.   END OF PROVIDER NOTE       Melly Little PA-C  03/17/22 [id number]

## 2022-03-18 VITALS
OXYGEN SATURATION: 97 % | SYSTOLIC BLOOD PRESSURE: 126 MMHG | DIASTOLIC BLOOD PRESSURE: 85 MMHG | HEIGHT: 65 IN | WEIGHT: 293 LBS | TEMPERATURE: 98.1 F | BODY MASS INDEX: 48.82 KG/M2 | HEART RATE: 85 BPM | RESPIRATION RATE: 14 BRPM

## 2022-03-18 LAB
EKG ATRIAL RATE: 79 BPM
EKG P AXIS: 76 DEGREES
EKG P-R INTERVAL: 180 MS
EKG Q-T INTERVAL: 420 MS
EKG QRS DURATION: 98 MS
EKG QTC CALCULATION (BAZETT): 481 MS
EKG R AXIS: 3 DEGREES
EKG T AXIS: 65 DEGREES
EKG VENTRICULAR RATE: 79 BPM

## 2022-03-18 PROCEDURE — 6360000002 HC RX W HCPCS: Performed by: EMERGENCY MEDICINE

## 2022-03-18 PROCEDURE — 6370000000 HC RX 637 (ALT 250 FOR IP): Performed by: EMERGENCY MEDICINE

## 2022-03-18 PROCEDURE — C9113 INJ PANTOPRAZOLE SODIUM, VIA: HCPCS | Performed by: EMERGENCY MEDICINE

## 2022-03-18 PROCEDURE — 93010 ELECTROCARDIOGRAM REPORT: CPT | Performed by: INTERNAL MEDICINE

## 2022-03-18 RX ORDER — HYDROCODONE BITARTRATE AND ACETAMINOPHEN 5; 325 MG/1; MG/1
1 TABLET ORAL ONCE
Status: COMPLETED | OUTPATIENT
Start: 2022-03-18 | End: 2022-03-18

## 2022-03-18 RX ORDER — KETOROLAC TROMETHAMINE 30 MG/ML
15 INJECTION, SOLUTION INTRAMUSCULAR; INTRAVENOUS ONCE
Status: DISCONTINUED | OUTPATIENT
Start: 2022-03-18 | End: 2022-03-18

## 2022-03-18 RX ORDER — ONDANSETRON 2 MG/ML
4 INJECTION INTRAMUSCULAR; INTRAVENOUS ONCE
Status: COMPLETED | OUTPATIENT
Start: 2022-03-18 | End: 2022-03-18

## 2022-03-18 RX ORDER — PANTOPRAZOLE SODIUM 40 MG/10ML
40 INJECTION, POWDER, LYOPHILIZED, FOR SOLUTION INTRAVENOUS ONCE
Status: COMPLETED | OUTPATIENT
Start: 2022-03-18 | End: 2022-03-18

## 2022-03-18 RX ADMIN — ONDANSETRON 4 MG: 2 INJECTION INTRAMUSCULAR; INTRAVENOUS at 05:50

## 2022-03-18 RX ADMIN — LIDOCAINE HYDROCHLORIDE: 20 SOLUTION ORAL; TOPICAL at 05:42

## 2022-03-18 RX ADMIN — ONDANSETRON 4 MG: 2 INJECTION INTRAMUSCULAR; INTRAVENOUS at 10:11

## 2022-03-18 RX ADMIN — HYDROCODONE BITARTRATE AND ACETAMINOPHEN 1 TABLET: 5; 325 TABLET ORAL at 09:36

## 2022-03-18 RX ADMIN — PANTOPRAZOLE SODIUM 40 MG: 40 INJECTION, POWDER, FOR SOLUTION INTRAVENOUS at 05:50

## 2022-03-18 ASSESSMENT — PAIN SCALES - GENERAL: PAINLEVEL_OUTOF10: 8

## 2022-03-18 NOTE — ED NOTES
Patient experiencing dry heaves in hallway bed. Physician aware. Evette London.  SAJAN Sanon  03/18/22 7621

## 2022-03-18 NOTE — ED NOTES
RN called [facility] again for update. They state that they should be able to  patient another 2 - 3 hours from now. RN informed [facility] that this patient has currently been in ED on discharge for over 10 hours now. Again, ambulance company states they will attempt to get to patient as soon as they can. RN to call another company to inquire about transport. Isaac Malave.  Dionne Villafuerte, SAJAN  03/18/22 [id number]

## 2022-03-18 NOTE — ED NOTES
[facility] called again for update. Staff informed nurse that  will be within the hour. Tim Monsalve.  Rupesh Reji, [place]  03/18/22 3743

## 2022-03-18 NOTE — ED NOTES
Patient medicated. Approximately 950 mL of urine emptied from bojorquez bag. Pt was also updated on  time being by 1030 per [facility]. Laura Rouse.  SAJAN Sanon  03/18/22 8628

## 2022-04-04 ENCOUNTER — OFFICE VISIT (OUTPATIENT)
Dept: NEUROLOGY | Age: 62
End: 2022-04-04
Payer: MEDICAID

## 2022-04-04 VITALS
OXYGEN SATURATION: 97 % | HEART RATE: 69 BPM | DIASTOLIC BLOOD PRESSURE: 82 MMHG | SYSTOLIC BLOOD PRESSURE: 129 MMHG | TEMPERATURE: 97.6 F

## 2022-04-04 DIAGNOSIS — G35 MULTIPLE SCLEROSIS (HCC): Primary | ICD-10-CM

## 2022-04-04 PROCEDURE — G8427 DOCREV CUR MEDS BY ELIG CLIN: HCPCS | Performed by: NURSE PRACTITIONER

## 2022-04-04 PROCEDURE — 3017F COLORECTAL CA SCREEN DOC REV: CPT | Performed by: NURSE PRACTITIONER

## 2022-04-04 PROCEDURE — 1036F TOBACCO NON-USER: CPT | Performed by: NURSE PRACTITIONER

## 2022-04-04 PROCEDURE — 99213 OFFICE O/P EST LOW 20 MIN: CPT | Performed by: NURSE PRACTITIONER

## 2022-04-04 PROCEDURE — G8417 CALC BMI ABV UP PARAM F/U: HCPCS | Performed by: NURSE PRACTITIONER

## 2022-04-04 NOTE — PROGRESS NOTES
[facility]. Davie Lyman M.D., F.A.C.P. Cj Lopez, PRICE, APRN, ACNS-BC  Timmy Argueta. Nimco Reyes, MSN, APRN-FNP-C  Keyshawn Colvin, MSN, APRN-FNP-C  CHARLY Velasquez, PA-C  Brandan Orta, MSN, APRN-FNP-C  [address], [address]  [place], [address] [place]  Phone: [phone]  Fax: [phone]       Edmundo Lopez is a 57 y.o. right handed female     Patient follows for MS      She was a longtime patient of Dr. Ethan Ramirez in [facility]. Previous to that she was seeing Dr. Natividad Newton at the [place] clinic at [facility]. She was diagnosed with MS back in March 16, 2000. For the past 19 years she was on Avelox and steroids. She is no longer on DMT therapy. She believes her last MS exacerbation was 6 weeks ago when her vision became blurry and her right side became numb and tingly. She also had some issues with numbness to the right side of her tongue but she did not seek medical treatment and her symptoms did resolve. Patient has been in a wheelchair for the past 5 years. She is able to move her bilateral upper extremities but her right arm is weaker than her left. She is unable to lift or move her right leg but she is able to lift and move her left leg. She notes no vision changes since being diagnosed with MS and no red desaturation. She does have bowel/bladder incontinence due to her MS. She has seen urology in the past along with having a bladder sling but still notes incontinence at times. She can tell when she has to use bathroom she just has no control with holding it in. She lives with her son and daughter-in-law, her daughter-in-law is her caregiver. Her last MRI brain was back in April 2017 which was noted to have several MS lesions bilaterally but no active lesions. She cannot remember the last time she had any MRIs of her spine, but does note that she does have MS lesions in her cervical spine. She also has complaints of difficulty swallowing.   She has not really noticed with types of textures that she has difficulty swallowing. She has had a couple coughing bouts after eating, with concerns from her son. She had swallow study which was normal.      MRI brain and C spine with no new or enhancing lesions per report. Patient today with complaints of dry mouth and constipation. She is taking Doculax and senna with a clear fiber supplement as well. She will have diarrhea and then constipation.       + difficulty swallowing  No chest pain or palpitations  No SOB  + incontinence of bowels and bladder  + numbness and tingling   + right side weakness    ROS is otherwise negative    Objective:     Vitals:    04/04/22 1330   BP: 129/82   Site: Right Upper Arm   Pulse: 69   Temp: 97.6 °F (36.4 °C)   SpO2: 97%     General appearance: alert, appears stated age, cooperative and in no distress, laying in scooter  Head: normocephalic, without obvious abnormality, atraumatic  Eyes: conjunctivae/corneas clear; no drainage  Neck: supple, symmetrical, trachea midline   Lungs: Diminished to auscultation bilaterally  Heart: regular rate and rhythm  Abdomen: Obese, bowel sounds hypoactive  Skin:  color, texture, turgor normal--no rashes or lesions      Mental Status: alert and oriented x 4    Appropriate attention/concentration  Intact fundus of knowledge      Speech: no dysarthria  Language: no aphasias    Cranial Nerves:  I: smell    II: visual acuity  No red desaturation   II: visual fields Full    II: pupils SHIRA   III,VII: ptosis None   III,IV,VI: extraocular muscles  EOMI without nystagmus   V: mastication Normal   V: facial light touch sensation  Normal   V,VII: corneal reflex     VII: facial muscle function - upper  Normal   VII: facial muscle function - lower Normal   VIII: hearing Normal   IX: soft palate elevation  Normal   IX,X: gag reflex    XI: trapezius strength  5/5   XI: sternocleidomastoid strength 5/5   XI: neck extension strength  5/5   XII: tongue strength Normal     Motor:  4/5 RUE strength, 5/5 strength LUE strength, 1/5 strength RLE, 4/5 strength LLE   Obese bulk and tone  No drift to BUE  No abnormal movements    Sensory:  LT normal    Coordination:   FN, FFM and NAKIA normal  HS unable to perform    DTR:   BE throughout    Laboratory/Radiology:  ry/Radiology:     Results for Juanjo Martin (MRN [id number]) as of 4/4/2022 14:23   Ref.  Range 3/17/2022 15:08   Sodium Latest Ref Range: 132 - 146 mmol/L 138   Potassium Latest Ref Range: 3.5 - 5.0 mmol/L 4.2   Chloride Latest Ref Range: 98 - 107 mmol/L 102   CO2 Latest Ref Range: 22 - 29 mmol/L 25   BUN,BUNPL Latest Ref Range: 6 - 23 mg/dL 9   Creatinine Latest Ref Range: 0.5 - 1.0 mg/dL 0.7   Anion Gap Latest Ref Range: 7 - 16 mmol/L 11   GFR Non- Latest Ref Range: >=60 mL/min/1.73 >60   GFR African American Unknown >60   Lactic Acid Latest Ref Range: 0.5 - 2.2 mmol/L 1.1   GLUCOSE, FASTING,GF Latest Ref Range: 74 - 99 mg/dL 101 (H)   CALCIUM, SERUM, 559856 Latest Ref Range: 8.6 - 10.2 mg/dL 9.4   Total Protein Latest Ref Range: 6.4 - 8.3 g/dL 7.9   Pro-BNP Latest Ref Range: 0 - 125 pg/mL 143 (H)   Troponin, High Sensitivity Latest Ref Range: 0 - 9 ng/L 7   Albumin Latest Ref Range: 3.5 - 5.2 g/dL 3.5   Alk Phos Latest Ref Range: 35 - 104 U/L 109 (H)   ALT Latest Ref Range: 0 - 32 U/L 28   AST Latest Ref Range: 0 - 31 U/L 30   Bilirubin Latest Ref Range: 0.0 - 1.2 mg/dL 0.3   Lipase Latest Ref Range: 13 - 60 U/L 44   WBC Latest Ref Range: 4.5 - 11.5 E9/L 4.8   RBC Latest Ref Range: 3.50 - 5.50 E12/L 3.97   Hemoglobin Quant Latest Ref Range: 11.5 - 15.5 g/dL 10.5 (L)   Hematocrit Latest Ref Range: 34.0 - 48.0 % 33.5 (L)   MCV Latest Ref Range: 80.0 - 99.9 fL 84.4   MCH Latest Ref Range: 26.0 - 35.0 pg 26.4   MCHC Latest Ref Range: 32.0 - 34.5 % 31.3 (L)   MPV Latest Ref Range: 7.0 - 12.0 fL 9.3   RDW Latest Ref Range: 11.5 - 15.0 fL 15.9 (H)   Platelet Count Latest Ref Range: 130 - 450 E9/L 321 Neutrophils % Latest Ref Range: 43.0 - 80.0 % 59.9   Immature Granulocytes % Latest Ref Range: 0.0 - 5.0 % 0.4   Lymphocyte % Latest Ref Range: 20.0 - 42.0 % 25.1   Monocytes % Latest Ref Range: 2.0 - 12.0 % 11.2   Eosinophils % Latest Ref Range: 0.0 - 6.0 % 1.9   Basophils % Latest Ref Range: 0.0 - 2.0 % 1.5   Neutrophils Absolute Latest Ref Range: 1.80 - 7.30 E9/L 2.85   Immature Granulocytes # Latest Units: E9/L 0.02   Lymphocytes Absolute Latest Ref Range: 1.50 - 4.00 E9/L 1.19 (L)   Monocytes Absolute Latest Ref Range: 0.10 - 0.95 E9/L 0.53   Eosinophils Absolute Latest Ref Range: 0.05 - 0.50 E9/L 0.09   Basophils Absolute Latest Ref Range: 0.00 - 0.20 E9/L 0.07     MRI brain and C spine: no new lesion or enhancing lesions reported     All labs were independently reviewed today    Assessment:     History of MS > 20 years  --- Not currently on DMT therapy  --- Was on Avonex and steroids  --- EDSS 8.5--- in wheelchair/scooter bound for most of the day and is cared for  --- Incontinent of bladder/bowel  --- Right side weakness > left side  --- We will obtain medical records from previous neurologist  --- Recent MS exacerbation versus stroke ruled out with MRI brain and C spine     Dysphagia related to MS  --- Difficulty swallowing  --- swallow study was normal    Plan:       Follow-up in 6 months     Call with any questions or concerns      ERICA Bhandari CNP  1:30 PM  4/4/2022

## 2022-05-31 ENCOUNTER — PREP FOR PROCEDURE (OUTPATIENT)
Dept: SURGERY | Age: 62
End: 2022-05-31

## 2022-05-31 RX ORDER — SODIUM CHLORIDE 9 MG/ML
INJECTION, SOLUTION INTRAVENOUS CONTINUOUS
Status: CANCELLED | OUTPATIENT
Start: 2022-05-31

## 2022-08-17 ENCOUNTER — APPOINTMENT (OUTPATIENT)
Dept: CT IMAGING | Age: 62
DRG: 243 | End: 2022-08-17
Payer: MEDICAID

## 2022-08-17 ENCOUNTER — APPOINTMENT (OUTPATIENT)
Dept: GENERAL RADIOLOGY | Age: 62
DRG: 243 | End: 2022-08-17
Payer: MEDICAID

## 2022-08-17 ENCOUNTER — HOSPITAL ENCOUNTER (INPATIENT)
Age: 62
LOS: 2 days | Discharge: HOME | DRG: 243 | End: 2022-08-19
Attending: EMERGENCY MEDICINE | Admitting: INTERNAL MEDICINE
Payer: MEDICAID

## 2022-08-17 DIAGNOSIS — K92.0 HEMATEMESIS WITH NAUSEA: Primary | ICD-10-CM

## 2022-08-17 DIAGNOSIS — N39.0 COMPLICATED UTI (URINARY TRACT INFECTION): ICD-10-CM

## 2022-08-17 DIAGNOSIS — K44.9 HIATAL HERNIA: ICD-10-CM

## 2022-08-17 LAB
ABO/RH: NORMAL
ALBUMIN SERPL-MCNC: 4.1 G/DL (ref 3.5–5.2)
ALP BLD-CCNC: 144 U/L (ref 35–104)
ALT SERPL-CCNC: 18 U/L (ref 0–32)
AMORPHOUS: PRESENT
ANION GAP SERPL CALCULATED.3IONS-SCNC: 17 MMOL/L (ref 7–16)
ANTIBODY SCREEN: NORMAL
APTT: 26.7 SEC (ref 24.5–35.1)
AST SERPL-CCNC: 21 U/L (ref 0–31)
BACTERIA: ABNORMAL /HPF
BASOPHILS ABSOLUTE: 0.1 E9/L (ref 0–0.2)
BASOPHILS RELATIVE PERCENT: 1.3 % (ref 0–2)
BILIRUB SERPL-MCNC: 0.6 MG/DL (ref 0–1.2)
BILIRUBIN DIRECT: <0.2 MG/DL (ref 0–0.3)
BILIRUBIN URINE: ABNORMAL
BILIRUBIN, INDIRECT: ABNORMAL MG/DL (ref 0–1)
BLOOD, URINE: ABNORMAL
BUN BLDV-MCNC: 14 MG/DL (ref 6–23)
CALCIUM SERPL-MCNC: 9.8 MG/DL (ref 8.6–10.2)
CHLORIDE BLD-SCNC: 102 MMOL/L (ref 98–107)
CLARITY: ABNORMAL
CO2: 21 MMOL/L (ref 22–29)
COLOR: YELLOW
CREAT SERPL-MCNC: 0.5 MG/DL (ref 0.5–1)
CRYSTALS, UA: ABNORMAL /HPF
EKG ATRIAL RATE: 64 BPM
EKG P AXIS: 42 DEGREES
EKG P-R INTERVAL: 152 MS
EKG Q-T INTERVAL: 472 MS
EKG QRS DURATION: 88 MS
EKG QTC CALCULATION (BAZETT): 486 MS
EKG R AXIS: 17 DEGREES
EKG T AXIS: 67 DEGREES
EKG VENTRICULAR RATE: 64 BPM
EOSINOPHILS ABSOLUTE: 0.02 E9/L (ref 0.05–0.5)
EOSINOPHILS RELATIVE PERCENT: 0.3 % (ref 0–6)
GFR AFRICAN AMERICAN: >60
GFR NON-AFRICAN AMERICAN: >60 ML/MIN/1.73
GLUCOSE BLD-MCNC: 141 MG/DL (ref 74–99)
GLUCOSE URINE: NEGATIVE MG/DL
HCT VFR BLD CALC: 37.8 % (ref 34–48)
HEMOGLOBIN: 12.2 G/DL (ref 11.5–15.5)
IMMATURE GRANULOCYTES #: 0.02 E9/L
IMMATURE GRANULOCYTES %: 0.3 % (ref 0–5)
INR BLD: 1.1
KETONES, URINE: >=80 MG/DL
LACTIC ACID: 2.4 MMOL/L (ref 0.5–2.2)
LEUKOCYTE ESTERASE, URINE: ABNORMAL
LIPASE: 35 U/L (ref 13–60)
LYMPHOCYTES ABSOLUTE: 1.19 E9/L (ref 1.5–4)
LYMPHOCYTES RELATIVE PERCENT: 16 % (ref 20–42)
MCH RBC QN AUTO: 27 PG (ref 26–35)
MCHC RBC AUTO-ENTMCNC: 32.3 % (ref 32–34.5)
MCV RBC AUTO: 83.6 FL (ref 80–99.9)
MONOCYTES ABSOLUTE: 0.59 E9/L (ref 0.1–0.95)
MONOCYTES RELATIVE PERCENT: 7.9 % (ref 2–12)
NEUTROPHILS ABSOLUTE: 5.54 E9/L (ref 1.8–7.3)
NEUTROPHILS RELATIVE PERCENT: 74.2 % (ref 43–80)
NITRITE, URINE: POSITIVE
PDW BLD-RTO: 17.7 FL (ref 11.5–15)
PH UA: 8.5 (ref 5–9)
PLATELET # BLD: 461 E9/L (ref 130–450)
PMV BLD AUTO: 9.1 FL (ref 7–12)
POTASSIUM SERPL-SCNC: 3.7 MMOL/L (ref 3.5–5)
PROTEIN UA: 30 MG/DL
PROTHROMBIN TIME: 11.5 SEC (ref 9.3–12.4)
RBC # BLD: 4.52 E12/L (ref 3.5–5.5)
RBC UA: ABNORMAL /HPF (ref 0–2)
SODIUM BLD-SCNC: 140 MMOL/L (ref 132–146)
SPECIFIC GRAVITY UA: 1.01 (ref 1–1.03)
TOTAL PROTEIN: 8.7 G/DL (ref 6.4–8.3)
TROPONIN, HIGH SENSITIVITY: 10 NG/L (ref 0–9)
UROBILINOGEN, URINE: 0.2 E.U./DL
WBC # BLD: 7.5 E9/L (ref 4.5–11.5)
WBC UA: >20 /HPF (ref 0–5)

## 2022-08-17 PROCEDURE — 6360000004 HC RX CONTRAST MEDICATION: Performed by: RADIOLOGY

## 2022-08-17 PROCEDURE — 86900 BLOOD TYPING SEROLOGIC ABO: CPT

## 2022-08-17 PROCEDURE — 85025 COMPLETE CBC W/AUTO DIFF WBC: CPT

## 2022-08-17 PROCEDURE — 96375 TX/PRO/DX INJ NEW DRUG ADDON: CPT

## 2022-08-17 PROCEDURE — 2580000003 HC RX 258: Performed by: EMERGENCY MEDICINE

## 2022-08-17 PROCEDURE — 87088 URINE BACTERIA CULTURE: CPT

## 2022-08-17 PROCEDURE — 83690 ASSAY OF LIPASE: CPT

## 2022-08-17 PROCEDURE — 93005 ELECTROCARDIOGRAM TRACING: CPT | Performed by: EMERGENCY MEDICINE

## 2022-08-17 PROCEDURE — 96372 THER/PROPH/DIAG INJ SC/IM: CPT

## 2022-08-17 PROCEDURE — A4216 STERILE WATER/SALINE, 10 ML: HCPCS | Performed by: EMERGENCY MEDICINE

## 2022-08-17 PROCEDURE — 96374 THER/PROPH/DIAG INJ IV PUSH: CPT

## 2022-08-17 PROCEDURE — 6360000002 HC RX W HCPCS: Performed by: INTERNAL MEDICINE

## 2022-08-17 PROCEDURE — 71045 X-RAY EXAM CHEST 1 VIEW: CPT

## 2022-08-17 PROCEDURE — 1200000000 HC SEMI PRIVATE

## 2022-08-17 PROCEDURE — 84484 ASSAY OF TROPONIN QUANT: CPT

## 2022-08-17 PROCEDURE — 99285 EMERGENCY DEPT VISIT HI MDM: CPT

## 2022-08-17 PROCEDURE — 71275 CT ANGIOGRAPHY CHEST: CPT

## 2022-08-17 PROCEDURE — 86850 RBC ANTIBODY SCREEN: CPT

## 2022-08-17 PROCEDURE — 80076 HEPATIC FUNCTION PANEL: CPT

## 2022-08-17 PROCEDURE — 80048 BASIC METABOLIC PNL TOTAL CA: CPT

## 2022-08-17 PROCEDURE — 6370000000 HC RX 637 (ALT 250 FOR IP): Performed by: STUDENT IN AN ORGANIZED HEALTH CARE EDUCATION/TRAINING PROGRAM

## 2022-08-17 PROCEDURE — 81001 URINALYSIS AUTO W/SCOPE: CPT

## 2022-08-17 PROCEDURE — 86901 BLOOD TYPING SEROLOGIC RH(D): CPT

## 2022-08-17 PROCEDURE — 74177 CT ABD & PELVIS W/CONTRAST: CPT

## 2022-08-17 PROCEDURE — C9113 INJ PANTOPRAZOLE SODIUM, VIA: HCPCS | Performed by: EMERGENCY MEDICINE

## 2022-08-17 PROCEDURE — 6370000000 HC RX 637 (ALT 250 FOR IP): Performed by: INTERNAL MEDICINE

## 2022-08-17 PROCEDURE — 2580000003 HC RX 258: Performed by: INTERNAL MEDICINE

## 2022-08-17 PROCEDURE — 6360000002 HC RX W HCPCS: Performed by: EMERGENCY MEDICINE

## 2022-08-17 PROCEDURE — 83605 ASSAY OF LACTIC ACID: CPT

## 2022-08-17 PROCEDURE — 85730 THROMBOPLASTIN TIME PARTIAL: CPT

## 2022-08-17 PROCEDURE — 85610 PROTHROMBIN TIME: CPT

## 2022-08-17 PROCEDURE — 6360000002 HC RX W HCPCS: Performed by: STUDENT IN AN ORGANIZED HEALTH CARE EDUCATION/TRAINING PROGRAM

## 2022-08-17 RX ORDER — SENNOSIDES 8.6 MG
1300 CAPSULE ORAL 2 TIMES DAILY
COMMUNITY

## 2022-08-17 RX ORDER — LORAZEPAM 0.5 MG/1
0.5 TABLET ORAL NIGHTLY
Status: DISCONTINUED | OUTPATIENT
Start: 2022-08-17 | End: 2022-08-19 | Stop reason: HOSPADM

## 2022-08-17 RX ORDER — ENOXAPARIN SODIUM 100 MG/ML
40 INJECTION SUBCUTANEOUS 2 TIMES DAILY
Status: DISCONTINUED | OUTPATIENT
Start: 2022-08-17 | End: 2022-08-19 | Stop reason: HOSPADM

## 2022-08-17 RX ORDER — GABAPENTIN 300 MG/1
300 CAPSULE ORAL 2 TIMES DAILY
COMMUNITY
End: 2022-10-25 | Stop reason: SDUPTHER

## 2022-08-17 RX ORDER — GABAPENTIN 300 MG/1
300 CAPSULE ORAL 2 TIMES DAILY
Status: DISCONTINUED | OUTPATIENT
Start: 2022-08-17 | End: 2022-08-19 | Stop reason: HOSPADM

## 2022-08-17 RX ORDER — SODIUM CHLORIDE 9 MG/ML
INJECTION, SOLUTION INTRAVENOUS CONTINUOUS
Status: DISCONTINUED | OUTPATIENT
Start: 2022-08-17 | End: 2022-08-19 | Stop reason: HOSPADM

## 2022-08-17 RX ORDER — SODIUM CHLORIDE 0.9 % (FLUSH) 0.9 %
5-40 SYRINGE (ML) INJECTION PRN
Status: DISCONTINUED | OUTPATIENT
Start: 2022-08-17 | End: 2022-08-19 | Stop reason: HOSPADM

## 2022-08-17 RX ORDER — SODIUM CHLORIDE 9 MG/ML
INJECTION, SOLUTION INTRAVENOUS PRN
Status: DISCONTINUED | OUTPATIENT
Start: 2022-08-17 | End: 2022-08-19 | Stop reason: HOSPADM

## 2022-08-17 RX ORDER — PANTOPRAZOLE SODIUM 40 MG/1
40 TABLET, DELAYED RELEASE ORAL EVERY MORNING
Status: ON HOLD | COMMUNITY
End: 2022-08-19 | Stop reason: SDUPTHER

## 2022-08-17 RX ORDER — LORAZEPAM 0.5 MG/1
0.5 TABLET ORAL NIGHTLY
COMMUNITY

## 2022-08-17 RX ORDER — PROMETHAZINE HYDROCHLORIDE 25 MG/ML
12.5 INJECTION, SOLUTION INTRAMUSCULAR; INTRAVENOUS ONCE
Status: COMPLETED | OUTPATIENT
Start: 2022-08-17 | End: 2022-08-17

## 2022-08-17 RX ORDER — PROCHLORPERAZINE EDISYLATE 5 MG/ML
10 INJECTION INTRAMUSCULAR; INTRAVENOUS EVERY 6 HOURS PRN
Status: DISCONTINUED | OUTPATIENT
Start: 2022-08-17 | End: 2022-08-19 | Stop reason: HOSPADM

## 2022-08-17 RX ORDER — ONDANSETRON 2 MG/ML
4 INJECTION INTRAMUSCULAR; INTRAVENOUS EVERY 6 HOURS PRN
Status: DISCONTINUED | OUTPATIENT
Start: 2022-08-17 | End: 2022-08-17 | Stop reason: SDUPTHER

## 2022-08-17 RX ORDER — POLYETHYLENE GLYCOL 3350 17 G/17G
17 POWDER, FOR SOLUTION ORAL DAILY PRN
Status: DISCONTINUED | OUTPATIENT
Start: 2022-08-17 | End: 2022-08-19 | Stop reason: HOSPADM

## 2022-08-17 RX ORDER — TRAMADOL HYDROCHLORIDE 50 MG/1
50 TABLET ORAL EVERY 6 HOURS PRN
Status: DISCONTINUED | OUTPATIENT
Start: 2022-08-17 | End: 2022-08-19 | Stop reason: HOSPADM

## 2022-08-17 RX ORDER — SUCRALFATE 1 G/1
1 TABLET ORAL 4 TIMES DAILY
COMMUNITY

## 2022-08-17 RX ORDER — DULOXETIN HYDROCHLORIDE 60 MG/1
120 CAPSULE, DELAYED RELEASE ORAL EVERY MORNING
Status: DISCONTINUED | OUTPATIENT
Start: 2022-08-18 | End: 2022-08-19 | Stop reason: HOSPADM

## 2022-08-17 RX ORDER — ONDANSETRON 4 MG/1
4 TABLET, ORALLY DISINTEGRATING ORAL EVERY 8 HOURS PRN
Status: DISCONTINUED | OUTPATIENT
Start: 2022-08-17 | End: 2022-08-19 | Stop reason: HOSPADM

## 2022-08-17 RX ORDER — MAGNESIUM HYDROXIDE/ALUMINUM HYDROXICE/SIMETHICONE 120; 1200; 1200 MG/30ML; MG/30ML; MG/30ML
5 SUSPENSION ORAL EVERY 6 HOURS PRN
COMMUNITY

## 2022-08-17 RX ORDER — ONDANSETRON 2 MG/ML
8 INJECTION INTRAMUSCULAR; INTRAVENOUS ONCE
Status: COMPLETED | OUTPATIENT
Start: 2022-08-17 | End: 2022-08-17

## 2022-08-17 RX ORDER — ONDANSETRON 2 MG/ML
4 INJECTION INTRAMUSCULAR; INTRAVENOUS EVERY 6 HOURS PRN
Status: DISCONTINUED | OUTPATIENT
Start: 2022-08-17 | End: 2022-08-19 | Stop reason: HOSPADM

## 2022-08-17 RX ORDER — SODIUM CHLORIDE 0.9 % (FLUSH) 0.9 %
10 SYRINGE (ML) INJECTION PRN
Status: DISCONTINUED | OUTPATIENT
Start: 2022-08-17 | End: 2022-08-19 | Stop reason: HOSPADM

## 2022-08-17 RX ORDER — PANTOPRAZOLE SODIUM 40 MG/1
40 TABLET, DELAYED RELEASE ORAL EVERY MORNING
Status: DISCONTINUED | OUTPATIENT
Start: 2022-08-18 | End: 2022-08-19 | Stop reason: HOSPADM

## 2022-08-17 RX ORDER — ACETAMINOPHEN 325 MG/1
1300 TABLET ORAL 2 TIMES DAILY
Status: DISCONTINUED | OUTPATIENT
Start: 2022-08-17 | End: 2022-08-19 | Stop reason: HOSPADM

## 2022-08-17 RX ORDER — SODIUM CHLORIDE 0.9 % (FLUSH) 0.9 %
5-40 SYRINGE (ML) INJECTION EVERY 12 HOURS SCHEDULED
Status: DISCONTINUED | OUTPATIENT
Start: 2022-08-17 | End: 2022-08-19 | Stop reason: HOSPADM

## 2022-08-17 RX ADMIN — TRIMETHOBENZAMIDE HYDROCHLORIDE 200 MG: 100 INJECTION INTRAMUSCULAR at 21:18

## 2022-08-17 RX ADMIN — CEFTRIAXONE 1000 MG: 1 INJECTION, POWDER, FOR SOLUTION INTRAMUSCULAR; INTRAVENOUS at 16:17

## 2022-08-17 RX ADMIN — ACETAMINOPHEN 1300 MG: 325 TABLET ORAL at 21:05

## 2022-08-17 RX ADMIN — TRAMADOL HYDROCHLORIDE 50 MG: 50 TABLET, COATED ORAL at 16:17

## 2022-08-17 RX ADMIN — IOPAMIDOL 75 ML: 755 INJECTION, SOLUTION INTRAVENOUS at 11:47

## 2022-08-17 RX ADMIN — ONDANSETRON 4 MG: 2 INJECTION INTRAMUSCULAR; INTRAVENOUS at 21:17

## 2022-08-17 RX ADMIN — SODIUM CHLORIDE: 9 INJECTION, SOLUTION INTRAVENOUS at 16:51

## 2022-08-17 RX ADMIN — SODIUM CHLORIDE 80 MG: 9 INJECTION INTRAMUSCULAR; INTRAVENOUS; SUBCUTANEOUS at 10:58

## 2022-08-17 RX ADMIN — LIDOCAINE HYDROCHLORIDE: 20 SOLUTION ORAL; TOPICAL at 16:18

## 2022-08-17 RX ADMIN — SODIUM CHLORIDE, PRESERVATIVE FREE 10 ML: 5 INJECTION INTRAVENOUS at 22:15

## 2022-08-17 RX ADMIN — PROCHLORPERAZINE EDISYLATE 10 MG: 5 INJECTION INTRAMUSCULAR; INTRAVENOUS at 22:16

## 2022-08-17 RX ADMIN — ONDANSETRON 8 MG: 2 INJECTION INTRAMUSCULAR; INTRAVENOUS at 10:57

## 2022-08-17 RX ADMIN — GABAPENTIN 300 MG: 300 CAPSULE ORAL at 21:05

## 2022-08-17 RX ADMIN — LORAZEPAM 0.5 MG: 0.5 TABLET ORAL at 22:15

## 2022-08-17 RX ADMIN — LIDOCAINE HYDROCHLORIDE: 20 SOLUTION ORAL; TOPICAL at 21:11

## 2022-08-17 RX ADMIN — PROCHLORPERAZINE EDISYLATE 10 MG: 5 INJECTION INTRAMUSCULAR; INTRAVENOUS at 16:17

## 2022-08-17 RX ADMIN — LEVOTHYROXINE SODIUM 175 MCG: 0.12 TABLET ORAL at 16:51

## 2022-08-17 RX ADMIN — SODIUM CHLORIDE, PRESERVATIVE FREE 10 ML: 5 INJECTION INTRAVENOUS at 16:18

## 2022-08-17 RX ADMIN — PROMETHAZINE HYDROCHLORIDE 12.5 MG: 25 INJECTION INTRAMUSCULAR; INTRAVENOUS at 12:19

## 2022-08-17 ASSESSMENT — PAIN SCALES - GENERAL
PAINLEVEL_OUTOF10: 8
PAINLEVEL_OUTOF10: 10
PAINLEVEL_OUTOF10: 2
PAINLEVEL_OUTOF10: 10

## 2022-08-17 ASSESSMENT — PAIN DESCRIPTION - LOCATION
LOCATION: ABDOMEN

## 2022-08-17 ASSESSMENT — ENCOUNTER SYMPTOMS
ABDOMINAL PAIN: 1
SINUS PRESSURE: 0
DIARRHEA: 1
SHORTNESS OF BREATH: 0
NAUSEA: 1
BACK PAIN: 0
COUGH: 0
EYE REDNESS: 0
SORE THROAT: 0
ABDOMINAL DISTENTION: 0
EYE PAIN: 0
WHEEZING: 0
EYE DISCHARGE: 0
VOMITING: 1

## 2022-08-17 ASSESSMENT — PAIN DESCRIPTION - ORIENTATION
ORIENTATION: MID
ORIENTATION: UPPER

## 2022-08-17 ASSESSMENT — PAIN - FUNCTIONAL ASSESSMENT: PAIN_FUNCTIONAL_ASSESSMENT: PREVENTS OR INTERFERES SOME ACTIVE ACTIVITIES AND ADLS

## 2022-08-17 ASSESSMENT — PAIN DESCRIPTION - DESCRIPTORS
DESCRIPTORS: CRAMPING
DESCRIPTORS: BURNING;STABBING
DESCRIPTORS: ACHING;SORE;DISCOMFORT

## 2022-08-17 NOTE — ED NOTES
Staff has been trying to obtain an EKG since patient arrival in room but she is unable to remain still for testing at this time; will re-attempt - LIP aware.      Yuliya CallahanCurahealth Heritage Valley  08/17/22 [id number]

## 2022-08-17 NOTE — PROGRESS NOTES
Database initiated. She is alert to person and place from home with son and daughter in law who cares for her 24/7. According to the son she is basically wheelchair/bed bound and dependent for everything.  Pharmacy tech to verify home medications

## 2022-08-17 NOTE — CONSULTS
GENERAL SURGERY  CONSULT NOTE  8/17/2022    Physician Consulted: Dr. Lele Anderson  Reason for Consult: Coffee ground emesis  Referring Physician: Dr. Isidro Jang    CULLEN Rodriguez is a 60 y.o. female with PMHx PE (not on any anticoagulation) who presents for evaluation of abdominal pain. She states that she has been having abdominal pain for 2 days in the upper abdomen and into her chest. She says this pain is associated with nausea/vomiting of dark coffee ground material and GERD. She denies any melena, hematochezia, fevers, or chills. She is not on any anticoagulation. She has had a previous laparoscopic hiatal hernia repair with anterior fundoplication. She denies any NSAID or steroid use. She has no history of Crohn's or UC. Her last EGD was in 2020 which showed esophageal ulcers.       Past Medical History:   Diagnosis Date    Acquired hypothyroidism 9/14/2016    Acute blood loss as cause of postoperative anemia 11/19/2018    Anemia     Anticoagulant long-term use     Anxiety     Arthritis     Blood transfusion     Chronic back pain     Depression     GERD (gastroesophageal reflux disease)     Hx of blood clots     Lymphedema of lower extremity 4/17/2014    Migraine     Multiple sclerosis ([address] 2000    Obesity     Osteoarthritis     PE (pulmonary thromboembolism) ([address] 02/2016    Peripheral vascular disease ([address]     vericose veins    Prominent abdominal aortic pulse 4/17/2014    Pulmonary embolism ([address] 05/2016    Swelling of lower limb 4/17/2014    Thyroid disease     Urinary incontinence     has a indwelling catheter    Varicose veins of lower extremities 4/17/2014       Past Surgical History:   Procedure Laterality Date    BLADDER SURGERY      COLONOSCOPY      DENTAL SURGERY      random teeth extraction    DILATION AND CURETTAGE OF UTERUS      ENDOSCOPY, COLON, DIAGNOSTIC      FRACTURE SURGERY      HERNIA REPAIR      OPEN TREATMENT FRACTURE DISTAL TIBIA & FIBULA Right 11/16/2018    OPEN REDUCTION INTERNAL FIXATION RIGHT DISTAL FEMUR, RIGHT DISTAL TIBIA performed by Keisha Curry DO at [address] HISTORY  09/15/2015    LAPAROSCOPIC HIATAL HERNIA REPAIR WITH FUNDOPLICATION WITH MYOFASCIAL FLAP    TIBIA FRACTURE SURGERY Right     TONSILLECTOMY      TOTAL HIP ARTHROPLASTY Right 09/13/2016    Right Total Hip Arthroplasty    UPPER GASTROINTESTINAL ENDOSCOPY      UPPER GASTROINTESTINAL ENDOSCOPY N/A 5/18/2020    EGD BIOPSY performed by Lauren Waddell MD at [facility] ENDOSCOPY       Medications Prior to Admission:    Prior to Admission medications    Medication Sig Start Date End Date Taking? Authorizing Provider   sucralfate (CARAFATE) 1 GM tablet Take 1 tablet by mouth 4 times daily for 7 days 3/17/22 4/4/22  Zenaida Whiting PA-C   pantoprazole (PROTONIX) 40 MG tablet Take 1 tablet by mouth 2 times daily for 10 days . 5/19/20 11/18/21  Maria Blanchard MD   levothyroxine (SYNTHROID) 200 MCG tablet Take 262.5 mcg by mouth once a week Indications: Sundays    Historical Provider, MD   polyethylene glycol (GLYCOLAX) packet Take 17 g by mouth daily as needed for Constipation    Historical Provider, MD   calcium-vitamin D (OSCAL-500) 500-200 MG-UNIT per tablet Take 1 tablet by mouth 2 times daily    Historical Provider, MD   clonazePAM (KLONOPIN) 1 MG tablet Take 1 mg by mouth nightly.      Historical Provider, MD   levothyroxine (SYNTHROID) 175 MCG tablet Take 175 mcg by mouth Six times weekly Monday through [address] Provider, MD   traZODone (DESYREL) 50 MG tablet Take 50 mg by mouth nightly    Historical Provider, MD   cyclobenzaprine (FLEXERIL) 10 MG tablet Take 10 mg by mouth three times daily     Historical Provider, MD   DULoxetine (CYMBALTA) 60 MG extended release capsule Take 60 mg by mouth 2 times daily    Historical Provider, MD   Rectal Cleansers (F[employer] CLEANS[employer] ENEMA RE) Place 1 Dose rectally daily as needed    Historical Provider, MD   senna (SENOKOT) 8.6 MG tablet Take 2 tablets by mouth nightly     Historical Provider, MD   SUMAtriptan (IMITREX) 100 MG tablet Take 100 mg by mouth once as needed for Migraine    Historical Provider, MD   gabapentin (NEURONTIN) 300 MG capsule Take 3 capsules by mouth nightly  Patient taking differently: Take 300 mg by mouth every morning. . 16   Rosaline Cannon DO   acetaminophen (TYLENOL) 500 MG tablet Take 650 mg by mouth 2 times daily     Historical Provider, MD       Allergies   Allergen Reactions    Fentanyl Itching     Fentanyl patch--- itchy and red \"dots\" all over back and arms    Ferritin Other (See Comments)     Broke out \"into a sweat, my blood pressure shot up, I felt like lead on my chest and hard to breath. \"       Family History   Problem Relation Age of Onset    Cancer Mother     Dementia Mother     Obesity Father     Diabetes Father     High Blood Pressure Father     Stroke Father     Lupus Sister     High Blood Pressure Sister     Arthritis Sister     Diabetes Sister        Social History     Tobacco Use    Smoking status: Former     Packs/day: 0.25     Types: Cigarettes     Start date: 1977     Quit date: 2004     Years since quittin.3    Smokeless tobacco: Never   Vaping Use    Vaping Use: Some days    Substances: Always   Substance Use Topics    Alcohol use: Yes     Comment: occassional    Drug use: No         Review of Systems   General ROS: negative  Hematological and Lymphatic ROS: negative  Respiratory ROS: negative  Cardiovascular ROS: negative  Gastrointestinal ROS: As above  Genito-Urinary ROS: negative  Musculoskeletal ROS: negative      PHYSICAL EXAM:    Vitals:    22 1323   BP: (!) 155/69   Pulse: 89   Resp: 16   Temp: 97.8 °F (36.6 °C)   SpO2: 99%       General Appearance:  awake, alert, oriented, in no acute distress  Skin:  Skin color, texture, turgor normal. No rashes or lesions. Head/face:  NCAT  Eyes:  No gross abnormalities.   Lungs:  No increased work of breathing on room air  Heart:  RR and normotensive  Abdomen:  Soft, mild TTP epigastric area, nondistended  Extremities: Extremities warm to touch, pink, with no edema. LABS:    CBC  Recent Labs     08/17/22  1048   WBC 7.5   HGB 12.2   HCT 37.8   *     BMP  Recent Labs     08/17/22  1048      K 3.7      CO2 21*   BUN 14   CREATININE 0.5   CALCIUM 9.8     Liver Function  Recent Labs     08/17/22  1048   LIPASE 35   BILITOT 0.6   AST 21   ALT 18   ALKPHOS 144*   PROT 8.7*   LABALBU 4.1     No results for input(s): LACTATE in the last 72 hours. Recent Labs     08/17/22  1048   INR 1.1       RADIOLOGY    CT ABDOMEN PELVIS W IV CONTRAST Additional Contrast? None    Result Date: 8/17/2022  EXAMINATION: CTA OF THE CHEST; CT OF THE ABDOMEN AND PELVIS WITH CONTRAST 8/17/2022 11:55 am TECHNIQUE: CTA of the chest was performed after the administration of intravenous contrast.  Multiplanar reformatted images are provided for review. MIP images are provided for review. Automated exposure control, iterative reconstruction, and/or weight based adjustment of the mA/kV was utilized to reduce the radiation dose to as low as reasonably achievable.; CT of the abdomen and pelvis was performed with the administration of intravenous contrast. Multiplanar reformatted images are provided for review. Automated exposure control, iterative reconstruction, and/or weight based adjustment of the mA/kV was utilized to reduce the radiation dose to as low as reasonably achievable. COMPARISON: CT angiogram of the chest and CT abdomen/pelvis 03/17/2022 HISTORY: ORDERING SYSTEM PROVIDED HISTORY: eval for PE TECHNOLOGIST PROVIDED HISTORY: Reason for exam:->eval for PE Decision Support Exception - unselect if not a suspected or confirmed emergency medical condition->Emergency Medical Condition (MA) FINDINGS: CTA chest: Examination is significantly degraded by respiratory motion. There is no evidence of pulmonary embolism within the main pulmonary arteries. There is no evidence of thoracic aortic aneurysm or dissection. No evidence of pleural or pericardial effusion. There is a large hiatal hernia. There is no evidence of lymphadenopathy within the thorax. The central airways are patent. There is no pneumothorax. There is biapical pleuroparenchymal scarring. There are vague opacities in the lateral aspect the right lower lobe and medial aspect of the left lower lobe which are poorly evaluated secondary to the degree of motion. However, these are likely present on the prior examination. There is suggestion of improvement in the medial left lower lobe compared to the prior examination. There is stable atelectasis and/or scarring in the superior segment of the left lower lobe abutting the interlobar fissure. There are degenerative changes within the spine and shoulders. CT abdomen and pelvis: The liver, spleen, pancreas, adrenal glands, and kidneys are unremarkable. The gallbladder is intact without evidence of biliary ductal dilatation. There is no evidence of bowel obstruction, pneumoperitoneum, or ascites. There is difficult visualization of the pelvis secondary to scatter artifact from right hip prosthesis. The urinary bladder is decompressed by indwelling catheter. The uterus and adnexa are grossly unremarkable in appearance. The appendix is unremarkable in appearance. There is atrophy of the right psoas and iliacus muscles. There are degenerative changes within the spine. There is stable compression deformity at L1.     1. Extremely limited evaluation for pulmonary embolism secondary to excessive motion. No evidence of pulmonary embolism within the main pulmonary arteries. 2. Alveolar opacity in the medial left lower lobe is likely decreased compared to 03/17/2022. 3. Large hiatal hernia. 4. No acute abnormality within the abdomen or pelvis.      XR CHEST PORTABLE    Result Date: 8/17/2022  EXAMINATION: ONE XRAY VIEW OF THE CHEST 8/17/2022 11:31 am COMPARISON: None. HISTORY: ORDERING SYSTEM PROVIDED HISTORY: chest pain TECHNOLOGIST PROVIDED HISTORY: Reason for exam:->chest pain FINDINGS: The lungs are without acute focal process. There is no effusion or pneumothorax. The cardiomediastinal silhouette is without acute process. The osseous structures are without acute process. No acute process. CTA PULMONARY W CONTRAST    Result Date: 8/17/2022  EXAMINATION: CTA OF THE CHEST; CT OF THE ABDOMEN AND PELVIS WITH CONTRAST 8/17/2022 11:55 am TECHNIQUE: CTA of the chest was performed after the administration of intravenous contrast.  Multiplanar reformatted images are provided for review. MIP images are provided for review. Automated exposure control, iterative reconstruction, and/or weight based adjustment of the mA/kV was utilized to reduce the radiation dose to as low as reasonably achievable.; CT of the abdomen and pelvis was performed with the administration of intravenous contrast. Multiplanar reformatted images are provided for review. Automated exposure control, iterative reconstruction, and/or weight based adjustment of the mA/kV was utilized to reduce the radiation dose to as low as reasonably achievable. COMPARISON: CT angiogram of the chest and CT abdomen/pelvis 03/17/2022 HISTORY: ORDERING SYSTEM PROVIDED HISTORY: eval for PE TECHNOLOGIST PROVIDED HISTORY: Reason for exam:->eval for PE Decision Support Exception - unselect if not a suspected or confirmed emergency medical condition->Emergency Medical Condition (MA) FINDINGS: CTA chest: Examination is significantly degraded by respiratory motion. There is no evidence of pulmonary embolism within the main pulmonary arteries. There is no evidence of thoracic aortic aneurysm or dissection. No evidence of pleural or pericardial effusion. There is a large hiatal hernia. There is no evidence of lymphadenopathy within the thorax. The central airways are patent. There is no pneumothorax.   There is

## 2022-08-17 NOTE — ED PROVIDER NOTES
59-year-old female history of MS history of PE on no current anticoagulation presents to the emergency department with abdominal pain in the upper abdomen as well as multiple episodes of emesis of turned into coffee-ground emesis. Patient has a history of a hiatal hernia has been scoped in the past following with Dr. Aquilino Perez in which they found esophageal ulcers. The patient states he symptoms started anywhere from a day to 2 days ago. Had some diarrhea and concerns for that she states no fevers chills cough shortness of breath headache or vision changes. She reports no leg swelling. She is post to be on Carafate and omeprazole for the symptoms. She has had a previous hiatal hernia repair    The history is provided by the patient. Nausea & Vomiting  Severity:  Moderate  Duration:  2 days  Timing:  Intermittent  Quality:  Coffee grounds  Progression:  Unchanged  Chronicity:  Recurrent  Recent urination:  Normal  Relieved by:  Nothing  Worsened by:  Nothing  Ineffective treatments:  None tried  Associated symptoms: abdominal pain and diarrhea    Associated symptoms: no arthralgias, no chills, no cough, no fever, no headaches, no myalgias, no sore throat and no URI    Risk factors: no diabetes, no prior abdominal surgery and no sick contacts       Review of Systems   Constitutional:  Negative for chills and fever. HENT:  Negative for ear pain, sinus pressure and sore throat. Eyes:  Negative for pain, discharge and redness. Respiratory:  Negative for cough, shortness of breath and wheezing. Cardiovascular:  Negative for chest pain. Gastrointestinal:  Positive for abdominal pain, diarrhea, nausea and vomiting. Negative for abdominal distention. Genitourinary:  Negative for dysuria and frequency. Musculoskeletal:  Negative for arthralgias, back pain and myalgias. Skin:  Negative for rash and wound. Neurological:  Negative for weakness and headaches.    Hematological:  Negative for adenopathy. All other systems reviewed and are negative. Physical Exam  Constitutional:       Appearance: She is well-developed. She is obese. HENT:      Head: Normocephalic and atraumatic. Cardiovascular:      Rate and Rhythm: Normal rate and regular rhythm. Heart sounds: Normal heart sounds. Pulmonary:      Effort: Pulmonary effort is normal.      Breath sounds: Normal breath sounds. Abdominal:      General: Bowel sounds are normal.      Tenderness: There is abdominal tenderness in the epigastric area. Musculoskeletal:      Right lower leg: No edema. Left lower leg: No edema. Skin:     General: Skin is warm. Capillary Refill: Capillary refill takes less than 2 seconds. Neurological:      General: No focal deficit present. Mental Status: She is alert and oriented to person, place, and time. Procedures     MDM  Number of Diagnoses or Management Options  Complicated UTI (urinary tract infection)  Hematemesis with nausea  Hiatal hernia  Diagnosis management comments: Patient was seen and examined. Labs and imaging were ordered. Work-up does not reveal any concerning findings other than a mild urinary tract infection which patient was provided Rocephin however patient received multiple antiemetics without improvement of symptoms. Patient was felt to warrant admission Case discussed with the hospitalist who will admit the patient Dr. Radha Nathan.   Patient excepted to [facility].              --------------------------------------------- PAST HISTORY ---------------------------------------------  Past Medical History:  has a past medical history of Acquired hypothyroidism, Acute blood loss as cause of postoperative anemia, Anemia, Anticoagulant long-term use, Anxiety, Arthritis, Blood transfusion, Chronic back pain, Depression, GERD (gastroesophageal reflux disease), Hx of blood clots, Lymphedema of lower extremity, Migraine, [facility] Obesity, Osteoarthritis, PE (pulmonary thromboembolism) ([facility] Peripheral vascular disease ([facility] Prominent abdominal aortic pulse, Pulmonary embolism (HCC), Swelling of lower limb, Thyroid disease, Urinary incontinence, and Varicose veins of lower extremities. Past Surgical History:  has a past surgical history that includes Bladder surgery; hernia repair; Dental surgery; Tonsillectomy; Dilation and curettage of uterus; other surgical history (09/15/2015); Tibia fracture surgery (Right); fracture surgery; Upper gastrointestinal endoscopy; Colonoscopy; Endoscopy, colon, diagnostic; Total hip arthroplasty (Right, 09/13/2016); open treatment fracture distal tibia & fibula (Right, 11/16/2018); and Upper gastrointestinal endoscopy (N/A, 5/18/2020). Social History:  reports that she quit smoking about 18 years ago. Her smoking use included cigarettes. She started smoking about 45 years ago. She smoked an average of .25 packs per day. She has never used smokeless tobacco. She reports current alcohol use. She reports that she does not use drugs. Family History: family history includes Arthritis in her sister; Cancer in her mother; Dementia in her mother; Diabetes in her father and sister; High Blood Pressure in her father and sister; Lupus in her sister; Obesity in her father; Stroke in her father. The patients home medications have been reviewed.     Allergies: Fentanyl and Ferritin    -------------------------------------------------- RESULTS -------------------------------------------------    LABS:  Results for orders placed or performed during the hospital encounter of 08/17/22   CBC with Auto Differential   Result Value Ref Range    WBC 7.5 4.5 - 11.5 E9/L    RBC 4.52 3.50 - 5.50 E12/L    Hemoglobin 12.2 11.5 - 15.5 g/dL    Hematocrit 37.8 34.0 - 48.0 %    MCV 83.6 80.0 - 99.9 fL    MCH 27.0 26.0 - 35.0 pg    MCHC 32.3 32.0 - 34.5 %    RDW 17.7 (H) 11.5 - 15.0 fL    Platelets 725 (H) 985 - 450 E9/L    MPV 9.1 7.0 - 12.0 fL    Neutrophils % 74.2 43.0 - 80.0 %    Immature Granulocytes % 0.3 0.0 - 5.0 %    Lymphocytes % 16.0 (L) 20.0 - 42.0 %    Monocytes % 7.9 2.0 - 12.0 %    Eosinophils % 0.3 0.0 - 6.0 %    Basophils % 1.3 0.0 - 2.0 %    Neutrophils Absolute 5.54 1.80 - 7.30 E9/L    Immature Granulocytes # 0.02 E9/L    Lymphocytes Absolute 1.19 (L) 1.50 - 4.00 E9/L    Monocytes Absolute 0.59 0.10 - 0.95 E9/L    Eosinophils Absolute 0.02 (L) 0.05 - 0.50 E9/L    Basophils Absolute 0.10 0.00 - 0.20 F4/L   Basic Metabolic Panel   Result Value Ref Range    Sodium 140 132 - 146 mmol/L    Potassium 3.7 3.5 - 5.0 mmol/L    Chloride 102 98 - 107 mmol/L    CO2 21 (L) 22 - 29 mmol/L    Anion Gap 17 (H) 7 - 16 mmol/L    Glucose 141 (H) 74 - 99 mg/dL    BUN 14 6 - 23 mg/dL    Creatinine 0.5 0.5 - 1.0 mg/dL    GFR Non-African American >60 >=60 mL/min/1.73    GFR African American >60     Calcium 9.8 8.6 - 10.2 mg/dL   Hepatic Function Panel   Result Value Ref Range    Total Protein 8.7 (H) 6.4 - 8.3 g/dL    Albumin 4.1 3.5 - 5.2 g/dL    Alkaline Phosphatase 144 (H) 35 - 104 U/L    ALT 18 0 - 32 U/L    AST 21 0 - 31 U/L    Total Bilirubin 0.6 0.0 - 1.2 mg/dL   Urinalysis   Result Value Ref Range    Color, UA Yellow Straw/Yellow    Clarity, UA CLOUDY (A) Clear    Glucose, Ur Negative Negative mg/dL    Bilirubin Urine SMALL (A) Negative    Ketones, Urine >=80 (A) Negative mg/dL    Specific Gravity, UA 1.010 1.005 - 1.030    Blood, Urine TRACE-INTACT Negative    pH, UA 8.5 5.0 - 9.0    Protein, UA 30 (A) Negative mg/dL    Urobilinogen, Urine 0.2 <2.0 E.U./dL    Nitrite, Urine POSITIVE (A) Negative    Leukocyte Esterase, Urine MODERATE (A) Negative   Protime-INR   Result Value Ref Range    Protime 11.5 9.3 - 12.4 sec    INR 1.1    APTT   Result Value Ref Range    aPTT 26.7 24.5 - 35.1 sec   Troponin   Result Value Ref Range    Troponin, High Sensitivity 10 (H) 0 - 9 ng/L   Lactic Acid   Result Value Ref Range    Lactic Acid 2.4 (H) 0.5 - 2.2 mmol/L   Lipase   Result Value Ref Range    Lipase 35 13 - 60 U/L   Microscopic Urinalysis   Result Value Ref Range    WBC, UA >20 (A) 0 - 5 /HPF    RBC, UA NONE 0 - 2 /HPF    Bacteria, UA MANY (A) None Seen /HPF    Amorphous, UA PRESENT     Crystals, UA Many (A) None Seen /HPF   EKG 12 Lead   Result Value Ref Range    Ventricular Rate 64 BPM    Atrial Rate 64 BPM    P-R Interval 152 ms    QRS Duration 88 ms    Q-T Interval 472 ms    QTc Calculation (Bazett) 486 ms    P Axis 42 degrees    R Axis 17 degrees    T Axis 67 degrees   TYPE AND SCREEN   Result Value Ref Range    ABO/Rh A POS     Antibody Screen NEG        RADIOLOGY:  CT ABDOMEN PELVIS W IV CONTRAST Additional Contrast? None    Result Date: 8/17/2022  EXAMINATION: CTA OF THE CHEST; CT OF THE ABDOMEN AND PELVIS WITH CONTRAST 8/17/2022 11:55 am TECHNIQUE: CTA of the chest was performed after the administration of intravenous contrast.  Multiplanar reformatted images are provided for review. MIP images are provided for review. Automated exposure control, iterative reconstruction, and/or weight based adjustment of the mA/kV was utilized to reduce the radiation dose to as low as reasonably achievable.; CT of the abdomen and pelvis was performed with the administration of intravenous contrast. Multiplanar reformatted images are provided for review. Automated exposure control, iterative reconstruction, and/or weight based adjustment of the mA/kV was utilized to reduce the radiation dose to as low as reasonably achievable. COMPARISON: CT angiogram of the chest and CT abdomen/pelvis 03/17/2022 HISTORY: ORDERING SYSTEM PROVIDED HISTORY: eval for PE TECHNOLOGIST PROVIDED HISTORY: Reason for exam:->eval for PE Decision Support Exception - unselect if not a suspected or confirmed emergency medical condition->Emergency Medical Condition (MA) FINDINGS: CTA chest: Examination is significantly degraded by respiratory motion.   There is no evidence of pulmonary embolism within the main pulmonary arteries. There is no evidence of thoracic aortic aneurysm or dissection. No evidence of pleural or pericardial effusion. There is a large hiatal hernia. There is no evidence of lymphadenopathy within the thorax. The central airways are patent. There is no pneumothorax. There is biapical pleuroparenchymal scarring. There are vague opacities in the lateral aspect the right lower lobe and medial aspect of the left lower lobe which are poorly evaluated secondary to the degree of motion. However, these are likely present on the prior examination. There is suggestion of improvement in the medial left lower lobe compared to the prior examination. There is stable atelectasis and/or scarring in the superior segment of the left lower lobe abutting the interlobar fissure. There are degenerative changes within the spine and shoulders. CT abdomen and pelvis: The liver, spleen, pancreas, adrenal glands, and kidneys are unremarkable. The gallbladder is intact without evidence of biliary ductal dilatation. There is no evidence of bowel obstruction, pneumoperitoneum, or ascites. There is difficult visualization of the pelvis secondary to scatter artifact from right hip prosthesis. The urinary bladder is decompressed by indwelling catheter. The uterus and adnexa are grossly unremarkable in appearance. The appendix is unremarkable in appearance. There is atrophy of the right psoas and iliacus muscles. There are degenerative changes within the spine. There is stable compression deformity at L1.     1. Extremely limited evaluation for pulmonary embolism secondary to excessive motion. No evidence of pulmonary embolism within the main pulmonary arteries. 2. Alveolar opacity in the medial left lower lobe is likely decreased compared to 03/17/2022. 3. Large hiatal hernia. 4. No acute abnormality within the abdomen or pelvis.      XR CHEST PORTABLE    Result Date: 8/17/2022  EXAMINATION: ONE XRAY VIEW OF THE CHEST 8/17/2022 11:31 am COMPARISON: None. HISTORY: ORDERING SYSTEM PROVIDED HISTORY: chest pain TECHNOLOGIST PROVIDED HISTORY: Reason for exam:->chest pain FINDINGS: The lungs are without acute focal process. There is no effusion or pneumothorax. The cardiomediastinal silhouette is without acute process. The osseous structures are without acute process. No acute process. CTA PULMONARY W CONTRAST    Result Date: 8/17/2022  EXAMINATION: CTA OF THE CHEST; CT OF THE ABDOMEN AND PELVIS WITH CONTRAST 8/17/2022 11:55 am TECHNIQUE: CTA of the chest was performed after the administration of intravenous contrast.  Multiplanar reformatted images are provided for review. MIP images are provided for review. Automated exposure control, iterative reconstruction, and/or weight based adjustment of the mA/kV was utilized to reduce the radiation dose to as low as reasonably achievable.; CT of the abdomen and pelvis was performed with the administration of intravenous contrast. Multiplanar reformatted images are provided for review. Automated exposure control, iterative reconstruction, and/or weight based adjustment of the mA/kV was utilized to reduce the radiation dose to as low as reasonably achievable. COMPARISON: CT angiogram of the chest and CT abdomen/pelvis 03/17/2022 HISTORY: ORDERING SYSTEM PROVIDED HISTORY: eval for PE TECHNOLOGIST PROVIDED HISTORY: Reason for exam:->eval for PE Decision Support Exception - unselect if not a suspected or confirmed emergency medical condition->Emergency Medical Condition (MA) FINDINGS: CTA chest: Examination is significantly degraded by respiratory motion. There is no evidence of pulmonary embolism within the main pulmonary arteries. There is no evidence of thoracic aortic aneurysm or dissection. No evidence of pleural or pericardial effusion. There is a large hiatal hernia. There is no evidence of lymphadenopathy within the thorax. The central airways are patent. There is no pneumothorax. There is biapical pleuroparenchymal scarring. There are vague opacities in the lateral aspect the right lower lobe and medial aspect of the left lower lobe which are poorly evaluated secondary to the degree of motion. However, these are likely present on the prior examination. There is suggestion of improvement in the medial left lower lobe compared to the prior examination. There is stable atelectasis and/or scarring in the superior segment of the left lower lobe abutting the interlobar fissure. There are degenerative changes within the spine and shoulders. CT abdomen and pelvis: The liver, spleen, pancreas, adrenal glands, and kidneys are unremarkable. The gallbladder is intact without evidence of biliary ductal dilatation. There is no evidence of bowel obstruction, pneumoperitoneum, or ascites. There is difficult visualization of the pelvis secondary to scatter artifact from right hip prosthesis. The urinary bladder is decompressed by indwelling catheter. The uterus and adnexa are grossly unremarkable in appearance. The appendix is unremarkable in appearance. There is atrophy of the right psoas and iliacus muscles. There are degenerative changes within the spine. There is stable compression deformity at L1.     1. Extremely limited evaluation for pulmonary embolism secondary to excessive motion. No evidence of pulmonary embolism within the main pulmonary arteries. 2. Alveolar opacity in the medial left lower lobe is likely decreased compared to 03/17/2022. 3. Large hiatal hernia. 4. No acute abnormality within the abdomen or pelvis.       ------------------------- NURSING NOTES AND VITALS REVIEWED ---------------------------  Date / Time Roomed:  8/17/2022 10:29 AM  ED Bed Assignment:  MAY/MAY    The nursing notes within the ED encounter and vital signs as below have been reviewed.      Patient Vitals for the past 24 hrs:   BP Temp Pulse Resp SpO2 Height Weight   08/17/22 1323 (!) 155/69 97.8 °F (36.6 °C) 89 16 99 % -- --   08/17/22 1100 -- 97.6 °F (36.4 °C) -- -- -- -- --   08/17/22 1045 (!) 189/88 97.6 °F (36.4 °C) 82 22 98 % 5' 5\" (1.651 m) (!) 340 lb (154.2 kg)       Oxygen Saturation Interpretation: Normal    ------------------------------------------ PROGRESS NOTES ------------------------------------------  Counseling:  I have spoken with the patient and discussed todays results, in addition to providing specific details for the plan of care and counseling regarding the diagnosis and prognosis. Their questions are answered at this time and they are agreeable with the plan of admission.    --------------------------------- ADDITIONAL PROVIDER NOTES ---------------------------------  This patient has remained hemodynamically stable during their ED course. Diagnosis:  1. Hematemesis with nausea    2. Hiatal hernia    3. Complicated UTI (urinary tract infection)        Disposition:  Patient's disposition: Admit to med/surg floor  Patient's condition is fair.            Lindsay Vasquez DO  08/17/22 [id number]

## 2022-08-17 NOTE — ED PROVIDER NOTES
[facility]  Department of Emergency Medicine     Written by: Gilda Pascual MD  Patient Name: Rosa Anderson  Attending Provider: No att. providers found  Admit Date: 2022 10:29 AM  MRN: [id number]                   : 1960        No chief complaint on file. - Chief complaint    HPI     Review of Systems     Physical Exam     Procedures       MDM             ***    Patient was seen and evaluated by myself and my attending No att. providers found. Assessment and Plan discussed with attending provider, please see attestation for final plan of care.      Gilda Pascual MD

## 2022-08-18 ENCOUNTER — ANESTHESIA EVENT (OUTPATIENT)
Dept: OPERATING ROOM | Age: 62
DRG: 243 | End: 2022-08-18
Payer: MEDICAID

## 2022-08-18 ENCOUNTER — ANESTHESIA (OUTPATIENT)
Dept: OPERATING ROOM | Age: 62
DRG: 243 | End: 2022-08-18
Payer: MEDICAID

## 2022-08-18 LAB
ANION GAP SERPL CALCULATED.3IONS-SCNC: 11 MMOL/L (ref 7–16)
BUN BLDV-MCNC: 14 MG/DL (ref 6–23)
CALCIUM SERPL-MCNC: 8.8 MG/DL (ref 8.6–10.2)
CHLORIDE BLD-SCNC: 107 MMOL/L (ref 98–107)
CO2: 24 MMOL/L (ref 22–29)
CREAT SERPL-MCNC: 0.7 MG/DL (ref 0.5–1)
GFR AFRICAN AMERICAN: >60
GFR NON-AFRICAN AMERICAN: >60 ML/MIN/1.73
GLUCOSE BLD-MCNC: 102 MG/DL (ref 74–99)
HCT VFR BLD CALC: 31.1 % (ref 34–48)
HEMOGLOBIN: 10 G/DL (ref 11.5–15.5)
LACTIC ACID: 0.9 MMOL/L (ref 0.5–2.2)
MCH RBC QN AUTO: 27 PG (ref 26–35)
MCHC RBC AUTO-ENTMCNC: 32.2 % (ref 32–34.5)
MCV RBC AUTO: 84.1 FL (ref 80–99.9)
PDW BLD-RTO: 18.1 FL (ref 11.5–15)
PLATELET # BLD: 361 E9/L (ref 130–450)
PMV BLD AUTO: 9.4 FL (ref 7–12)
POTASSIUM SERPL-SCNC: 3.7 MMOL/L (ref 3.5–5)
RBC # BLD: 3.7 E12/L (ref 3.5–5.5)
SODIUM BLD-SCNC: 142 MMOL/L (ref 132–146)
WBC # BLD: 6.2 E9/L (ref 4.5–11.5)

## 2022-08-18 PROCEDURE — 6360000002 HC RX W HCPCS: Performed by: INTERNAL MEDICINE

## 2022-08-18 PROCEDURE — 80048 BASIC METABOLIC PNL TOTAL CA: CPT

## 2022-08-18 PROCEDURE — 36415 COLL VENOUS BLD VENIPUNCTURE: CPT

## 2022-08-18 PROCEDURE — 2709999900 HC NON-CHARGEABLE SUPPLY: Performed by: SURGERY

## 2022-08-18 PROCEDURE — 2580000003 HC RX 258: Performed by: INTERNAL MEDICINE

## 2022-08-18 PROCEDURE — 83605 ASSAY OF LACTIC ACID: CPT

## 2022-08-18 PROCEDURE — 85027 COMPLETE CBC AUTOMATED: CPT

## 2022-08-18 PROCEDURE — 7100000011 HC PHASE II RECOVERY - ADDTL 15 MIN: Performed by: SURGERY

## 2022-08-18 PROCEDURE — 0DB78ZX EXCISION OF STOMACH, PYLORUS, VIA NATURAL OR ARTIFICIAL OPENING ENDOSCOPIC, DIAGNOSTIC: ICD-10-PCS | Performed by: SURGERY

## 2022-08-18 PROCEDURE — 3700000000 HC ANESTHESIA ATTENDED CARE: Performed by: SURGERY

## 2022-08-18 PROCEDURE — 6360000002 HC RX W HCPCS: Performed by: NURSE ANESTHETIST, CERTIFIED REGISTERED

## 2022-08-18 PROCEDURE — 6370000000 HC RX 637 (ALT 250 FOR IP): Performed by: INTERNAL MEDICINE

## 2022-08-18 PROCEDURE — 7100000010 HC PHASE II RECOVERY - FIRST 15 MIN: Performed by: SURGERY

## 2022-08-18 PROCEDURE — 6360000002 HC RX W HCPCS: Performed by: STUDENT IN AN ORGANIZED HEALTH CARE EDUCATION/TRAINING PROGRAM

## 2022-08-18 PROCEDURE — 88305 TISSUE EXAM BY PATHOLOGIST: CPT

## 2022-08-18 PROCEDURE — 1200000000 HC SEMI PRIVATE

## 2022-08-18 PROCEDURE — 3600007501: Performed by: SURGERY

## 2022-08-18 PROCEDURE — 6370000000 HC RX 637 (ALT 250 FOR IP): Performed by: STUDENT IN AN ORGANIZED HEALTH CARE EDUCATION/TRAINING PROGRAM

## 2022-08-18 RX ORDER — PROPOFOL 10 MG/ML
INJECTION, EMULSION INTRAVENOUS PRN
Status: DISCONTINUED | OUTPATIENT
Start: 2022-08-18 | End: 2022-08-18 | Stop reason: SDUPTHER

## 2022-08-18 RX ORDER — SODIUM CHLORIDE 0.9 % (FLUSH) 0.9 %
5-40 SYRINGE (ML) INJECTION PRN
Status: DISCONTINUED | OUTPATIENT
Start: 2022-08-18 | End: 2022-08-18 | Stop reason: HOSPADM

## 2022-08-18 RX ORDER — SODIUM CHLORIDE 9 MG/ML
INJECTION, SOLUTION INTRAVENOUS PRN
Status: DISCONTINUED | OUTPATIENT
Start: 2022-08-18 | End: 2022-08-18 | Stop reason: HOSPADM

## 2022-08-18 RX ORDER — SODIUM CHLORIDE 0.9 % (FLUSH) 0.9 %
5-40 SYRINGE (ML) INJECTION EVERY 12 HOURS SCHEDULED
Status: DISCONTINUED | OUTPATIENT
Start: 2022-08-18 | End: 2022-08-18 | Stop reason: HOSPADM

## 2022-08-18 RX ADMIN — GABAPENTIN 300 MG: 300 CAPSULE ORAL at 21:43

## 2022-08-18 RX ADMIN — SODIUM CHLORIDE, PRESERVATIVE FREE 10 ML: 5 INJECTION INTRAVENOUS at 08:12

## 2022-08-18 RX ADMIN — LEVOTHYROXINE SODIUM 175 MCG: 0.12 TABLET ORAL at 08:10

## 2022-08-18 RX ADMIN — LIDOCAINE HYDROCHLORIDE: 20 SOLUTION ORAL; TOPICAL at 08:11

## 2022-08-18 RX ADMIN — SODIUM CHLORIDE: 9 INJECTION, SOLUTION INTRAVENOUS at 05:49

## 2022-08-18 RX ADMIN — PANTOPRAZOLE SODIUM 40 MG: 40 TABLET, DELAYED RELEASE ORAL at 08:10

## 2022-08-18 RX ADMIN — ACETAMINOPHEN 1300 MG: 325 TABLET ORAL at 08:10

## 2022-08-18 RX ADMIN — LIDOCAINE HYDROCHLORIDE: 20 SOLUTION ORAL; TOPICAL at 21:44

## 2022-08-18 RX ADMIN — SODIUM CHLORIDE: 9 INJECTION, SOLUTION INTRAVENOUS at 21:42

## 2022-08-18 RX ADMIN — ENOXAPARIN SODIUM 40 MG: 100 INJECTION SUBCUTANEOUS at 21:44

## 2022-08-18 RX ADMIN — SODIUM CHLORIDE, PRESERVATIVE FREE 10 ML: 5 INJECTION INTRAVENOUS at 21:44

## 2022-08-18 RX ADMIN — CEFTRIAXONE 1000 MG: 1 INJECTION, POWDER, FOR SOLUTION INTRAMUSCULAR; INTRAVENOUS at 17:13

## 2022-08-18 RX ADMIN — ACETAMINOPHEN 1300 MG: 325 TABLET ORAL at 21:43

## 2022-08-18 RX ADMIN — PROCHLORPERAZINE EDISYLATE 10 MG: 5 INJECTION INTRAMUSCULAR; INTRAVENOUS at 08:11

## 2022-08-18 RX ADMIN — LORAZEPAM 0.5 MG: 0.5 TABLET ORAL at 21:43

## 2022-08-18 RX ADMIN — DULOXETINE HYDROCHLORIDE 120 MG: 60 CAPSULE, DELAYED RELEASE ORAL at 08:10

## 2022-08-18 RX ADMIN — PROPOFOL 80 MG: 10 INJECTION, EMULSION INTRAVENOUS at 16:18

## 2022-08-18 RX ADMIN — GABAPENTIN 300 MG: 300 CAPSULE ORAL at 08:10

## 2022-08-18 ASSESSMENT — PAIN DESCRIPTION - DESCRIPTORS: DESCRIPTORS: BURNING

## 2022-08-18 ASSESSMENT — PAIN SCALES - GENERAL
PAINLEVEL_OUTOF10: 5
PAINLEVEL_OUTOF10: 0

## 2022-08-18 ASSESSMENT — LIFESTYLE VARIABLES: SMOKING_STATUS: 0

## 2022-08-18 ASSESSMENT — PAIN DESCRIPTION - LOCATION: LOCATION: ABDOMEN

## 2022-08-18 NOTE — PATIENT CARE CONFERENCE
P Quality Flow/Interdisciplinary Rounds Progress Note        Quality Flow Rounds held on August 18, 2022    Disciplines Attending:  Bedside Nurse, , and Nursing Unit Leadership    Steve Narvaez was admitted on 8/17/2022 10:29 AM    Anticipated Discharge Date:       Disposition:    Agustin Score:  Agustin Scale Score: 15    Readmission Risk              Risk of Unplanned Readmission:  14           Discussed patient goal for the day, patient clinical progression, and barriers to discharge.   The following Goal(s) of the Day/Commitment(s) have been identified:  Diagnostics - Report Results- EGD today      Giselle Grant RN  August 18, 2022

## 2022-08-18 NOTE — ANESTHESIA POSTPROCEDURE EVALUATION
Department of Anesthesiology  Postprocedure Note    Patient: Isatu Silvestre  MRN: [id number]  YOB: 1960  Date of evaluation: 8/18/2022      Procedure Summary     Date: 08/18/22 Room / Location: [facility] OR 07 / [facility]    Anesthesia Start: 5483 Anesthesia Stop: 1628    Procedure: EGD BIOPSY Diagnosis:       Hiatal hernia      (HIATAL HERNIA)    Surgeons: Anthony Mulligan MD Responsible Provider: Alvarado Bailey DO    Anesthesia Type: MAC ASA Status: 4          Anesthesia Type: No value filed.     Jessee Phase I:      Jessee Phase II:        Anesthesia Post Evaluation    Patient location during evaluation: bedside  Patient participation: complete - patient participated  Level of consciousness: awake and alert  Airway patency: patent  Nausea & Vomiting: no vomiting and no nausea  Cardiovascular status: hemodynamically stable  Respiratory status: room air and spontaneous ventilation  Hydration status: stable

## 2022-08-18 NOTE — CARE COORDINATION
Introduced my self and provided explanation of CM role to patient. Patient is awake, alert, and aware of current diagnosis and treatment plan including EGD today. She voices she resides at home with her son and daughter in law and is dependent for her adl completion. She has a hospital bed at home. Patient is established with a pcp and denies any issue with retail pharmaceutical coverage. Patient voices she will need non emergency stretcher transportation to return home at time of discharge. Demo's and certification form are prepared and aere placed inlite chart. Patient denied other home going needs. Will follow along with  and assist with discharge planning as necessary. Jalil Suárez.  Willy, MSN, RN  [facility] Case Management  [phone]

## 2022-08-18 NOTE — ANESTHESIA PRE PROCEDURE
Department of Anesthesiology  Preprocedure Note       Name:  Aviva Huddleston   Age:  60 y.o.  :  1960                                          MRN:  [id number]         Date:  2022      Surgeon: Soledad Justice):  Navin Melton MD    Procedure: Procedure(s):  EGD ESOPHAGOGASTRODUODENOSCOPY    Medications prior to admission:   Prior to Admission medications    Medication Sig Start Date End Date Taking? Authorizing Provider   acetaminophen (TYLENOL) 650 MG extended release tablet Take 1,300 mg by mouth 2 times daily   Yes Historical Provider, MD   gabapentin (NEURONTIN) 300 MG capsule Take 300 mg by mouth 2 times daily. Yes Historical Provider, MD   pantoprazole (PROTONIX) 40 MG tablet Take 40 mg by mouth every morning   Yes Historical Provider, MD   sucralfate (CARAFATE) 1 GM tablet Take 1 g by mouth 4 times daily   Yes Historical Provider, MD   LORazepam (ATIVAN) 0.5 MG tablet Take 0.5 mg by mouth nightly.    Yes Historical Provider, MD   aluminum & magnesium hydroxide-simethicone (MAALOX) 200-200-20 MG/5ML SUSP suspension Take 5 mLs by mouth every 6 hours as needed for Indigestion   Yes Historical Provider, MD   levothyroxine (SYNTHROID) 175 MCG tablet Take 262.5 mcg by mouth once a week Indications: Sundays *SUNDAYS*  **SEE OTHER ORDER**    Historical Provider, MD   polyethylene glycol (GLYCOLAX) packet Take 17 g by mouth daily as needed for Constipation    Historical Provider, MD   levothyroxine (SYNTHROID) 175 MCG tablet Take 175 mcg by mouth Six times weekly *[id number]*  -OMIT SUNDAYS-  **SEE OTHER ORDER**    Historical Provider, MD   cyclobenzaprine (FLEXERIL) 10 MG tablet Take 10 mg by mouth 2 times daily    Historical Provider, MD   DULoxetine (CYMBALTA) 60 MG extended release capsule Take 120 mg by mouth every morning    Historical Provider, MD   Rectal Cleansers (FLE[employer] CLEANSING ENEMA RE) Place 1 Dose rectally as needed (SEVERE CONSTIPATION)    Historical Provider, MD senna (SENOKOT) 8.6 MG tablet Take 2 tablets by mouth nightly     Historical Provider, MD   SUMAtriptan (IMITREX) 100 MG tablet Take 100 mg by mouth as needed for Migraine    Historical Provider, MD       Current medications:    Current Facility-Administered Medications   Medication Dose Route Frequency Provider Last Rate Last Admin    sodium chloride flush 0.9 % injection 10 mL  10 mL IntraVENous PRN Jimy Bland DO   10 mL at 08/17/22 1618    trimethobenzamide (TIGAN) injection 200 mg  200 mg IntraMUSCular Once Jimy Bland DO        trimethobenzamide Tessa Cancer) injection 200 mg  200 mg IntraMUSCular Q6H PRN Lokesh Osborne MD   200 mg at 08/17/22 2118    prochlorperazine (COMPAZINE) injection 10 mg  10 mg IntraVENous Q6H PRN Lokesh Osborne MD   10 mg at 08/18/22 0811    aluminum & magnesium hydroxide-simethicone (MAALOX) 30 mL, lidocaine viscous hcl (XYLOCAINE) 5 mL (GI COCKTAIL)   Oral 4x Daily Lokesh Osborne MD   Given at 08/18/22 0811    cefTRIAXone (ROCEPHIN) 1,000 mg in sterile water 10 mL IV syringe  1,000 mg IntraVENous Q24H Zeinab Benavidez MD   1,000 mg at 08/17/22 1617    traMADol (ULTRAM) tablet 50 mg  50 mg Oral Q6H PRN Zeinab Benavidez MD   50 mg at 08/17/22 1617    acetaminophen (TYLENOL) tablet 1,300 mg  1,300 mg Oral BID Zeinab Benavidez MD   1,300 mg at 08/18/22 0810    DULoxetine (CYMBALTA) extended release capsule 120 mg  120 mg Oral QAM Zeinab Benavidez MD   120 mg at 08/18/22 0810    gabapentin (NEURONTIN) capsule 300 mg  300 mg Oral BID Zeinab Benavidez MD   300 mg at 08/18/22 0810    levothyroxine (SYNTHROID) tablet 175 mcg  175 mcg Oral Once per day on Mon Tue Wed Thu Fri Sat Zeinab Benavidez MD   175 mcg at 08/18/22 0810    [START ON 8/21/2022] levothyroxine (SYNTHROID) tablet 262.5 mcg  262.5 mcg Oral Weekly Zeinab Benavidez MD        LORazepam (ATIVAN) tablet 0.5 mg  0.5 mg Oral Nightly Orvilla Benavidez, MD   0.5 mg at 08/17/22 7283    pantoprazole (PROTONIX) tablet 40 mg  40 mg Oral QAM Valentín Diaz MD   40 mg at 08/18/22 0810    sodium chloride flush 0.9 % injection 5-40 mL  5-40 mL IntraVENous 2 times per day Valentín Diaz MD   10 mL at 08/18/22 8740    sodium chloride flush 0.9 % injection 5-40 mL  5-40 mL IntraVENous PRN Valentín Diaz MD        0.9 % sodium chloride infusion   IntraVENous PRN Valentín Diaz MD        enoxaparin (LOVENOX) injection 40 mg  40 mg SubCUTAneous BID Valentín Diaz MD        ondansetron (ZOFRAN-ODT) disintegrating tablet 4 mg  4 mg Oral Q8H PRN Valentín Diaz MD        Or    [facility]dansetron [facility]) injection 4 mg  4 mg IntraVENous Q6H PRN Valentín Daiz MD   4 mg at 08/17/22 2117    polyethylene glycol (GLYCOLAX) packet 17 g  17 g Oral Daily PRN Valentín Diaz MD        0.9 % sodium chloride infusion   IntraVENous Continuous Valentín Diaz MD 75 mL/hr at 08/18/22 0549 New Bag at 08/18/22 0549     Facility-Administered Medications Ordered in Other Encounters   Medication Dose Route Frequency Provider Last Rate Last Admin    0.9 % sodium chloride bolus  250 mL IntraVENous Once Katherine Gomez MD           Allergies: Allergies   Allergen Reactions    Fentanyl Itching     Fentanyl patch--- itchy and red \"dots\" all over back and arms    Ferritin Other (See Comments)     Broke out \"into a sweat, my blood pressure shot up, I felt like lead on my chest and hard to breath. \"       Problem List:    Patient Active Problem List   Diagnosis Code    [facility] G[facility]    History of pulmonary embolus (PE) Z86.711    Acquired hypothyroidism E03.9    Closed fracture of distal end of right femur with nonunion S72.401K    Morbid obesity with BMI of 50.0-59.9, adult ([facility] E66.01, Z68.43    Acute blood loss as cause of postoperative anemia D62    Multiple sclerosis ([facility] G35    Closed nondisplaced spiral fracture of shaft of right tibia with nonunion S82.244K    Closed nondisplaced spiral fracture of shaft of right fibula with routine healing S82.444D    Chest pain R07.9    Upper GI bleed K92.2    Esophageal ulcer K22.10    Hematemesis K92.0       Past Medical History:        Diagnosis Date    Acquired hypothyroidism 9/14/2016    Acute blood loss as cause of postoperative anemia 11/19/2018    Anemia     Anticoagulant long-term use     Anxiety     Arthritis     Blood transfusion     Chronic back pain     Depression     GERD (gastroesophageal reflux disease)     Hx of blood clots     Lymphedema of lower extremity 4/17/2014    Migraine     Multiple sclerosis ([facility] 2000    Obesity     Osteoarthritis     PE (pulmonary thromboembolism) ([facility] 02/2016    Peripheral vascular disease (HCC)     vericose veins    Prominent abdominal aortic pulse 4/17/2014    Pulmonary embolism (HCC) 05/2016    Swelling of lower limb 4/17/2014    Thyroid disease     Urinary incontinence     has a indwelling catheter    Varicose veins of lower extremities 4/17/2014       Past Surgical History:        Procedure Laterality Date    BLADDER SURGERY      COLONOSCOPY      DENTAL SURGERY      random teeth extraction    DILATION AND CURETTAGE OF UTERUS      ENDOSCOPY, COLON, DIAGNOSTIC      FRACTURE SURGERY      HERNIA REPAIR      OPEN TREATMENT FRACTURE DISTAL TIBIA & FIBULA Right 11/16/2018    OPEN REDUCTION INTERNAL FIXATION RIGHT DISTAL FEMUR, RIGHT DISTAL TIBIA performed by Griselda Wallace DO at [address]  09/15/2015    LAPAROSCOPIC HIATAL HERNIA REPAIR WITH FUNDOPLICATION WITH MYOFASCIAL FLAP    TIBIA FRACTURE SURGERY Right     TONSILLECTOMY      TOTAL HIP ARTHROPLASTY Right 09/13/2016    Right Total Hip Arthroplasty    UPPER GASTROINTESTINAL ENDOSCOPY      UPPER GASTROINTESTINAL ENDOSCOPY N/A 5/18/2020    EGD BIOPSY performed by Afshan Gusman MD at [facility] ENDOSCOPY       Social History:    Social History     Tobacco Use    Smoking status: Former Packs/day: 0.25     Types: Cigarettes     Start date: 1977     Quit date: 2004     Years since quittin.3    Smokeless tobacco: Never   Substance Use Topics    Alcohol use: Yes     Comment: occassional                                Counseling given: Not Answered      Vital Signs (Current):   Vitals:    22 1500 22 1944 22 0035 22 0745   BP: (!) 140/98 116/68  122/71   Pulse: 89 93  98   Resp:    Temp: 37.1 °C (98.8 °F) 36.9 °C (98.4 °F)  36.8 °C (98.3 °F)   TempSrc: Axillary Oral  Oral   SpO2: 95% 98%  96%   Weight:   (!) 340 lb (154.2 kg)    Height:                                                  BP Readings from Last 3 Encounters:   22 122/71   22 129/82   22 126/85       NPO Status:                                                                                 BMI:   Wt Readings from Last 3 Encounters:   22 (!) 340 lb (154.2 kg)   22 (!) 340 lb (154.2 kg)   21 (!) 340 lb (154.2 kg)     Body mass index is 56.58 kg/m².     CBC:   Lab Results   Component Value Date/Time    WBC 6.2 2022 02:15 AM    RBC 3.70 2022 02:15 AM    HGB 10.0 2022 02:15 AM    HCT 31.1 2022 02:15 AM    MCV 84.1 2022 02:15 AM    RDW 18.1 2022 02:15 AM     2022 02:15 AM       CMP:   Lab Results   Component Value Date/Time     2022 02:15 AM    K 3.7 2022 02:15 AM    K 4.2 2022 03:08 PM     2022 02:15 AM    CO2 24 2022 02:15 AM    BUN 14 2022 02:15 AM    CREATININE 0.7 2022 02:15 AM    GFRAA >60 2022 02:15 AM    LABGLOM >60 2022 02:15 AM    GLUCOSE 102 2022 02:15 AM    GLUCOSE 94 2012 09:06 PM    PROT 8.7 2022 10:48 AM    CALCIUM 8.8 2022 02:15 AM    BILITOT 0.6 2022 10:48 AM    ALKPHOS 144 2022 10:48 AM    AST 21 2022 10:48 AM    ALT 18 2022 10:48 AM       POC Tests: No results for input(s): POCGLU, Camella [place], POCCL, POCBUN, POCHEMO, POCHCT in the last 72 hours. Coags:   Lab Results   Component Value Date/Time    PROTIME 11.5 08/17/2022 10:48 AM    INR 1.1 08/17/2022 10:48 AM    APTT 26.7 08/17/2022 10:48 AM       HCG (If Applicable):   Lab Results   Component Value Date    PREGTESTUR NEGATIVE 12/14/2016        ABGs: No results found for: PHART, PO2ART, FHL4CXR, NNV8XMM, BEART, N4VJTOUT     Type & Screen (If Applicable):  No results found for: LABABO, LABRH    Drug/Infectious Status (If Applicable):  No results found for: HIV, HEPCAB    COVID-19 Screening (If Applicable): No results found for: COVID19    EKG 8/17/22:  Narrative & Impression    Normal sinus rhythm  Cannot rule out Anterior infarct , age undetermined  Abnormal ECG  When compared with ECG of 17-MAR-2022 16:09,  No significant change was found     CTA Pulmonary 8/17/22:  Impression   1. Extremely limited evaluation for pulmonary embolism secondary to excessive   motion. No evidence of pulmonary embolism within the main pulmonary arteries. 2. Alveolar opacity in the medial left lower lobe is likely decreased   compared to 03/17/2022.   3. Large hiatal hernia. 4. No acute abnormality within the abdomen or pelvis. CXR 8/17/22:  FINDINGS:   The lungs are without acute focal process.  There is no effusion or   pneumothorax. The cardiomediastinal silhouette is without acute process. The   osseous structures are without acute process. Carotid US 4/28/17:  Impression   1. Mild atherosclerotic soft and calcified plaque deposition   bilaterally. 2. Evidence for hemodynamically significant stenosis on the right. 3. Suggestion of a focal 50-69% stenosis of the left carotid artery   based on the submitted velocities. Although this could also be due to   vascular tortuosity as well.          Anesthesia Evaluation  Patient summary reviewed and Nursing notes reviewed no history of anesthetic complications:   Airway: Mallampati: II  TM distance: <3 FB   Neck ROM: full  Mouth opening: > = 3 FB   Dental:    (+) upper dentures      Pulmonary: breath sounds clear to auscultation      (-) not a current smoker (Former smoker - Quit in 2004)                          ROS comment: Patient had Covid late July 2022. Resolved   Cardiovascular:  Exercise tolerance: no interval change,         ECG reviewed  Rhythm: regular  Rate: normal           Beta Blocker:  Not on Beta Blocker         Neuro/Psych:   (+) neuromuscular disease (Chronic back pain and neuropathy): multiple sclerosis, headaches: migraine headaches, psychiatric history (Depression/Anxiety):            GI/Hepatic/Renal:   (+) hiatal hernia, GERD:, PUD (Esophageal ulcer), morbid obesity (SMO with a BMI of 57 kg/m2)         ROS comment: GI bleed and hematemesis. Endo/Other:    (+) hypothyroidism, blood dyscrasia: anticoagulation therapy, arthritis (Chronic back pain): OA., .                 Abdominal:   (+) obese,           Vascular:   + PVD, aortic or cerebral, DVT, PE (Hx of pulmonary thromboembolism). ROS comment: Varicose veins. Other Findings:           Anesthesia Plan      MAC     ASA 4     (20 gauge IV left wrist)  Induction: intravenous. Anesthetic plan and risks discussed with patient. Use of blood products discussed with patient whom consented to blood products. Plan discussed with CRNA and attending. Vicki Ngo RN   8/18/2022    History, data, and pertinent studies from chart review. Above represents information available via the shared medical record including previous anesthetic, medication, and allergy history. Confirmation of above and final disposition per DOS anesthesiologist.    Karen Moran DO  Staff Anesthesiologist  August 18, 2022  11:59 AM    Chart reviewed, patient seen and agree with above evaluation. ERICA Pepper - CRNA    Patient seen and examined, chart reviewed, agree with above findings.   Anesthetic plan, risks,

## 2022-08-18 NOTE — H&P
[id number] [address]                              HISTORY AND PHYSICAL    PATIENT NAME: Wan King                 :        1960  MED REC NO:   [id number]                            ROOM:       0501  ACCOUNT NO:   [de-identified]                           ADMIT DATE: 2022  PROVIDER:     Kaia Pierre MD    CHIEF COMPLAINT:  Abdominal pain. HISTORY OF PRESENT ILLNESS:  This is a 50-year-old woman who presented  to the emergency room with increasing epigastric abdominal pain, mild  nausea, and several bouts of vomiting over the one to one and a half  days prior to admission. She states that several bouts of vomiting were  associated with black coffee-ground emesis. She has not had any melena  or hematochezia. She does have a history of esophageal ulcers on  endoscopy back in . She has a history of gastric  volvulus/paraesophageal hernia repaired with fundoplication back in  . Presently, she is lying in bed comfortably in no acute distress. She  did not have any significant vomiting overnight. Her hemoglobin was 12  on admission. It is 10 this morning; however, her baseline hemoglobin  has chronically been around 10, so I do not think she has had any  significant blood loss. She is chronically on a PPI in the form of  pantoprazole, which she states she has been taking consistently. She  denies any use of any anti-inflammatory medications. PAST MEDICAL HISTORY:  Significant for osteopenia, vitamin D deficiency,  generalized osteoarthritis. She has a history of migraine, depression,  chronic venous insufficiency, chronic iron deficiency, a remote history  of multiple sclerosis, gastroesophageal reflux disease, hypothyroidism,  and chronic physical disability. She is essentially bed-bound.   She has  had major injuries to her hip with significant fractures in the past  that limit her mobility. PAST SURGICAL HISTORY:  Includes a fundoplication in 96/6882 and a right  total hip arthroplasty in 08/2016. SOCIAL HISTORY:  She lives at home with her son. She is a nonsmoker. She does not drink any alcohol. MEDICATIONS:  Include gabapentin 300 mg t.i.d., Synthroid 175 mcg one  tablet Monday through Saturday and one-half tablet on Sunday,  pantoprazole 40 mg daily, duloxetine 60 mg two tablets daily, lorazepam  0.5 mg every 12 hours as needed, Zofran 4 mg every six hours as needed,  Carafate one tablet four times a day, cyclobenzaprine 10 mg every 12  hours as needed. REVIEW OF SYSTEMS:  SKIN:  Reveals no new or changing moles, rashes, or lesions. LUNGS:  No shortness of breath, cough, or wheezing. GI:  As above. CARDIOVASCULAR:  No chest pain, palpitations, or shortness of breath. No history of angina, MI, or CHF. MUSCULOSKELETAL:  She has had multiple orthopedic injuries in the past  including several fractures of her leg, which combined with her MS has  left her unable to stand or walk. NEUROLOGIC:  She has a history of remote MS. She has not been on any  treatment for this. No history of CVA or TIA symptoms. No  paresthesias. No headaches. PHYSICAL EXAMINATION:  VITAL SIGNS:  Stable. She is afebrile. HEENT:  Head:  Normocephalic and atraumatic. Eyes:  PERRLA. Extraocular movements are intact without nystagmus. Throat:  Oral and  buccal mucosa are moist without oral ulcer, exudate, or lesion. NECK:  No goiter, bruit, or lymphadenopathy. CHEST:  Symmetrical excursions with inspiration. BACK:  No CVA or spine tenderness. HEART:  Regular rate and rhythm without murmur, rub, gallop, or JVD. LUNGS:  Clear to auscultation and percussion bilaterally with no  wheezes, rubs, rales, or use of accessory respiratory muscles. ABDOMEN:  Obese. Soft. Positive bowel sounds x4. She has mild  tenderness over her mid epigastric area without guarding or rebound.   EXTREMITIES: No clubbing or cyanosis. Trace lower leg edema  bilaterally with chronic stasis dermatitis changes, but no acute  ulceration or skin breakdown. She does have chronically decreased range  of motion in her right hip and right knee due to previous fractures. NEUROLOGIC:  Cranial nerves II through XII are grossly intact with a  grossly normal sensory exam.  She has 4/5 strength in her proximal upper  extremities and 4/5 strength in her proximal lower extremities. Again,  gait, she is nonambulatory. ASSESSMENT AND PLAN:  1. Hematemesis. The patient presents with mid epigastric discomfort  and several bouts of vomiting associated with coffee-ground emesis. Her  hemoglobin this morning is 10, which is near her baseline. She does not  appear to have any ongoing significant GI bleeding. She has been seen  by Surgery. She is n.p.o. She is on b.i.d. PPI. She is scheduled for  an endoscopy this morning. 2.  Epigastric abdominal pain, as above. 3.  Chronic anemia. Her hemoglobin is 10. She has chronic history of  iron deficiency anemia and takes periodic IV iron. Though her  hemoglobin was 12 on admission and now 10, I do not think this  represents significant blood loss anemia. Her baseline hemoglobin has  chronically been around 10 and I think it is presently at baseline. 4.  Depression. This is chronic and stable. We will continue her on  her usual regimen. 5.  Generalized anxiety disorder. This is stable. We will continue her  on her p.r.n. dosing of lorazepam.  6.  Hypothyroidism. She is clinically euthyroid and we will continue  her on her usual dose of thyroid medication. 7.  Gastroesophageal reflux disease. This has generally been well  controlled with PPI and sucralfate. 8.  Urinary retention. The patient has a chronic indwelling Lloyd that  is changed monthly. This is presumably neurogenic and due to her  history of multiple sclerosis.         Saritha Kilgore MD    D: 08/18/2022 7:55:42 T:  08/18/2022 7:59:43     [email or web address]  Job#: [id number]     Doc#: [id number]    CC:

## 2022-08-19 VITALS
TEMPERATURE: 98.1 F | RESPIRATION RATE: 18 BRPM | SYSTOLIC BLOOD PRESSURE: 134 MMHG | OXYGEN SATURATION: 97 % | DIASTOLIC BLOOD PRESSURE: 81 MMHG | BODY MASS INDEX: 48.82 KG/M2 | WEIGHT: 293 LBS | HEIGHT: 65 IN | HEART RATE: 94 BPM

## 2022-08-19 LAB
HCT VFR BLD CALC: 30.2 % (ref 34–48)
HEMOGLOBIN: 9.5 G/DL (ref 11.5–15.5)
MCH RBC QN AUTO: 26.9 PG (ref 26–35)
MCHC RBC AUTO-ENTMCNC: 31.5 % (ref 32–34.5)
MCV RBC AUTO: 85.6 FL (ref 80–99.9)
PDW BLD-RTO: 17.9 FL (ref 11.5–15)
PLATELET # BLD: 303 E9/L (ref 130–450)
PMV BLD AUTO: 9.2 FL (ref 7–12)
RBC # BLD: 3.53 E12/L (ref 3.5–5.5)
URINE CULTURE, ROUTINE: NORMAL
WBC # BLD: 4.4 E9/L (ref 4.5–11.5)

## 2022-08-19 PROCEDURE — 6360000002 HC RX W HCPCS: Performed by: INTERNAL MEDICINE

## 2022-08-19 PROCEDURE — 2580000003 HC RX 258: Performed by: INTERNAL MEDICINE

## 2022-08-19 PROCEDURE — 6370000000 HC RX 637 (ALT 250 FOR IP): Performed by: STUDENT IN AN ORGANIZED HEALTH CARE EDUCATION/TRAINING PROGRAM

## 2022-08-19 PROCEDURE — 36415 COLL VENOUS BLD VENIPUNCTURE: CPT

## 2022-08-19 PROCEDURE — 6370000000 HC RX 637 (ALT 250 FOR IP): Performed by: INTERNAL MEDICINE

## 2022-08-19 PROCEDURE — 85027 COMPLETE CBC AUTOMATED: CPT

## 2022-08-19 RX ORDER — PANTOPRAZOLE SODIUM 40 MG/1
40 TABLET, DELAYED RELEASE ORAL 2 TIMES DAILY
Qty: 30 TABLET | Refills: 3 | Status: SHIPPED | OUTPATIENT
Start: 2022-08-19

## 2022-08-19 RX ADMIN — LEVOTHYROXINE SODIUM 175 MCG: 0.12 TABLET ORAL at 08:52

## 2022-08-19 RX ADMIN — ACETAMINOPHEN 1300 MG: 325 TABLET ORAL at 08:53

## 2022-08-19 RX ADMIN — DULOXETINE HYDROCHLORIDE 120 MG: 60 CAPSULE, DELAYED RELEASE ORAL at 08:52

## 2022-08-19 RX ADMIN — PANTOPRAZOLE SODIUM 40 MG: 40 TABLET, DELAYED RELEASE ORAL at 08:57

## 2022-08-19 RX ADMIN — GABAPENTIN 300 MG: 300 CAPSULE ORAL at 08:52

## 2022-08-19 RX ADMIN — SODIUM CHLORIDE: 9 INJECTION, SOLUTION INTRAVENOUS at 08:48

## 2022-08-19 RX ADMIN — LIDOCAINE HYDROCHLORIDE: 20 SOLUTION ORAL; TOPICAL at 08:51

## 2022-08-19 RX ADMIN — ENOXAPARIN SODIUM 40 MG: 100 INJECTION SUBCUTANEOUS at 08:53

## 2022-08-19 NOTE — PROGRESS NOTES
Subjective: The patient is awake and alert. No problems overnight. Denies chest pain, angina, and dyspnea. Denies abdominal pain. Tolerating diet. No nausea or vomiting. EGD showed hiatal hernia with esophagitis    Objective:    BP (!) 110/58   Pulse 83   Temp 98.2 °F (36.8 °C) (Oral)   Resp 18   Ht 5' 5\" (1.651 m)   Wt (!) 340 lb (154.2 kg)   SpO2 95%   BMI 56.58 kg/m²   Neck: No goiter, bruit, or LA  Heart:  RRR, no murmurs, gallops, or rubs.   Lungs:  CTA bilaterally, no wheeze, rales or rhonchi  Abd: bowel sounds present, nontender, nondistended, no masses  Extrem:  No clubbing, cyanosis, or edema, 2+ peripheral pulses, FROM    CBC:   Lab Results   Component Value Date/Time    WBC 6.2 08/18/2022 02:15 AM    RBC 3.70 08/18/2022 02:15 AM    HGB 10.0 08/18/2022 02:15 AM    HCT 31.1 08/18/2022 02:15 AM    MCV 84.1 08/18/2022 02:15 AM    MCH 27.0 08/18/2022 02:15 AM    MCHC 32.2 08/18/2022 02:15 AM    RDW 18.1 08/18/2022 02:15 AM     08/18/2022 02:15 AM    MPV 9.4 08/18/2022 02:15 AM     BMP:    Lab Results   Component Value Date/Time     08/18/2022 02:15 AM    K 3.7 08/18/2022 02:15 AM    K 4.2 03/17/2022 03:08 PM     08/18/2022 02:15 AM    CO2 24 08/18/2022 02:15 AM    BUN 14 08/18/2022 02:15 AM    LABALBU 4.1 08/17/2022 10:48 AM    CREATININE 0.7 08/18/2022 02:15 AM    CALCIUM 8.8 08/18/2022 02:15 AM    GFRAA >60 08/18/2022 02:15 AM    LABGLOM >60 08/18/2022 02:15 AM    GLUCOSE 102 08/18/2022 02:15 AM    GLUCOSE 94 05/30/2012 09:06 PM          Current Facility-Administered Medications:     sodium chloride flush 0.9 % injection 10 mL, 10 mL, IntraVENous, PRN, Anthony Thompson DO, 10 mL at 08/17/22 1618    trimethobenzamide (TIGAN) injection 200 mg, 200 mg, IntraMUSCular, Once, Anthony Thompson DO    trimethobenzamide Randine Rule) injection 200 mg, 200 mg, IntraMUSCular, Q6H PRN, Justine Romero MD, 200 mg at 08/17/22 2118    prochlorperazine (COMPAZINE) injection 10 mg, 10 mg, IntraVENous, Q6H PRN, Shala Davila MD, 10 mg at 08/18/22 0811    aluminum & magnesium hydroxide-simethicone (MAALOX) 30 mL, lidocaine viscous hcl (XYLOCAINE) 5 mL (GI COCKTAIL), , Oral, 4x Daily, Shala Davila MD, Given at 08/18/22 2144    cefTRIAXone (ROCEPHIN) 1,000 mg in sterile water 10 mL IV syringe, 1,000 mg, IntraVENous, Q24H, Slava Rosas MD, 1,000 mg at 08/18/22 1713    traMADol (ULTRAM) tablet 50 mg, 50 mg, Oral, Q6H PRN, Slava Rosas MD, 50 mg at 08/17/22 1617    acetaminophen (TYLENOL) tablet 1,300 mg, 1,300 mg, Oral, BID, Slava Rosas MD, 1,300 mg at 08/18/22 2143    DULoxetine (CYMBALTA) extended release capsule 120 mg, 120 mg, Oral, QAM, Slava Rosas MD, 120 mg at 08/18/22 0810    gabapentin (NEURONTIN) capsule 300 mg, 300 mg, Oral, BID, Slava Rosas MD, 300 mg at 08/18/22 2143    levothyroxine (SYNTHROID) tablet 175 mcg, 175 mcg, Oral, Once per day on Mon Tue Wed Thu Fri Sat, Slava Rosas MD, 175 mcg at 08/18/22 0810    [START ON 8/21/2022] levothyroxine (SYNTHROID) tablet 262.5 mcg, 262.5 mcg, Oral, Weekly, Slava Rosas MD    LORazepam (ATIVAN) tablet 0.5 mg, 0.5 mg, Oral, Nightly, Slava Rosas MD, 0.5 mg at 08/18/22 2143    pantoprazole (PROTONIX) tablet 40 mg, 40 mg, Oral, QAM, Slava Rosas MD, 40 mg at 08/18/22 0810    sodium chloride flush 0.9 % injection 5-40 mL, 5-40 mL, IntraVENous, 2 times per day, Slava Rosas MD, 10 mL at 08/18/22 2144    sodium chloride flush 0.9 % injection 5-40 mL, 5-40 mL, IntraVENous, PRN, Slava Rosas MD    0.9 % sodium chloride infusion, , IntraVENous, PRN, Slava Rosas MD    enoxaparin (LOVENOX) injection 40 mg, 40 mg, SubCUTAneous, BID, Slava Rosas MD, 40 mg at 08/18/22 2144    ondansetron (ZOFRAN-ODT) disintegrating tablet 4 mg, 4 mg, Oral, Q8H PRN **OR** ondansetron (ZOFRAN) injection 4 mg, 4 mg, IntraVENous, Q6H PRN, Slava Rosas MD, 4 mg at 08/17/22 2117    polyethylene glycol (GLYCOLAX) packet 17 g, 17 g, Oral, Daily PRN, Yohana Simpson MD    0.9 % sodium chloride infusion, , IntraVENous, Continuous, Yohana Simpson MD, Last Rate: 75 mL/hr at 08/18/22 2142, New Bag at 08/18/22 2142    Facility-Administered Medications Ordered in Other Encounters:     0.9 % sodium chloride bolus, 250 mL, IntraVENous, Once, Ross Colón MD    Assessment:    Patient Active Problem List   Diagnosis    MS (multiple sclerosis) ([facility] [address]    History of pulmonary embolus (PE)    Acquired hypothyroidism    Closed fracture of distal end of right femur with nonunion    Morbid obesity with BMI of 50.0-59.9, adult ([facility] [address]    Acute blood loss as cause of postoperative anemia    Multiple sclerosis (HCC)    Closed nondisplaced spiral fracture of shaft of right tibia with nonunion    Closed nondisplaced spiral fracture of shaft of right fibula with routine healing    Chest pain    Upper GI bleed    Esophageal ulcer    Hematemesis       Plan:  UGI bleed- presumably due to esophagitis. Hb is stable. Increase PPI to BID  Acute blood loss anemia- Hb 12-->10.  Appears stable with no ongoing hematemesis or bleeding  UTI- uncomplicated, complete treatment with cefdinir  D/c home        Yohana Simpson MD  6:52 AM  8/19/2022

## 2022-08-19 NOTE — PROGRESS NOTES
Dr. Sayra Smith called floor and asked that staff notify patient that he was sending a prescription over to her pharmacy that would need to be picked up once she was discharged. Patient informed at this time, verbalized understanding.   Electronically signed by Benjy Gayle RN on 8/19/2022 at 8:08 AM

## 2022-08-19 NOTE — CARE COORDINATION
Discharge orders noted  Non emergency transportation has been arranged with [facility],  time 12 NOON. Patient and bedside nurse notified of scheduled  time. Todd Rice.  Willy, MSN, RN  [facility] Case Management  [phone]

## 2022-08-19 NOTE — PROGRESS NOTES
GENERAL SURGERY  DAILY PROGRESS NOTE  8/19/2022    Chief Complaint   Patient presents with    Abdominal Pain     X 3 days    Emesis       Subjective:  Pt states she is okay. Tolerating a diet. Denies any abdominal pain or N/V. Objective:  BP (!) 110/58   Pulse 83   Temp 98.2 °F (36.8 °C) (Oral)   Resp 18   Ht 5' 5\" (1.651 m)   Wt (!) 340 lb (154.2 kg)   SpO2 95%   BMI 56.58 kg/m²     GENERAL:  Laying in bed, awake, alert, cooperative, no apparent distress  HEAD: Normocephalic, atraumatic  EYES: No sclera icterus, pupils equal  LUNGS:  No increased work of breathing  CARDIOVASCULAR:  RR and normotensive  ABDOMEN:  Soft, non-tender, non-distended  EXTREMITIES: No edema or swelling  SKIN: Warm and dry    Assessment/Plan:  60 y.o. female with coffee ground emesis, likely gastrointestinal hemorrhage s/p EGD with large 4cm hiatal hernia and esophagitis    - Okay for regular diet as tolerated  - Pain control per primary  - Monitor Hgb, transfuse as needed  - PPI BID  - Okay to DC from surgical POV    Electronically signed by Sarah Edwards MD on 8/19/2022 at 6:11 AM    Attending Note:    Patient seen/examined 8/19/2022. I discussed case with the resident and reviewed all relevant labs/imaging. Agree with resident's assessment and plan.

## 2022-08-20 NOTE — DISCHARGE SUMMARY
[address] [address]                               DISCHARGE SUMMARY    PATIENT NAME: Cj Mattson                 :        1960  MED REC NO:   [id number]                            ROOM:       0501  ACCOUNT NO:   [de-identified]                           ADMIT DATE: 2022  PROVIDER:     Sue Das MD                  [address] [place] [address] DATE: 2022    FINAL DIAGNOSES:  1. Hematemesis. 2.  Acute blood loss anemia. 3.  Acute upper GI bleed secondary to esophagitis. 4.  Chronic anemia. 5.  Chronic MS.    COURSE OF ILLNESS:  This is a 76-year-old woman who was admitted with  increasing epigastric abdominal pain associated with several bouts of  vomiting including several bouts of coffee-ground emesis. Hemoglobin on  admission was 12 and subsequently decreased to 10, where it was stable. She does have a history of chronic anemia and her baseline hemoglobin in  the past has been around 10, though it was 12 at the start of this  admission. During the admission, she did not have any further bouts of  nausea, vomiting, or hematemesis. There was no evidence of any ongoing  bleeding. Her pantoprazole, which she is on daily was increased to  twice a day. She did undergo endoscopy and this showed a 4 cm hiatal  hernia and esophagitis. She has previously had fundoplication for a  large hiatal hernia. With the finding esophagitis, she was left on her  Carafate and pantoprazole was maintained at b.i.d. dosing. Otherwise,  the patient was stable at the time of discharge. She had no ongoing  abdominal pain and no further nausea, vomiting, or hematemesis. The  patient is felt to be medically in good condition and stable for  discharge. Surgery has okayed her discharge as well. The only change  in medications will be the increase in her pantoprazole to b.i.d. dosing  for her esophagitis.   She was also noted to have an uncomplicated  urinary tract infection. She was treated with ceftriaxone during this  hospitalization.     DISCHARGE MEDICATIONS:  As follows:  Pantoprazole 40 mg b.i.d., cefdinir  300 mg b.i.d. for three more days, lorazepam 0.5 mg daily p.r.n.,  gabapentin 300 mg b.i.d., Flexeril 10 mg b.i.d., duloxetine 60 mg two  tablets daily, levothyroxine 175 mcg one tablet Monday through Saturday  and one and a half tablets on Sunday, and Carafate 1 gm t.i.d.        Garland Paredes MD    D: 08/19/2022 7:05:35       T: 08/19/2022 7:08:01     [email or web address]  Job#: [id number]     Doc#: [id number]    CC:

## 2022-10-25 ENCOUNTER — OFFICE VISIT (OUTPATIENT)
Dept: NEUROLOGY | Age: 62
End: 2022-10-25
Payer: MEDICAID

## 2022-10-25 VITALS
TEMPERATURE: 98.6 F | DIASTOLIC BLOOD PRESSURE: 78 MMHG | HEART RATE: 81 BPM | OXYGEN SATURATION: 95 % | SYSTOLIC BLOOD PRESSURE: 133 MMHG

## 2022-10-25 DIAGNOSIS — R13.10 DYSPHAGIA, UNSPECIFIED TYPE: ICD-10-CM

## 2022-10-25 DIAGNOSIS — G35 MULTIPLE SCLEROSIS (HCC): Primary | ICD-10-CM

## 2022-10-25 DIAGNOSIS — R26.2 DIFFICULTY IN WALKING: ICD-10-CM

## 2022-10-25 DIAGNOSIS — R32 BOWEL AND BLADDER INCONTINENCE: ICD-10-CM

## 2022-10-25 DIAGNOSIS — R15.9 BOWEL AND BLADDER INCONTINENCE: ICD-10-CM

## 2022-10-25 PROCEDURE — 99214 OFFICE O/P EST MOD 30 MIN: CPT | Performed by: NURSE PRACTITIONER

## 2022-10-25 PROCEDURE — G8484 FLU IMMUNIZE NO ADMIN: HCPCS | Performed by: NURSE PRACTITIONER

## 2022-10-25 PROCEDURE — G8417 CALC BMI ABV UP PARAM F/U: HCPCS | Performed by: NURSE PRACTITIONER

## 2022-10-25 PROCEDURE — 1036F TOBACCO NON-USER: CPT | Performed by: NURSE PRACTITIONER

## 2022-10-25 PROCEDURE — 3017F COLORECTAL CA SCREEN DOC REV: CPT | Performed by: NURSE PRACTITIONER

## 2022-10-25 PROCEDURE — G8427 DOCREV CUR MEDS BY ELIG CLIN: HCPCS | Performed by: NURSE PRACTITIONER

## 2022-10-25 RX ORDER — CYCLOBENZAPRINE HCL 10 MG
10 TABLET ORAL 3 TIMES DAILY
Qty: 90 TABLET | Refills: 5 | Status: SHIPPED | OUTPATIENT
Start: 2022-10-25

## 2022-10-25 RX ORDER — GABAPENTIN 300 MG/1
300 CAPSULE ORAL 3 TIMES DAILY
Qty: 90 CAPSULE | Refills: 5 | Status: SHIPPED | OUTPATIENT
Start: 2022-10-25 | End: 2022-11-24

## 2022-10-25 NOTE — PROGRESS NOTES
[facility]. Cherie Fenton M.D., F.A.C.P. Clarence Akers, PRICE, APRN, ACNS-BC  Irineo Mohamud. Miriam Clark, MSN, APRN-FNP-C  Xochitl Parish, MSN, APRN-FNP-C  CHARLY Jackson, PA-ZAY MAXøvgavlvanusha Ga, MSN, APRN-FNP-C  [address], [address] [address]  Phone: [phone]  Fax: [phone]       Imelda Salcedo is a 60 y.o. right handed female     Patient follows for MS    She was a longtime patient of Dr. Dion Hsu [place]. Previous to that she was seeing Dr. Santiago Ngo at the [place] [facility] at [facility]. She was diagnosed with MS back in March 16, 2000. For the past 19 years she was on Avelox and steroids. She is no longer on DMT therapy. She believes her last MS exacerbation was 6 weeks ago when her vision became blurry and her right side became numb and tingly. She also had some issues with numbness to the right side of her tongue but she did not seek medical treatment and her symptoms did resolve. Patient has been in a wheelchair for the past 5 years. She is able to move her bilateral upper extremities but her right arm is weaker than her left. She is unable to lift or move her right leg but she is able to lift and move her left leg. She notes no vision changes since being diagnosed with MS and no red desaturation. She does have bowel/bladder incontinence due to her MS. She has seen urology in the past along with having a bladder sling but still notes incontinence at times. She can tell when she has to use bathroom she just has no control with holding it in. She lives with her son and daughter-in-law, her daughter-in-law is her caregiver. Her last MRI brain was back in April 2017 which was noted to have several MS lesions bilaterally but no active lesions. She cannot remember the last time she had any MRIs of her spine, but does note that she does have MS lesions in her cervical spine. She also has complaints of difficulty swallowing.   She has not really noticed with types of textures that she has difficulty swallowing. She has had a couple coughing bouts after eating, with concerns from her son. She had swallow study which was normal.      MRI brain and C spine with no new or enhancing lesions per report. Today patient reports PCP has decreased her Flexeril and gabapentin.   Since the decrease in these medications she has had more muscle spasms and continued pain in her extremities due to her MS.    + difficulty swallowing  No chest pain or palpitations  No SOB  + incontinence of bowels and bladder--- Lloyd  + numbness and tingling   + right side weakness    ROS is otherwise negative    Objective:     Vitals:    10/25/22 1127   BP: 133/78   Site: Left Lower Arm   Pulse: 81   Temp: 98.6 °F (37 °C)   SpO2: 95%     General appearance: alert, appears stated age, cooperative and in no distress, laying in scooter  Head: normocephalic, without obvious abnormality, atraumatic  Eyes: conjunctivae/corneas clear; no drainage  Neck: supple, symmetrical, trachea midline   Lungs: Diminished to auscultation bilaterally  Heart: regular rate and rhythm  Abdomen: Obese, bowel sounds hypoactive  Skin:  color, texture, turgor normal--no rashes or lesions      Mental Status: alert and oriented x 4    Appropriate attention/concentration  Intact fundus of knowledge      Speech: no dysarthria  Language: no aphasias    Cranial Nerves:  I: smell    II: visual acuity  No red desaturation   II: visual fields Full    II: pupils SHIRA   III,VII: ptosis None   III,IV,VI: extraocular muscles  EOMI without nystagmus   V: mastication Normal   V: facial light touch sensation  Normal   V,VII: corneal reflex     VII: facial muscle function - upper  Normal   VII: facial muscle function - lower Normal   VIII: hearing Normal   IX: soft palate elevation  Normal   IX,X: gag reflex    XI: trapezius strength  5/5   XI: sternocleidomastoid strength 5/5   XI: neck extension strength  5/5   XII: tongue strength  Normal     Motor:  4/5 RUE strength, 5/5 strength LUE strength, 1/5 strength RLE, 4/5 strength LLE   Obese bulk and tone  No drift to BUE  No abnormal movements    Sensory:  LT normal    Coordination:   FN, FFM and NAKIA normal  HS unable to perform    DTR:   BE throughout    Laboratory/Radiology:  ry/Radiology:     Results for Toy Hobson (MRN [id number]) as of 4/4/2022 14:23   Ref.  Range 3/17/2022 15:08   Sodium Latest Ref Range: 132 - 146 mmol/L 138   Potassium Latest Ref Range: 3.5 - 5.0 mmol/L 4.2   Chloride Latest Ref Range: 98 - 107 mmol/L 102   CO2 Latest Ref Range: 22 - 29 mmol/L 25   BUN,BUNPL Latest Ref Range: 6 - 23 mg/dL 9   Creatinine Latest Ref Range: 0.5 - 1.0 mg/dL 0.7   Anion Gap Latest Ref Range: 7 - 16 mmol/L 11   GFR Non- Latest Ref Range: >=60 mL/min/1.73 >60   GFR African American Unknown >60   Lactic Acid Latest Ref Range: 0.5 - 2.2 mmol/L 1.1   GLUCOSE, FASTING,GF Latest Ref Range: 74 - 99 mg/dL 101 (H)   CALCIUM, SERUM, 407014 Latest Ref Range: 8.6 - 10.2 mg/dL 9.4   Total Protein Latest Ref Range: 6.4 - 8.3 g/dL 7.9   Pro-BNP Latest Ref Range: 0 - 125 pg/mL 143 (H)   Troponin, High Sensitivity Latest Ref Range: 0 - 9 ng/L 7   Albumin Latest Ref Range: 3.5 - 5.2 g/dL 3.5   Alk Phos Latest Ref Range: 35 - 104 U/L 109 (H)   ALT Latest Ref Range: 0 - 32 U/L 28   AST Latest Ref Range: 0 - 31 U/L 30   Bilirubin Latest Ref Range: 0.0 - 1.2 mg/dL 0.3   Lipase Latest Ref Range: 13 - 60 U/L 44   WBC Latest Ref Range: 4.5 - 11.5 E9/L 4.8   RBC Latest Ref Range: 3.50 - 5.50 E12/L 3.97   Hemoglobin Quant Latest Ref Range: 11.5 - 15.5 g/dL 10.5 (L)   Hematocrit Latest Ref Range: 34.0 - 48.0 % 33.5 (L)   MCV Latest Ref Range: 80.0 - 99.9 fL 84.4   MCH Latest Ref Range: 26.0 - 35.0 pg 26.4   MCHC Latest Ref Range: 32.0 - 34.5 % 31.3 (L)   MPV Latest Ref Range: 7.0 - 12.0 fL 9.3   RDW Latest Ref Range: 11.5 - 15.0 fL 15.9 (H)   Platelet Count Latest Ref Range: 130 - 450 E9/L 321   Neutrophils % Latest Ref Range: 43.0 - 80.0 % 59.9   Immature Granulocytes % Latest Ref Range: 0.0 - 5.0 % 0.4   Lymphocyte % Latest Ref Range: 20.0 - 42.0 % 25.1   Monocytes % Latest Ref Range: 2.0 - 12.0 % 11.2   Eosinophils % Latest Ref Range: 0.0 - 6.0 % 1.9   Basophils % Latest Ref Range: 0.0 - 2.0 % 1.5   Neutrophils Absolute Latest Ref Range: 1.80 - 7.30 E9/L 2.85   Immature Granulocytes # Latest Units: E9/L 0.02   Lymphocytes Absolute Latest Ref Range: 1.50 - 4.00 E9/L 1.19 (L)   Monocytes Absolute Latest Ref Range: 0.10 - 0.95 E9/L 0.53   Eosinophils Absolute Latest Ref Range: 0.05 - 0.50 E9/L 0.09   Basophils Absolute Latest Ref Range: 0.00 - 0.20 E9/L 0.07     MRI brain and C spine: no new lesion or enhancing lesions reported     All labs were independently reviewed today    Assessment:     MS > 20 years  --- Not currently on DMT therapy  --- Was on Avonex and steroids  --- EDSS 8.5--- in wheelchair/scooter bound for most of the day and is cared for  --- Incontinent of bladder/bowel--- has Lloyd catheter  --- Right side weakness > left side  --- We will obtain medical records from previous neurologist  --- Recent MS exacerbation versus stroke ruled out with MRI brain and C spine     Dysphagia related to MS  --- Difficulty swallowing  --- swallow study was normal    Plan:     Increase gabapentin 300 mg from twice daily to 3 times daily    Increase Flexeril 10 mg from twice daily to 3 times daily    Follow-up in 6 months     Call with any questions or concerns      ERICA Garcia CNP  11:33 AM  10/25/2022

## 2023-01-12 ENCOUNTER — HOSPITAL ENCOUNTER (INPATIENT)
Age: 63
LOS: 4 days | Discharge: HOME HEALTH | DRG: 418 | End: 2023-01-19
Attending: EMERGENCY MEDICINE | Admitting: INTERNAL MEDICINE
Payer: MEDICARE

## 2023-01-12 ENCOUNTER — APPOINTMENT (OUTPATIENT)
Dept: ULTRASOUND IMAGING | Age: 63
DRG: 418 | End: 2023-01-12
Payer: MEDICARE

## 2023-01-12 DIAGNOSIS — K81.9 CHOLECYSTITIS: Primary | ICD-10-CM

## 2023-01-12 LAB
ALBUMIN SERPL-MCNC: 2.1 G/DL (ref 3.5–5.2)
ALP BLD-CCNC: 76 U/L (ref 35–104)
ALT SERPL-CCNC: 5 U/L (ref 0–32)
ANION GAP SERPL CALCULATED.3IONS-SCNC: 9 MMOL/L (ref 7–16)
AST SERPL-CCNC: 12 U/L (ref 0–31)
BASOPHILS ABSOLUTE: 0.06 E9/L (ref 0–0.2)
BASOPHILS RELATIVE PERCENT: 0.7 % (ref 0–2)
BILIRUB SERPL-MCNC: 0.2 MG/DL (ref 0–1.2)
BUN BLDV-MCNC: 6 MG/DL (ref 6–23)
CALCIUM SERPL-MCNC: 5.8 MG/DL (ref 8.6–10.2)
CHLORIDE BLD-SCNC: 113 MMOL/L (ref 98–107)
CO2: 16 MMOL/L (ref 22–29)
CREAT SERPL-MCNC: 0.4 MG/DL (ref 0.5–1)
EOSINOPHILS ABSOLUTE: 0.11 E9/L (ref 0.05–0.5)
EOSINOPHILS RELATIVE PERCENT: 1.3 % (ref 0–6)
GFR SERPL CREATININE-BSD FRML MDRD: >60 ML/MIN/1.73
GLUCOSE BLD-MCNC: 73 MG/DL (ref 74–99)
HCT VFR BLD CALC: 33.4 % (ref 34–48)
HEMOGLOBIN: 10.2 G/DL (ref 11.5–15.5)
IMMATURE GRANULOCYTES #: 0.04 E9/L
IMMATURE GRANULOCYTES %: 0.5 % (ref 0–5)
LIPASE: 35 U/L (ref 13–60)
LYMPHOCYTES ABSOLUTE: 1.11 E9/L (ref 1.5–4)
LYMPHOCYTES RELATIVE PERCENT: 12.8 % (ref 20–42)
MCH RBC QN AUTO: 24.9 PG (ref 26–35)
MCHC RBC AUTO-ENTMCNC: 30.5 % (ref 32–34.5)
MCV RBC AUTO: 81.7 FL (ref 80–99.9)
MONOCYTES ABSOLUTE: 0.64 E9/L (ref 0.1–0.95)
MONOCYTES RELATIVE PERCENT: 7.4 % (ref 2–12)
NEUTROPHILS ABSOLUTE: 6.71 E9/L (ref 1.8–7.3)
NEUTROPHILS RELATIVE PERCENT: 77.3 % (ref 43–80)
PDW BLD-RTO: 18.1 FL (ref 11.5–15)
PLATELET # BLD: 313 E9/L (ref 130–450)
PMV BLD AUTO: 10.4 FL (ref 7–12)
POTASSIUM SERPL-SCNC: 2.9 MMOL/L (ref 3.5–5)
RBC # BLD: 4.09 E12/L (ref 3.5–5.5)
REASON FOR REJECTION: NORMAL
REJECTED TEST: NORMAL
SODIUM BLD-SCNC: 138 MMOL/L (ref 132–146)
TOTAL PROTEIN: 5 G/DL (ref 6.4–8.3)
WBC # BLD: 8.7 E9/L (ref 4.5–11.5)

## 2023-01-12 PROCEDURE — 6360000002 HC RX W HCPCS

## 2023-01-12 PROCEDURE — 76705 ECHO EXAM OF ABDOMEN: CPT

## 2023-01-12 PROCEDURE — 6370000000 HC RX 637 (ALT 250 FOR IP): Performed by: INTERNAL MEDICINE

## 2023-01-12 PROCEDURE — 2580000003 HC RX 258: Performed by: EMERGENCY MEDICINE

## 2023-01-12 PROCEDURE — 6360000002 HC RX W HCPCS: Performed by: INTERNAL MEDICINE

## 2023-01-12 PROCEDURE — 96366 THER/PROPH/DIAG IV INF ADDON: CPT

## 2023-01-12 PROCEDURE — 96365 THER/PROPH/DIAG IV INF INIT: CPT

## 2023-01-12 PROCEDURE — G0378 HOSPITAL OBSERVATION PER HR: HCPCS

## 2023-01-12 PROCEDURE — 85025 COMPLETE CBC W/AUTO DIFF WBC: CPT

## 2023-01-12 PROCEDURE — 6360000002 HC RX W HCPCS: Performed by: EMERGENCY MEDICINE

## 2023-01-12 PROCEDURE — 80053 COMPREHEN METABOLIC PANEL: CPT

## 2023-01-12 PROCEDURE — 6370000000 HC RX 637 (ALT 250 FOR IP)

## 2023-01-12 PROCEDURE — 99285 EMERGENCY DEPT VISIT HI MDM: CPT

## 2023-01-12 PROCEDURE — 96367 TX/PROPH/DG ADDL SEQ IV INF: CPT

## 2023-01-12 PROCEDURE — 36415 COLL VENOUS BLD VENIPUNCTURE: CPT

## 2023-01-12 PROCEDURE — 2500000003 HC RX 250 WO HCPCS: Performed by: INTERNAL MEDICINE

## 2023-01-12 PROCEDURE — 96375 TX/PRO/DX INJ NEW DRUG ADDON: CPT

## 2023-01-12 PROCEDURE — 83690 ASSAY OF LIPASE: CPT

## 2023-01-12 RX ORDER — OXYCODONE HYDROCHLORIDE 5 MG/1
5 TABLET ORAL EVERY 4 HOURS PRN
Status: DISCONTINUED | OUTPATIENT
Start: 2023-01-12 | End: 2023-01-19 | Stop reason: HOSPADM

## 2023-01-12 RX ORDER — ACETAMINOPHEN 325 MG/1
650 TABLET ORAL EVERY 6 HOURS
Status: DISCONTINUED | OUTPATIENT
Start: 2023-01-12 | End: 2023-01-19 | Stop reason: HOSPADM

## 2023-01-12 RX ORDER — TRAZODONE HYDROCHLORIDE 50 MG/1
50 TABLET ORAL NIGHTLY
COMMUNITY

## 2023-01-12 RX ORDER — DULOXETIN HYDROCHLORIDE 60 MG/1
60 CAPSULE, DELAYED RELEASE ORAL DAILY
Status: DISCONTINUED | OUTPATIENT
Start: 2023-01-13 | End: 2023-01-13

## 2023-01-12 RX ORDER — DULOXETIN HYDROCHLORIDE 60 MG/1
120 CAPSULE, DELAYED RELEASE ORAL DAILY
Status: DISCONTINUED | OUTPATIENT
Start: 2023-01-13 | End: 2023-01-12

## 2023-01-12 RX ORDER — POTASSIUM CHLORIDE 7.45 MG/ML
10 INJECTION INTRAVENOUS
Status: DISPENSED | OUTPATIENT
Start: 2023-01-12 | End: 2023-01-12

## 2023-01-12 RX ORDER — MORPHINE SULFATE 4 MG/ML
4 INJECTION, SOLUTION INTRAMUSCULAR; INTRAVENOUS ONCE
Status: COMPLETED | OUTPATIENT
Start: 2023-01-12 | End: 2023-01-12

## 2023-01-12 RX ORDER — POTASSIUM CHLORIDE 7.45 MG/ML
10 INJECTION INTRAVENOUS
Status: COMPLETED | OUTPATIENT
Start: 2023-01-12 | End: 2023-01-13

## 2023-01-12 RX ORDER — GABAPENTIN 300 MG/1
300 CAPSULE ORAL EVERY MORNING
COMMUNITY

## 2023-01-12 RX ORDER — OXYCODONE HYDROCHLORIDE 5 MG/1
10 TABLET ORAL EVERY 4 HOURS PRN
Status: DISCONTINUED | OUTPATIENT
Start: 2023-01-12 | End: 2023-01-19 | Stop reason: HOSPADM

## 2023-01-12 RX ORDER — GABAPENTIN 300 MG/1
600 CAPSULE ORAL NIGHTLY
COMMUNITY

## 2023-01-12 RX ORDER — SENNA PLUS 8.6 MG/1
2 TABLET ORAL NIGHTLY
Status: DISCONTINUED | OUTPATIENT
Start: 2023-01-12 | End: 2023-01-19 | Stop reason: HOSPADM

## 2023-01-12 RX ORDER — GABAPENTIN 300 MG/1
300 CAPSULE ORAL EVERY MORNING
Status: DISCONTINUED | OUTPATIENT
Start: 2023-01-13 | End: 2023-01-19 | Stop reason: HOSPADM

## 2023-01-12 RX ORDER — LORAZEPAM 0.5 MG/1
0.5 TABLET ORAL EVERY 12 HOURS PRN
Status: DISCONTINUED | OUTPATIENT
Start: 2023-01-12 | End: 2023-01-19 | Stop reason: HOSPADM

## 2023-01-12 RX ORDER — ESOMEPRAZOLE MAGNESIUM 40 MG/1
40 CAPSULE, DELAYED RELEASE ORAL EVERY MORNING
COMMUNITY

## 2023-01-12 RX ORDER — SUCRALFATE 1 G/1
1 TABLET ORAL 2 TIMES DAILY
Status: DISCONTINUED | OUTPATIENT
Start: 2023-01-12 | End: 2023-01-19 | Stop reason: HOSPADM

## 2023-01-12 RX ORDER — CYCLOBENZAPRINE HCL 10 MG
10 TABLET ORAL 2 TIMES DAILY
COMMUNITY

## 2023-01-12 RX ORDER — PANTOPRAZOLE SODIUM 40 MG/1
40 TABLET, DELAYED RELEASE ORAL
Status: DISCONTINUED | OUTPATIENT
Start: 2023-01-13 | End: 2023-01-19 | Stop reason: HOSPADM

## 2023-01-12 RX ORDER — KETOROLAC TROMETHAMINE 30 MG/ML
30 INJECTION, SOLUTION INTRAMUSCULAR; INTRAVENOUS ONCE
Status: COMPLETED | OUTPATIENT
Start: 2023-01-12 | End: 2023-01-12

## 2023-01-12 RX ORDER — TRAZODONE HYDROCHLORIDE 50 MG/1
50 TABLET ORAL NIGHTLY
Status: DISCONTINUED | OUTPATIENT
Start: 2023-01-12 | End: 2023-01-19 | Stop reason: HOSPADM

## 2023-01-12 RX ORDER — GABAPENTIN 300 MG/1
600 CAPSULE ORAL NIGHTLY
Status: DISCONTINUED | OUTPATIENT
Start: 2023-01-12 | End: 2023-01-19 | Stop reason: HOSPADM

## 2023-01-12 RX ORDER — DULOXETIN HYDROCHLORIDE 60 MG/1
60 CAPSULE, DELAYED RELEASE ORAL DAILY
Status: DISCONTINUED | OUTPATIENT
Start: 2023-01-12 | End: 2023-01-12

## 2023-01-12 RX ORDER — ONDANSETRON 2 MG/ML
4 INJECTION INTRAMUSCULAR; INTRAVENOUS EVERY 6 HOURS PRN
Status: DISCONTINUED | OUTPATIENT
Start: 2023-01-12 | End: 2023-01-19 | Stop reason: HOSPADM

## 2023-01-12 RX ADMIN — MORPHINE SULFATE 4 MG: 4 INJECTION, SOLUTION INTRAMUSCULAR; INTRAVENOUS at 11:03

## 2023-01-12 RX ADMIN — POTASSIUM CHLORIDE 10 MEQ: 7.46 INJECTION, SOLUTION INTRAVENOUS at 19:01

## 2023-01-12 RX ADMIN — MICONAZOLE NITRATE: 2 POWDER TOPICAL at 21:54

## 2023-01-12 RX ADMIN — SUCRALFATE 1 G: 1 TABLET ORAL at 21:50

## 2023-01-12 RX ADMIN — TRAZODONE HYDROCHLORIDE 50 MG: 50 TABLET ORAL at 21:50

## 2023-01-12 RX ADMIN — PIPERACILLIN AND TAZOBACTAM 4500 MG: 4; .5 INJECTION, POWDER, LYOPHILIZED, FOR SOLUTION INTRAVENOUS; PARENTERAL at 12:44

## 2023-01-12 RX ADMIN — SENNOSIDES 17.2 MG: 8.6 TABLET, COATED ORAL at 21:50

## 2023-01-12 RX ADMIN — GABAPENTIN 600 MG: 300 CAPSULE ORAL at 21:50

## 2023-01-12 RX ADMIN — POTASSIUM CHLORIDE 10 MEQ: 7.46 INJECTION, SOLUTION INTRAVENOUS at 16:59

## 2023-01-12 RX ADMIN — POTASSIUM CHLORIDE 10 MEQ: 7.46 INJECTION, SOLUTION INTRAVENOUS at 23:13

## 2023-01-12 RX ADMIN — ONDANSETRON 4 MG: 2 INJECTION INTRAMUSCULAR; INTRAVENOUS at 16:47

## 2023-01-12 RX ADMIN — OXYCODONE HYDROCHLORIDE 10 MG: 5 TABLET ORAL at 16:46

## 2023-01-12 RX ADMIN — ACETAMINOPHEN 650 MG: 325 TABLET ORAL at 23:10

## 2023-01-12 RX ADMIN — DULOXETINE HYDROCHLORIDE 60 MG: 60 CAPSULE, DELAYED RELEASE ORAL at 16:46

## 2023-01-12 RX ADMIN — KETOROLAC TROMETHAMINE 30 MG: 30 INJECTION, SOLUTION INTRAMUSCULAR; INTRAVENOUS at 11:03

## 2023-01-12 RX ADMIN — ACETAMINOPHEN 650 MG: 325 TABLET ORAL at 16:44

## 2023-01-12 ASSESSMENT — ENCOUNTER SYMPTOMS
ABDOMINAL PAIN: 1
SHORTNESS OF BREATH: 0
EYE PAIN: 0
EYE REDNESS: 0
SORE THROAT: 0
DIARRHEA: 0
COUGH: 0

## 2023-01-12 ASSESSMENT — PAIN SCALES - GENERAL
PAINLEVEL_OUTOF10: 10
PAINLEVEL_OUTOF10: 8
PAINLEVEL_OUTOF10: 7
PAINLEVEL_OUTOF10: 10

## 2023-01-12 ASSESSMENT — PAIN DESCRIPTION - ORIENTATION
ORIENTATION: RIGHT
ORIENTATION: RIGHT

## 2023-01-12 ASSESSMENT — PAIN DESCRIPTION - LOCATION
LOCATION: ABDOMEN;BACK
LOCATION: ABDOMEN

## 2023-01-12 ASSESSMENT — PAIN DESCRIPTION - DESCRIPTORS
DESCRIPTORS: ACHING;DISCOMFORT
DESCRIPTORS: PRESSURE;SHARP

## 2023-01-12 ASSESSMENT — PAIN - FUNCTIONAL ASSESSMENT
PAIN_FUNCTIONAL_ASSESSMENT: PREVENTS OR INTERFERES SOME ACTIVE ACTIVITIES AND ADLS
PAIN_FUNCTIONAL_ASSESSMENT: ACTIVITIES ARE NOT PREVENTED

## 2023-01-12 NOTE — ED NOTES
Called floor spoke to sadie verified SBAR received pt stable for transport      Palmer Route, RN  01/12/23 0847

## 2023-01-12 NOTE — ED PROVIDER NOTES
[facility] ENCOUNTER      Pt Name: Marcelino Townsend  MRN: [id number]  Armsdemetriusgfurt 1960  Date of evaluation: 1/12/2023  Provider: Camden Jolley MD     CHIEF COMPLAINT     No chief complaint on file. HISTORY OF PRESENT ILLNESS   (Location/Symptom, Timing/Onset, Context/Setting, Quality, Duration, Modifying Factors, Severity) Note limiting factors. I wore a kn95 mask for the entirety of this encounter. HPI    Marcelino Townsend is a 60 y.o. female who presents to the emergency department epigastric pain. Patient has known gallstones. Patient has had chronic pain in her epigastrium but as of yesterday evening began having more persistent severe pain feels similar to when she had issues with gallbladder the past.  She follows with surgery supposed of her gallbladder out in less than a month. Denies fevers is nauseous but no vomiting no other complaints    Nursing Notes were reviewed. REVIEW OF SYSTEMS    (2+ for level 4; 10+ for level 5)   Review of Systems   Constitutional:  Negative for fatigue and fever. HENT:  Negative for ear pain and sore throat. Eyes:  Negative for pain and redness. Respiratory:  Negative for cough and shortness of breath. Gastrointestinal:  Positive for abdominal pain. Negative for diarrhea. Genitourinary:  Negative for dysuria and frequency. Neurological:  Negative for dizziness and headaches.      PAST MEDICAL HISTORY     Past Medical History:   Diagnosis Date    Acquired hypothyroidism 9/14/2016    Acute blood loss as cause of postoperative anemia 11/19/2018    Anemia     Anticoagulant long-term use     Anxiety     Arthritis     Blood transfusion     Chronic back pain     Depression     GERD (gastroesophageal reflux disease)     Hx of blood clots     Lymphedema of lower extremity 4/17/2014    Migraine     Multiple sclerosis ([facility] [address] 2000    Obesity     Osteoarthritis     PE (pulmonary thromboembolism) ([facility] [address] 02/2016    Peripheral vascular disease (HCC)     vericose veins    Prominent abdominal aortic pulse 4/17/2014    Pulmonary embolism ([facility] [address] 05/2016    Swelling of lower limb 4/17/2014    Thyroid disease     Urinary incontinence     has a indwelling catheter    Varicose veins of lower extremities 4/17/2014       SURGICAL HISTORY       Past Surgical History:   Procedure Laterality Date    BLADDER SURGERY      COLONOSCOPY      DENTAL SURGERY      random teeth extraction    DILATION AND CURETTAGE OF UTERUS      ENDOSCOPY, COLON, DIAGNOSTIC      FRACTURE SURGERY      HERNIA REPAIR      OPEN TREATMENT FRACTURE DISTAL TIBIA & FIBULA Right 11/16/2018    OPEN REDUCTION INTERNAL FIXATION RIGHT DISTAL FEMUR, RIGHT DISTAL TIBIA performed by Dave Kirby DO at [address] [place] [address]  09/15/2015    LAPAROSCOPIC HIATAL HERNIA REPAIR WITH FUNDOPLICATION WITH MYOFASCIAL FLAP    TIBIA FRACTURE SURGERY Right     TONSILLECTOMY      TOTAL HIP ARTHROPLASTY Right 09/13/2016    Right Total Hip Arthroplasty    UPPER GASTROINTESTINAL ENDOSCOPY      UPPER GASTROINTESTINAL ENDOSCOPY N/A 5/18/2020    EGD BIOPSY performed by Sherie June MD at [place] [address] N/A 8/18/2022    EGD BIOPSY performed by Sherie June MD at [place] [address]       Previous Medications    ACETAMINOPHEN (TYLENOL) 650 MG EXTENDED RELEASE TABLET    Take 1,300 mg by mouth 2 times daily    ALUMINUM & MAGNESIUM HYDROXIDE-SIMETHICONE (MAALOX) 200-200-20 MG/5ML SUSP SUSPENSION    Take 5 mLs by mouth every 6 hours as needed for Indigestion    CYCLOBENZAPRINE (FLEXERIL) 10 MG TABLET    Take 1 tablet by mouth 3 times daily Take 1 tablet in the morning and 2 tablets at night    DULOXETINE (CYMBALTA) 60 MG EXTENDED RELEASE CAPSULE    Take 120 mg by mouth every morning    GABAPENTIN (NEURONTIN) 300 MG CAPSULE    Take 1 capsule by mouth 3 times daily for 30 days.  Take 1 capsule in the morning and 2 capsules at bedtime    LEVOTHYROXINE (SYNTHROID) 175 MCG TABLET    Take 175 mcg by mouth Six times weekly *[id number]-[id number]-THUR-FRI-SAT*  -OMIT SUNDAYS-  **SEE OTHER ORDER**    LEVOTHYROXINE (SYNTHROID) 175 MCG TABLET    Take 262.5 mcg by mouth once a week Indications: Sundays *SUNDAYS*  **SEE OTHER ORDER**    LORAZEPAM (ATIVAN) 0.5 MG TABLET    Take 0.5 mg by mouth nightly.     PANTOPRAZOLE (PROTONIX) 40 MG TABLET    Take 1 tablet by mouth in the morning and at bedtime    POLYETHYLENE GLYCOL (GLYCOLAX) PACKET    Take 17 g by mouth daily as needed for Constipation    RECTAL CLEANSERS (FLEET NATURALS CLEANSING ENEMA RE)    Place 1 Dose rectally as needed (SEVERE CONSTIPATION)    SENNA (SENOKOT) 8.6 MG TABLET    Take 2 tablets by mouth nightly     SUCRALFATE (CARAFATE) 1 GM TABLET    Take 1 g by mouth 4 times daily    SUMATRIPTAN (IMITREX) 100 MG TABLET    Take 100 mg by mouth as needed for Migraine       ALLERGIES     Fentanyl and Ferritin    FAMILY HISTORY       Family History   Problem Relation Age of Onset    Cancer Mother     Dementia Mother     Obesity Father     Diabetes Father     High Blood Pressure Father     Stroke Father     Lupus Sister     High Blood Pressure Sister     Arthritis Sister     Diabetes Sister         SOCIAL HISTORY       Social History     Socioeconomic History    Marital status:    Tobacco Use    Smoking status: Former     Packs/day: 0.25     Types: Cigarettes     Start date: 1977     Quit date: 2004     Years since quittin.7    Smokeless tobacco: Never   Vaping Use    Vaping Use: Some days    Substances: Always   Substance and Sexual Activity    Alcohol use: Yes     Comment: occassional    Drug use: No    Sexual activity: Never       SCREENINGS           PHYSICAL EXAM    (up to 7 for level 4, 8 or more for level 5)     ED Triage Vitals   BP Temp Temp src Pulse Resp SpO2 Height Weight   -- -- -- -- -- -- -- --       Physical Exam  Constitutional:       Appearance: She is well-developed. Comments: Mild distress   HENT:      Head: Normocephalic and atraumatic. Eyes:      Conjunctiva/sclera: Conjunctivae normal.   Pulmonary:      Effort: Pulmonary effort is normal. No respiratory distress. Abdominal:      Comments: Patient has reproducible epigastric pain no peritonitis   Neurological:      Mental Status: She is alert and oriented to person, place, and time. Psychiatric:         Behavior: Behavior normal.       DIAGNOSTIC RESULTS     EKG (Per Emergency Physician):       RADIOLOGY (Per Emergency Physician): Interpretation per the Radiologist below, if available at the time of this note:  No results found. ED BEDSIDE ULTRASOUND:   Performed by ED Physician - none    LABS:  Labs Reviewed   COMPREHENSIVE METABOLIC PANEL   CBC WITH AUTO DIFFERENTIAL   LIPASE        All other labs were within normal range or not returned as of this dictation. EMERGENCY DEPARTMENT COURSE and DIFFERENTIAL DIAGNOSIS/MDM:   Vitals: There were no vitals filed for this visit. Medications   ketorolac (TORADOL) injection 30 mg (has no administration in time range)   morphine sulfate (PF) injection 4 mg (has no administration in time range)       MDM  . Patient presenting with epigastric pain suspect patient is having either biliary colic, cholecystitis or choledocholithiasis. Plan to also evaluate for ascending cholangitis with labs LFTs and ultrasound. For unable to control her pain or she has an infected gallbladder or obstructive pathology based on LFTs, may need admission for procedural intervention. Disposition pending    REVAL:     Shows acute cholecystitis plan to consult Dr. Krunal Moran ordered plan to admit    CRITICAL CARE TIME   Total Critical Care time was 0 minutes, excluding separately reportable procedures.   There was a high probability of clinically significant/life threatening deterioration in the patient's condition which required my urgent intervention. CONSULTS:  None    PROCEDURES:  Unless otherwise noted below, none     Procedures    Patients symptoms are consistent with sepsis, severe sepsis, or septic shock (If yes use \".sepsiscoremeasure\"): no  FINAL IMPRESSION    No diagnosis found. DISPOSITION/PLAN   DISPOSITION        PATIENT REFERRED TO:  No follow-up provider specified. DISCHARGE MEDICATIONS:  New Prescriptions    No medications on file          (Please note:  Portions of this note were completed with a voice recognition program.  Efforts were made to edit the dictations but occasionally words and phrases are mis-transcribed.)  Form v2016. J.5-cn    Princess Billy MD (electronically signed)  Emergency Medicine Provider       Princess Billy MD  01/12/23 [place] [address] Julio Cesar Quintero MD  01/12/23 [id number]

## 2023-01-12 NOTE — PROGRESS NOTES
Anticipated admission: Database initiated. Patient is A&O from home with son. States she requires an electric wheelchair and list assist at home and is RA at baseline. She has MS and her right leg is NWB. She needs assistance with ADL's and has [facility]  that helps to dress and bath her.

## 2023-01-12 NOTE — PROGRESS NOTES
Spoke to Dr. Sandra Bennett to obtain admission orders.  JOSE wood x3 for K 2.9 per  Electronically signed by Arabella Torres RN on 1/12/2023 at 4:27 PM

## 2023-01-12 NOTE — CONSULTS
GENERAL SURGERY  CONSULT NOTE  1/12/2023    Physician Consulted: Dr. Valorie Le  Reason for Consult: Acute cholecystitis  Referring Physician: Dr. Mike BERMAN  Altaf Mcneil is a 60 y.o. female who presents for evaluation of abdominal pain. History significant for multiple sclerosis, obesity, PE, anemia, gastric ulcers, hiatal hernia repair. General surgery consulted for possible acute cholecystitis. Patient presents with acute abdominal pain that began 1/11 early a.m. and has progressively worsened. She states it is in the right upper quadrant of her abdomen and radiates towards her right flank. Patient takes daily Tylenol for her arthritis and states this is not made the pain better. She believes food makes the pain worse and is unable to tolerate p.o. intake at this time. Patient denies any hematemesis or melena. Experiencing some constipation. Denies any anticoagulation use. Patient's last EGD was 8/22, with evidence of esophagitis and 4 cm hiatal hernia. She states she has been bedbound for approximately 7 years secondary to her multiple sclerosis. Labs: ALT 5, AST 12, bilirubin 0.2, lipase 35. White blood cell count 8.7 hemoglobin 10.2  Ultrasound abdomen with evidence of cholelithiasis and gallbladder wall thickening.     Past Medical History:   Diagnosis Date    Acquired hypothyroidism 9/14/2016    Acute blood loss as cause of postoperative anemia 11/19/2018    Anemia     Anticoagulant long-term use     Anxiety     Arthritis     Blood transfusion     Chronic back pain     Depression     GERD (gastroesophageal reflux disease)     Hx of blood clots     Lymphedema of lower extremity 4/17/2014    Migraine     Multiple sclerosis ([address] 2000    Obesity     Osteoarthritis     PE (pulmonary thromboembolism) ([address] 02/2016    Peripheral vascular disease ([address]     vericose veins    Prominent abdominal aortic pulse 4/17/2014    Pulmonary embolism ([address] 05/2016    Swelling of lower limb 4/17/2014 Thyroid disease     Urinary incontinence     has a indwelling catheter    Varicose veins of lower extremities 4/17/2014       Past Surgical History:   Procedure Laterality Date    BLADDER SURGERY      COLONOSCOPY      DENTAL SURGERY      random teeth extraction    DILATION AND CURETTAGE OF UTERUS      ENDOSCOPY, COLON, DIAGNOSTIC      FRACTURE SURGERY      HERNIA REPAIR      OPEN TREATMENT FRACTURE DISTAL TIBIA & FIBULA Right 11/16/2018    OPEN REDUCTION INTERNAL FIXATION RIGHT DISTAL FEMUR, RIGHT DISTAL TIBIA performed by Davi Wiley DO at [address] [place] [address]  09/15/2015    LAPAROSCOPIC HIATAL HERNIA REPAIR WITH FUNDOPLICATION WITH MYOFASCIAL FLAP    TIBIA FRACTURE SURGERY Right     TONSILLECTOMY      TOTAL HIP ARTHROPLASTY Right 09/13/2016    Right Total Hip Arthroplasty    UPPER GASTROINTESTINAL ENDOSCOPY      UPPER GASTROINTESTINAL ENDOSCOPY N/A 5/18/2020    EGD BIOPSY performed by Faby Mesa MD at [place] [address] N/A 8/18/2022    EGD BIOPSY performed by [facility]y [facility]a MD at [facility] OR       Medications Prior to Admission    Prior to Admission medications    Medication Sig Start Date End Date Taking? Authorizing Provider   gabapentin (NEURONTIN) 300 MG capsule Take 1 capsule by mouth 3 times daily for 30 days.  Take 1 capsule in the morning and 2 capsules at bedtime 10/25/22 11/24/22  ERICA Eastman CNP   cyclobenzaprine (FLEXERIL) 10 MG tablet Take 1 tablet by mouth 3 times daily Take 1 tablet in the morning and 2 tablets at night 10/25/22   ERICA Eastman CNP   pantoprazole (PROTONIX) 40 MG tablet Take 1 tablet by mouth in the morning and at bedtime 8/19/22   Dale Flores MD   acetaminophen (TYLENOL) 650 MG extended release tablet Take 1,300 mg by mouth 2 times daily    Historical Provider, MD   sucralfate (CARAFATE) 1 GM tablet Take 1 g by mouth 4 times daily    Historical Provider, MD   LORazepam (ATIVAN) 0.5 MG tablet Take 0.5 mg by mouth nightly. Historical Provider, MD   aluminum & magnesium hydroxide-simethicone (MAALOX) 200-200-20 MG/5ML SUSP suspension Take 5 mLs by mouth every 6 hours as needed for Indigestion    Historical Provider, MD   levothyroxine (SYNTHROID) 175 MCG tablet Take 262.5 mcg by mouth once a week Indications: Sundays *SUNDAYS*  **SEE OTHER ORDER**    Historical Provider, MD   polyethylene glycol (GLYCOLAX) packet Take 17 g by mouth daily as needed for Constipation    Historical Provider, MD   levothyroxine (SYNTHROID) 175 MCG tablet Take 175 mcg by mouth Six times weekly *[id number]-THUR-FRI-SAT*  -OMIT SUNDAYS-  **SEE OTHER ORDER**    Historical Provider, MD   DULoxetine (CYMBALTA) 60 MG extended release capsule Take 120 mg by mouth every morning    Historical Provider, MD   Rectal Cleansers (FLEET NATURALS CLEANSING ENEMA RE) Place 1 Dose rectally as needed (SEVERE CONSTIPATION)    Historical Provider, MD   senna (SENOKOT) 8.6 MG tablet Take 2 tablets by mouth nightly     Historical Provider, MD   SUMAtriptan (IMITREX) 100 MG tablet Take 100 mg by mouth as needed for Migraine    Historical Provider, MD       Allergies   Allergen Reactions    Fentanyl Itching     Fentanyl patch--- itchy and red \"dots\" all over back and arms    Ferritin Other (See Comments)     Broke out \"into a sweat, my blood pressure shot up, I felt like lead on my chest and hard to breath. \"       Family History   Problem Relation Age of Onset    Cancer Mother     Dementia Mother     Obesity Father     Diabetes Father     High Blood Pressure Father     Stroke Father     Lupus Sister     High Blood Pressure Sister     Arthritis Sister     Diabetes Sister        Social History     Tobacco Use    Smoking status: Former     Packs/day: 0.25     Types: Cigarettes     Start date: 1977     Quit date: 2004     Years since quittin.7    Smokeless tobacco: Never   Vaping Use    Vaping Use: Some days    Substances: Always   Substance Use Topics    Alcohol use: Yes     Comment: occassional    Drug use: No         Review of Systems: Review of Systems - History obtained from chart review and the patient  General ROS: negative for - chills, fever, or malaise  Ophthalmic ROS: negative for - blurry vision  Allergy and Immunology ROS: negative for - hives  Hematological and Lymphatic ROS: negative for - jaundice or pallor  Breast ROS: negative for - new or changing breast lumps  Cardiovascular ROS: no chest pain or dyspnea on exertion  Gastrointestinal ROS: positive for - abdominal pain and nausea/vomiting  negative for - hematemesis or melena  Genito-Urinary ROS: no dysuria, trouble voiding, or hematuria  Dermatological ROS: negative for - rash    PHYSICAL EXAM:    Vitals:    01/12/23 1103   BP: (!) 114/103   Pulse: 88   Resp: 18   Temp: 98.6 °F (37 °C)   SpO2: 96%       GENERAL: Obese, comfortable  HEAD:  Normocephalic. Atraumatic. EYES:   No scleral icterus. PERRL. LUNGS: Nonlabored breathing on room air  CARDIOVASCULAR: RR  ABDOMEN:  Soft, non-distended, moderate right upper quadrant pain and guarding, no rigidity  EXTREMITIES:   MAEx4. Atraumatic. No LE edema. SKIN:  Warm and dry  NEUROLOGIC:  GCS 15      ASSESSMENT/PLAN:  60 y.o. female with possible cholecystitis    Plan will be  -HIDA  -Continue antibiotics  -Continue multimodal pain control and IV fluids  -Continue antiemetics  -Would appreciate admit to medicine for co morbidities     discussed with Dr. Greg Peoples  .     Yanely Pendleton DO  Surgery Resident PGY-1  1/12/2023  1:15 PM

## 2023-01-13 ENCOUNTER — APPOINTMENT (OUTPATIENT)
Dept: NUCLEAR MEDICINE | Age: 63
DRG: 418 | End: 2023-01-13
Payer: MEDICARE

## 2023-01-13 LAB
ALBUMIN SERPL-MCNC: 3.2 G/DL (ref 3.5–5.2)
ALP BLD-CCNC: 133 U/L (ref 35–104)
ALT SERPL-CCNC: 13 U/L (ref 0–32)
ANION GAP SERPL CALCULATED.3IONS-SCNC: 9 MMOL/L (ref 7–16)
AST SERPL-CCNC: 24 U/L (ref 0–31)
BASOPHILS ABSOLUTE: 0.08 E9/L (ref 0–0.2)
BASOPHILS RELATIVE PERCENT: 0.9 % (ref 0–2)
BILIRUB SERPL-MCNC: 0.5 MG/DL (ref 0–1.2)
BUN BLDV-MCNC: 10 MG/DL (ref 6–23)
CALCIUM SERPL-MCNC: 9.1 MG/DL (ref 8.6–10.2)
CHLORIDE BLD-SCNC: 99 MMOL/L (ref 98–107)
CO2: 24 MMOL/L (ref 22–29)
CREAT SERPL-MCNC: 0.7 MG/DL (ref 0.5–1)
EOSINOPHILS ABSOLUTE: 0.22 E9/L (ref 0.05–0.5)
EOSINOPHILS RELATIVE PERCENT: 2.3 % (ref 0–6)
GFR SERPL CREATININE-BSD FRML MDRD: >60 ML/MIN/1.73
GLUCOSE BLD-MCNC: 100 MG/DL (ref 74–99)
HCT VFR BLD CALC: 29.7 % (ref 34–48)
HEMOGLOBIN: 9.1 G/DL (ref 11.5–15.5)
IMMATURE GRANULOCYTES #: 0.05 E9/L
IMMATURE GRANULOCYTES %: 0.5 % (ref 0–5)
LYMPHOCYTES ABSOLUTE: 1.08 E9/L (ref 1.5–4)
LYMPHOCYTES RELATIVE PERCENT: 11.5 % (ref 20–42)
MCH RBC QN AUTO: 24.7 PG (ref 26–35)
MCHC RBC AUTO-ENTMCNC: 30.6 % (ref 32–34.5)
MCV RBC AUTO: 80.5 FL (ref 80–99.9)
MONOCYTES ABSOLUTE: 1.21 E9/L (ref 0.1–0.95)
MONOCYTES RELATIVE PERCENT: 12.9 % (ref 2–12)
NEUTROPHILS ABSOLUTE: 6.74 E9/L (ref 1.8–7.3)
NEUTROPHILS RELATIVE PERCENT: 71.9 % (ref 43–80)
PDW BLD-RTO: 18.1 FL (ref 11.5–15)
PLATELET # BLD: 260 E9/L (ref 130–450)
PMV BLD AUTO: 9 FL (ref 7–12)
POTASSIUM SERPL-SCNC: 4.2 MMOL/L (ref 3.5–5)
RBC # BLD: 3.69 E12/L (ref 3.5–5.5)
SODIUM BLD-SCNC: 132 MMOL/L (ref 132–146)
TOTAL PROTEIN: 7.1 G/DL (ref 6.4–8.3)
WBC # BLD: 9.4 E9/L (ref 4.5–11.5)

## 2023-01-13 PROCEDURE — 96372 THER/PROPH/DIAG INJ SC/IM: CPT

## 2023-01-13 PROCEDURE — 96367 TX/PROPH/DG ADDL SEQ IV INF: CPT

## 2023-01-13 PROCEDURE — 6360000002 HC RX W HCPCS: Performed by: STUDENT IN AN ORGANIZED HEALTH CARE EDUCATION/TRAINING PROGRAM

## 2023-01-13 PROCEDURE — G0378 HOSPITAL OBSERVATION PER HR: HCPCS

## 2023-01-13 PROCEDURE — 2580000003 HC RX 258: Performed by: STUDENT IN AN ORGANIZED HEALTH CARE EDUCATION/TRAINING PROGRAM

## 2023-01-13 PROCEDURE — 6370000000 HC RX 637 (ALT 250 FOR IP)

## 2023-01-13 PROCEDURE — A9537 TC99M MEBROFENIN: HCPCS | Performed by: RADIOLOGY

## 2023-01-13 PROCEDURE — 96376 TX/PRO/DX INJ SAME DRUG ADON: CPT

## 2023-01-13 PROCEDURE — 2500000003 HC RX 250 WO HCPCS: Performed by: STUDENT IN AN ORGANIZED HEALTH CARE EDUCATION/TRAINING PROGRAM

## 2023-01-13 PROCEDURE — 96366 THER/PROPH/DIAG IV INF ADDON: CPT

## 2023-01-13 PROCEDURE — 36415 COLL VENOUS BLD VENIPUNCTURE: CPT

## 2023-01-13 PROCEDURE — 80053 COMPREHEN METABOLIC PANEL: CPT

## 2023-01-13 PROCEDURE — 85025 COMPLETE CBC W/AUTO DIFF WBC: CPT

## 2023-01-13 PROCEDURE — 78226 HEPATOBILIARY SYSTEM IMAGING: CPT

## 2023-01-13 PROCEDURE — 3430000000 HC RX DIAGNOSTIC RADIOPHARMACEUTICAL: Performed by: RADIOLOGY

## 2023-01-13 PROCEDURE — 96375 TX/PRO/DX INJ NEW DRUG ADDON: CPT

## 2023-01-13 PROCEDURE — 6370000000 HC RX 637 (ALT 250 FOR IP): Performed by: INTERNAL MEDICINE

## 2023-01-13 PROCEDURE — 6360000002 HC RX W HCPCS

## 2023-01-13 RX ORDER — INDOCYANINE GREEN AND WATER 25 MG
5 KIT INJECTION ONCE
Status: COMPLETED | OUTPATIENT
Start: 2023-01-16 | End: 2023-01-16

## 2023-01-13 RX ORDER — DULOXETIN HYDROCHLORIDE 60 MG/1
120 CAPSULE, DELAYED RELEASE ORAL DAILY
Status: DISCONTINUED | OUTPATIENT
Start: 2023-01-13 | End: 2023-01-19 | Stop reason: HOSPADM

## 2023-01-13 RX ORDER — KETOROLAC TROMETHAMINE 30 MG/ML
30 INJECTION, SOLUTION INTRAMUSCULAR; INTRAVENOUS EVERY 6 HOURS PRN
Status: DISCONTINUED | OUTPATIENT
Start: 2023-01-13 | End: 2023-01-16

## 2023-01-13 RX ORDER — METRONIDAZOLE 500 MG/100ML
500 INJECTION, SOLUTION INTRAVENOUS EVERY 8 HOURS
Status: DISCONTINUED | OUTPATIENT
Start: 2023-01-13 | End: 2023-01-19 | Stop reason: HOSPADM

## 2023-01-13 RX ADMIN — SUCRALFATE 1 G: 1 TABLET ORAL at 20:27

## 2023-01-13 RX ADMIN — ACETAMINOPHEN 650 MG: 325 TABLET ORAL at 17:10

## 2023-01-13 RX ADMIN — OXYCODONE HYDROCHLORIDE 10 MG: 5 TABLET ORAL at 15:31

## 2023-01-13 RX ADMIN — OXYCODONE HYDROCHLORIDE 10 MG: 5 TABLET ORAL at 20:33

## 2023-01-13 RX ADMIN — ONDANSETRON 4 MG: 2 INJECTION INTRAMUSCULAR; INTRAVENOUS at 20:25

## 2023-01-13 RX ADMIN — TRIMETHOBENZAMIDE HYDROCHLORIDE 200 MG: 100 INJECTION INTRAMUSCULAR at 15:55

## 2023-01-13 RX ADMIN — MICONAZOLE NITRATE: 2 POWDER TOPICAL at 20:25

## 2023-01-13 RX ADMIN — TRAZODONE HYDROCHLORIDE 50 MG: 50 TABLET ORAL at 20:27

## 2023-01-13 RX ADMIN — ONDANSETRON 4 MG: 2 INJECTION INTRAMUSCULAR; INTRAVENOUS at 15:24

## 2023-01-13 RX ADMIN — Medication 6 MILLICURIE: at 13:26

## 2023-01-13 RX ADMIN — METRONIDAZOLE 500 MG: 500 INJECTION, SOLUTION INTRAVENOUS at 09:30

## 2023-01-13 RX ADMIN — ACETAMINOPHEN 650 MG: 325 TABLET ORAL at 09:18

## 2023-01-13 RX ADMIN — SENNOSIDES 17.2 MG: 8.6 TABLET, COATED ORAL at 20:27

## 2023-01-13 RX ADMIN — KETOROLAC TROMETHAMINE 30 MG: 30 INJECTION, SOLUTION INTRAMUSCULAR; INTRAVENOUS at 15:23

## 2023-01-13 RX ADMIN — ONDANSETRON 4 MG: 2 INJECTION INTRAMUSCULAR; INTRAVENOUS at 09:20

## 2023-01-13 RX ADMIN — WATER 2000 MG: 1 INJECTION INTRAMUSCULAR; INTRAVENOUS; SUBCUTANEOUS at 09:19

## 2023-01-13 RX ADMIN — METRONIDAZOLE 500 MG: 500 INJECTION, SOLUTION INTRAVENOUS at 17:13

## 2023-01-13 RX ADMIN — GABAPENTIN 600 MG: 300 CAPSULE ORAL at 20:27

## 2023-01-13 ASSESSMENT — PAIN DESCRIPTION - DESCRIPTORS
DESCRIPTORS: SHARP
DESCRIPTORS: SHARP
DESCRIPTORS: STABBING;SHOOTING;CRAMPING
DESCRIPTORS: SHARP

## 2023-01-13 ASSESSMENT — PAIN DESCRIPTION - ORIENTATION
ORIENTATION: MID
ORIENTATION: LEFT
ORIENTATION: RIGHT
ORIENTATION: MID
ORIENTATION: RIGHT

## 2023-01-13 ASSESSMENT — PAIN SCALES - GENERAL
PAINLEVEL_OUTOF10: 10
PAINLEVEL_OUTOF10: 10
PAINLEVEL_OUTOF10: 6
PAINLEVEL_OUTOF10: 8
PAINLEVEL_OUTOF10: 4
PAINLEVEL_OUTOF10: 8

## 2023-01-13 ASSESSMENT — PAIN - FUNCTIONAL ASSESSMENT
PAIN_FUNCTIONAL_ASSESSMENT: ACTIVITIES ARE NOT PREVENTED

## 2023-01-13 ASSESSMENT — PAIN DESCRIPTION - LOCATION
LOCATION: ABDOMEN;BACK
LOCATION: ABDOMEN

## 2023-01-13 NOTE — PROGRESS NOTES
P Quality Flow/Interdisciplinary Rounds Progress Note        Quality Flow Rounds held on January 13, 2023    Disciplines Attending:  Bedside Nurse, , , and Nursing Unit Leadership    Aldair Corrigan was admitted on 1/12/2023 10:21 AM    Anticipated Discharge Date:       Disposition:    Agustin Score:  Agustin Scale Score: 15    Readmission Risk              Risk of Unplanned Readmission:  0           Discussed patient goal for the day, patient clinical progression, and barriers to discharge.   The following Goal(s) of the Day/Commitment(s) have been identified:   NPO for HIDA today, possible cholecystectomy timing TBD, discharge [facility], RN  January 13, 2023

## 2023-01-13 NOTE — CARE COORDINATION
Met with patient about diagnosis and discharge plan of care. Pt admit with cholecystitis. NPO for HIDA scan. Pt was to have OP lap taiwo in February. Pt has MS and bed bound at home. Lives with son and daughter also helps. Pt has hospital bed, sit to stand lift, electric wheelchair, wheelchair lift outside of home, all supplies come from [place] [facility]. Pt is active with care [facility]. 3 hrs in am and 2hrs in pm-7 days a week. PCP is Dr Yosvany Welch. PT WILL NEED AMBULANCE TRANSFER TO HOME. No other needs. Will follow. Ambulance form on chart-mjo    The Plan for Transition of Care is related to the following treatment goals: home     The Patient and/or patient representative pt was provided with a choice of provider and agrees   with the discharge plan. [x] Yes [] No    Freedom of choice list was provided with basic dialogue that supports the patient's individualized plan of care/goals, treatment preferences and shares the quality data associated with the providers.  [x] Yes [] No

## 2023-01-13 NOTE — H&P
[address] [address]                              HISTORY AND PHYSICAL    PATIENT NAME: Alma Romero                 :        1960  MED REC NO:   [id number]                            ROOM:       [id number]  ACCOUNT NO:   [de-identified]                           ADMIT DATE: 2023  PROVIDER:     Cy Castrejon MD    CHIEF COMPLAINT:  Right upper quadrant abdominal pain. HISTORY OF PRESENT ILLNESS:  This is a 78-year-old woman, who was  recently evaluated by  _____ on an outpatient basis for right upper  quadrant pain and found to have symptoms and imaging consistent with  acute cholecystitis. She was scheduled to have a cholecystectomy in  February, but presented to the emergency room much sooner for complaints  of worsening right upper quadrant abdominal pain radiating into her  back. It is associated with some nausea and she reports some episodes  of vomiting. She denies any fever or chills. Presently, she is lying  in bed comfortably, in no acute distress and denies any current nausea. She does report some mild to moderate pain in her right upper quadrant  at this time. PAST MEDICAL HISTORY:  Significant for history of multiple sclerosis  history of gastroesophageal reflux disease, hypothyroidism, chronic  depression with generalized anxiety, osteopenia, chronic venous  insufficiency, chronic anemia with a history of iron deficiency anemia,  history of vitamin D deficiency, and she has chronic physical disability  and is basically bed-bound due to her MS and history of significant hip  fractures. PAST SURGICAL HISTORY:  Includes a gastric volvulus and paraesophageal  hernia repair with fundoplication in  and a right hip  arthroplasty in 2016. SOCIAL HISTORY:  She is disabled. She is a never smoker. She does not  drink any significant amounts of alcohol.     MEDICATIONS:  Include gabapentin 300-mg capsules one in the morning and  two in the evening. Levothyroxine 175 mcg daily, pantoprazole 40 mg  daily, duloxetine 60 mg two capsules daily, lorazepam 0.5 mg one every  12 hours as needed, Zofran 4 mg every eight hours as needed, sucralfate  1 gm twice a day, and Flexeril 10 mg twice daily. REVIEW OF SYSTEMS:  SKIN:  Reveals no new or changing moles, rashes or lesions. LUNGS:  No shortness breath, cough, or wheezing. CARDIOVASCULAR:  No chest pain, palpitations, shortness of breath,  history of angina, MI, CHF, or cardiac arrhythmia. GI:  As above. :  No dysuria or hematuria, but she does have a history of frequency  and urgency incontinence consistent with overactive bladder. No recent  UTIs. MUSCULOSKELETAL:  She has significant osteoarthritis and significant  disabilities from previous tibial and proximal hip fractures from a  fall. NEUROLOGIC:  No history of CVA or TIA symptoms. No paresthesias. No  headaches. She does have a history of multiple sclerosis, which she has  not been on any treatment for several years. PSYCHIATRIC:  She has a long history of significant depression and  anxiety symptoms. She follows with psychiatry and a counselor for this. No current issues with significant depression or suicidal issues. PHYSICAL EXAMINATION:  GENERAL:  This is a 79-year-old lady lying in bed, in mild distress at  this time, but she is alert and oriented x3. VITAL SIGNS:  Stable. She is afebrile. HEENT:  Head:  Normocephalic and atraumatic. Eyes:  PERRLA. Extraocular movements intact without nystagmus. Throat:  Oral and  buccal mucosa are moist without any ulcer, exudate, or lesions. SKIN:  Warm and dry with normal turgor. No evidence of any jaundice. CHEST:  Symmetrical excursions with inspiration. BACK:  No CVA or spine tenderness. HEART:  Has a regular rate and rhythm without murmur, rub, gallop, or  JVD.   LUNGS:  Clear to auscultation and percussion bilaterally with no  wheezes, rubs, rales, or use of accessory respiratory muscles. ABDOMEN:  Mildly tender in the right upper quadrant with no guarding or  rebound. No hepatosplenomegaly or other masses are appreciated. She  does have positive bowel sounds x4. EXTREMITIES:  No clubbing or cyanosis. She has trace to 1+ lower  extremity edema, which is chronic and unchanged. Full range of motion  of all extremities. NEUROLOGIC:  Cranial nerves II through XII are grossly intact with a  grossly normal sensory exam.  She has 4/5 strength in the proximal leg  which is chronic. Her gait was not assessed. She is unsafe to walk. ASSESSMENT AND PLAN:  1. Acute cholecystitis. She has been started on IV antibiotics by  Surgery. She is n.p.o. for a HIDA scan today and it would appear that  cholecystectomy will be scheduled in the near future.  _____ has  been consulted. 2.  Hypokalemia. The patient presented some low potassium. She has  been treated with IV potassium and her sodium has improved. 3.  Chronic anemia. The patient has a baseline hemoglobin in the 9 to  10 area and this is presently stable. 4.  Chronic depression with anxiety. This is stable. We will continue  on her usual doses of antidepressants. 5.  Multiple sclerosis. This has been stable for many years and she has  been off any anti-MS medications for the last couple of years with no  evidence of any recurrence.         Kailey Finch MD    D: 01/13/2023 9:22:20       T: 01/13/2023 9:26:52     [email or web address]  Job#: [id number]     Doc#: [id number]    CC:

## 2023-01-13 NOTE — PROGRESS NOTES
GENERAL SURGERY  DAILY PROGRESS NOTE  1/13/2023    CHIEF COMPLAINT:  Chief Complaint   Patient presents with    Abdominal Pain     Pt having ab pain since yesterday scheduled in feb to get gallbladder removed       SUBJECTIVE:  Still ruq abdominal pain no n/v. NPO. Labs within normal limits     OBJECTIVE:  /78   Pulse 89   Temp 98.8 °F (37.1 °C) (Oral)   Resp 18   Ht 5' 5\" (1.651 m)   Wt (!) 340 lb (154.2 kg)   SpO2 96%   BMI 56.58 kg/m²     GENERAL:  NAD. A&Ox3. LUNGS:  No increased work of breathing. CARDIOVASCULAR: RR  ABDOMEN:  Soft, non-distended, mild RUQ tenderness. No guarding, rigidity, rebound. ASSESSMENT/PLAN:  60 y.o. female with possible cholecystitis     Plan will be  -HIDA 1/13  - NPO, IVF for HIDA  -Continue antibiotics  -Continue multimodal pain control and IV fluids  -Continue antiemetics and pain control  - Monitor Bili/LFTs, wbc   - abx  - plan for cholecystectomy 1/16    Gbay Houston MD  Surgery Resident PGY-2  1/13/2023  5:34 AM    HIDA with non filling  IV ab  Clears  Lap taiwo on Monday    ATTENDING PHYSICIAN PROGRESS NOTE      I have examined the patient, reviewed the record, and discussed the case with the Resident. I have reviewed all relevant labs and imaging data. Please refer to the resident's note. I agree with the assessment and plan with the following corrections/ additions. The following summarizes my clinical findings and independent assessment.      Ld Velazco MD

## 2023-01-14 LAB
ALBUMIN SERPL-MCNC: 3.1 G/DL (ref 3.5–5.2)
ALP BLD-CCNC: 168 U/L (ref 35–104)
ALT SERPL-CCNC: 11 U/L (ref 0–32)
ANION GAP SERPL CALCULATED.3IONS-SCNC: 12 MMOL/L (ref 7–16)
AST SERPL-CCNC: 19 U/L (ref 0–31)
BASOPHILS ABSOLUTE: 0.11 E9/L (ref 0–0.2)
BASOPHILS RELATIVE PERCENT: 1.2 % (ref 0–2)
BILIRUB SERPL-MCNC: 0.4 MG/DL (ref 0–1.2)
BUN BLDV-MCNC: 13 MG/DL (ref 6–23)
CALCIUM SERPL-MCNC: 9 MG/DL (ref 8.6–10.2)
CHLORIDE BLD-SCNC: 97 MMOL/L (ref 98–107)
CO2: 24 MMOL/L (ref 22–29)
CREAT SERPL-MCNC: 0.7 MG/DL (ref 0.5–1)
EOSINOPHILS ABSOLUTE: 0.45 E9/L (ref 0.05–0.5)
EOSINOPHILS RELATIVE PERCENT: 4.7 % (ref 0–6)
GFR SERPL CREATININE-BSD FRML MDRD: >60 ML/MIN/1.73
GLUCOSE BLD-MCNC: 90 MG/DL (ref 74–99)
HCT VFR BLD CALC: 29.1 % (ref 34–48)
HEMOGLOBIN: 9 G/DL (ref 11.5–15.5)
IMMATURE GRANULOCYTES #: 0.03 E9/L
IMMATURE GRANULOCYTES %: 0.3 % (ref 0–5)
LYMPHOCYTES ABSOLUTE: 1.44 E9/L (ref 1.5–4)
LYMPHOCYTES RELATIVE PERCENT: 15.2 % (ref 20–42)
MCH RBC QN AUTO: 25 PG (ref 26–35)
MCHC RBC AUTO-ENTMCNC: 30.9 % (ref 32–34.5)
MCV RBC AUTO: 80.8 FL (ref 80–99.9)
MONOCYTES ABSOLUTE: 1.02 E9/L (ref 0.1–0.95)
MONOCYTES RELATIVE PERCENT: 10.7 % (ref 2–12)
NEUTROPHILS ABSOLUTE: 6.45 E9/L (ref 1.8–7.3)
NEUTROPHILS RELATIVE PERCENT: 67.9 % (ref 43–80)
PDW BLD-RTO: 18.2 FL (ref 11.5–15)
PLATELET # BLD: 273 E9/L (ref 130–450)
PMV BLD AUTO: 9.7 FL (ref 7–12)
POTASSIUM SERPL-SCNC: 3.8 MMOL/L (ref 3.5–5)
RBC # BLD: 3.6 E12/L (ref 3.5–5.5)
SODIUM BLD-SCNC: 133 MMOL/L (ref 132–146)
TOTAL PROTEIN: 7.2 G/DL (ref 6.4–8.3)
WBC # BLD: 9.5 E9/L (ref 4.5–11.5)

## 2023-01-14 PROCEDURE — 6360000002 HC RX W HCPCS

## 2023-01-14 PROCEDURE — 6370000000 HC RX 637 (ALT 250 FOR IP)

## 2023-01-14 PROCEDURE — 2580000003 HC RX 258: Performed by: INTERNAL MEDICINE

## 2023-01-14 PROCEDURE — G0378 HOSPITAL OBSERVATION PER HR: HCPCS

## 2023-01-14 PROCEDURE — 6370000000 HC RX 637 (ALT 250 FOR IP): Performed by: INTERNAL MEDICINE

## 2023-01-14 PROCEDURE — 2500000003 HC RX 250 WO HCPCS: Performed by: STUDENT IN AN ORGANIZED HEALTH CARE EDUCATION/TRAINING PROGRAM

## 2023-01-14 PROCEDURE — 96372 THER/PROPH/DIAG INJ SC/IM: CPT

## 2023-01-14 PROCEDURE — 85025 COMPLETE CBC W/AUTO DIFF WBC: CPT

## 2023-01-14 PROCEDURE — 80053 COMPREHEN METABOLIC PANEL: CPT

## 2023-01-14 PROCEDURE — 96376 TX/PRO/DX INJ SAME DRUG ADON: CPT

## 2023-01-14 PROCEDURE — 96366 THER/PROPH/DIAG IV INF ADDON: CPT

## 2023-01-14 PROCEDURE — 36415 COLL VENOUS BLD VENIPUNCTURE: CPT

## 2023-01-14 PROCEDURE — 6360000002 HC RX W HCPCS: Performed by: STUDENT IN AN ORGANIZED HEALTH CARE EDUCATION/TRAINING PROGRAM

## 2023-01-14 PROCEDURE — 2500000003 HC RX 250 WO HCPCS: Performed by: INTERNAL MEDICINE

## 2023-01-14 PROCEDURE — 2580000003 HC RX 258: Performed by: STUDENT IN AN ORGANIZED HEALTH CARE EDUCATION/TRAINING PROGRAM

## 2023-01-14 RX ORDER — CALCIUM CARBONATE 200(500)MG
500 TABLET,CHEWABLE ORAL 3 TIMES DAILY PRN
Status: DISCONTINUED | OUTPATIENT
Start: 2023-01-14 | End: 2023-01-19 | Stop reason: HOSPADM

## 2023-01-14 RX ORDER — SODIUM CHLORIDE 0.9 % (FLUSH) 0.9 %
5-40 SYRINGE (ML) INJECTION 2 TIMES DAILY
Status: DISCONTINUED | OUTPATIENT
Start: 2023-01-14 | End: 2023-01-19 | Stop reason: HOSPADM

## 2023-01-14 RX ADMIN — Medication 10 ML: at 20:33

## 2023-01-14 RX ADMIN — ACETAMINOPHEN 650 MG: 325 TABLET ORAL at 18:11

## 2023-01-14 RX ADMIN — CALCIUM CARBONATE 500 MG: 500 TABLET, CHEWABLE ORAL at 15:45

## 2023-01-14 RX ADMIN — ACETAMINOPHEN 650 MG: 325 TABLET ORAL at 05:06

## 2023-01-14 RX ADMIN — TRIMETHOBENZAMIDE HYDROCHLORIDE 200 MG: 100 INJECTION INTRAMUSCULAR at 01:00

## 2023-01-14 RX ADMIN — MICONAZOLE NITRATE: 2 POWDER TOPICAL at 08:41

## 2023-01-14 RX ADMIN — PANTOPRAZOLE SODIUM 40 MG: 40 TABLET, DELAYED RELEASE ORAL at 05:06

## 2023-01-14 RX ADMIN — METRONIDAZOLE 500 MG: 500 INJECTION, SOLUTION INTRAVENOUS at 08:44

## 2023-01-14 RX ADMIN — Medication 10 ML: at 08:48

## 2023-01-14 RX ADMIN — CALCIUM CARBONATE 500 MG: 500 TABLET, CHEWABLE ORAL at 08:37

## 2023-01-14 RX ADMIN — ONDANSETRON 4 MG: 2 INJECTION INTRAMUSCULAR; INTRAVENOUS at 20:32

## 2023-01-14 RX ADMIN — SENNOSIDES 17.2 MG: 8.6 TABLET, COATED ORAL at 20:32

## 2023-01-14 RX ADMIN — GABAPENTIN 300 MG: 300 CAPSULE ORAL at 08:39

## 2023-01-14 RX ADMIN — SUCRALFATE 1 G: 1 TABLET ORAL at 20:32

## 2023-01-14 RX ADMIN — SUCRALFATE 1 G: 1 TABLET ORAL at 08:39

## 2023-01-14 RX ADMIN — METRONIDAZOLE 500 MG: 500 INJECTION, SOLUTION INTRAVENOUS at 18:11

## 2023-01-14 RX ADMIN — TRAZODONE HYDROCHLORIDE 50 MG: 50 TABLET ORAL at 20:32

## 2023-01-14 RX ADMIN — WATER 2000 MG: 1 INJECTION INTRAMUSCULAR; INTRAVENOUS; SUBCUTANEOUS at 05:06

## 2023-01-14 RX ADMIN — LORAZEPAM 0.5 MG: 0.5 TABLET ORAL at 00:59

## 2023-01-14 RX ADMIN — METRONIDAZOLE 500 MG: 500 INJECTION, SOLUTION INTRAVENOUS at 00:55

## 2023-01-14 RX ADMIN — KETOROLAC TROMETHAMINE 30 MG: 30 INJECTION, SOLUTION INTRAMUSCULAR; INTRAVENOUS at 08:40

## 2023-01-14 RX ADMIN — ACETAMINOPHEN 650 MG: 325 TABLET ORAL at 12:30

## 2023-01-14 RX ADMIN — LORAZEPAM 0.5 MG: 0.5 TABLET ORAL at 23:16

## 2023-01-14 RX ADMIN — MICONAZOLE NITRATE: 2 POWDER TOPICAL at 20:32

## 2023-01-14 RX ADMIN — OXYCODONE HYDROCHLORIDE 5 MG: 5 TABLET ORAL at 15:45

## 2023-01-14 RX ADMIN — LEVOTHYROXINE SODIUM 175 MCG: 0.12 TABLET ORAL at 08:39

## 2023-01-14 RX ADMIN — GABAPENTIN 600 MG: 300 CAPSULE ORAL at 20:31

## 2023-01-14 RX ADMIN — OXYCODONE HYDROCHLORIDE 10 MG: 5 TABLET ORAL at 08:39

## 2023-01-14 RX ADMIN — DULOXETINE HYDROCHLORIDE 120 MG: 60 CAPSULE, DELAYED RELEASE ORAL at 08:40

## 2023-01-14 RX ADMIN — OXYCODONE HYDROCHLORIDE 5 MG: 5 TABLET ORAL at 00:59

## 2023-01-14 ASSESSMENT — PAIN DESCRIPTION - LOCATION
LOCATION: FLANK
LOCATION: ABDOMEN;BACK
LOCATION: ABDOMEN
LOCATION: ABDOMEN

## 2023-01-14 ASSESSMENT — PAIN DESCRIPTION - DESCRIPTORS
DESCRIPTORS: ACHING;CRAMPING;DULL
DESCRIPTORS: SPASM;SHOOTING;SHARP
DESCRIPTORS: ACHING
DESCRIPTORS: CRAMPING;DISCOMFORT;STABBING

## 2023-01-14 ASSESSMENT — PAIN DESCRIPTION - ONSET: ONSET: ON-GOING

## 2023-01-14 ASSESSMENT — PAIN SCALES - GENERAL
PAINLEVEL_OUTOF10: 5
PAINLEVEL_OUTOF10: 6
PAINLEVEL_OUTOF10: 3
PAINLEVEL_OUTOF10: 2
PAINLEVEL_OUTOF10: 7

## 2023-01-14 ASSESSMENT — PAIN DESCRIPTION - ORIENTATION
ORIENTATION: RIGHT
ORIENTATION: RIGHT
ORIENTATION: RIGHT;UPPER;MID
ORIENTATION: UPPER;MID

## 2023-01-14 ASSESSMENT — PAIN DESCRIPTION - FREQUENCY: FREQUENCY: CONTINUOUS

## 2023-01-14 ASSESSMENT — PAIN - FUNCTIONAL ASSESSMENT
PAIN_FUNCTIONAL_ASSESSMENT: PREVENTS OR INTERFERES SOME ACTIVE ACTIVITIES AND ADLS
PAIN_FUNCTIONAL_ASSESSMENT: ACTIVITIES ARE NOT PREVENTED
PAIN_FUNCTIONAL_ASSESSMENT: ACTIVITIES ARE NOT PREVENTED

## 2023-01-14 NOTE — PROGRESS NOTES
Subjective: The patient is awake and alert. No problems overnight. Denies chest pain, angina, and dyspnea. Denies abdominal pain. Tolerating diet. No nausea or vomiting. Objective:    /73   Pulse 93   Temp 98.4 °F (36.9 °C) (Oral)   Resp 17   Ht 5' 5\" (1.651 m)   Wt (!) 340 lb (154.2 kg)   SpO2 96%   BMI 56.58 kg/m²     Heart:  RRR, no murmurs, gallops, or rubs.   Lungs:  CTA bilaterally, no wheeze, rales or rhonchi  Abd: bowel sounds present, nontender, nondistended, no masses  Extrem:  No clubbing, cyanosis, or edema    CBC with Differential:    Lab Results   Component Value Date/Time    WBC 9.5 01/14/2023 04:16 AM    RBC 3.60 01/14/2023 04:16 AM    HGB 9.0 01/14/2023 04:16 AM    HCT 29.1 01/14/2023 04:16 AM     01/14/2023 04:16 AM    MCV 80.8 01/14/2023 04:16 AM    MCH 25.0 01/14/2023 04:16 AM    MCHC 30.9 01/14/2023 04:16 AM    RDW 18.2 01/14/2023 04:16 AM    SEGSPCT 70 02/27/2014 03:19 PM    LYMPHOPCT 15.2 01/14/2023 04:16 AM    MONOPCT 10.7 01/14/2023 04:16 AM    BASOPCT 1.2 01/14/2023 04:16 AM    MONOSABS 1.02 01/14/2023 04:16 AM    LYMPHSABS 1.44 01/14/2023 04:16 AM    EOSABS 0.45 01/14/2023 04:16 AM    BASOSABS 0.11 01/14/2023 04:16 AM     CMP:    Lab Results   Component Value Date/Time     01/14/2023 04:16 AM    K 3.8 01/14/2023 04:16 AM    K 4.2 03/17/2022 03:08 PM    CL 97 01/14/2023 04:16 AM    CO2 24 01/14/2023 04:16 AM    BUN 13 01/14/2023 04:16 AM    CREATININE 0.7 01/14/2023 04:16 AM    GFRAA >60 08/18/2022 02:15 AM    LABGLOM >60 01/14/2023 04:16 AM    GLUCOSE 90 01/14/2023 04:16 AM    GLUCOSE 94 05/30/2012 09:06 PM    PROT 7.2 01/14/2023 04:16 AM    LABALBU 3.1 01/14/2023 04:16 AM    CALCIUM 9.0 01/14/2023 04:16 AM    BILITOT 0.4 01/14/2023 04:16 AM    ALKPHOS 168 01/14/2023 04:16 AM    AST 19 01/14/2023 04:16 AM    ALT 11 01/14/2023 04:16 AM     PT/INR:    Lab Results   Component Value Date/Time    PROTIME 11.5 08/17/2022 10:48 AM    INR 1.1 08/17/2022 10:48 AM     @cktotal:3,ckmb:3,ckmbindex:3,troponini:3@  U/A:    Lab Results   Component Value Date/Time    NITRU POSITIVE 08/17/2022 10:48 AM    COLORU Yellow 08/17/2022 10:48 AM    PHUR 8.5 08/17/2022 10:48 AM    LABCAST FEW 07/08/2016 03:35 PM    WBCUA >20 08/17/2022 10:48 AM    RBCUA NONE 08/17/2022 10:48 AM    RBCUA 1-3 02/27/2014 05:08 PM    MUCUS Present 12/10/2014 02:00 PM    BACTERIA MANY 08/17/2022 10:48 AM    CLARITYU CLOUDY 08/17/2022 10:48 AM    SPECGRAV 1.010 08/17/2022 10:48 AM    UROBILINOGEN 0.2 08/17/2022 10:48 AM    BILIRUBINUR SMALL 08/17/2022 10:48 AM    BLOODU TRACE-INTACT 08/17/2022 10:48 AM    GLUCOSEU Negative 08/17/2022 10:48 AM    KETUA >=80 08/17/2022 10:48 AM    AMORPHOUS PRESENT 08/17/2022 10:48 AM        Assessment:    Patient Active Problem List   Diagnosis    MS (multiple sclerosis) (HCC)    History of pulmonary embolus (PE)    Acquired hypothyroidism    Closed fracture 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

## 2023-01-14 NOTE — PLAN OF CARE
Problem: Skin/Tissue Integrity  Goal: Absence of new skin breakdown  Description: 1. Monitor for areas of redness and/or skin breakdown  2. Assess vascular access sites hourly  3. Every 4-6 hours minimum:  Change oxygen saturation probe site  4. Every 4-6 hours:  If on nasal continuous positive airway pressure, respiratory therapy assess nares and determine need for appliance change or resting period.   Outcome: Progressing     Problem: Discharge Planning  Goal: Discharge to home or other facility with appropriate resources  Outcome: Progressing     Problem: Pain  Goal: Verbalizes/displays adequate comfort level or baseline comfort level  Outcome: Progressing     Problem: Safety - Adult  Goal: Free from fall injury  Outcome: Progressing

## 2023-01-15 LAB
ALBUMIN SERPL-MCNC: 2.9 G/DL (ref 3.5–5.2)
ALP BLD-CCNC: 172 U/L (ref 35–104)
ALT SERPL-CCNC: 10 U/L (ref 0–32)
ANION GAP SERPL CALCULATED.3IONS-SCNC: 8 MMOL/L (ref 7–16)
AST SERPL-CCNC: 13 U/L (ref 0–31)
BASOPHILS ABSOLUTE: 0.08 E9/L (ref 0–0.2)
BASOPHILS RELATIVE PERCENT: 1.1 % (ref 0–2)
BILIRUB SERPL-MCNC: 0.3 MG/DL (ref 0–1.2)
BUN BLDV-MCNC: 10 MG/DL (ref 6–23)
CALCIUM SERPL-MCNC: 9.2 MG/DL (ref 8.6–10.2)
CHLORIDE BLD-SCNC: 96 MMOL/L (ref 98–107)
CO2: 26 MMOL/L (ref 22–29)
CREAT SERPL-MCNC: 0.6 MG/DL (ref 0.5–1)
EOSINOPHILS ABSOLUTE: 0.45 E9/L (ref 0.05–0.5)
EOSINOPHILS RELATIVE PERCENT: 6.5 % (ref 0–6)
GFR SERPL CREATININE-BSD FRML MDRD: >60 ML/MIN/1.73
GLUCOSE BLD-MCNC: 95 MG/DL (ref 74–99)
HCT VFR BLD CALC: 30.1 % (ref 34–48)
HEMOGLOBIN: 9 G/DL (ref 11.5–15.5)
IMMATURE GRANULOCYTES #: 0.04 E9/L
IMMATURE GRANULOCYTES %: 0.6 % (ref 0–5)
LYMPHOCYTES ABSOLUTE: 1.21 E9/L (ref 1.5–4)
LYMPHOCYTES RELATIVE PERCENT: 17.4 % (ref 20–42)
MCH RBC QN AUTO: 24.7 PG (ref 26–35)
MCHC RBC AUTO-ENTMCNC: 29.9 % (ref 32–34.5)
MCV RBC AUTO: 82.5 FL (ref 80–99.9)
MONOCYTES ABSOLUTE: 0.62 E9/L (ref 0.1–0.95)
MONOCYTES RELATIVE PERCENT: 8.9 % (ref 2–12)
NEUTROPHILS ABSOLUTE: 4.56 E9/L (ref 1.8–7.3)
NEUTROPHILS RELATIVE PERCENT: 65.5 % (ref 43–80)
PDW BLD-RTO: 18.4 FL (ref 11.5–15)
PLATELET # BLD: 301 E9/L (ref 130–450)
PMV BLD AUTO: 9.3 FL (ref 7–12)
POTASSIUM SERPL-SCNC: 4 MMOL/L (ref 3.5–5)
RBC # BLD: 3.65 E12/L (ref 3.5–5.5)
SODIUM BLD-SCNC: 130 MMOL/L (ref 132–146)
TOTAL PROTEIN: 7 G/DL (ref 6.4–8.3)
WBC # BLD: 7 E9/L (ref 4.5–11.5)

## 2023-01-15 PROCEDURE — 2500000003 HC RX 250 WO HCPCS: Performed by: STUDENT IN AN ORGANIZED HEALTH CARE EDUCATION/TRAINING PROGRAM

## 2023-01-15 PROCEDURE — 2580000003 HC RX 258: Performed by: INTERNAL MEDICINE

## 2023-01-15 PROCEDURE — 85025 COMPLETE CBC W/AUTO DIFF WBC: CPT

## 2023-01-15 PROCEDURE — 6370000000 HC RX 637 (ALT 250 FOR IP): Performed by: INTERNAL MEDICINE

## 2023-01-15 PROCEDURE — 80053 COMPREHEN METABOLIC PANEL: CPT

## 2023-01-15 PROCEDURE — 96376 TX/PRO/DX INJ SAME DRUG ADON: CPT

## 2023-01-15 PROCEDURE — 1200000000 HC SEMI PRIVATE

## 2023-01-15 PROCEDURE — 6370000000 HC RX 637 (ALT 250 FOR IP)

## 2023-01-15 PROCEDURE — 6360000002 HC RX W HCPCS: Performed by: STUDENT IN AN ORGANIZED HEALTH CARE EDUCATION/TRAINING PROGRAM

## 2023-01-15 PROCEDURE — 36415 COLL VENOUS BLD VENIPUNCTURE: CPT

## 2023-01-15 PROCEDURE — G0378 HOSPITAL OBSERVATION PER HR: HCPCS

## 2023-01-15 PROCEDURE — 96366 THER/PROPH/DIAG IV INF ADDON: CPT

## 2023-01-15 PROCEDURE — 6360000002 HC RX W HCPCS

## 2023-01-15 PROCEDURE — 2580000003 HC RX 258: Performed by: STUDENT IN AN ORGANIZED HEALTH CARE EDUCATION/TRAINING PROGRAM

## 2023-01-15 RX ADMIN — GABAPENTIN 300 MG: 300 CAPSULE ORAL at 08:41

## 2023-01-15 RX ADMIN — DULOXETINE HYDROCHLORIDE 120 MG: 60 CAPSULE, DELAYED RELEASE ORAL at 08:42

## 2023-01-15 RX ADMIN — OXYCODONE HYDROCHLORIDE 10 MG: 5 TABLET ORAL at 08:41

## 2023-01-15 RX ADMIN — Medication 10 ML: at 08:43

## 2023-01-15 RX ADMIN — OXYCODONE HYDROCHLORIDE 10 MG: 5 TABLET ORAL at 18:46

## 2023-01-15 RX ADMIN — TRAZODONE HYDROCHLORIDE 50 MG: 50 TABLET ORAL at 20:14

## 2023-01-15 RX ADMIN — SENNOSIDES 17.2 MG: 8.6 TABLET, COATED ORAL at 20:14

## 2023-01-15 RX ADMIN — OXYCODONE HYDROCHLORIDE 10 MG: 5 TABLET ORAL at 12:10

## 2023-01-15 RX ADMIN — METRONIDAZOLE 500 MG: 500 INJECTION, SOLUTION INTRAVENOUS at 18:45

## 2023-01-15 RX ADMIN — ACETAMINOPHEN 650 MG: 325 TABLET ORAL at 18:45

## 2023-01-15 RX ADMIN — MICONAZOLE NITRATE: 2 POWDER TOPICAL at 20:14

## 2023-01-15 RX ADMIN — WATER 2000 MG: 1 INJECTION INTRAMUSCULAR; INTRAVENOUS; SUBCUTANEOUS at 05:22

## 2023-01-15 RX ADMIN — METRONIDAZOLE 500 MG: 500 INJECTION, SOLUTION INTRAVENOUS at 08:46

## 2023-01-15 RX ADMIN — LORAZEPAM 0.5 MG: 0.5 TABLET ORAL at 22:58

## 2023-01-15 RX ADMIN — MICONAZOLE NITRATE: 2 POWDER TOPICAL at 08:44

## 2023-01-15 RX ADMIN — SUCRALFATE 1 G: 1 TABLET ORAL at 08:42

## 2023-01-15 RX ADMIN — Medication 10 ML: at 20:14

## 2023-01-15 RX ADMIN — GABAPENTIN 600 MG: 300 CAPSULE ORAL at 20:13

## 2023-01-15 RX ADMIN — METRONIDAZOLE 500 MG: 500 INJECTION, SOLUTION INTRAVENOUS at 01:30

## 2023-01-15 RX ADMIN — PANTOPRAZOLE SODIUM 40 MG: 40 TABLET, DELAYED RELEASE ORAL at 05:22

## 2023-01-15 RX ADMIN — ACETAMINOPHEN 650 MG: 325 TABLET ORAL at 22:57

## 2023-01-15 RX ADMIN — SUCRALFATE 1 G: 1 TABLET ORAL at 20:14

## 2023-01-15 RX ADMIN — ACETAMINOPHEN 650 MG: 325 TABLET ORAL at 05:22

## 2023-01-15 RX ADMIN — ACETAMINOPHEN 650 MG: 325 TABLET ORAL at 11:01

## 2023-01-15 RX ADMIN — ONDANSETRON 4 MG: 2 INJECTION INTRAMUSCULAR; INTRAVENOUS at 08:42

## 2023-01-15 RX ADMIN — LEVOTHYROXINE SODIUM 175 MCG: 0.12 TABLET ORAL at 08:41

## 2023-01-15 ASSESSMENT — PAIN DESCRIPTION - DESCRIPTORS
DESCRIPTORS: DISCOMFORT;CRAMPING;NAGGING
DESCRIPTORS: DISCOMFORT;CRAMPING
DESCRIPTORS: ACHING;DISCOMFORT;HYPERSENSITIVITY

## 2023-01-15 ASSESSMENT — PAIN SCALES - GENERAL
PAINLEVEL_OUTOF10: 7
PAINLEVEL_OUTOF10: 7
PAINLEVEL_OUTOF10: 5
PAINLEVEL_OUTOF10: 7

## 2023-01-15 ASSESSMENT — PAIN DESCRIPTION - ORIENTATION
ORIENTATION: MID;UPPER
ORIENTATION: MID;UPPER
ORIENTATION: RIGHT;UPPER
ORIENTATION: MID;UPPER

## 2023-01-15 ASSESSMENT — PAIN DESCRIPTION - ONSET: ONSET: ON-GOING

## 2023-01-15 ASSESSMENT — PAIN - FUNCTIONAL ASSESSMENT
PAIN_FUNCTIONAL_ASSESSMENT: PREVENTS OR INTERFERES SOME ACTIVE ACTIVITIES AND ADLS

## 2023-01-15 ASSESSMENT — PAIN DESCRIPTION - PAIN TYPE: TYPE: ACUTE PAIN

## 2023-01-15 ASSESSMENT — PAIN DESCRIPTION - LOCATION
LOCATION: ABDOMEN

## 2023-01-15 ASSESSMENT — PAIN DESCRIPTION - DIRECTION: RADIATING_TOWARDS: BACK

## 2023-01-15 ASSESSMENT — PAIN DESCRIPTION - FREQUENCY: FREQUENCY: CONTINUOUS

## 2023-01-15 NOTE — PLAN OF CARE
Problem: Skin/Tissue Integrity  Goal: Absence of new skin breakdown  Description: 1. Monitor for areas of redness and/or skin breakdown  2. Assess vascular access sites hourly  3. Every 4-6 hours minimum:  Change oxygen saturation probe site  4. Every 4-6 hours:  If on nasal continuous positive airway pressure, respiratory therapy assess nares and determine need for appliance change or resting period.   1/14/2023 2023 by Divya Beckwith RN  Outcome: Progressing  1/14/2023 1836 by Sammie Aparicio RN  Outcome: Progressing     Problem: Discharge Planning  Goal: Discharge to home or other facility with appropriate resources  1/14/2023 2023 by Divya Beckwith RN  Outcome: Progressing  1/14/2023 1836 by Sammie Aparicio RN  Outcome: Progressing     Problem: Pain  Goal: Verbalizes/displays adequate comfort level or baseline comfort level  1/14/2023 2023 by Divya Beckwith RN  Outcome: Progressing  1/14/2023 1836 by Sammie Aparicio RN  Outcome: Progressing     Problem: Safety - Adult  Goal: Free from fall injury  Outcome: Progressing

## 2023-01-15 NOTE — PROGRESS NOTES
Subjective: The patient is awake and alert. Pain in abdomen  Objective:    /78   Pulse 80   Temp 98.3 °F (36.8 °C) (Oral)   Resp 18   Ht 5' 5\" (1.651 m)   Wt (!) 340 lb (154.2 kg)   SpO2 99%   BMI 56.58 kg/m²     Heart:  RRR, no murmurs, gallops, or rubs.   Lungs:  CTA bilaterally, no wheeze, rales or rhonchi  Abd: bowel sounds present, nontender, nondistended, no masses  Extrem:  No clubbing, cyanosis, or edema    CBC with Differential:    Lab Results   Component Value Date/Time    WBC 7.0 01/15/2023 02:27 AM    RBC 3.65 01/15/2023 02:27 AM    HGB 9.0 01/15/2023 02:27 AM    HCT 30.1 01/15/2023 02:27 AM     01/15/2023 02:27 AM    MCV 82.5 01/15/2023 02:27 AM    MCH 24.7 01/15/2023 02:27 AM    MCHC 29.9 01/15/2023 02:27 AM    RDW 18.4 01/15/2023 02:27 AM    SEGSPCT 70 02/27/2014 03:19 PM    LYMPHOPCT 17.4 01/15/2023 02:27 AM    MONOPCT 8.9 01/15/2023 02:27 AM    BASOPCT 1.1 01/15/2023 02:27 AM    MONOSABS 0.62 01/15/2023 02:27 AM    LYMPHSABS 1.21 01/15/2023 02:27 AM    EOSABS 0.45 01/15/2023 02:27 AM    BASOSABS 0.08 01/15/2023 02:27 AM     CMP:    Lab Results   Component Value Date/Time     01/15/2023 02:27 AM    K 4.0 01/15/2023 02:27 AM    K 4.2 03/17/2022 03:08 PM    CL 96 01/15/2023 02:27 AM    CO2 26 01/15/2023 02:27 AM    BUN 10 01/15/2023 02:27 AM    CREATININE 0.6 01/15/2023 02:27 AM    GFRAA >60 08/18/2022 02:15 AM    LABGLOM >60 01/15/2023 02:27 AM    GLUCOSE 95 01/15/2023 02:27 AM    GLUCOSE 94 05/30/2012 09:06 PM    PROT 7.0 01/15/2023 02:27 AM    LABALBU 2.9 01/15/2023 02:27 AM    CALCIUM 9.2 01/15/2023 02:27 AM    BILITOT 0.3 01/15/2023 02:27 AM    ALKPHOS 172 01/15/2023 02:27 AM    AST 13 01/15/2023 02:27 AM    ALT 10 01/15/2023 02:27 AM     PT/INR:    Lab Results   Component Value Date/Time    PROTIME 11.5 08/17/2022 10:48 AM    INR 1.1 08/17/2022 10:48 AM     @cktotal:3,ckmb:3,ckmbindex:3,troponini:3@  U/A:    Lab Results   Component Value Date/Time    NITRU POSITIVE 08/17/2022 10:48 AM    COLORU Yellow 08/17/2022 10:48 AM    PHUR 8.5 08/17/2022 10:48 AM    LABCAST FEW 07/08/2016 03:35 PM    WBCUA >20 08/17/2022 10:48 AM    RBCUA NONE 08/17/2022 10:48 AM    RBCUA 1-3 02/27/2014 05:08 PM    MUCUS Present 12/10/2014 02:00 PM    BACTERIA MANY 08/17/2022 10:48 AM    CLARITYU CLOUDY 08/17/2022 10:48 AM    SPECGRAV 1.010 08/17/2022 10:48 AM    UROBILINOGEN 0.2 08/17/2022 10:48 AM    BILIRUBINUR SMALL 08/17/2022 10:48 AM    BLOODU TRACE-INTACT 08/17/2022 10:48 AM    GLUCOSEU Negative 08/17/2022 10:48 AM    KETUA >=80 08/17/2022 10:48 AM    AMORPHOUS PRESENT 08/17/2022 10:48 AM        Assessment:    Patient Active Problem List   Diagnosis    MS (multiple sclerosis) ([facility] [address]    History of pulmonary embolus (PE)    Acquired hypothyroidism    Closed fracture of distal end of right femur with nonunion    Morbid obesity with BMI of 50.0-59.9, adult (HCC)    Acute blood loss as cause of postoperative anemia    Multiple sclerosis (HCC)    Closed nondisplaced spiral fracture of shaft of right tibia with nonunion    Closed nondisplaced spiral fracture of shaft of right fibula with routine healing    Chest pain    Upper GI bleed    Esophageal ulcer    Hematemesis    Cholecystitis       Plan:    Acute cholecystitis  On ABT  Still vomiting  Pain in RUQ  For OR in am  Nontoxic at this point    MS-  Continue skin care    Anemia-  HB stable      Jeremías Lazo DO  5:57 AM  1/15/2023

## 2023-01-15 NOTE — PROGRESS NOTES
GENERAL SURGERY  DAILY PROGRESS NOTE (late entry)  1/14/2023    CHIEF COMPLAINT:  Chief Complaint   Patient presents with    Abdominal Pain     Pt having ab pain since yesterday scheduled in feb to get gallbladder removed       SUBJECTIVE:  Some RUQ pain, tolerating liquids    OBJECTIVE:  BP (!) 152/73   Pulse 90   Temp 98.8 °F (37.1 °C) (Oral)   Resp 18   Ht 5' 5\" (1.651 m)   Wt (!) 340 lb (154.2 kg)   SpO2 98%   BMI 56.58 kg/m²     GENERAL:  NAD. A&Ox3. LUNGS:  No increased work of breathing. CARDIOVASCULAR: RR  ABDOMEN:  Soft, non-distended, mild RUQ tenderness. No guarding, rigidity, rebound.     ASSESSMENT/PLAN:  60 y.o. female with cholecystitis    -HIDA shows nonfilling gallbladder  -Cont abx  -Cholecystectomy Monday    Khadijah Albarran MD  1/15/2023  9:54 AM

## 2023-01-15 NOTE — PROGRESS NOTES
GENERAL SURGERY  DAILY PROGRESS NOTE  1/15/2023    CHIEF COMPLAINT:  Chief Complaint   Patient presents with    Abdominal Pain     Pt having ab pain since yesterday scheduled in feb to get gallbladder removed       SUBJECTIVE:  RUQ pain improved    OBJECTIVE:  BP (!) 152/73   Pulse 90   Temp 98.8 °F (37.1 °C) (Oral)   Resp 18   Ht 5' 5\" (1.651 m)   Wt (!) 340 lb (154.2 kg)   SpO2 98%   BMI 56.58 kg/m²     GENERAL:  NAD. A&Ox3. LUNGS:  No increased work of breathing. CARDIOVASCULAR: RR  ABDOMEN:  Soft, non-distended, minimal RUQ tenderness. No guarding, rigidity, rebound.     ASSESSMENT/PLAN:  60 y.o. female with cholecystitis    -HIDA shows nonfilling gallbladder  -Cont abx  -Cholecystectomy tomorrow    Lacho Stephenson MD  1/15/2023  9:56 AM

## 2023-01-16 ENCOUNTER — ANESTHESIA EVENT (OUTPATIENT)
Dept: OPERATING ROOM | Age: 63
DRG: 418 | End: 2023-01-16
Payer: MEDICARE

## 2023-01-16 ENCOUNTER — APPOINTMENT (OUTPATIENT)
Dept: GENERAL RADIOLOGY | Age: 63
DRG: 418 | End: 2023-01-16
Payer: MEDICARE

## 2023-01-16 ENCOUNTER — ANESTHESIA (OUTPATIENT)
Dept: OPERATING ROOM | Age: 63
DRG: 418 | End: 2023-01-16
Payer: MEDICARE

## 2023-01-16 PROCEDURE — 0FT44ZZ RESECTION OF GALLBLADDER, PERCUTANEOUS ENDOSCOPIC APPROACH: ICD-10-PCS | Performed by: SURGERY

## 2023-01-16 PROCEDURE — 6360000002 HC RX W HCPCS

## 2023-01-16 PROCEDURE — 2580000003 HC RX 258: Performed by: STUDENT IN AN ORGANIZED HEALTH CARE EDUCATION/TRAINING PROGRAM

## 2023-01-16 PROCEDURE — 3700000000 HC ANESTHESIA ATTENDED CARE: Performed by: SURGERY

## 2023-01-16 PROCEDURE — 6370000000 HC RX 637 (ALT 250 FOR IP): Performed by: STUDENT IN AN ORGANIZED HEALTH CARE EDUCATION/TRAINING PROGRAM

## 2023-01-16 PROCEDURE — 2580000003 HC RX 258: Performed by: INTERNAL MEDICINE

## 2023-01-16 PROCEDURE — 6360000002 HC RX W HCPCS: Performed by: ANESTHESIOLOGY

## 2023-01-16 PROCEDURE — 3600000009 HC SURGERY ROBOT BASE: Performed by: SURGERY

## 2023-01-16 PROCEDURE — 2720000010 HC SURG SUPPLY STERILE: Performed by: SURGERY

## 2023-01-16 PROCEDURE — 2580000003 HC RX 258: Performed by: NURSE ANESTHETIST, CERTIFIED REGISTERED

## 2023-01-16 PROCEDURE — 2500000003 HC RX 250 WO HCPCS: Performed by: NURSE ANESTHETIST, CERTIFIED REGISTERED

## 2023-01-16 PROCEDURE — S2900 ROBOTIC SURGICAL SYSTEM: HCPCS | Performed by: SURGERY

## 2023-01-16 PROCEDURE — 6370000000 HC RX 637 (ALT 250 FOR IP)

## 2023-01-16 PROCEDURE — 2500000003 HC RX 250 WO HCPCS

## 2023-01-16 PROCEDURE — 7100000000 HC PACU RECOVERY - FIRST 15 MIN: Performed by: SURGERY

## 2023-01-16 PROCEDURE — 6360000002 HC RX W HCPCS: Performed by: STUDENT IN AN ORGANIZED HEALTH CARE EDUCATION/TRAINING PROGRAM

## 2023-01-16 PROCEDURE — 1200000000 HC SEMI PRIVATE

## 2023-01-16 PROCEDURE — 2500000003 HC RX 250 WO HCPCS: Performed by: STUDENT IN AN ORGANIZED HEALTH CARE EDUCATION/TRAINING PROGRAM

## 2023-01-16 PROCEDURE — 8E0W4CZ ROBOTIC ASSISTED PROCEDURE OF TRUNK REGION, PERCUTANEOUS ENDOSCOPIC APPROACH: ICD-10-PCS | Performed by: SURGERY

## 2023-01-16 PROCEDURE — 2709999900 HC NON-CHARGEABLE SUPPLY: Performed by: SURGERY

## 2023-01-16 PROCEDURE — 3600000019 HC SURGERY ROBOT ADDTL 15MIN: Performed by: SURGERY

## 2023-01-16 PROCEDURE — 7100000001 HC PACU RECOVERY - ADDTL 15 MIN: Performed by: SURGERY

## 2023-01-16 PROCEDURE — 6360000002 HC RX W HCPCS: Performed by: NURSE ANESTHETIST, CERTIFIED REGISTERED

## 2023-01-16 PROCEDURE — 2500000003 HC RX 250 WO HCPCS: Performed by: SURGERY

## 2023-01-16 PROCEDURE — 3700000001 HC ADD 15 MINUTES (ANESTHESIA): Performed by: SURGERY

## 2023-01-16 PROCEDURE — C1889 IMPLANT/INSERT DEVICE, NOC: HCPCS | Performed by: SURGERY

## 2023-01-16 DEVICE — CLIP INT M L POLYMER LOK LIG HEM O LOK: Type: IMPLANTABLE DEVICE | Site: ABDOMEN | Status: FUNCTIONAL

## 2023-01-16 DEVICE — CLIP LIG POLYMER L VESOLOCK: Type: IMPLANTABLE DEVICE | Site: ABDOMEN | Status: FUNCTIONAL

## 2023-01-16 RX ORDER — ROCURONIUM BROMIDE 10 MG/ML
INJECTION, SOLUTION INTRAVENOUS PRN
Status: DISCONTINUED | OUTPATIENT
Start: 2023-01-16 | End: 2023-01-16 | Stop reason: SDUPTHER

## 2023-01-16 RX ORDER — SODIUM CHLORIDE 0.9 % (FLUSH) 0.9 %
5-40 SYRINGE (ML) INJECTION PRN
Status: DISCONTINUED | OUTPATIENT
Start: 2023-01-16 | End: 2023-01-16 | Stop reason: HOSPADM

## 2023-01-16 RX ORDER — SODIUM CHLORIDE, SODIUM LACTATE, POTASSIUM CHLORIDE, CALCIUM CHLORIDE 600; 310; 30; 20 MG/100ML; MG/100ML; MG/100ML; MG/100ML
INJECTION, SOLUTION INTRAVENOUS CONTINUOUS
Status: DISCONTINUED | OUTPATIENT
Start: 2023-01-16 | End: 2023-01-19 | Stop reason: HOSPADM

## 2023-01-16 RX ORDER — SODIUM CHLORIDE 9 MG/ML
25 INJECTION, SOLUTION INTRAVENOUS PRN
Status: DISCONTINUED | OUTPATIENT
Start: 2023-01-16 | End: 2023-01-16 | Stop reason: HOSPADM

## 2023-01-16 RX ORDER — LABETALOL HYDROCHLORIDE 5 MG/ML
10 INJECTION, SOLUTION INTRAVENOUS
Status: DISCONTINUED | OUTPATIENT
Start: 2023-01-16 | End: 2023-01-16 | Stop reason: HOSPADM

## 2023-01-16 RX ORDER — LIDOCAINE HYDROCHLORIDE 20 MG/ML
INJECTION, SOLUTION EPIDURAL; INFILTRATION; INTRACAUDAL; PERINEURAL PRN
Status: DISCONTINUED | OUTPATIENT
Start: 2023-01-16 | End: 2023-01-16 | Stop reason: SDUPTHER

## 2023-01-16 RX ORDER — FENTANYL CITRATE 50 UG/ML
INJECTION, SOLUTION INTRAMUSCULAR; INTRAVENOUS PRN
Status: DISCONTINUED | OUTPATIENT
Start: 2023-01-16 | End: 2023-01-16 | Stop reason: SDUPTHER

## 2023-01-16 RX ORDER — MIDAZOLAM HYDROCHLORIDE 2 MG/2ML
2 INJECTION, SOLUTION INTRAMUSCULAR; INTRAVENOUS
Status: DISCONTINUED | OUTPATIENT
Start: 2023-01-16 | End: 2023-01-16 | Stop reason: HOSPADM

## 2023-01-16 RX ORDER — ONDANSETRON 2 MG/ML
INJECTION INTRAMUSCULAR; INTRAVENOUS PRN
Status: DISCONTINUED | OUTPATIENT
Start: 2023-01-16 | End: 2023-01-16 | Stop reason: SDUPTHER

## 2023-01-16 RX ORDER — PROPOFOL 10 MG/ML
INJECTION, EMULSION INTRAVENOUS PRN
Status: DISCONTINUED | OUTPATIENT
Start: 2023-01-16 | End: 2023-01-16 | Stop reason: SDUPTHER

## 2023-01-16 RX ORDER — HYDRALAZINE HYDROCHLORIDE 20 MG/ML
10 INJECTION INTRAMUSCULAR; INTRAVENOUS
Status: DISCONTINUED | OUTPATIENT
Start: 2023-01-16 | End: 2023-01-16 | Stop reason: HOSPADM

## 2023-01-16 RX ORDER — MIDAZOLAM HYDROCHLORIDE 1 MG/ML
INJECTION INTRAMUSCULAR; INTRAVENOUS PRN
Status: DISCONTINUED | OUTPATIENT
Start: 2023-01-16 | End: 2023-01-16 | Stop reason: SDUPTHER

## 2023-01-16 RX ORDER — IPRATROPIUM BROMIDE AND ALBUTEROL SULFATE 2.5; .5 MG/3ML; MG/3ML
1 SOLUTION RESPIRATORY (INHALATION)
Status: DISCONTINUED | OUTPATIENT
Start: 2023-01-16 | End: 2023-01-16 | Stop reason: HOSPADM

## 2023-01-16 RX ORDER — DEXAMETHASONE SODIUM PHOSPHATE 4 MG/ML
INJECTION, SOLUTION INTRA-ARTICULAR; INTRALESIONAL; INTRAMUSCULAR; INTRAVENOUS; SOFT TISSUE PRN
Status: DISCONTINUED | OUTPATIENT
Start: 2023-01-16 | End: 2023-01-16 | Stop reason: SDUPTHER

## 2023-01-16 RX ORDER — BUPIVACAINE HYDROCHLORIDE AND EPINEPHRINE 2.5; 5 MG/ML; UG/ML
INJECTION, SOLUTION EPIDURAL; INFILTRATION; INTRACAUDAL; PERINEURAL PRN
Status: DISCONTINUED | OUTPATIENT
Start: 2023-01-16 | End: 2023-01-16 | Stop reason: ALTCHOICE

## 2023-01-16 RX ORDER — SODIUM CHLORIDE 0.9 % (FLUSH) 0.9 %
5-40 SYRINGE (ML) INJECTION EVERY 12 HOURS SCHEDULED
Status: DISCONTINUED | OUTPATIENT
Start: 2023-01-16 | End: 2023-01-16 | Stop reason: HOSPADM

## 2023-01-16 RX ORDER — SODIUM CHLORIDE 9 MG/ML
INJECTION, SOLUTION INTRAVENOUS CONTINUOUS PRN
Status: DISCONTINUED | OUTPATIENT
Start: 2023-01-16 | End: 2023-01-16 | Stop reason: SDUPTHER

## 2023-01-16 RX ORDER — ONDANSETRON 2 MG/ML
4 INJECTION INTRAMUSCULAR; INTRAVENOUS
Status: DISCONTINUED | OUTPATIENT
Start: 2023-01-16 | End: 2023-01-16 | Stop reason: HOSPADM

## 2023-01-16 RX ADMIN — ONDANSETRON 4 MG: 2 INJECTION INTRAMUSCULAR; INTRAVENOUS at 17:10

## 2023-01-16 RX ADMIN — GABAPENTIN 600 MG: 300 CAPSULE ORAL at 21:30

## 2023-01-16 RX ADMIN — HYDROMORPHONE HYDROCHLORIDE 0.5 MG: 1 INJECTION, SOLUTION INTRAMUSCULAR; INTRAVENOUS; SUBCUTANEOUS at 21:33

## 2023-01-16 RX ADMIN — TRAZODONE HYDROCHLORIDE 50 MG: 50 TABLET ORAL at 21:29

## 2023-01-16 RX ADMIN — WATER 2000 MG: 1 INJECTION INTRAMUSCULAR; INTRAVENOUS; SUBCUTANEOUS at 05:16

## 2023-01-16 RX ADMIN — SODIUM CHLORIDE, POTASSIUM CHLORIDE, SODIUM LACTATE AND CALCIUM CHLORIDE: 600; 310; 30; 20 INJECTION, SOLUTION INTRAVENOUS at 21:28

## 2023-01-16 RX ADMIN — Medication 10 ML: at 10:04

## 2023-01-16 RX ADMIN — MIDAZOLAM 2 MG: 1 INJECTION INTRAMUSCULAR; INTRAVENOUS at 16:55

## 2023-01-16 RX ADMIN — DEXAMETHASONE SODIUM PHOSPHATE 10 MG: 4 INJECTION, SOLUTION INTRAMUSCULAR; INTRAVENOUS at 17:10

## 2023-01-16 RX ADMIN — METRONIDAZOLE 500 MG: 500 INJECTION, SOLUTION INTRAVENOUS at 09:39

## 2023-01-16 RX ADMIN — SUGAMMADEX 250 MG: 100 INJECTION, SOLUTION INTRAVENOUS at 18:25

## 2023-01-16 RX ADMIN — ROCURONIUM BROMIDE 50 MG: 50 INJECTION INTRAVENOUS at 17:04

## 2023-01-16 RX ADMIN — INDOCYANINE GREEN AND WATER 5 MG: KIT at 15:33

## 2023-01-16 RX ADMIN — OXYCODONE HYDROCHLORIDE 10 MG: 5 TABLET ORAL at 13:09

## 2023-01-16 RX ADMIN — FENTANYL CITRATE 50 MCG: 50 INJECTION, SOLUTION INTRAMUSCULAR; INTRAVENOUS at 17:15

## 2023-01-16 RX ADMIN — Medication 10 ML: at 21:32

## 2023-01-16 RX ADMIN — HYDROMORPHONE HYDROCHLORIDE 0.5 MG: 1 INJECTION, SOLUTION INTRAMUSCULAR; INTRAVENOUS; SUBCUTANEOUS at 19:07

## 2023-01-16 RX ADMIN — SENNOSIDES 17.2 MG: 8.6 TABLET, COATED ORAL at 21:29

## 2023-01-16 RX ADMIN — HYDROMORPHONE HYDROCHLORIDE 0.5 MG: 1 INJECTION, SOLUTION INTRAMUSCULAR; INTRAVENOUS; SUBCUTANEOUS at 18:55

## 2023-01-16 RX ADMIN — HYDROMORPHONE HYDROCHLORIDE 0.5 MG: 1 INJECTION, SOLUTION INTRAMUSCULAR; INTRAVENOUS; SUBCUTANEOUS at 19:00

## 2023-01-16 RX ADMIN — METRONIDAZOLE 500 MG: 500 INJECTION, SOLUTION INTRAVENOUS at 16:59

## 2023-01-16 RX ADMIN — PROPOFOL 150 MG: 10 INJECTION, EMULSION INTRAVENOUS at 17:04

## 2023-01-16 RX ADMIN — LIDOCAINE HYDROCHLORIDE 100 MG: 20 INJECTION, SOLUTION EPIDURAL; INFILTRATION; INTRACAUDAL; PERINEURAL at 17:04

## 2023-01-16 RX ADMIN — SUCRALFATE 1 G: 1 TABLET ORAL at 21:29

## 2023-01-16 RX ADMIN — METRONIDAZOLE 500 MG: 500 INJECTION, SOLUTION INTRAVENOUS at 01:35

## 2023-01-16 RX ADMIN — FENTANYL CITRATE 50 MCG: 50 INJECTION, SOLUTION INTRAMUSCULAR; INTRAVENOUS at 17:04

## 2023-01-16 RX ADMIN — MICONAZOLE NITRATE: 2 POWDER TOPICAL at 21:30

## 2023-01-16 RX ADMIN — KETOROLAC TROMETHAMINE 30 MG: 30 INJECTION, SOLUTION INTRAMUSCULAR; INTRAVENOUS at 08:15

## 2023-01-16 RX ADMIN — ACETAMINOPHEN 650 MG: 325 TABLET ORAL at 22:39

## 2023-01-16 RX ADMIN — ONDANSETRON 4 MG: 2 INJECTION INTRAMUSCULAR; INTRAVENOUS at 13:10

## 2023-01-16 RX ADMIN — SODIUM CHLORIDE: 9 INJECTION, SOLUTION INTRAVENOUS at 16:55

## 2023-01-16 ASSESSMENT — PAIN - FUNCTIONAL ASSESSMENT
PAIN_FUNCTIONAL_ASSESSMENT: ACTIVITIES ARE NOT PREVENTED
PAIN_FUNCTIONAL_ASSESSMENT: PREVENTS OR INTERFERES SOME ACTIVE ACTIVITIES AND ADLS

## 2023-01-16 ASSESSMENT — PAIN SCALES - GENERAL
PAINLEVEL_OUTOF10: 7
PAINLEVEL_OUTOF10: 9
PAINLEVEL_OUTOF10: 10
PAINLEVEL_OUTOF10: 8
PAINLEVEL_OUTOF10: 0
PAINLEVEL_OUTOF10: 7
PAINLEVEL_OUTOF10: 8

## 2023-01-16 ASSESSMENT — PAIN DESCRIPTION - DESCRIPTORS
DESCRIPTORS: CRAMPING;DISCOMFORT;THROBBING
DESCRIPTORS: STABBING;TENDER
DESCRIPTORS: ACHING

## 2023-01-16 ASSESSMENT — PAIN DESCRIPTION - ORIENTATION
ORIENTATION: RIGHT
ORIENTATION: MID
ORIENTATION: MID
ORIENTATION: RIGHT

## 2023-01-16 ASSESSMENT — LIFESTYLE VARIABLES: SMOKING_STATUS: 0

## 2023-01-16 ASSESSMENT — PAIN DESCRIPTION - LOCATION
LOCATION: ABDOMEN

## 2023-01-16 ASSESSMENT — PAIN SCALES - WONG BAKER: WONGBAKER_NUMERICALRESPONSE: 0

## 2023-01-16 NOTE — PROGRESS NOTES
Subjective: The patient is awake and alert. No problems overnight. Denies chest pain, angina, and dyspnea. RUQ abdominal pain persists. No fever or chills. For OR today. Objective:    /75   Pulse 88   Temp 98.5 °F (36.9 °C) (Oral)   Resp 18   Ht 5' 5\" (1.651 m)   Wt (!) 340 lb (154.2 kg)   SpO2 97%   BMI 56.58 kg/m²   Neck: No goiter, bruit, or LA  Heart:  RRR, no murmurs, gallops, or rubs.   Lungs:  CTA bilaterally, no wheeze, rales or rhonchi  Abd: bowel sounds present, + RUQ abdominal tenderness,, nondistended, no masses  Extrem:  No clubbing, cyanosis, or edema, 2+ peripheral pulses, FROM    CBC:   Lab Results   Component Value Date/Time    WBC 7.0 01/15/2023 02:27 AM    RBC 3.65 01/15/2023 02:27 AM    HGB 9.0 01/15/2023 02:27 AM    HCT 30.1 01/15/2023 02:27 AM    MCV 82.5 01/15/2023 02:27 AM    MCH 24.7 01/15/2023 02:27 AM    MCHC 29.9 01/15/2023 02:27 AM    RDW 18.4 01/15/2023 02:27 AM     01/15/2023 02:27 AM    MPV 9.3 01/15/2023 02:27 AM     CMP:    Lab Results   Component Value Date/Time     01/15/2023 02:27 AM    K 4.0 01/15/2023 02:27 AM    K 4.2 03/17/2022 03:08 PM    CL 96 01/15/2023 02:27 AM    CO2 26 01/15/2023 02:27 AM    BUN 10 01/15/2023 02:27 AM    CREATININE 0.6 01/15/2023 02:27 AM    GFRAA >60 08/18/2022 02:15 AM    LABGLOM >60 01/15/2023 02:27 AM    GLUCOSE 95 01/15/2023 02:27 AM    GLUCOSE 94 05/30/2012 09:06 PM    PROT 7.0 01/15/2023 02:27 AM    LABALBU 2.9 01/15/2023 02:27 AM    CALCIUM 9.2 01/15/2023 02:27 AM    BILITOT 0.3 01/15/2023 02:27 AM    ALKPHOS 172 01/15/2023 02:27 AM    AST 13 01/15/2023 02:27 AM    ALT 10 01/15/2023 02:27 AM          Current Facility-Administered Medications:     white petrolatum ointment, , Topical, BID PRN, Iris Hickey MD    calcium carbonate (TUMS) chewable tablet 500 mg, 500 mg, Oral, TID PRN, Iris Hickey MD, 500 mg at 01/14/23 1545    sodium chloride flush 0.9 % injection 5-40 mL, 5-40 mL, IntraVENous, BID, Kathleen Kim MD, 10 mL at 01/15/23 2014    cefTRIAXone (ROCEPHIN) 2,000 mg in sterile water 20 mL IV syringe, 2,000 mg, IntraVENous, Q24H, Onur Vanegas DO, 2,000 mg at 01/16/23 0516    metronidazole (FLAGYL) 500 mg in 0.9% NaCl 100 mL IVPB premix, 500 mg, IntraVENous, Q8H, Onur KELLER Capal, DO, Stopped at 01/16/23 0239    DULoxetine (CYMBALTA) extended release capsule 120 mg, 120 mg, Oral, Daily, Kathleen Kim MD, 120 mg at 01/15/23 0842    indocyanine green (IC-GREEN) syringe 5 mg, 5 mg, IntraVENous, Once, Sissy Cheng MD    ketorolac (TORADOL) injection 30 mg, 30 mg, IntraVENous, Q6H PRN, Sissy Cheng MD, 30 mg at 01/14/23 0840    trimethobenzamide (TIGAN) injection 200 mg, 200 mg, IntraMUSCular, Q6H PRN, Sissy Cheng MD, 200 mg at 01/14/23 0100    acetaminophen (TYLENOL) tablet 650 mg, 650 mg, Oral, Q6H, Brandan Stanley DO, 650 mg at 01/15/23 2257    oxyCODONE (ROXICODONE) immediate release tablet 5 mg, 5 mg, Oral, Q4H PRN, 5 mg at 01/14/23 1545 **OR** oxyCODONE (ROXICODONE) immediate release tablet 10 mg, 10 mg, Oral, Q4H PRN, Maldonado La DO, 10 mg at 01/15/23 1846    ondansetron (ZOFRAN) injection 4 mg, 4 mg, IntraVENous, Q6H PRN, Maldonado La DO, 4 mg at 01/15/23 0842    gabapentin (NEURONTIN) capsule 300 mg, 300 mg, Oral, QAM, Kathleen Kim MD, 300 mg at 01/15/23 0841    gabapentin (NEURONTIN) capsule 600 mg, 600 mg, Oral, Nightly, Kathleen Kim MD, 600 mg at 01/15/23 2013    levothyroxine (SYNTHROID) tablet 175 mcg, 175 mcg, Oral, QAM, Kathleen Kim MD, 175 mcg at 01/15/23 0841    LORazepam (ATIVAN) tablet 0.5 mg, 0.5 mg, Oral, Q12H PRN, Kathleen Kim MD, 0.5 mg at 01/15/23 2258    senna (SENOKOT) tablet 17.2 mg, 2 tablet, Oral, Nightly, Kathleen Kim MD, 17.2 mg at 01/15/23 2014    sucralfate (CARAFATE) tablet 1 g, 1 g, Oral, BID, Kathleen Kim MD, 1 g at 01/15/23 2014    traZODone (DESYREL) tablet 50 mg, 50 mg, Oral, Nightly, Kathleen Kim MD, 50 mg at 01/15/23 2014    pantoprazole (PROTONIX) tablet 40 mg, 40 mg, Oral, QAM AC, Yuliya Kuhn MD, 40 mg at 01/15/23 0522    miconazole (MICOTIN) 2 % powder, , Topical, BID, Yuliya Kuhn MD, Given at 01/15/23 2014    Facility-Administered Medications Ordered in Other Encounters:     0.9 % sodium chloride bolus, 250 mL, IntraVENous, Once, Lindsay Cerrato MD    Assessment:    Patient Active Problem List   Diagnosis    MS (multiple sclerosis) ([facility] [address]    History of pulmonary embolus (PE)    Acquired hypothyroidism    Closed fracture of distal end of right femur with nonunion    Morbid obesity with BMI of 50.0-59.9, adult ([facility] [address]    Acute blood loss as cause of postoperative anemia    Multiple sclerosis (HCC)    Closed nondisplaced spiral fracture of shaft of right tibia with nonunion    Closed nondisplaced spiral fracture of shaft of right fibula with routine healing    Chest pain    Upper GI bleed    Esophageal ulcer    Hematemesis    Cholecystitis       Plan:  Acute cholecystitis- on antibiotics, for lap choley today  Chronic anemia- stable, follow  Hyponatremia- mild, follow        Yuliya Kuhn MD  7:22 AM  1/16/2023

## 2023-01-16 NOTE — CARE COORDINATION
Updated plan of care. NPO for lap cholecystectomy . Plan remains home with son. WILL NEED AMBULANCE TRANSPORT DAY OF DISCHARGE. Ambulance form on chart.  Will follow-[facility]

## 2023-01-16 NOTE — ANESTHESIA PRE PROCEDURE
Department of Anesthesiology  Preprocedure Note       Name:  Radha Horta   Age:  60 y.o.  :  1960                                          MRN:  [id number]         Date:  2023      Surgeon: Miladys Horton):  Michael Guido MD    Procedure: Procedure(s):  LAPAROSCOPIC ROBOTIC XI CHOLECYSTECTOMY POSSIBLE OPEN POSSIBLE CHOLANGIOGRAM    Medications prior to admission:   Prior to Admission medications    Medication Sig Start Date End Date Taking? Authorizing Provider   cyclobenzaprine (FLEXERIL) 10 MG tablet Take 10 mg by mouth in the morning and 10 mg in the evening. Historical Provider, MD   gabapentin (NEURONTIN) 300 MG capsule Take 300 mg by mouth every morning. **SEE OTHER ORDER**    Historical Provider, MD   gabapentin (NEURONTIN) 300 MG capsule Take 600 mg by mouth nightly. **SEE OTHER ORDER**    Historical Provider, MD   esomeprazole (NEXIUM) 40 MG delayed release capsule Take 40 mg by mouth every morning    Historical Provider, MD   traZODone (DESYREL) 50 MG tablet Take 50 mg by mouth nightly    Historical Provider, MD   acetaminophen (TYLENOL) 650 MG extended release tablet Take 1,300 mg by mouth 2 times daily    Historical Provider, MD   sucralfate (CARAFATE) 1 GM tablet Take 1 g by mouth in the morning and 1 g in the evening. Historical Provider, MD   LORazepam (ATIVAN) 0.5 MG tablet Take 0.5 mg by mouth every 12 hours as needed (ANXIETY/SLEEP).     Historical Provider, MD   aluminum & magnesium hydroxide-simethicone (MAALOX) 200-200-20 MG/5ML SUSP suspension Take 5 mLs by mouth every 6 hours as needed for Indigestion    Historical Provider, MD   levothyroxine (SYNTHROID) 175 MCG tablet Take 175 mcg by mouth every morning    Historical Provider, MD   DULoxetine (CYMBALTA) 60 MG extended release capsule Take 120 mg by mouth every morning    Historical Provider, MD   senna (SENOKOT) 8.6 MG tablet Take 2 tablets by mouth nightly     Historical Provider, MD       Current medications:    No current facility-administered medications for this visit. No current outpatient medications on file.      Facility-Administered Medications Ordered in Other Visits   Medication Dose Route Frequency Provider Last Rate Last Admin    white petrolatum ointment   Topical BID PRN Ivette Orozco MD        calcium carbonate (TUMS) chewable tablet 500 mg  500 mg Oral TID PRN Ivette Orozco MD   500 mg at 01/14/23 1545    sodium chloride flush 0.9 % injection 5-40 mL  5-40 mL IntraVENous BID Ivette Orozco MD   10 mL at 01/16/23 1004    cefTRIAXone (ROCEPHIN) 2,000 mg in sterile water 20 mL IV syringe  2,000 mg IntraVENous Q24H Onur B Capal, DO   2,000 mg at 01/16/23 0516    metronidazole (FLAGYL) 500 mg in 0.9% NaCl 100 mL IVPB premix  500 mg IntraVENous Q8H Onur B Capal, DO   Stopped at 01/16/23 1040    DULoxetine (CYMBALTA) extended release capsule 120 mg  120 mg Oral Daily Ivette Orozco MD   120 mg at 01/15/23 0842    indocyanine green (IC-GREEN) syringe 5 mg  5 mg IntraVENous Once Latonya Garcia MD        ketorolac (TORADOL) injection 30 mg  30 mg IntraVENous Q6H PRN Latonya Garcia MD   30 mg at 01/16/23 0815    trimethobenzamide (TIGAN) injection 200 mg  200 mg IntraMUSCular Q6H PRN Latonya Garcia MD   200 mg at 01/14/23 0100    acetaminophen (TYLENOL) tablet 650 mg  650 mg Oral Q6H Brandan Stanley, DO   650 mg at 01/15/23 2257    oxyCODONE (ROXICODONE) immediate release tablet 5 mg  5 mg Oral Q4H PRN Beverley Buck, DO   5 mg at 01/14/23 1545    Or    oxyCODONE (ROXICODONE) immediate release tablet 10 mg  10 mg Oral Q4H PRN Beverley Tivoli, DO   10 mg at 01/16/23 1309    ondansetron (ZOFRAN) injection 4 mg  4 mg IntraVENous Q6H PRN Beverley Buck, DO   4 mg at 01/16/23 1310    gabapentin (NEURONTIN) capsule 300 mg  300 mg Oral QAM Ivette Orozco MD   300 mg at 01/15/23 0841    gabapentin (NEURONTIN) capsule 600 mg  600 mg Oral Nightly Ivette Orozco MD   600 mg at 01/15/23 2013    levothyroxine (SYNTHROID) tablet 175 mcg  175 mcg Oral QAM Olam Gottron, MD   175 mcg at 01/15/23 0841    LORazepam (ATIVAN) tablet 0.5 mg  0.5 mg Oral Q12H PRN Olam Gottron, MD   0.5 mg at 01/15/23 2258    senna (SENOKOT) tablet 17.2 mg  2 tablet Oral Nightly Olam Gottron, MD   17.2 mg at 01/15/23 2014    sucralfate (CARAFATE) tablet 1 g  1 g Oral BID Olam Gottron, MD   1 g at 01/15/23 2014    traZODone (DESYREL) tablet 50 mg  50 mg Oral Nightly Olam Gottron, MD   50 mg at 01/15/23 2014    pantoprazole (PROTONIX) tablet 40 mg  40 mg Oral QAM AC Olam Gottron, MD   40 mg at 01/15/23 0522    miconazole (MICOTIN) 2 % powder   Topical BID Olam Gottron, MD   Given at 01/15/23 2014    0.9 % sodium chloride bolus  250 mL IntraVENous Once Ivonne Garcia MD           Allergies: Allergies   Allergen Reactions    Fentanyl Hives and Itching     PATCH-- itchy w/ red \"dots\" all over back and arms     Ferritin Other (See Comments)     Broke out \"into a sweat, my blood pressure shot up, I felt like lead on my chest and hard to breath. \"       Problem List:    Patient Active Problem List   Diagnosis Code    MS (multiple sclerosis) ([facility] G35    History of pulmonary embolus (PE) Z86.711    Acquired hypothyroidism E03.9    Closed fracture of distal end of right femur with nonunion S72.401K    Morbid obesity with BMI of 50.0-59.9, adult ([address] E66.01, Z68.43    Acute blood loss as cause of postoperative anemia D62    Multiple sclerosis ([address] G35    Closed nondisplaced spiral fracture of shaft of right tibia with nonunion S89.101R    Closed nondisplaced spiral fracture of shaft of right fibula with routine healing S82.444D    Chest pain R07.9    Upper GI bleed K92.2    Esophageal ulcer K22.10    Hematemesis K92.0    Cholecystitis K81.9       Past Medical History:        Diagnosis Date    Acquired hypothyroidism 9/14/2016    Acute blood loss as cause of postoperative anemia 2018    Anemia     Anticoagulant long-term use     Anxiety     Arthritis     Blood transfusion     Chronic back pain     Depression     GERD (gastroesophageal reflux disease)     Hx of blood clots     Lymphedema of lower extremity 2014    Migraine     Multiple sclerosis ([facility] [address]     Obesity     Osteoarthritis     PE (pulmonary thromboembolism) ([facility] [address] 2016    Peripheral vascular disease (HCC)     vericose veins    Prominent abdominal aortic pulse 2014    Pulmonary embolism (HCC) 2016    Swelling of lower limb 2014    Thyroid disease     Urinary incontinence     has a indwelling catheter    Varicose veins of lower extremities 2014       Past Surgical History:        Procedure Laterality Date    BLADDER SURGERY      COLONOSCOPY      DENTAL SURGERY      random teeth extraction    DILATION AND CURETTAGE OF UTERUS      ENDOSCOPY, COLON, DIAGNOSTIC      FRACTURE SURGERY      HERNIA REPAIR      OPEN TREATMENT FRACTURE DISTAL TIBIA & FIBULA Right 2018    OPEN REDUCTION INTERNAL FIXATION RIGHT DISTAL FEMUR, RIGHT DISTAL TIBIA performed by Karen Ma DO at [place] [address]  09/15/2015    LAPAROSCOPIC HIATAL HERNIA REPAIR WITH FUNDOPLICATION WITH MYOFASCIAL FLAP    TIBIA FRACTURE SURGERY Right     TONSILLECTOMY      TOTAL HIP ARTHROPLASTY Right 2016    Right Total Hip Arthroplasty    UPPER GASTROINTESTINAL ENDOSCOPY      UPPER GASTROINTESTINAL ENDOSCOPY N/A 2020    EGD BIOPSY performed by Wendy Hou MD at [address] [place] [address] N/A 2022    EGD BIOPSY performed by Wendy Hou MD at [facility] OR       Social History:    Social History     Tobacco Use    Smoking status: Former     Packs/day: 0.25     Types: Cigarettes     Start date: 1977     Quit date: 2004     Years since quittin.7    Smokeless tobacco: Never   Substance Use Topics    Alcohol use: Yes     Comment: occassional                                Counseling given: Not Answered      Vital Signs (Current): There were no vitals filed for this visit. BP Readings from Last 3 Encounters:   01/16/23 (!) 164/87   10/25/22 133/78   08/19/22 134/81       NPO Status:                                                                                 BMI:   Wt Readings from Last 3 Encounters:   01/12/23 (!) 340 lb (154.2 kg)   08/19/22 (!) 340 lb (154.2 kg)   03/17/22 (!) 340 lb (154.2 kg)     There is no height or weight on file to calculate BMI.    CBC:   Lab Results   Component Value Date/Time    WBC 7.0 01/15/2023 02:27 AM    RBC 3.65 01/15/2023 02:27 AM    HGB 9.0 01/15/2023 02:27 AM    HCT 30.1 01/15/2023 02:27 AM    MCV 82.5 01/15/2023 02:27 AM    RDW 18.4 01/15/2023 02:27 AM     01/15/2023 02:27 AM       CMP:   Lab Results   Component Value Date/Time     01/15/2023 02:27 AM    K 4.0 01/15/2023 02:27 AM    K 4.2 03/17/2022 03:08 PM    CL 96 01/15/2023 02:27 AM    CO2 26 01/15/2023 02:27 AM    BUN 10 01/15/2023 02:27 AM    CREATININE 0.6 01/15/2023 02:27 AM    GFRAA >60 08/18/2022 02:15 AM    LABGLOM >60 01/15/2023 02:27 AM    GLUCOSE 95 01/15/2023 02:27 AM    GLUCOSE 94 05/30/2012 09:06 PM    PROT 7.0 01/15/2023 02:27 AM    CALCIUM 9.2 01/15/2023 02:27 AM    BILITOT 0.3 01/15/2023 02:27 AM    ALKPHOS 172 01/15/2023 02:27 AM    AST 13 01/15/2023 02:27 AM    ALT 10 01/15/2023 02:27 AM       POC Tests: No results for input(s): POCGLU, POCNA, POCK, POCCL, POCBUN, POCHEMO, POCHCT in the last 72 hours.     Coags:   Lab Results   Component Value Date/Time    PROTIME 11.5 08/17/2022 10:48 AM    INR 1.1 08/17/2022 10:48 AM    APTT 26.7 08/17/2022 10:48 AM       HCG (If Applicable):   Lab Results   Component Value Date    PREGTESTUR NEGATIVE 12/14/2016        ABGs: No results found for: PHART, PO2ART, EXZ5TFB, OYZ2JVV, BEART, Y9GFVTYB     Type & Screen (If Applicable):  No results found for: LABABO, LABRH    Drug/Infectious Status (If Applicable):  No results found for: HIV, HEPCAB    COVID-19 Screening (If Applicable): No results found for: COVID19    EKG 8/17/22:  Narrative & Impression    Normal sinus rhythm  Cannot rule out Anterior infarct , age undetermined  Abnormal ECG  When compared with ECG of 17-MAR-2022 16:09,  No significant change was found     CTA Pulmonary 8/17/22:  Impression   1. Extremely limited evaluation for pulmonary embolism secondary to excessive   motion. No evidence of pulmonary embolism within the main pulmonary arteries. 2. Alveolar opacity in the medial left lower lobe is likely decreased   compared to 03/17/2022.   3. Large hiatal hernia. 4. No acute abnormality within the abdomen or pelvis. CXR 8/17/22:  FINDINGS:   The lungs are without acute focal process.  There is no effusion or   pneumothorax. The cardiomediastinal silhouette is without acute process. The   osseous structures are without acute process. Carotid US 4/28/17:  Impression   1. Mild atherosclerotic soft and calcified plaque deposition   bilaterally. 2. Evidence for hemodynamically significant stenosis on the right. 3. Suggestion of a focal 50-69% stenosis of the left carotid artery   based on the submitted velocities. Although this could also be due to   vascular tortuosity as well. Anesthesia Evaluation  Patient summary reviewed and Nursing notes reviewed no history of anesthetic complications:   Airway: Mallampati: II  TM distance: <3 FB   Neck ROM: full  Mouth opening: > = 3 FB   Dental:    (+) upper dentures      Pulmonary: breath sounds clear to auscultation      (-) not a current smoker (Former smoker - Quit in 2004)                          ROS comment: Patient had Covid late July 2022.  Resolved   Cardiovascular:  Exercise tolerance: no interval change,         ECG reviewed  Rhythm: regular  Rate: normal           Beta Blocker:  Not on Beta Blocker         Neuro/Psych:   (+) neuromuscular disease (Chronic back pain and neuropathy): multiple sclerosis, headaches: migraine headaches, psychiatric history (Depression/Anxiety):            GI/Hepatic/Renal:   (+) hiatal hernia, GERD:, PUD (Esophageal ulcer), morbid obesity (SMO with a BMI of 57 kg/m2)         ROS comment: Cholecystitis (K81.9). Endo/Other:    (+) hypothyroidism, blood dyscrasia (Hx of GI Bleed): anticoagulation therapy and anemia, arthritis (Chronic back pain): OA., .                 Abdominal:   (+) obese,           Vascular:   + PVD, aortic or cerebral, DVT, PE (Hx of pulmonary thromboembolism). ROS comment: Varicose veins. Other Findings:             Anesthesia Plan      general     ASA 4     (20 gauge IV left wrist)  Induction: intravenous. MIPS: Postoperative opioids intended and Prophylactic antiemetics administered. Anesthetic plan and risks discussed with patient. Use of blood products discussed with patient whom consented to blood products. Plan discussed with CRNA. Meka Neff MD   1/16/2023  DOS STAFF ADDENDUM:    Patient seen and chart reviewed. No interval change in history or exam.   Anesthesia plan discussed, risk/benefits addressed. Patient's concerns and questions answered.      ERICA Holliday - CRNA  January 16, 2023  4:44 PM

## 2023-01-16 NOTE — PLAN OF CARE
Problem: Skin/Tissue Integrity  Goal: Absence of new skin breakdown  Description: 1. Monitor for areas of redness and/or skin breakdown  2. Assess vascular access sites hourly  3. Every 4-6 hours minimum:  Change oxygen saturation probe site  4. Every 4-6 hours:  If on nasal continuous positive airway pressure, respiratory therapy assess nares and determine need for appliance change or resting period.   1/15/2023 2248 by Rowan Patrick RN  Outcome: Progressing  1/15/2023 1344 by Haroon Ziegler RN  Outcome: Progressing     Problem: Discharge Planning  Goal: Discharge to home or other facility with appropriate resources  1/15/2023 2248 by Rowan Patrick RN  Outcome: Progressing  1/15/2023 1344 by Haroon Ziegler RN  Outcome: Progressing     Problem: Pain  Goal: Verbalizes/displays adequate comfort level or baseline comfort level  1/15/2023 2248 by Rowan Patrick RN  Outcome: Progressing  1/15/2023 1344 by Haroon Ziegler RN  Outcome: Progressing     Problem: Safety - Adult  Goal: Free from fall injury  1/15/2023 2248 by Rowan Patrick RN  Outcome: Progressing  1/15/2023 1344 by Haroon Ziegler RN  Outcome: Progressing

## 2023-01-16 NOTE — ANESTHESIA POSTPROCEDURE EVALUATION
Department of Anesthesiology  Postprocedure Note    Patient: Vineet Adams  MRN: [id number]  YOB: 1960  Date of evaluation: 1/16/2023      Procedure Summary     Date: 01/16/23 Room / Location: [facility] 10 / [address] [place] [address]    Anesthesia Start: [id number] Anesthesia Stop: 1842    Procedure: LAPAROSCOPIC ROBOTIC XI CHOLECYSTECTOMY (Abdomen) Diagnosis:       Cholecystitis      (Cholecystitis [K81.9])    Surgeons: Monisha Bal MD Responsible Provider: Daquan Kwan MD    Anesthesia Type: general ASA Status: 4          Anesthesia Type: No value filed.     Jessee Phase I:      Jessee Phase II:        Anesthesia Post Evaluation    Patient location during evaluation: PACU  Patient participation: complete - patient participated  Level of consciousness: awake and alert  Pain score: 1  Airway patency: patent  Nausea & Vomiting: no nausea and no vomiting  Complications: no  Cardiovascular status: hemodynamically stable  Respiratory status: acceptable

## 2023-01-16 NOTE — PROGRESS NOTES
[facility] Quality Flow/Interdisciplinary Rounds Progress Note        Quality Flow Rounds held on January 16, 2023    Disciplines Attending:  Bedside Nurse, , , and Nursing Unit Leadership    Livan Morrison was admitted on 1/12/2023 10:21 AM    Anticipated Discharge Date:       Disposition:    Agustin Score:  Agustin Scale Score: 15    Readmission Risk              Risk of Unplanned Readmission:  9           Discussed patient goal for the day, patient clinical progression, and barriers to discharge.   The following Goal(s) of the Day/Commitment(s) have been identified:   Discharge 9841 Jovita Núñez RN  January 16, 2023

## 2023-01-17 LAB
ALBUMIN SERPL-MCNC: 2.9 G/DL (ref 3.5–5.2)
ALP BLD-CCNC: 330 U/L (ref 35–104)
ALT SERPL-CCNC: 16 U/L (ref 0–32)
ANION GAP SERPL CALCULATED.3IONS-SCNC: 11 MMOL/L (ref 7–16)
AST SERPL-CCNC: 37 U/L (ref 0–31)
BILIRUB SERPL-MCNC: <0.2 MG/DL (ref 0–1.2)
BUN BLDV-MCNC: 8 MG/DL (ref 6–23)
CALCIUM SERPL-MCNC: 8.8 MG/DL (ref 8.6–10.2)
CHLORIDE BLD-SCNC: 97 MMOL/L (ref 98–107)
CO2: 24 MMOL/L (ref 22–29)
CREAT SERPL-MCNC: 0.5 MG/DL (ref 0.5–1)
GFR SERPL CREATININE-BSD FRML MDRD: >60 ML/MIN/1.73
GLUCOSE BLD-MCNC: 125 MG/DL (ref 74–99)
HCT VFR BLD CALC: 27.9 % (ref 34–48)
HEMOGLOBIN: 8.6 G/DL (ref 11.5–15.5)
MCH RBC QN AUTO: 24.6 PG (ref 26–35)
MCHC RBC AUTO-ENTMCNC: 30.8 % (ref 32–34.5)
MCV RBC AUTO: 79.7 FL (ref 80–99.9)
PDW BLD-RTO: 18.5 FL (ref 11.5–15)
PLATELET # BLD: 368 E9/L (ref 130–450)
PMV BLD AUTO: 9.6 FL (ref 7–12)
POTASSIUM REFLEX MAGNESIUM: 4.4 MMOL/L (ref 3.5–5)
RBC # BLD: 3.5 E12/L (ref 3.5–5.5)
SODIUM BLD-SCNC: 132 MMOL/L (ref 132–146)
TOTAL PROTEIN: 7 G/DL (ref 6.4–8.3)
WBC # BLD: 7.3 E9/L (ref 4.5–11.5)

## 2023-01-17 PROCEDURE — 6370000000 HC RX 637 (ALT 250 FOR IP): Performed by: STUDENT IN AN ORGANIZED HEALTH CARE EDUCATION/TRAINING PROGRAM

## 2023-01-17 PROCEDURE — 6360000002 HC RX W HCPCS: Performed by: STUDENT IN AN ORGANIZED HEALTH CARE EDUCATION/TRAINING PROGRAM

## 2023-01-17 PROCEDURE — 2580000003 HC RX 258: Performed by: STUDENT IN AN ORGANIZED HEALTH CARE EDUCATION/TRAINING PROGRAM

## 2023-01-17 PROCEDURE — 85027 COMPLETE CBC AUTOMATED: CPT

## 2023-01-17 PROCEDURE — 80053 COMPREHEN METABOLIC PANEL: CPT

## 2023-01-17 PROCEDURE — 2500000003 HC RX 250 WO HCPCS: Performed by: STUDENT IN AN ORGANIZED HEALTH CARE EDUCATION/TRAINING PROGRAM

## 2023-01-17 PROCEDURE — 6360000002 HC RX W HCPCS

## 2023-01-17 PROCEDURE — 1200000000 HC SEMI PRIVATE

## 2023-01-17 PROCEDURE — 36415 COLL VENOUS BLD VENIPUNCTURE: CPT

## 2023-01-17 RX ORDER — ENOXAPARIN SODIUM 100 MG/ML
40 INJECTION SUBCUTANEOUS 2 TIMES DAILY
Status: DISCONTINUED | OUTPATIENT
Start: 2023-01-17 | End: 2023-01-19 | Stop reason: HOSPADM

## 2023-01-17 RX ADMIN — LORAZEPAM 0.5 MG: 0.5 TABLET ORAL at 00:58

## 2023-01-17 RX ADMIN — SUCRALFATE 1 G: 1 TABLET ORAL at 08:40

## 2023-01-17 RX ADMIN — METRONIDAZOLE 500 MG: 500 INJECTION, SOLUTION INTRAVENOUS at 00:53

## 2023-01-17 RX ADMIN — SUCRALFATE 1 G: 1 TABLET ORAL at 20:30

## 2023-01-17 RX ADMIN — ENOXAPARIN SODIUM 40 MG: 100 INJECTION SUBCUTANEOUS at 08:39

## 2023-01-17 RX ADMIN — ACETAMINOPHEN 650 MG: 325 TABLET ORAL at 18:06

## 2023-01-17 RX ADMIN — METRONIDAZOLE 500 MG: 500 INJECTION, SOLUTION INTRAVENOUS at 18:08

## 2023-01-17 RX ADMIN — HYDROMORPHONE HYDROCHLORIDE 0.5 MG: 1 INJECTION, SOLUTION INTRAMUSCULAR; INTRAVENOUS; SUBCUTANEOUS at 06:03

## 2023-01-17 RX ADMIN — METRONIDAZOLE 500 MG: 500 INJECTION, SOLUTION INTRAVENOUS at 08:43

## 2023-01-17 RX ADMIN — MICONAZOLE NITRATE: 2 POWDER TOPICAL at 08:40

## 2023-01-17 RX ADMIN — MICONAZOLE NITRATE: 2 POWDER TOPICAL at 20:31

## 2023-01-17 RX ADMIN — HYDROMORPHONE HYDROCHLORIDE 0.5 MG: 1 INJECTION, SOLUTION INTRAMUSCULAR; INTRAVENOUS; SUBCUTANEOUS at 14:19

## 2023-01-17 RX ADMIN — DULOXETINE HYDROCHLORIDE 120 MG: 60 CAPSULE, DELAYED RELEASE ORAL at 08:40

## 2023-01-17 RX ADMIN — GABAPENTIN 600 MG: 300 CAPSULE ORAL at 20:30

## 2023-01-17 RX ADMIN — OXYCODONE HYDROCHLORIDE 10 MG: 5 TABLET ORAL at 18:06

## 2023-01-17 RX ADMIN — ACETAMINOPHEN 650 MG: 325 TABLET ORAL at 22:12

## 2023-01-17 RX ADMIN — ACETAMINOPHEN 650 MG: 325 TABLET ORAL at 12:42

## 2023-01-17 RX ADMIN — SENNOSIDES 17.2 MG: 8.6 TABLET, COATED ORAL at 20:30

## 2023-01-17 RX ADMIN — WATER 2000 MG: 1 INJECTION INTRAMUSCULAR; INTRAVENOUS; SUBCUTANEOUS at 06:00

## 2023-01-17 RX ADMIN — ACETAMINOPHEN 650 MG: 325 TABLET ORAL at 06:00

## 2023-01-17 RX ADMIN — PANTOPRAZOLE SODIUM 40 MG: 40 TABLET, DELAYED RELEASE ORAL at 06:43

## 2023-01-17 RX ADMIN — OXYCODONE HYDROCHLORIDE 10 MG: 5 TABLET ORAL at 12:42

## 2023-01-17 RX ADMIN — GABAPENTIN 300 MG: 300 CAPSULE ORAL at 08:40

## 2023-01-17 RX ADMIN — TRAZODONE HYDROCHLORIDE 50 MG: 50 TABLET ORAL at 21:30

## 2023-01-17 RX ADMIN — OXYCODONE HYDROCHLORIDE 10 MG: 5 TABLET ORAL at 08:39

## 2023-01-17 RX ADMIN — ENOXAPARIN SODIUM 40 MG: 100 INJECTION SUBCUTANEOUS at 20:30

## 2023-01-17 RX ADMIN — LORAZEPAM 0.5 MG: 0.5 TABLET ORAL at 21:31

## 2023-01-17 RX ADMIN — LEVOTHYROXINE SODIUM 175 MCG: 0.12 TABLET ORAL at 08:40

## 2023-01-17 ASSESSMENT — PAIN DESCRIPTION - LOCATION
LOCATION: ABDOMEN

## 2023-01-17 ASSESSMENT — PAIN DESCRIPTION - ORIENTATION
ORIENTATION: RIGHT
ORIENTATION: RIGHT;MID
ORIENTATION: RIGHT
ORIENTATION: LEFT
ORIENTATION: RIGHT
ORIENTATION: RIGHT

## 2023-01-17 ASSESSMENT — PAIN DESCRIPTION - DESCRIPTORS
DESCRIPTORS: ACHING
DESCRIPTORS: ACHING
DESCRIPTORS: SORE
DESCRIPTORS: ACHING
DESCRIPTORS: STABBING
DESCRIPTORS: SHARP;STABBING

## 2023-01-17 ASSESSMENT — PAIN SCALES - GENERAL
PAINLEVEL_OUTOF10: 8
PAINLEVEL_OUTOF10: 7
PAINLEVEL_OUTOF10: 8
PAINLEVEL_OUTOF10: 8
PAINLEVEL_OUTOF10: 10

## 2023-01-17 ASSESSMENT — PAIN - FUNCTIONAL ASSESSMENT
PAIN_FUNCTIONAL_ASSESSMENT: PREVENTS OR INTERFERES SOME ACTIVE ACTIVITIES AND ADLS
PAIN_FUNCTIONAL_ASSESSMENT: PREVENTS OR INTERFERES SOME ACTIVE ACTIVITIES AND ADLS

## 2023-01-17 NOTE — CARE COORDINATION
POD#1 lap cholecystectomy. Advance diet as tolerated. Wean O2. Pt will need ambulance transport.  Forms on chart-mjo

## 2023-01-17 NOTE — PROGRESS NOTES
GENERAL SURGERY  DAILY PROGRESS NOTE  1/17/2023    CHIEF COMPLAINT:  Chief Complaint   Patient presents with    Abdominal Pain     Pt having ab pain since yesterday scheduled in feb to get gallbladder removed       SUBJECTIVE:  Doing well this morning, no acute events overnight. OBJECTIVE:  BP (!) 139/93   Pulse 86   Temp 98.4 °F (36.9 °C) (Oral)   Resp 16   Ht 5' 5\" (1.651 m)   Wt (!) 340 lb (154.2 kg)   SpO2 100%   BMI 56.58 kg/m²     GENERAL: Comfortable, answers questions appropriately. LUNGS: Nonlabored breathing on 3 L nasal cannula  CARDIOVASCULAR: RR, normotensive  ABDOMEN:  Soft, non-distended, appropriately tender. Incisions appear clean dry and intact. Right-sided DESTINEY drain with serosanguineous output, 4024 hours, no guarding, rigidity, rebound.     ASSESSMENT/PLAN:  60 y.o. female postop day 1 robot-assisted laparoscopic cholecystectomy    -Advance to regular diet  -Okay for DVT prophylaxis  -Strict I's and O's/DESTINEY output  -Multimodal pain control  -Wean O2 as tolerated    Beverley Jane DO  Surgery Resident PGY-1  1/17/2023  7:13 AM

## 2023-01-17 NOTE — PROGRESS NOTES
Subjective: The patient is awake and alert. No problems overnight. Denies chest pain, angina, and dyspnea. S/p robotic laparoscopic cholecystectomy. Some postsurgical RUQ pain. No fever or chills. No nausea or vomiting. Objective:    BP (!) 139/93   Pulse 86   Temp 98.4 °F (36.9 °C) (Oral)   Resp 16   Ht 5' 5\" (1.651 m)   Wt (!) 340 lb (154.2 kg)   SpO2 100%   BMI 56.58 kg/m²   Neck: No goiter, bruit, or LA  Heart:  RRR, no murmurs, gallops, or rubs.   Lungs:  CTA bilaterally, no wheeze, rales or rhonchi  Abd: bowel sounds present, + RUQ tenderness,, nondistended, no masses  Extrem:  No clubbing, cyanosis, or edema, 2+ peripheral pulses, FROM    CBC:   Lab Results   Component Value Date/Time    WBC 7.3 01/17/2023 03:00 AM    RBC 3.50 01/17/2023 03:00 AM    HGB 8.6 01/17/2023 03:00 AM    HCT 27.9 01/17/2023 03:00 AM    MCV 79.7 01/17/2023 03:00 AM    MCH 24.6 01/17/2023 03:00 AM    MCHC 30.8 01/17/2023 03:00 AM    RDW 18.5 01/17/2023 03:00 AM     01/17/2023 03:00 AM    MPV 9.6 01/17/2023 03:00 AM     CMP:    Lab Results   Component Value Date/Time     01/17/2023 03:00 AM    K 4.4 01/17/2023 03:00 AM    CL 97 01/17/2023 03:00 AM    CO2 24 01/17/2023 03:00 AM    BUN 8 01/17/2023 03:00 AM    CREATININE 0.5 01/17/2023 03:00 AM    GFRAA >60 08/18/2022 02:15 AM    LABGLOM >60 01/17/2023 03:00 AM    GLUCOSE 125 01/17/2023 03:00 AM    GLUCOSE 94 05/30/2012 09:06 PM    PROT 7.0 01/17/2023 03:00 AM    LABALBU 2.9 01/17/2023 03:00 AM    CALCIUM 8.8 01/17/2023 03:00 AM    BILITOT <0.2 01/17/2023 03:00 AM    ALKPHOS 330 01/17/2023 03:00 AM    AST 37 01/17/2023 03:00 AM    ALT 16 01/17/2023 03:00 AM          Current Facility-Administered Medications:     HYDROmorphone (DILAUDID) injection 0.5 mg, 0.5 mg, IntraVENous, Q4H PRN, Onur KELLER Capal, DO, 0.5 mg at 01/16/23 2133    lactated ringers infusion, , IntraVENous, Continuous, Onur KELLER Capal, DO, Last Rate: 100 mL/hr at 01/16/23 2128, New Bag at 01/16/23 2128    white petrolatum ointment, , Topical, BID PRN, Adonna Barefoot Capal, DO    calcium carbonate (TUMS) chewable tablet 500 mg, 500 mg, Oral, TID PRN, Carmell [place] B Capal, DO, 500 mg at 01/14/23 1545    sodium chloride flush 0.9 % injection 5-40 mL, 5-40 mL, IntraVENous, BID, Onur B Capal, DO, 10 mL at 01/16/23 2132    cefTRIAXone (ROCEPHIN) 2,000 mg in sterile water 20 mL IV syringe, 2,000 mg, IntraVENous, Q24H, Onur B Capal, DO, 2,000 mg at 01/16/23 0516    metronidazole (FLAGYL) 500 mg in 0.9% NaCl 100 mL IVPB premix, 500 mg, IntraVENous, Q8H, Onur B Capal, DO, Stopped at 01/17/23 0153    DULoxetine (CYMBALTA) extended release capsule 120 mg, 120 mg, Oral, Daily, Onur B Capal, DO, 120 mg at 01/15/23 0842    trimethobenzamide (TIGAN) injection 200 mg, 200 mg, IntraMUSCular, Q6H PRN, Carmell [place] B Capal, DO, 200 mg at 01/14/23 0100    acetaminophen (TYLENOL) tablet 650 mg, 650 mg, Oral, Q6H, Onur B Capal, DO, 650 mg at 01/16/23 2239    oxyCODONE (ROXICODONE) immediate release tablet 5 mg, 5 mg, Oral, Q4H PRN, 5 mg at 01/14/23 1545 **OR** oxyCODONE (ROXICODONE) immediate release tablet 10 mg, 10 mg, Oral, Q4H PRN, Onur B Capal, DO, 10 mg at 01/16/23 1309    ondansetron (ZOFRAN) injection 4 mg, 4 mg, IntraVENous, Q6H PRN, Carmell [place] B Capal, DO, 4 mg at 01/16/23 1310    gabapentin (NEURONTIN) capsule 300 mg, 300 mg, Oral, QAM, Onur B Capal, DO, 300 mg at 01/15/23 0841    gabapentin (NEURONTIN) capsule 600 mg, 600 mg, Oral, Nightly, Onur B Capal, DO, 600 mg at 01/16/23 2130    levothyroxine (SYNTHROID) tablet 175 mcg, 175 mcg, Oral, QAM, Onur B Capal, DO, 175 mcg at 01/15/23 0841    LORazepam (ATIVAN) tablet 0.5 mg, 0.5 mg, Oral, Q12H PRN, Carmell [place] B Capal, DO, 0.5 mg at 01/17/23 0058    senna (SENOKOT) tablet 17.2 mg, 2 tablet, Oral, Nightly, Onur B Capal, DO, 17.2 mg at 01/16/23 2129    sucralfate (CARAFATE) tablet 1 g, 1 g, Oral, BID, Onur B Capal, DO, 1 g at 01/16/23 2129    traZODone (DESYREL) tablet 50 mg, 50 mg, Oral, Nightly, Onur KELLER Capal, DO, 50 mg at 01/16/23 2129    pantoprazole (PROTONIX) tablet 40 mg, 40 mg, Oral, QAM AC, Onur B Capal, DO, 40 mg at 01/15/23 0522    miconazole (MICOTIN) 2 % powder, , Topical, BID, Juma Dennis DO, Given at 01/16/23 2130    Facility-Administered Medications Ordered in Other Encounters:     0.9 % sodium chloride bolus, 250 mL, IntraVENous, Once, Celia Olsen MD    Assessment:    Patient Active Problem List   Diagnosis    MS (multiple sclerosis) ([facility] [address]    History of pulmonary embolus (PE)    Acquired hypothyroidism    Closed fracture of distal end of right femur with nonunion    Morbid obesity with BMI of 50.0-59.9, adult ([facility] [address]    Acute blood loss as cause of postoperative anemia    Multiple sclerosis (HCC)    Closed nondisplaced spiral fracture of shaft of right tibia with nonunion    Closed nondisplaced spiral fracture of shaft of right fibula with routine healing    Chest pain    Upper GI bleed    Esophageal ulcer    Hematemesis    Cholecystitis       Plan:  Acute cholecystitis- on iv antibiotics per surgery,s/p cholecystectomy. Pain management per surgery  Hyponatremia- sodium improved 130-->132  Chronic anemia- stable  Pt is stable from medical standpoint.  D/c home when ok with surgery        Malu Lares MD  5:28 AM  1/17/2023

## 2023-01-17 NOTE — PROGRESS NOTES
Your patient is on a medication that requires a renal and/or weight dose adjustment. Renal/Body Weight Function Assessment:    Date Body Weight IBW  Adjusted BW SCr  CrCl Dialysis status   1/17/2023 (!) 340 lb (154.2 kg)  Ideal body weight: 57 kg (125 lb 10.6 oz)  Adjusted ideal body weight: 95.9 kg (211 lb 6.4 oz) Serum creatinine: 0.5 mg/dL 01/17/23 0300  Estimated creatinine clearance: 174 mL/min N/A       Pharmacy has dose-adjusted the following medication(s):    Date Previous Order Adjusted Order   1/17/2023 Enoxaparin 40 mg Daily Enoxaparin 40 mg BID       These changes were made per protocol according to the [facility] Renal Dosing Policy/ [facility] Pharmacist Anticoagulant Review. *Please note this dose may need readjusted if your patient's condition changes. Please contact pharmacy with any questions regarding these changes.     Eugenio Michelle, [facility]  1/17/2023  7:16 AM

## 2023-01-17 NOTE — PROGRESS NOTES
P Quality Flow/Interdisciplinary Rounds Progress Note        Quality Flow Rounds held on January 17, 2023    Disciplines Attending:  Bedside Nurse, , , and Nursing Unit Leadership    Esther Nicole was admitted on 1/12/2023 10:21 AM    Anticipated Discharge Date:       Disposition:    Agustin Score:  Agustin Scale Score: 15    Readmission Risk              Risk of Unplanned Readmission:  12           Discussed patient goal for the day, patient clinical progression, and barriers to discharge.   The following Goal(s) of the Day/Commitment(s) have been identified:   Discharge 8089 Jovita Núñez RN  January 17, 2023

## 2023-01-18 LAB
ALBUMIN SERPL-MCNC: 2.7 G/DL (ref 3.5–5.2)
ALP BLD-CCNC: 592 U/L (ref 35–104)
ALT SERPL-CCNC: 14 U/L (ref 0–32)
ANION GAP SERPL CALCULATED.3IONS-SCNC: 9 MMOL/L (ref 7–16)
AST SERPL-CCNC: 30 U/L (ref 0–31)
BILIRUB SERPL-MCNC: <0.2 MG/DL (ref 0–1.2)
BUN BLDV-MCNC: 8 MG/DL (ref 6–23)
CALCIUM SERPL-MCNC: 8.6 MG/DL (ref 8.6–10.2)
CHLORIDE BLD-SCNC: 99 MMOL/L (ref 98–107)
CO2: 27 MMOL/L (ref 22–29)
CREAT SERPL-MCNC: 0.6 MG/DL (ref 0.5–1)
GFR SERPL CREATININE-BSD FRML MDRD: >60 ML/MIN/1.73
GLUCOSE BLD-MCNC: 124 MG/DL (ref 74–99)
HCT VFR BLD CALC: 27.1 % (ref 34–48)
HEMOGLOBIN: 8.4 G/DL (ref 11.5–15.5)
MCH RBC QN AUTO: 25.3 PG (ref 26–35)
MCHC RBC AUTO-ENTMCNC: 31 % (ref 32–34.5)
MCV RBC AUTO: 81.6 FL (ref 80–99.9)
PDW BLD-RTO: 18.6 FL (ref 11.5–15)
PLATELET # BLD: 389 E9/L (ref 130–450)
PMV BLD AUTO: 9.3 FL (ref 7–12)
POTASSIUM REFLEX MAGNESIUM: 3.8 MMOL/L (ref 3.5–5)
RBC # BLD: 3.32 E12/L (ref 3.5–5.5)
SODIUM BLD-SCNC: 135 MMOL/L (ref 132–146)
TOTAL PROTEIN: 6.6 G/DL (ref 6.4–8.3)
WBC # BLD: 6 E9/L (ref 4.5–11.5)

## 2023-01-18 PROCEDURE — 2500000003 HC RX 250 WO HCPCS: Performed by: STUDENT IN AN ORGANIZED HEALTH CARE EDUCATION/TRAINING PROGRAM

## 2023-01-18 PROCEDURE — 6360000002 HC RX W HCPCS: Performed by: STUDENT IN AN ORGANIZED HEALTH CARE EDUCATION/TRAINING PROGRAM

## 2023-01-18 PROCEDURE — 6370000000 HC RX 637 (ALT 250 FOR IP)

## 2023-01-18 PROCEDURE — 36415 COLL VENOUS BLD VENIPUNCTURE: CPT

## 2023-01-18 PROCEDURE — 6370000000 HC RX 637 (ALT 250 FOR IP): Performed by: STUDENT IN AN ORGANIZED HEALTH CARE EDUCATION/TRAINING PROGRAM

## 2023-01-18 PROCEDURE — 80053 COMPREHEN METABOLIC PANEL: CPT

## 2023-01-18 PROCEDURE — 6360000002 HC RX W HCPCS

## 2023-01-18 PROCEDURE — 85027 COMPLETE CBC AUTOMATED: CPT

## 2023-01-18 PROCEDURE — 2580000003 HC RX 258: Performed by: STUDENT IN AN ORGANIZED HEALTH CARE EDUCATION/TRAINING PROGRAM

## 2023-01-18 PROCEDURE — 1200000000 HC SEMI PRIVATE

## 2023-01-18 RX ORDER — LACTULOSE 10 G/15ML
20 SOLUTION ORAL 3 TIMES DAILY
Status: DISCONTINUED | OUTPATIENT
Start: 2023-01-18 | End: 2023-01-19 | Stop reason: HOSPADM

## 2023-01-18 RX ORDER — BISACODYL 10 MG
10 SUPPOSITORY, RECTAL RECTAL DAILY
Status: DISCONTINUED | OUTPATIENT
Start: 2023-01-18 | End: 2023-01-19 | Stop reason: HOSPADM

## 2023-01-18 RX ADMIN — SENNOSIDES 17.2 MG: 8.6 TABLET, COATED ORAL at 21:12

## 2023-01-18 RX ADMIN — MICONAZOLE NITRATE: 2 POWDER TOPICAL at 09:50

## 2023-01-18 RX ADMIN — DULOXETINE HYDROCHLORIDE 120 MG: 60 CAPSULE, DELAYED RELEASE ORAL at 09:50

## 2023-01-18 RX ADMIN — OXYCODONE HYDROCHLORIDE 10 MG: 5 TABLET ORAL at 00:17

## 2023-01-18 RX ADMIN — PANTOPRAZOLE SODIUM 40 MG: 40 TABLET, DELAYED RELEASE ORAL at 06:43

## 2023-01-18 RX ADMIN — ENOXAPARIN SODIUM 40 MG: 100 INJECTION SUBCUTANEOUS at 21:11

## 2023-01-18 RX ADMIN — ONDANSETRON 4 MG: 2 INJECTION INTRAMUSCULAR; INTRAVENOUS at 20:58

## 2023-01-18 RX ADMIN — TRAZODONE HYDROCHLORIDE 50 MG: 50 TABLET ORAL at 21:12

## 2023-01-18 RX ADMIN — LEVOTHYROXINE SODIUM 175 MCG: 0.12 TABLET ORAL at 09:49

## 2023-01-18 RX ADMIN — ACETAMINOPHEN 650 MG: 325 TABLET ORAL at 05:25

## 2023-01-18 RX ADMIN — METRONIDAZOLE 500 MG: 500 INJECTION, SOLUTION INTRAVENOUS at 09:53

## 2023-01-18 RX ADMIN — WATER 2000 MG: 1 INJECTION INTRAMUSCULAR; INTRAVENOUS; SUBCUTANEOUS at 05:27

## 2023-01-18 RX ADMIN — SODIUM CHLORIDE, POTASSIUM CHLORIDE, SODIUM LACTATE AND CALCIUM CHLORIDE: 600; 310; 30; 20 INJECTION, SOLUTION INTRAVENOUS at 18:17

## 2023-01-18 RX ADMIN — METRONIDAZOLE 500 MG: 500 INJECTION, SOLUTION INTRAVENOUS at 02:02

## 2023-01-18 RX ADMIN — LACTULOSE 20 G: 20 SOLUTION ORAL at 21:11

## 2023-01-18 RX ADMIN — GABAPENTIN 600 MG: 300 CAPSULE ORAL at 21:11

## 2023-01-18 RX ADMIN — LACTULOSE 20 G: 20 SOLUTION ORAL at 18:13

## 2023-01-18 RX ADMIN — PETROLATUM: 420 OINTMENT TOPICAL at 21:32

## 2023-01-18 RX ADMIN — ACETAMINOPHEN 650 MG: 325 TABLET ORAL at 18:14

## 2023-01-18 RX ADMIN — ENOXAPARIN SODIUM 40 MG: 100 INJECTION SUBCUTANEOUS at 09:49

## 2023-01-18 RX ADMIN — GABAPENTIN 300 MG: 300 CAPSULE ORAL at 09:49

## 2023-01-18 RX ADMIN — SUCRALFATE 1 G: 1 TABLET ORAL at 09:50

## 2023-01-18 RX ADMIN — METRONIDAZOLE 500 MG: 500 INJECTION, SOLUTION INTRAVENOUS at 18:18

## 2023-01-18 RX ADMIN — OXYCODONE HYDROCHLORIDE 10 MG: 5 TABLET ORAL at 04:53

## 2023-01-18 RX ADMIN — OXYCODONE HYDROCHLORIDE 10 MG: 5 TABLET ORAL at 10:07

## 2023-01-18 RX ADMIN — SUCRALFATE 1 G: 1 TABLET ORAL at 21:12

## 2023-01-18 RX ADMIN — BISACODYL 10 MG: 10 SUPPOSITORY RECTAL at 10:46

## 2023-01-18 ASSESSMENT — PAIN SCALES - GENERAL
PAINLEVEL_OUTOF10: 0
PAINLEVEL_OUTOF10: 9
PAINLEVEL_OUTOF10: 5
PAINLEVEL_OUTOF10: 8
PAINLEVEL_OUTOF10: 8
PAINLEVEL_OUTOF10: 10
PAINLEVEL_OUTOF10: 9

## 2023-01-18 ASSESSMENT — PAIN DESCRIPTION - DESCRIPTORS
DESCRIPTORS: ACHING;DISCOMFORT
DESCRIPTORS: PRESSURE;SHOOTING;SHARP
DESCRIPTORS: SORE;SHARP

## 2023-01-18 ASSESSMENT — PAIN DESCRIPTION - ORIENTATION
ORIENTATION: RIGHT;LEFT
ORIENTATION: RIGHT;MID
ORIENTATION: RIGHT;MID

## 2023-01-18 ASSESSMENT — PAIN DESCRIPTION - LOCATION
LOCATION: ABDOMEN

## 2023-01-18 ASSESSMENT — PAIN - FUNCTIONAL ASSESSMENT: PAIN_FUNCTIONAL_ASSESSMENT: PREVENTS OR INTERFERES SOME ACTIVE ACTIVITIES AND ADLS

## 2023-01-18 NOTE — PROGRESS NOTES
[facility] Quality Flow/Interdisciplinary Rounds Progress Note        Quality Flow Rounds held on January 18, 2023    Disciplines Attending:  Bedside Nurse, , , and Nursing Unit Leadership    Shelva Lesches was admitted on 1/12/2023 10:21 AM    Anticipated Discharge Date:       Disposition:    Agustin Score:  Agustin Scale Score: 15    Readmission Risk              Risk of Unplanned Readmission:  13           Discussed patient goal for the day, patient clinical progression, and barriers to discharge.   The following Goal(s) of the Day/Commitment(s) have been identified:   Discharge 0570 Jovita Núñez RN  January 18, 2023

## 2023-01-18 NOTE — PROGRESS NOTES
Subjective: The patient is awake and alert. No problems overnight. Denies chest pain, angina, and dyspnea. C/o RUQ pain. Tolerating diet with some discomfort but no increased N/V. No fever or chills. Objective:    BP (!) 141/71   Pulse 86   Temp 98.6 °F (37 °C) (Oral)   Resp 18   Ht 5' 5\" (1.651 m)   Wt (!) 340 lb (154.2 kg)   SpO2 96%   BMI 56.58 kg/m²   Neck: No goiter, bruit, or LA  Heart:  RRR, no murmurs, gallops, or rubs.   Lungs:  CTA bilaterally, no wheeze, rales or rhonchi  Abd: bowel sounds present, tender RUQ,, nondistended, no masses  Extrem:  No clubbing, cyanosis, or edema, 2+ peripheral pulses, FROM    CBC:   Lab Results   Component Value Date/Time    WBC 7.3 01/17/2023 03:00 AM    RBC 3.50 01/17/2023 03:00 AM    HGB 8.6 01/17/2023 03:00 AM    HCT 27.9 01/17/2023 03:00 AM    MCV 79.7 01/17/2023 03:00 AM    MCH 24.6 01/17/2023 03:00 AM    MCHC 30.8 01/17/2023 03:00 AM    RDW 18.5 01/17/2023 03:00 AM     01/17/2023 03:00 AM    MPV 9.6 01/17/2023 03:00 AM     CMP:    Lab Results   Component Value Date/Time     01/17/2023 03:00 AM    K 4.4 01/17/2023 03:00 AM    CL 97 01/17/2023 03:00 AM    CO2 24 01/17/2023 03:00 AM    BUN 8 01/17/2023 03:00 AM    CREATININE 0.5 01/17/2023 03:00 AM    GFRAA >60 08/18/2022 02:15 AM    LABGLOM >60 01/17/2023 03:00 AM    GLUCOSE 125 01/17/2023 03:00 AM    GLUCOSE 94 05/30/2012 09:06 PM    PROT 7.0 01/17/2023 03:00 AM    LABALBU 2.9 01/17/2023 03:00 AM    CALCIUM 8.8 01/17/2023 03:00 AM    BILITOT <0.2 01/17/2023 03:00 AM    ALKPHOS 330 01/17/2023 03:00 AM    AST 37 01/17/2023 03:00 AM    ALT 16 01/17/2023 03:00 AM          Current Facility-Administered Medications:     enoxaparin (LOVENOX) injection 40 mg, 40 mg, SubCUTAneous, BID, Catherine Talavera MD, 40 mg at 01/17/23 2030    HYDROmorphone (DILAUDID) injection 0.5 mg, 0.5 mg, IntraVENous, Q4H PRN, Ole Vanegas DO, 0.5 mg at 01/17/23 1419    lactated ringers infusion, , IntraVENous, Continuous, David Snooks, DO, Last Rate: 100 mL/hr at 01/17/23 0701, Rate Verify at 01/17/23 0701    white petrolatum ointment, , Topical, BID PRN, Chet Lydia Capal, DO    calcium carbonate (TUMS) chewable tablet 500 mg, 500 mg, Oral, TID PRN, Viviann Plume B Capal, DO, 500 mg at 01/14/23 1545    sodium chloride flush 0.9 % injection 5-40 mL, 5-40 mL, IntraVENous, BID, Onur B Capal, DO, 10 mL at 01/16/23 2132    cefTRIAXone (ROCEPHIN) 2,000 mg in sterile water 20 mL IV syringe, 2,000 mg, IntraVENous, Q24H, Onur B Capal, DO, 2,000 mg at 01/18/23 0527    metronidazole (FLAGYL) 500 mg in 0.9% NaCl 100 mL IVPB premix, 500 mg, IntraVENous, Q8H, Onur B Capal, DO, Stopped at 01/18/23 0305    DULoxetine (CYMBALTA) extended release capsule 120 mg, 120 mg, Oral, Daily, Onur B Capal, DO, 120 mg at 01/17/23 0840    trimethobenzamide (TIGAN) injection 200 mg, 200 mg, IntraMUSCular, Q6H PRN, Viviann Plume B Capal, DO, 200 mg at 01/14/23 0100    acetaminophen (TYLENOL) tablet 650 mg, 650 mg, Oral, Q6H, Onur B Capal, DO, 650 mg at 01/18/23 0525    oxyCODONE (ROXICODONE) immediate release tablet 5 mg, 5 mg, Oral, Q4H PRN, 5 mg at 01/14/23 1545 **OR** oxyCODONE (ROXICODONE) immediate release tablet 10 mg, 10 mg, Oral, Q4H PRN, Onur B Capal, DO, 10 mg at 01/18/23 0453    ondansetron (ZOFRAN) injection 4 mg, 4 mg, IntraVENous, Q6H PRN, Viviann Plume B Capal, DO, 4 mg at 01/16/23 1310    gabapentin (NEURONTIN) capsule 300 mg, 300 mg, Oral, QAM, Onur B Capal, DO, 300 mg at 01/17/23 0840    gabapentin (NEURONTIN) capsule 600 mg, 600 mg, Oral, Nightly, Onur B Capal, DO, 600 mg at 01/17/23 2030    levothyroxine (SYNTHROID) tablet 175 mcg, 175 mcg, Oral, QAM, Onur B Capal, DO, 175 mcg at 01/17/23 0840    LORazepam (ATIVAN) tablet 0.5 mg, 0.5 mg, Oral, Q12H PRN, David Snooks, DO, 0.5 mg at 01/17/23 2131    senna (SENOKOT) tablet 17.2 mg, 2 tablet, Oral, Nightly, Onur B Capal, DO, 17.2 mg at 01/17/23 2030 sucralfate (CARAFATE) tablet 1 g, 1 g, Oral, BID, Onur B Capal, DO, 1 g at 01/17/23 2030    traZODone (DESYREL) tablet 50 mg, 50 mg, Oral, Nightly, Onur B Capal, DO, 50 mg at 01/17/23 2130    pantoprazole (PROTONIX) tablet 40 mg, 40 mg, Oral, QAM AC, Onur B Capal, DO, 40 mg at 01/17/23 0643    miconazole (MICOTIN) 2 % powder, , Topical, BID, Dilcia Marine, DO, Given at 01/17/23 2031    Facility-Administered Medications Ordered in Other Encounters:     0.9 % sodium chloride bolus, 250 mL, IntraVENous, Once, Alina Roman MD    Assessment:    Patient Active Problem List   Diagnosis    MS (multiple sclerosis) ([facility] [address]    History of pulmonary embolus (PE)    Acquired hypothyroidism    Closed fracture of distal end of right femur with nonunion    Morbid obesity with BMI of 50.0-59.9, adult ([facility] [address]    Acute blood loss as cause of postoperative anemia    Multiple sclerosis (HCC)    Closed nondisplaced spiral fracture of shaft of right tibia with nonunion    Closed nondisplaced spiral fracture of shaft of right fibula with routine healing    Chest pain    Upper GI bleed    Esophageal ulcer    Hematemesis    Cholecystitis       Plan:  D/c home when ok with surgery  Electrolyte abnormalities improved        Kavita Craven MD  6:00 AM  1/18/2023

## 2023-01-18 NOTE — PROGRESS NOTES
DESTINEY drain output has been minimal and serosanguineous. No further need for drain. Drain was pulled at bedside. A dressing was applied over the site.      Dr. Viji Juarez

## 2023-01-18 NOTE — PROGRESS NOTES
Sent message to Dr. Jalen Solitario per RN [facility].  Reason Need Discharge Order    SURGERY NOT READY TO SIGN OFF ON PATIENT   NO LONGER NEED DISCHARGE ORDER  SENT MESSAGE TO DR. Danniel Leventhal

## 2023-01-18 NOTE — PROGRESS NOTES
GENERAL SURGERY  DAILY PROGRESS NOTE  1/18/2023    CHIEF COMPLAINT:  Chief Complaint   Patient presents with    Abdominal Pain     Pt having ab pain since yesterday scheduled in feb to get gallbladder removed       SUBJECTIVE:  Postop day 2, no acute events overnight, states she has not had a bowel movement in several days, requesting enema, otherwise feels well. OBJECTIVE:  BP (!) 141/71   Pulse 86   Temp 98.6 °F (37 °C) (Oral)   Resp 18   Ht 5' 5\" (1.651 m)   Wt (!) 340 lb (154.2 kg)   SpO2 96%   BMI 56.58 kg/m²     GENERAL: Comfortable, answers questions appropriately. LUNGS: Nonlabored breathing on room air  CARDIOVASCULAR: RR, normotensive  ABDOMEN:  Soft, non-distended, appropriately tender. Incisions appear clean dry and intact. Right-sided DESTINEY drain with serosanguineous output with 110 mL and a total of 24 hours, no guarding, rigidity, rebound.     ASSESSMENT/PLAN:  60 y.o. female postop day 2 robot-assisted laparoscopic cholecystectomy    -Follow-up a.m. labs  -We will likely DC antibiotics and pull DESTINEY drain this afternoon pending labs  -Patient is on senna, will increase to lactulose versus enema  -Continue DVT prophylaxis  -Multimodal pain control    Will discuss with Dr. Blaine Cardenas, DO  Surgery Resident PGY-1  1/18/2023  7:30 AM

## 2023-01-18 NOTE — CARE COORDINATION
Updated plan of care. POD#2. Plan to pull DESTINEY drain today. Tentative discharge transport set up via [facility] at 4 pm to take pt home. Forms on chart-[facility]    ADDEND: discharge held.  [facility] put on will-call for tomorrow 1/19-o

## 2023-01-19 VITALS
HEART RATE: 103 BPM | SYSTOLIC BLOOD PRESSURE: 140 MMHG | WEIGHT: 293 LBS | OXYGEN SATURATION: 96 % | DIASTOLIC BLOOD PRESSURE: 70 MMHG | TEMPERATURE: 99.1 F | HEIGHT: 65 IN | BODY MASS INDEX: 48.82 KG/M2 | RESPIRATION RATE: 20 BRPM

## 2023-01-19 LAB
ALBUMIN SERPL-MCNC: 2.7 G/DL (ref 3.5–5.2)
ALP BLD-CCNC: 634 U/L (ref 35–104)
ALT SERPL-CCNC: 20 U/L (ref 0–32)
ANION GAP SERPL CALCULATED.3IONS-SCNC: 7 MMOL/L (ref 7–16)
AST SERPL-CCNC: 34 U/L (ref 0–31)
BILIRUB SERPL-MCNC: 0.2 MG/DL (ref 0–1.2)
BUN BLDV-MCNC: 3 MG/DL (ref 6–23)
CALCIUM SERPL-MCNC: 8.6 MG/DL (ref 8.6–10.2)
CHLORIDE BLD-SCNC: 97 MMOL/L (ref 98–107)
CO2: 29 MMOL/L (ref 22–29)
CREAT SERPL-MCNC: 0.5 MG/DL (ref 0.5–1)
GFR SERPL CREATININE-BSD FRML MDRD: >60 ML/MIN/1.73
GLUCOSE BLD-MCNC: 102 MG/DL (ref 74–99)
HCT VFR BLD CALC: 27.7 % (ref 34–48)
HEMOGLOBIN: 8.4 G/DL (ref 11.5–15.5)
MCH RBC QN AUTO: 25.1 PG (ref 26–35)
MCHC RBC AUTO-ENTMCNC: 30.3 % (ref 32–34.5)
MCV RBC AUTO: 82.7 FL (ref 80–99.9)
PDW BLD-RTO: 18.7 FL (ref 11.5–15)
PLATELET # BLD: 394 E9/L (ref 130–450)
PMV BLD AUTO: 9.4 FL (ref 7–12)
POTASSIUM REFLEX MAGNESIUM: 3.6 MMOL/L (ref 3.5–5)
RBC # BLD: 3.35 E12/L (ref 3.5–5.5)
SODIUM BLD-SCNC: 133 MMOL/L (ref 132–146)
TOTAL PROTEIN: 6.3 G/DL (ref 6.4–8.3)
WBC # BLD: 6 E9/L (ref 4.5–11.5)

## 2023-01-19 PROCEDURE — 6360000002 HC RX W HCPCS: Performed by: STUDENT IN AN ORGANIZED HEALTH CARE EDUCATION/TRAINING PROGRAM

## 2023-01-19 PROCEDURE — 2500000003 HC RX 250 WO HCPCS: Performed by: STUDENT IN AN ORGANIZED HEALTH CARE EDUCATION/TRAINING PROGRAM

## 2023-01-19 PROCEDURE — 36415 COLL VENOUS BLD VENIPUNCTURE: CPT

## 2023-01-19 PROCEDURE — 85027 COMPLETE CBC AUTOMATED: CPT

## 2023-01-19 PROCEDURE — 6370000000 HC RX 637 (ALT 250 FOR IP)

## 2023-01-19 PROCEDURE — 2580000003 HC RX 258: Performed by: STUDENT IN AN ORGANIZED HEALTH CARE EDUCATION/TRAINING PROGRAM

## 2023-01-19 PROCEDURE — 80053 COMPREHEN METABOLIC PANEL: CPT

## 2023-01-19 PROCEDURE — 6370000000 HC RX 637 (ALT 250 FOR IP): Performed by: STUDENT IN AN ORGANIZED HEALTH CARE EDUCATION/TRAINING PROGRAM

## 2023-01-19 PROCEDURE — 6360000002 HC RX W HCPCS

## 2023-01-19 RX ORDER — OXYCODONE HYDROCHLORIDE 5 MG/1
5 TABLET ORAL EVERY 6 HOURS PRN
Qty: 16 TABLET | Refills: 0 | Status: SHIPPED | OUTPATIENT
Start: 2023-01-19 | End: 2023-01-24

## 2023-01-19 RX ADMIN — SUCRALFATE 1 G: 1 TABLET ORAL at 09:10

## 2023-01-19 RX ADMIN — WATER 2000 MG: 1 INJECTION INTRAMUSCULAR; INTRAVENOUS; SUBCUTANEOUS at 04:40

## 2023-01-19 RX ADMIN — ONDANSETRON 4 MG: 2 INJECTION INTRAMUSCULAR; INTRAVENOUS at 04:45

## 2023-01-19 RX ADMIN — GABAPENTIN 300 MG: 300 CAPSULE ORAL at 09:10

## 2023-01-19 RX ADMIN — ENOXAPARIN SODIUM 40 MG: 100 INJECTION SUBCUTANEOUS at 09:11

## 2023-01-19 RX ADMIN — LEVOTHYROXINE SODIUM 175 MCG: 0.12 TABLET ORAL at 09:10

## 2023-01-19 RX ADMIN — BISACODYL 10 MG: 10 SUPPOSITORY RECTAL at 09:11

## 2023-01-19 RX ADMIN — DULOXETINE HYDROCHLORIDE 120 MG: 60 CAPSULE, DELAYED RELEASE ORAL at 09:10

## 2023-01-19 RX ADMIN — SODIUM CHLORIDE, POTASSIUM CHLORIDE, SODIUM LACTATE AND CALCIUM CHLORIDE: 600; 310; 30; 20 INJECTION, SOLUTION INTRAVENOUS at 05:59

## 2023-01-19 RX ADMIN — OXYCODONE HYDROCHLORIDE 10 MG: 5 TABLET ORAL at 09:27

## 2023-01-19 RX ADMIN — PANTOPRAZOLE SODIUM 40 MG: 40 TABLET, DELAYED RELEASE ORAL at 05:12

## 2023-01-19 RX ADMIN — METRONIDAZOLE 500 MG: 500 INJECTION, SOLUTION INTRAVENOUS at 01:26

## 2023-01-19 RX ADMIN — MICONAZOLE NITRATE: 2 POWDER TOPICAL at 12:35

## 2023-01-19 RX ADMIN — METRONIDAZOLE 500 MG: 500 INJECTION, SOLUTION INTRAVENOUS at 09:23

## 2023-01-19 ASSESSMENT — PAIN DESCRIPTION - LOCATION: LOCATION: ABDOMEN

## 2023-01-19 ASSESSMENT — PAIN SCALES - GENERAL: PAINLEVEL_OUTOF10: 9

## 2023-01-19 NOTE — PROGRESS NOTES
Subjective: The patient is awake and alert. No problems overnight. Denies chest pain, angina, and dyspnea. + abdominal pain. Tolerating diet. No nausea or vomiting. + BM. No fever or chills. Objective:    BP (!) 140/73   Pulse 94   Temp 99.2 °F (37.3 °C) (Oral)   Resp 20   Ht 5' 5\" (1.651 m)   Wt (!) 340 lb (154.2 kg)   SpO2 92%   BMI 56.58 kg/m²   Neck: No goiter, bruit, or LA  Heart:  RRR, no murmurs, gallops, or rubs.   Lungs:  CTA bilaterally, no wheeze, rales or rhonchi  Abd: bowel sounds present, + RUQ tenderness,, nondistended, no masses  Extrem:  No clubbing, cyanosis, or edema, 2+ peripheral pulses, FROM    CBC:   Lab Results   Component Value Date/Time    WBC 6.0 01/19/2023 03:00 AM    RBC 3.35 01/19/2023 03:00 AM    HGB 8.4 01/19/2023 03:00 AM    HCT 27.7 01/19/2023 03:00 AM    MCV 82.7 01/19/2023 03:00 AM    MCH 25.1 01/19/2023 03:00 AM    MCHC 30.3 01/19/2023 03:00 AM    RDW 18.7 01/19/2023 03:00 AM     01/19/2023 03:00 AM    MPV 9.4 01/19/2023 03:00 AM     CMP:    Lab Results   Component Value Date/Time     01/19/2023 03:00 AM    K 3.6 01/19/2023 03:00 AM    CL 97 01/19/2023 03:00 AM    CO2 29 01/19/2023 03:00 AM    BUN 3 01/19/2023 03:00 AM    CREATININE 0.5 01/19/2023 03:00 AM    GFRAA >60 08/18/2022 02:15 AM    LABGLOM >60 01/19/2023 03:00 AM    GLUCOSE 102 01/19/2023 03:00 AM    GLUCOSE 94 05/30/2012 09:06 PM    PROT 6.3 01/19/2023 03:00 AM    LABALBU 2.7 01/19/2023 03:00 AM    CALCIUM 8.6 01/19/2023 03:00 AM    BILITOT 0.2 01/19/2023 03:00 AM    ALKPHOS 634 01/19/2023 03:00 AM    AST 34 01/19/2023 03:00 AM    ALT 20 01/19/2023 03:00 AM          Current Facility-Administered Medications:     bisacodyl (DULCOLAX) suppository 10 mg, 10 mg, Rectal, Daily, Margareth Randhawa MD, 10 mg at 01/18/23 1046    lactulose (CHRONULAC) 10 GM/15ML solution 20 g, 20 g, Oral, TID, Brandan Stanley DO, 20 g at 01/18/23 2111    enoxaparin (LOVENOX) injection 40 mg, 40 mg, SubCUTAneous, BID, Latonya Garcia MD, 40 mg at 01/18/23 2111    HYDROmorphone (DILAUDID) injection 0.5 mg, 0.5 mg, IntraVENous, Q4H PRN, Rella Yazmin, DO, 0.5 mg at 01/17/23 1419    lactated ringers infusion, , IntraVENous, Continuous, Rella Yazmin, DO, Last Rate: 100 mL/hr at 01/19/23 0559, New Bag at 01/19/23 0559    white petrolatum ointment, , Topical, BID PRN, Rella Yazmin, DO, Given at 01/18/23 2132    calcium carbonate (TUMS) chewable tablet 500 mg, 500 mg, Oral, TID PRN, Alejandroroman Ellsworth Capal, DO, 500 mg at 01/14/23 1545    sodium chloride flush 0.9 % injection 5-40 mL, 5-40 mL, IntraVENous, BID, Onur B Capal, DO, 10 mL at 01/16/23 2132    cefTRIAXone (ROCEPHIN) 2,000 mg in sterile water 20 mL IV syringe, 2,000 mg, IntraVENous, Q24H, Onur B Capal, DO, 2,000 mg at 01/19/23 0440    metronidazole (FLAGYL) 500 mg in 0.9% NaCl 100 mL IVPB premix, 500 mg, IntraVENous, Q8H, Onur B Capal, DO, Stopped at 01/19/23 0226    DULoxetine (CYMBALTA) extended release capsule 120 mg, 120 mg, Oral, Daily, Onur B Capal, DO, 120 mg at 01/18/23 0950    trimethobenzamide (TIGAN) injection 200 mg, 200 mg, IntraMUSCular, Q6H PRN, Nalini Ion B Capal, DO, 200 mg at 01/14/23 0100    acetaminophen (TYLENOL) tablet 650 mg, 650 mg, Oral, Q6H, Onur B Capal, DO, 650 mg at 01/18/23 1814    oxyCODONE (ROXICODONE) immediate release tablet 5 mg, 5 mg, Oral, Q4H PRN, 5 mg at 01/14/23 1545 **OR** oxyCODONE (ROXICODONE) immediate release tablet 10 mg, 10 mg, Oral, Q4H PRN, Onur B Capal, DO, 10 mg at 01/18/23 1007    ondansetron (ZOFRAN) injection 4 mg, 4 mg, IntraVENous, Q6H PRN, Nalini Ion B Capal, DO, 4 mg at 01/19/23 0445    gabapentin (NEURONTIN) capsule 300 mg, 300 mg, Oral, QAM, Onur B Capal, DO, 300 mg at 01/18/23 0949    gabapentin (NEURONTIN) capsule 600 mg, 600 mg, Oral, Nightly, Onur B Capal, DO, 600 mg at 01/18/23 2111    levothyroxine (SYNTHROID) tablet 175 mcg, 175 mcg, Oral, QAM, Onur B Capal, DO, 175 mcg at 01/18/23 0949    LORazepam (ATIVAN) tablet 0.5 mg, 0.5 mg, Oral, Q12H PRN, Salome Margareth, DO, 0.5 mg at 01/17/23 2131    senna (SENOKOT) tablet 17.2 mg, 2 tablet, Oral, Nightly, Ounr B Capal, DO, 17.2 mg at 01/18/23 2112    sucralfate (CARAFATE) tablet 1 g, 1 g, Oral, BID, Onur B Capal, DO, 1 g at 01/18/23 2112    traZODone (DESYREL) tablet 50 mg, 50 mg, Oral, Nightly, Onur B Capal, DO, 50 mg at 01/18/23 2112    pantoprazole (PROTONIX) tablet 40 mg, 40 mg, Oral, QAM AC, Onur B Capal, DO, 40 mg at 01/19/23 0512    miconazole (MICOTIN) 2 % powder, , Topical, BID, Salome Margareth, DO, Given at 01/18/23 0950    Facility-Administered Medications Ordered in Other Encounters:     0.9 % sodium chloride bolus, 250 mL, IntraVENous, Once, Bar Keller MD    Assessment:    Patient Active Problem List   Diagnosis    MS (multiple sclerosis) ([facility] [address]    History of pulmonary embolus (PE)    Acquired hypothyroidism    Closed fracture of distal end of right femur with nonunion    Morbid obesity with BMI of 50.0-59.9, adult ([facility] [address]    Acute blood loss as cause of postoperative anemia    Multiple sclerosis (HCC)    Closed nondisplaced spiral fracture of shaft of right tibia with nonunion    Closed nondisplaced spiral fracture of shaft of right fibula with routine healing    Chest pain    Upper GI bleed    Esophageal ulcer    Hematemesis    Cholecystitis       Plan:  Acute cholecystits- management per surgery, s/p lap choley  Anemia- chronic, stable  D/c when ok with surgery        Reinier Hargrove MD  6:12 AM  1/19/2023

## 2023-01-19 NOTE — PROGRESS NOTES
GENERAL SURGERY  DAILY PROGRESS NOTE  1/19/2023    CHIEF COMPLAINT:  Chief Complaint   Patient presents with    Abdominal Pain     Pt having ab pain since yesterday scheduled in feb to get gallbladder removed       SUBJECTIVE:  Postop day , no acute events overnight, increased bowel regimen yesterday. Having BMs    OBJECTIVE:  BP (!) 140/73   Pulse 94   Temp 99.2 °F (37.3 °C) (Oral)   Resp 20   Ht 5' 5\" (1.651 m)   Wt (!) 340 lb (154.2 kg)   SpO2 92%   BMI 56.58 kg/m²     GENERAL: Comfortable, answers questions appropriately. LUNGS: Nonlabored breathing on room air  CARDIOVASCULAR: RR, hypertensive  ABDOMEN:  Soft, non-distended, appropriately tender. Incisions appear clean dry and intact.       ASSESSMENT/PLAN:  60 y.o. female postop day 3 robot-assisted laparoscopic cholecystectomy    - Looking good from a surgical standpoint  - Follow-up a.m. labs  - Abx complete and DESTINEY removed    Will discuss with Dr. Ean Vital DO  Surgery Resident PGY-1  1/19/2023  4:52 AM

## 2023-01-19 NOTE — PROGRESS NOTES
Wound care consult sent via [employer].     Electronically signed by Khushi Cobb RN on 1/18/2023 at 9:36 PM

## 2023-01-19 NOTE — CARE COORDINATION
[facility] set up for 3 pm today.  Pt aware-mjo    ADDEND: home with [facility] per pt choice-orders completed and notified-[employer]ikoo

## 2023-01-19 NOTE — PROGRESS NOTES
Consult rt buttocks wound  Seen this am. Screaming to leave her alone. Finally agreeable to turn- small 0.3x0.3x0.1 superficial skin loss  cleft  Davonte lo air loss bed in place. Aquaphor in place. Plan for discharge today  Marlen Mccann.  Griffin Coto, CNS, Wound Care

## 2023-01-26 ENCOUNTER — APPOINTMENT (OUTPATIENT)
Dept: CT IMAGING | Age: 63
End: 2023-01-26
Payer: MEDICARE

## 2023-01-26 ENCOUNTER — HOSPITAL ENCOUNTER (OUTPATIENT)
Age: 63
Setting detail: OBSERVATION
Discharge: HOME | End: 2023-01-28
Attending: EMERGENCY MEDICINE | Admitting: INTERNAL MEDICINE
Payer: MEDICARE

## 2023-01-26 DIAGNOSIS — G89.18 POSTOPERATIVE PAIN: Primary | ICD-10-CM

## 2023-01-26 PROBLEM — R10.84 GENERALIZED ABDOMINAL PAIN: Status: ACTIVE | Noted: 2023-01-26

## 2023-01-26 LAB
ALBUMIN SERPL-MCNC: 3.3 G/DL (ref 3.5–5.2)
ALP BLD-CCNC: 516 U/L (ref 35–104)
ALT SERPL-CCNC: 13 U/L (ref 0–32)
ANION GAP SERPL CALCULATED.3IONS-SCNC: 12 MMOL/L (ref 7–16)
ANISOCYTOSIS: ABNORMAL
AST SERPL-CCNC: 14 U/L (ref 0–31)
BACTERIA: ABNORMAL /HPF
BASOPHILS ABSOLUTE: 0.08 E9/L (ref 0–0.2)
BASOPHILS RELATIVE PERCENT: 1.4 % (ref 0–2)
BILIRUB SERPL-MCNC: 0.5 MG/DL (ref 0–1.2)
BILIRUBIN DIRECT: <0.2 MG/DL (ref 0–0.3)
BILIRUBIN URINE: NEGATIVE
BILIRUBIN, INDIRECT: ABNORMAL MG/DL (ref 0–1)
BLOOD, URINE: NEGATIVE
BUN BLDV-MCNC: 6 MG/DL (ref 6–23)
CALCIUM SERPL-MCNC: 9.4 MG/DL (ref 8.6–10.2)
CHLORIDE BLD-SCNC: 95 MMOL/L (ref 98–107)
CLARITY: ABNORMAL
CO2: 26 MMOL/L (ref 22–29)
COLOR: YELLOW
CREAT SERPL-MCNC: 0.5 MG/DL (ref 0.5–1)
EOSINOPHILS ABSOLUTE: 0.06 E9/L (ref 0.05–0.5)
EOSINOPHILS RELATIVE PERCENT: 1.1 % (ref 0–6)
GFR SERPL CREATININE-BSD FRML MDRD: >60 ML/MIN/1.73
GLUCOSE BLD-MCNC: 116 MG/DL (ref 74–99)
GLUCOSE URINE: NEGATIVE MG/DL
HCT VFR BLD CALC: 32.7 % (ref 34–48)
HEMOGLOBIN: 10.3 G/DL (ref 11.5–15.5)
HYPOCHROMIA: ABNORMAL
IMMATURE GRANULOCYTES #: 0.03 E9/L
IMMATURE GRANULOCYTES %: 0.5 % (ref 0–5)
KETONES, URINE: NEGATIVE MG/DL
LACTIC ACID, SEPSIS: 1.5 MMOL/L (ref 0.5–1.9)
LEUKOCYTE ESTERASE, URINE: ABNORMAL
LIPASE: 85 U/L (ref 13–60)
LYMPHOCYTES ABSOLUTE: 1.07 E9/L (ref 1.5–4)
LYMPHOCYTES RELATIVE PERCENT: 19 % (ref 20–42)
MCH RBC QN AUTO: 25.2 PG (ref 26–35)
MCHC RBC AUTO-ENTMCNC: 31.5 % (ref 32–34.5)
MCV RBC AUTO: 80.1 FL (ref 80–99.9)
MONOCYTES ABSOLUTE: 0.47 E9/L (ref 0.1–0.95)
MONOCYTES RELATIVE PERCENT: 8.3 % (ref 2–12)
NEUTROPHILS ABSOLUTE: 3.93 E9/L (ref 1.8–7.3)
NEUTROPHILS RELATIVE PERCENT: 69.7 % (ref 43–80)
NITRITE, URINE: NEGATIVE
PDW BLD-RTO: 20.1 FL (ref 11.5–15)
PH UA: 7.5 (ref 5–9)
PLATELET # BLD: 663 E9/L (ref 130–450)
PMV BLD AUTO: 8.8 FL (ref 7–12)
POLYCHROMASIA: ABNORMAL
POTASSIUM SERPL-SCNC: 4 MMOL/L (ref 3.5–5)
PROTEIN UA: NEGATIVE MG/DL
RBC # BLD: 4.08 E12/L (ref 3.5–5.5)
RBC UA: ABNORMAL /HPF (ref 0–2)
SODIUM BLD-SCNC: 133 MMOL/L (ref 132–146)
SPECIFIC GRAVITY UA: 1.01 (ref 1–1.03)
TARGET CELLS: ABNORMAL
TOTAL PROTEIN: 8.2 G/DL (ref 6.4–8.3)
UROBILINOGEN, URINE: 0.2 E.U./DL
WBC # BLD: 5.6 E9/L (ref 4.5–11.5)
WBC UA: ABNORMAL /HPF (ref 0–5)

## 2023-01-26 PROCEDURE — 6370000000 HC RX 637 (ALT 250 FOR IP): Performed by: INTERNAL MEDICINE

## 2023-01-26 PROCEDURE — G0378 HOSPITAL OBSERVATION PER HR: HCPCS

## 2023-01-26 PROCEDURE — 81001 URINALYSIS AUTO W/SCOPE: CPT

## 2023-01-26 PROCEDURE — 96361 HYDRATE IV INFUSION ADD-ON: CPT

## 2023-01-26 PROCEDURE — 85025 COMPLETE CBC W/AUTO DIFF WBC: CPT

## 2023-01-26 PROCEDURE — 6370000000 HC RX 637 (ALT 250 FOR IP): Performed by: EMERGENCY MEDICINE

## 2023-01-26 PROCEDURE — 80048 BASIC METABOLIC PNL TOTAL CA: CPT

## 2023-01-26 PROCEDURE — 74177 CT ABD & PELVIS W/CONTRAST: CPT

## 2023-01-26 PROCEDURE — 83690 ASSAY OF LIPASE: CPT

## 2023-01-26 PROCEDURE — 96375 TX/PRO/DX INJ NEW DRUG ADDON: CPT

## 2023-01-26 PROCEDURE — 96374 THER/PROPH/DIAG INJ IV PUSH: CPT

## 2023-01-26 PROCEDURE — 80076 HEPATIC FUNCTION PANEL: CPT

## 2023-01-26 PROCEDURE — 6360000002 HC RX W HCPCS: Performed by: EMERGENCY MEDICINE

## 2023-01-26 PROCEDURE — 2580000003 HC RX 258: Performed by: EMERGENCY MEDICINE

## 2023-01-26 PROCEDURE — 99285 EMERGENCY DEPT VISIT HI MDM: CPT

## 2023-01-26 PROCEDURE — 96376 TX/PRO/DX INJ SAME DRUG ADON: CPT

## 2023-01-26 PROCEDURE — 83605 ASSAY OF LACTIC ACID: CPT

## 2023-01-26 PROCEDURE — 6360000004 HC RX CONTRAST MEDICATION: Performed by: RADIOLOGY

## 2023-01-26 RX ORDER — ENOXAPARIN SODIUM 100 MG/ML
40 INJECTION SUBCUTANEOUS 2 TIMES DAILY
Status: DISCONTINUED | OUTPATIENT
Start: 2023-01-27 | End: 2023-01-27

## 2023-01-26 RX ORDER — SENNA PLUS 8.6 MG/1
2 TABLET ORAL NIGHTLY
Status: DISCONTINUED | OUTPATIENT
Start: 2023-01-26 | End: 2023-01-28 | Stop reason: HOSPADM

## 2023-01-26 RX ORDER — MAGNESIUM HYDROXIDE/ALUMINUM HYDROXICE/SIMETHICONE 120; 1200; 1200 MG/30ML; MG/30ML; MG/30ML
5 SUSPENSION ORAL EVERY 6 HOURS PRN
Status: DISCONTINUED | OUTPATIENT
Start: 2023-01-26 | End: 2023-01-28 | Stop reason: HOSPADM

## 2023-01-26 RX ORDER — ACETAMINOPHEN 325 MG/1
650 TABLET ORAL EVERY 4 HOURS PRN
Status: DISCONTINUED | OUTPATIENT
Start: 2023-01-26 | End: 2023-01-28 | Stop reason: HOSPADM

## 2023-01-26 RX ORDER — GABAPENTIN 300 MG/1
600 CAPSULE ORAL NIGHTLY
Status: DISCONTINUED | OUTPATIENT
Start: 2023-01-26 | End: 2023-01-28 | Stop reason: HOSPADM

## 2023-01-26 RX ORDER — MORPHINE SULFATE 4 MG/ML
4 INJECTION, SOLUTION INTRAMUSCULAR; INTRAVENOUS ONCE
Status: COMPLETED | OUTPATIENT
Start: 2023-01-26 | End: 2023-01-26

## 2023-01-26 RX ORDER — 0.9 % SODIUM CHLORIDE 0.9 %
1000 INTRAVENOUS SOLUTION INTRAVENOUS ONCE
Status: COMPLETED | OUTPATIENT
Start: 2023-01-26 | End: 2023-01-26

## 2023-01-26 RX ORDER — ONDANSETRON 2 MG/ML
8 INJECTION INTRAMUSCULAR; INTRAVENOUS ONCE
Status: COMPLETED | OUTPATIENT
Start: 2023-01-26 | End: 2023-01-26

## 2023-01-26 RX ORDER — GABAPENTIN 300 MG/1
300 CAPSULE ORAL EVERY MORNING
Status: DISCONTINUED | OUTPATIENT
Start: 2023-01-27 | End: 2023-01-28 | Stop reason: HOSPADM

## 2023-01-26 RX ORDER — OXYCODONE HYDROCHLORIDE AND ACETAMINOPHEN 5; 325 MG/1; MG/1
1 TABLET ORAL EVERY 6 HOURS PRN
Qty: 15 TABLET | Refills: 0 | Status: SHIPPED | OUTPATIENT
Start: 2023-01-26 | End: 2023-01-31

## 2023-01-26 RX ORDER — SUCRALFATE 1 G/1
1 TABLET ORAL 2 TIMES DAILY
Status: DISCONTINUED | OUTPATIENT
Start: 2023-01-26 | End: 2023-01-28 | Stop reason: HOSPADM

## 2023-01-26 RX ORDER — SENNOSIDES 8.6 MG
650 CAPSULE ORAL EVERY 6 HOURS PRN
Status: DISCONTINUED | OUTPATIENT
Start: 2023-01-26 | End: 2023-01-26 | Stop reason: CLARIF

## 2023-01-26 RX ORDER — ENOXAPARIN SODIUM 100 MG/ML
40 INJECTION SUBCUTANEOUS DAILY
Status: DISCONTINUED | OUTPATIENT
Start: 2023-01-27 | End: 2023-01-26 | Stop reason: DRUGHIGH

## 2023-01-26 RX ORDER — TRAZODONE HYDROCHLORIDE 50 MG/1
50 TABLET ORAL NIGHTLY
Status: DISCONTINUED | OUTPATIENT
Start: 2023-01-26 | End: 2023-01-28 | Stop reason: HOSPADM

## 2023-01-26 RX ORDER — PANTOPRAZOLE SODIUM 40 MG/1
40 TABLET, DELAYED RELEASE ORAL
Status: DISCONTINUED | OUTPATIENT
Start: 2023-01-27 | End: 2023-01-28 | Stop reason: HOSPADM

## 2023-01-26 RX ORDER — OXYCODONE HYDROCHLORIDE 5 MG/1
10 TABLET ORAL EVERY 4 HOURS PRN
Status: DISCONTINUED | OUTPATIENT
Start: 2023-01-26 | End: 2023-01-28 | Stop reason: HOSPADM

## 2023-01-26 RX ORDER — LORAZEPAM 0.5 MG/1
0.5 TABLET ORAL EVERY 12 HOURS PRN
Status: DISCONTINUED | OUTPATIENT
Start: 2023-01-26 | End: 2023-01-28 | Stop reason: HOSPADM

## 2023-01-26 RX ORDER — DULOXETIN HYDROCHLORIDE 60 MG/1
120 CAPSULE, DELAYED RELEASE ORAL EVERY MORNING
Status: DISCONTINUED | OUTPATIENT
Start: 2023-01-27 | End: 2023-01-28 | Stop reason: HOSPADM

## 2023-01-26 RX ORDER — CYCLOBENZAPRINE HCL 10 MG
10 TABLET ORAL 2 TIMES DAILY
Status: DISCONTINUED | OUTPATIENT
Start: 2023-01-26 | End: 2023-01-28 | Stop reason: HOSPADM

## 2023-01-26 RX ORDER — OXYCODONE HYDROCHLORIDE AND ACETAMINOPHEN 5; 325 MG/1; MG/1
1 TABLET ORAL ONCE
Status: COMPLETED | OUTPATIENT
Start: 2023-01-26 | End: 2023-01-26

## 2023-01-26 RX ADMIN — GABAPENTIN 600 MG: 300 CAPSULE ORAL at 21:48

## 2023-01-26 RX ADMIN — ONDANSETRON 8 MG: 2 INJECTION INTRAMUSCULAR; INTRAVENOUS at 11:27

## 2023-01-26 RX ADMIN — SODIUM CHLORIDE 1000 ML: 9 INJECTION, SOLUTION INTRAVENOUS at 11:26

## 2023-01-26 RX ADMIN — MORPHINE SULFATE 4 MG: 4 INJECTION, SOLUTION INTRAMUSCULAR; INTRAVENOUS at 18:31

## 2023-01-26 RX ADMIN — IOPAMIDOL 75 ML: 755 INJECTION, SOLUTION INTRAVENOUS at 12:55

## 2023-01-26 RX ADMIN — OXYCODONE HYDROCHLORIDE 10 MG: 5 TABLET ORAL at 21:48

## 2023-01-26 RX ADMIN — CYCLOBENZAPRINE 10 MG: 10 TABLET, FILM COATED ORAL at 21:48

## 2023-01-26 RX ADMIN — TRAZODONE HYDROCHLORIDE 50 MG: 50 TABLET ORAL at 21:48

## 2023-01-26 RX ADMIN — SENNOSIDES 17.2 MG: 8.6 TABLET, COATED ORAL at 21:48

## 2023-01-26 RX ADMIN — MORPHINE SULFATE 4 MG: 4 INJECTION, SOLUTION INTRAMUSCULAR; INTRAVENOUS at 11:27

## 2023-01-26 RX ADMIN — SUCRALFATE 1 G: 1 TABLET ORAL at 21:48

## 2023-01-26 RX ADMIN — OXYCODONE AND ACETAMINOPHEN 1 TABLET: 5; 325 TABLET ORAL at 15:22

## 2023-01-26 ASSESSMENT — ENCOUNTER SYMPTOMS
NAUSEA: 1
SORE THROAT: 0
EYE REDNESS: 0
EYE PAIN: 0
ABDOMINAL DISTENTION: 0
VOMITING: 1
DIARRHEA: 0
COUGH: 0
ABDOMINAL PAIN: 1
SINUS PRESSURE: 0
BACK PAIN: 0
EYE DISCHARGE: 0
WHEEZING: 0
SHORTNESS OF BREATH: 0

## 2023-01-26 ASSESSMENT — PAIN DESCRIPTION - LOCATION
LOCATION: ABDOMEN
LOCATION: ABDOMEN;BACK
LOCATION: ABDOMEN
LOCATION: ABDOMEN;BACK
LOCATION: BACK

## 2023-01-26 ASSESSMENT — PAIN DESCRIPTION - DESCRIPTORS
DESCRIPTORS: SHARP
DESCRIPTORS: BURNING;SHARP
DESCRIPTORS: DISCOMFORT;ACHING
DESCRIPTORS: BURNING;SHARP

## 2023-01-26 ASSESSMENT — PAIN SCALES - GENERAL
PAINLEVEL_OUTOF10: 8
PAINLEVEL_OUTOF10: 10
PAINLEVEL_OUTOF10: 9
PAINLEVEL_OUTOF10: 8
PAINLEVEL_OUTOF10: 8

## 2023-01-26 ASSESSMENT — LIFESTYLE VARIABLES
HOW MANY STANDARD DRINKS CONTAINING ALCOHOL DO YOU HAVE ON A TYPICAL DAY: PATIENT DOES NOT DRINK
HOW OFTEN DO YOU HAVE A DRINK CONTAINING ALCOHOL: NEVER

## 2023-01-26 ASSESSMENT — PAIN DESCRIPTION - ORIENTATION
ORIENTATION: MID
ORIENTATION: RIGHT;MID
ORIENTATION: RIGHT
ORIENTATION: RIGHT

## 2023-01-26 ASSESSMENT — PAIN DESCRIPTION - PAIN TYPE: TYPE: ACUTE PAIN

## 2023-01-26 ASSESSMENT — PAIN - FUNCTIONAL ASSESSMENT: PAIN_FUNCTIONAL_ASSESSMENT: 0-10

## 2023-01-26 NOTE — PROGRESS NOTES
Anticipated admission: Database initiated. Patient is A&O from home with son. States she requires an electric wheelchair and lift assist at home and is RA at baseline. She has MS and her right leg is NWB. She needs assistance with ADL's and has [facility]  that helps to dress and bath her. she was just discharged from here on the 19th with [facility] AT [facility] for catheter exchanges.

## 2023-01-26 NOTE — ED PROVIDER NOTES
58-year-old female presents to the emergency department with nausea vomiting right upper quadrant pain rating into her back and concerns that she did something that affected her surgery that she had done. On January 11 she had her gallbladder removed and a robotic assisted surgery. She reports progression was going well after this procedure until Friday when she turned funny and has been having increasing pain since with now nausea and vomiting. She came in for further evaluation today. She had surgery with Dr. Roche     The history is provided by the patient. Abdominal Pain  Pain location:  RUQ  Pain radiates to:  Does not radiate  Pain severity:  Moderate  Onset quality:  Gradual  Duration:  6 days  Timing:  Intermittent  Progression:  Waxing and waning  Chronicity:  New  Relieved by:  Nothing  Worsened by:  Nothing  Ineffective treatments:  None tried  Associated symptoms: nausea and vomiting    Associated symptoms: no chest pain, no chills, no cough, no diarrhea, no dysuria, no fever, no shortness of breath and no sore throat    Risk factors: multiple surgeries       Review of Systems   Constitutional:  Negative for chills and fever. HENT:  Negative for ear pain, sinus pressure and sore throat. Eyes:  Negative for pain, discharge and redness. Respiratory:  Negative for cough, shortness of breath and wheezing. Cardiovascular:  Negative for chest pain. Gastrointestinal:  Positive for abdominal pain, nausea and vomiting. Negative for abdominal distention and diarrhea. Genitourinary:  Negative for dysuria and frequency. Musculoskeletal:  Negative for arthralgias and back pain. Skin:  Negative for rash and wound. Neurological:  Negative for weakness and headaches. Hematological:  Negative for adenopathy. All other systems reviewed and are negative. Physical Exam  Constitutional:       General: She is in acute distress. Appearance: She is well-developed. She is obese.  She is ill-appearing. HENT:      Head: Normocephalic and atraumatic. Cardiovascular:      Rate and Rhythm: Normal rate and regular rhythm. Pulmonary:      Effort: Pulmonary effort is normal.      Breath sounds: Normal breath sounds. Abdominal:      General: Abdomen is flat. Bowel sounds are normal.      Palpations: Abdomen is soft. Tenderness: There is abdominal tenderness in the right upper quadrant. Skin:     General: Skin is warm. Capillary Refill: Capillary refill takes less than 2 seconds. Neurological:      General: No focal deficit present. Mental Status: She is alert and oriented to person, place, and time. Procedures     MDM  Number of Diagnoses or Management Options  Postoperative pain  Diagnosis management comments: Patient was seen and examined. Concern for infection or other postsurgical complication secondary to her gallbladder surgery. Labs and imaging were ordered. CT scan showed multiple findings but cannot rule out phlegmon on CT scan. General surgery was consulted. Spoke with Dr. Eric Childress who had surgical resident evaluate patient they recommended no further surgical intervention from their point and the patient can be discharged however patient required multiple doses of pain medication and was still in excruciating pain. Case was discussed with Dr. Khushbu Valle who agreed to admit the patient for further evaluation         ED Course as of 01/27/23 1008   Thu Jan 26, 2023   1106 Patient seen and examined. She had her gallbladder removed on January 11. Patient states on Friday she was moving around with her daughter when she felt something pop in her abdomen and since then has had worsening pain and nausea and vomiting she states is progressed over the last few days and worse to the point where she needed to come to the emergency department for evaluation she states up to that point she had no complications with the surgery.   She states no other complaints or other complaints at this time. Concerns include postop infection internal derangement due to recent motion. Other intra-abdominal pathology is considered. IV morphine IV Zofran IV fluids were ordered. We will check CBC electrolyte panel hepatic function panel lipase and sepsis lactate. [CF]   1107 Patient reevaluated she is having improvement of symptoms after pain medication and antiemetics [CF]   1518 Spoke with Dr. Chi Posada who does not feel there is any major concerns but would like surgical residents to evaluate [CF]   [phone] Reporting some pain will give p.o. Percocet [CF]   4854 General surgery evaluating patient. Awaiting their final plan. [CF]   7097 [facility] surgery resident called stating they have no intention to admit the patient for a postsurgical problem. On reevaluation the patient is falling in tears complaining of right-sided pain additional morphine was given. I did discuss patient needing admission she is agreeable for admission at this time due to her pain and would like to consider nursing home option [CF]   4721 Spoke with Dr. Faisal Galaviz he requested that we try 1 more dose of pain medicine before discharging the patient home because there is no clear reason to admit her for postoperative pain. I did speak with patient's Sister Victor M Justice who states patient was very uncomfortable at home. I do have significant concern for the patient's wellbeing and  ability to perform her ADLs if pain persists. [CF]   4577 Spoke with Dr. Faisal Galaviz again after reevaluating patient she is again in significant pain.   Patient will be admitted to [facility] 5 bed for observation for abdominal pain likely plan is for nursing home placement [CF]      ED Course User Index  [CF] Ryan Do, DO     --------------------------------------------- PAST HISTORY ---------------------------------------------  Past Medical History:  has a past medical history of Acquired hypothyroidism, Acute blood loss as cause of postoperative anemia, Anemia, Anticoagulant long-term use, Anxiety, Arthritis, Blood transfusion, Chronic back pain, Depression, GERD (gastroesophageal reflux disease), Hx of blood clots, Lymphedema of lower extremity, Migraine, Multiple sclerosis ([facility] [address] Obesity, Osteoarthritis, PE (pulmonary thromboembolism) ([facility] [address] Peripheral vascular disease ([facility] [address] Prominent abdominal aortic pulse, Pulmonary embolism (HCC), Swelling of lower limb, Thyroid disease, Urinary incontinence, and Varicose veins of lower extremities. Past Surgical History:  has a past surgical history that includes Bladder surgery; hernia repair; Dental surgery; Tonsillectomy; Dilation and curettage of uterus; other surgical history (09/15/2015); Tibia fracture surgery (Right); fracture surgery; Upper gastrointestinal endoscopy; Colonoscopy; Endoscopy, colon, diagnostic; Total hip arthroplasty (Right, 09/13/2016); pr open treatment fracture distal tibia & fibula (Right, 11/16/2018); Upper gastrointestinal endoscopy (N/A, 5/18/2020); Upper gastrointestinal endoscopy (N/A, 8/18/2022); and Cholecystectomy, laparoscopic (N/A, 1/16/2023). Social History:  reports that she quit smoking about 18 years ago. Her smoking use included cigarettes. She started smoking about 45 years ago. She smoked an average of .25 packs per day. She has never used smokeless tobacco. She reports current alcohol use. She reports that she does not use drugs. Family History: family history includes Arthritis in her sister; Cancer in her mother; Dementia in her mother; Diabetes in her father and sister; High Blood Pressure in her father and sister; Lupus in her sister; Obesity in her father; Stroke in her father. The patients home medications have been reviewed.     Allergies: Fentanyl and Ferritin    -------------------------------------------------- RESULTS -------------------------------------------------    LABS:  Results for orders placed or performed during the hospital encounter of 01/26/23   CBC with Auto Differential   Result Value Ref Range    WBC 5.6 4.5 - 11.5 E9/L    RBC 4.08 3.50 - 5.50 E12/L    Hemoglobin 10.3 (L) 11.5 - 15.5 g/dL    Hematocrit 32.7 (L) 34.0 - 48.0 %    MCV 80.1 80.0 - 99.9 fL    MCH 25.2 (L) 26.0 - 35.0 pg    MCHC 31.5 (L) 32.0 - 34.5 %    RDW 20.1 (H) 11.5 - 15.0 fL    Platelets 396 (H) 246 - 450 E9/L    MPV 8.8 7.0 - 12.0 fL    Neutrophils % 69.7 43.0 - 80.0 %    Immature Granulocytes % 0.5 0.0 - 5.0 %    Lymphocytes % 19.0 (L) 20.0 - 42.0 %    Monocytes % 8.3 2.0 - 12.0 %    Eosinophils % 1.1 0.0 - 6.0 %    Basophils % 1.4 0.0 - 2.0 %    Neutrophils Absolute 3.93 1.80 - 7.30 E9/L    Immature Granulocytes # 0.03 E9/L    Lymphocytes Absolute 1.07 (L) 1.50 - 4.00 E9/L    Monocytes Absolute 0.47 0.10 - 0.95 E9/L    Eosinophils Absolute 0.06 0.05 - 0.50 E9/L    Basophils Absolute 0.08 0.00 - 0.20 E9/L    Anisocytosis 2+     Polychromasia 1+     Hypochromia 1+     Target Cells 1+    Basic Metabolic Panel   Result Value Ref Range    Sodium 133 132 - 146 mmol/L    Potassium 4.0 3.5 - 5.0 mmol/L    Chloride 95 (L) 98 - 107 mmol/L    CO2 26 22 - 29 mmol/L    Anion Gap 12 7 - 16 mmol/L    Glucose 116 (H) 74 - 99 mg/dL    BUN 6 6 - 23 mg/dL    Creatinine 0.5 0.5 - 1.0 mg/dL    Est, Glom Filt Rate >60 >=60 mL/min/1.73    Calcium 9.4 8.6 - 10.2 mg/dL   Hepatic Function Panel   Result Value Ref Range    Total Protein 8.2 6.4 - 8.3 g/dL    Albumin 3.3 (L) 3.5 - 5.2 g/dL    Alkaline Phosphatase 516 (H) 35 - 104 U/L    ALT 13 0 - 32 U/L    AST 14 0 - 31 U/L    Total Bilirubin 0.5 0.0 - 1.2 mg/dL    Bilirubin, Direct <0.2 0.0 - 0.3 mg/dL    Bilirubin, Indirect see below 0.0 - 1.0 mg/dL   Lipase   Result Value Ref Range    Lipase 85 (H) 13 - 60 U/L   Lactate, Sepsis   Result Value Ref Range    Lactic Acid, Sepsis 1.5 0.5 - 1.9 mmol/L   Urinalysis   Result Value Ref Range    Color, UA Yellow Straw/Yellow    Clarity, UA SL CLOUDY Clear    Glucose, Ur Negative Negative mg/dL    Bilirubin Urine Negative Negative    Ketones, Urine Negative Negative mg/dL    Specific Gravity, UA 1.010 1.005 - 1.030    Blood, Urine Negative Negative    pH, UA 7.5 5.0 - 9.0    Protein, UA Negative Negative mg/dL    Urobilinogen, Urine 0.2 <2.0 E.U./dL    Nitrite, Urine Negative Negative    Leukocyte Esterase, Urine SMALL (A) Negative   Microscopic Urinalysis   Result Value Ref Range    WBC, UA 1-3 0 - 5 /HPF    RBC, UA NONE 0 - 2 /HPF    Bacteria, UA MANY (A) None Seen /HPF       RADIOLOGY:  NM HEPATOBILIARY    Result Date: 1/13/2023  EXAMINATION: NUCLEAR MEDICINE HEPATOBILIARY SCINTIGRAPHY (HIDA SCAN). 1/13/2023 1:24 pm TECHNIQUE: Approximately 5.0 mCi Tc-99m Mebrofenin (Choletec) was administered IV. Then, dynamic images of the abdomen were obtained in the anterior projection for 60 min(s). A right lateral view was also obtained at 60 min(s). COMPARISON: Ultrasound obtained 01/12/2023. HISTORY: ORDERING SYSTEM PROVIDED HISTORY: possible cholecystitis TECHNOLOGIST PROVIDED HISTORY: Reason for exam:->possible cholecystitis Decision Support Exception - unselect if not a suspected or confirmed emergency medical condition->Emergency Medical Condition (MA) What reading provider will be dictating this exam?->CRC FINDINGS: Prompt, homogenous uptake by the liver is noted with normal appearance of radiotracer excretion into the biliary system. Clearance of blood pool activity appears appropriate. Unremarkable radiotracer within the small bowel. Nonvisualization of the gallbladder up to 60 minutes. Repeat evaluation performed at 4 hours demonstrates no evidence of radiotracer uptake within the gallbladder, findings consistent with cyst is of duct obstruction and cholecystitis. Nonvisualization of the gallbladder at 1 hour and nonvisualization at 4 hours findings consistent with cystic duct obstruction and acute cholecystitis.      CT ABDOMEN PELVIS W IV CONTRAST Additional Contrast? None    Result Date: 1/26/2023  EXAMINATION: CT OF THE ABDOMEN AND PELVIS WITH CONTRAST 1/26/2023 12:56 pm TECHNIQUE: CT of the abdomen and pelvis was performed with the administration of intravenous contrast. Multiplanar reformatted images are provided for review. Automated exposure control, iterative reconstruction, and/or weight based adjustment of the mA/kV was utilized to reduce the radiation dose to as low as reasonably achievable. COMPARISON: CT abdomen and pelvis dated 08/17/2022 HISTORY: ORDERING SYSTEM PROVIDED HISTORY: post cholecystectomy pain, nausea vomiting TECHNOLOGIST PROVIDED HISTORY: Reason for exam:->post cholecystectomy pain, nausea vomiting Additional Contrast?->None Decision Support Exception - unselect if not a suspected or confirmed emergency medical condition->Emergency Medical Condition (MA) FINDINGS: Lower Chest: Lung bases are clear. Organs: Liver without focal lesion. Gallbladder is surgically absent status post cholecystectomy with mixed heterogeneous attenuation in the gallbladder fossa including scattered gas locules somewhat indeterminate for postoperative collection however no well-defined abscess. No biliary dilatation or irregularity. Pancreas and spleen unremarkable. Adrenals without nodule. Kidneys without suspicious renal lesion and no hydronephrosis. GI/Bowel: Moderate hiatal hernia somewhat folded in configuration of likely paraesophageal variant. .  No focal thickening or disproportion dilatation of bowel. No inflammatory findings. Pelvis: No suspicious pelvic lesion or bulky pelvic adenopathy/free fluid. Lloyd catheter in place. Peritoneum/Retroperitoneum: No bulky retroperitoneal adenopathy. No suspicious peritoneal or mesenteric process Vasculature: Grossly normal caliber of abdominal aorta and vasculature Bones/Soft Tissues: No acute osseous or soft tissue findings. Chronic compression deformity L1 level similar to prior.      Gallbladder is surgically absent status post cholecystectomy with mixed heterogeneous attenuation in the gallbladder fossa including scattered gas locules somewhat indeterminate for postoperative collection however no well-defined abscess. Phlegmonous process potential with at least minimal edema or fluid signal associated indeterminate for phlegmonous process Moderate hiatal hernia and a folded configuration of likely paraesophageal variant similar to prior. No evidence for small bowel obstructive process or inflammatory changes of bowel. US ABDOMEN LIMITED Specify organ? GALLBLADDER    Result Date: 1/12/2023  EXAMINATION: RIGHT UPPER QUADRANT ULTRASOUND 1/12/2023 11:04 am COMPARISON: None. HISTORY: ORDERING SYSTEM PROVIDED HISTORY: RUQ pain TECHNOLOGIST PROVIDED HISTORY: Reason for exam:->RUQ pain Specify organ?->GALLBLADDER FINDINGS: LIVER:  There is poor visualization of the liver. The liver appears to be normal size and echotexture without evidence of focal lesion. BILIARY SYSTEM:  There are echogenic, shadowing stones within the gallbladder as well as possible gallbladder sludge. The gallbladder is distended, measuring approximately 14.6 cm in length. There is suggestion of mild gallbladder wall thickening on few images. Common bile duct measures approximately 7.5 mm in diameter. RIGHT KIDNEY: The right kidney measures approximately 9.5 cm in length without evidence of hydronephrosis. PANCREAS:  The pancreas is obscured by bowel. Gallbladder hydrops with cholelithiasis, gallbladder wall thickening, and mild dilatation of the common bile duct is suggestive of acute cholecystitis. Consider hepatobiliary scan if further imaging evaluation is warranted. ------------------------- NURSING NOTES AND VITALS REVIEWED ---------------------------  Date / Time Roomed:  1/26/2023 10:53 AM  ED Bed Assignment:  [id number]    The nursing notes within the ED encounter and vital signs as below have been reviewed.      Patient Vitals for the past 24 hrs:   BP Temp Temp src Pulse Resp SpO2 Height Weight   01/27/23 0645 122/74 98.7 °F (37.1 °C) Oral 88 19 95 % -- --   01/27/23 0118 -- -- -- -- -- -- -- 283 lb 4.8 oz (128.5 kg)   01/26/23 2218 -- -- -- -- 18 -- -- --   01/26/23 2047 129/76 98 °F (36.7 °C) Oral 78 18 95 % -- --   01/26/23 1521 139/83 -- -- 94 18 98 % -- --   01/26/23 1104 (!) 164/92 98.5 °F (36.9 °C) Oral 90 18 97 % 5' 5\" (1.651 m) (!) 340 lb (154.2 kg)       Oxygen Saturation Interpretation: Normal    ------------------------------------------ PROGRESS NOTES ------------------------------------------  Counseling:  I have spoken with the patient and discussed todays results, in addition to providing specific details for the plan of care and counseling regarding the diagnosis and prognosis. Their questions are answered at this time and they are agreeable with the plan of admission.    --------------------------------- ADDITIONAL PROVIDER NOTES ---------------------------------  This patient's ED course included: a personal history and physicial examination, re-evaluation prior to disposition, multiple bedside re-evaluations, IV medications, and cardiac monitoring    This patient has remained hemodynamically stable during their ED course. Diagnosis:  1. Postoperative pain        Disposition:  Patient's disposition: Admit to med/surg floor  Patient's condition is fair.            Cindy Gomez DO  01/27/23 1010

## 2023-01-26 NOTE — CONSULTS
GENERAL SURGERY  CONSULT NOTE  1/26/2023    Physician Consulted: Dr. Mariaa Rivas  Reason for Consult: Postoperative pain    HPI  Steve Narvaez is a 60 y.o. female who presents for evaluation of abdominal pain. History significant for anxiety, lymphedema, GERD, PE and recent cholecystectomy 1/16. Patient states her abdominal pain started 4 days ago, is located in the center of her abdomen and radiates to her middle back. She additionally has diarrhea. Patient was medically admitted for cholecystectomy. At that time she had a DESTINEY drain which was removed before discharge. While she was here she had severe constipation related to a bowel regimen before discharge. Patient denies any melena, hematemesis, emesis or hematochezia.       Past Medical History:   Diagnosis Date    Acquired hypothyroidism 9/14/2016    Acute blood loss as cause of postoperative anemia 11/19/2018    Anemia     Anticoagulant long-term use     Anxiety     Arthritis     Blood transfusion     Chronic back pain     Depression     GERD (gastroesophageal reflux disease)     Hx of blood clots     Lymphedema of lower extremity 4/17/2014    Migraine     Multiple sclerosis ([address] 2000    Obesity     Osteoarthritis     PE (pulmonary thromboembolism) ([address] 02/2016    Peripheral vascular disease (HCC)     vericose veins    Prominent abdominal aortic pulse 4/17/2014    Pulmonary embolism ([address] 05/2016    Swelling of lower limb 4/17/2014    Thyroid disease     Urinary incontinence     has a indwelling catheter    Varicose veins of lower extremities 4/17/2014       Past Surgical History:   Procedure Laterality Date    BLADDER SURGERY      CHOLECYSTECTOMY, LAPAROSCOPIC N/A 1/16/2023    LAPAROSCOPIC ROBOTIC XI CHOLECYSTECTOMY performed by Soto Marcelo MD at [address]      random teeth extraction    DILATION AND CURETTAGE OF UTERUS      ENDOSCOPY, COLON, DIAGNOSTIC      FRACTURE SURGERY      HERNIA REPAIR      OTHER SURGICAL HISTORY  09/15/2015    LAPAROSCOPIC HIATAL HERNIA REPAIR WITH FUNDOPLICATION WITH MYOFASCIAL FLAP    NE OPEN TREATMENT FRACTURE DISTAL TIBIA & FIBULA Right 11/16/2018    OPEN REDUCTION INTERNAL FIXATION RIGHT DISTAL FEMUR, RIGHT DISTAL TIBIA performed by Sorin Dumont DO at [place] [address] Right     TONSILLECTOMY      TOTAL HIP ARTHROPLASTY Right 09/13/2016    Right Total Hip Arthroplasty    UPPER GASTROINTESTINAL ENDOSCOPY      UPPER GASTROINTESTINAL ENDOSCOPY N/A 5/18/2020    EGD BIOPSY performed by Eliud Norris MD at [address] [place] [address] N/A 8/18/2022    EGD BIOPSY performed by Eliud Norris MD at [facility] OR       Medications Prior to Admission    Prior to Admission medications    Medication Sig Start Date End Date Taking? Authorizing Provider   cyclobenzaprine (FLEXERIL) 10 MG tablet Take 10 mg by mouth in the morning and 10 mg in the evening. Historical Provider, MD   gabapentin (NEURONTIN) 300 MG capsule Take 300 mg by mouth every morning. **SEE OTHER ORDER**    Historical Provider, MD   gabapentin (NEURONTIN) 300 MG capsule Take 600 mg by mouth nightly. **SEE OTHER ORDER**    Historical Provider, MD   esomeprazole (NEXIUM) 40 MG delayed release capsule Take 40 mg by mouth every morning    Historical Provider, MD   traZODone (DESYREL) 50 MG tablet Take 50 mg by mouth nightly    Historical Provider, MD   acetaminophen (TYLENOL) 650 MG extended release tablet Take 1,300 mg by mouth 2 times daily    Historical Provider, MD   sucralfate (CARAFATE) 1 GM tablet Take 1 g by mouth in the morning and 1 g in the evening. Historical Provider, MD   LORazepam (ATIVAN) 0.5 MG tablet Take 0.5 mg by mouth every 12 hours as needed (ANXIETY/SLEEP).     Historical Provider, MD   aluminum & magnesium hydroxide-simethicone (MAALOX) 200-200-20 MG/5ML SUSP suspension Take 5 mLs by mouth every 6 hours as needed for Indigestion    Historical Provider, MD levothyroxine (SYNTHROID) 175 MCG tablet Take 175 mcg by mouth every morning    Historical Provider, MD   DULoxetine (CYMBALTA) 60 MG extended release capsule Take 120 mg by mouth every morning    Historical Provider, MD   senna (SENOKOT) 8.6 MG tablet Take 2 tablets by mouth nightly     Historical Provider, MD       Allergies   Allergen Reactions    Fentanyl Hives and Itching     PATCH-- itchy w/ red \"dots\" all over back and arms     Ferritin Other (See Comments)     Broke out \"into a sweat, my blood pressure shot up, I felt like lead on my chest and hard to breath. \"       Family History   Problem Relation Age of Onset    Cancer Mother     Dementia Mother     Obesity Father     Diabetes Father     High Blood Pressure Father     Stroke Father     Lupus Sister     High Blood Pressure Sister     Arthritis Sister     Diabetes Sister        Social History     Tobacco Use    Smoking status: Former     Packs/day: 0.25     Types: Cigarettes     Start date: 1977     Quit date: 2004     Years since quittin.7    Smokeless tobacco: Never   Vaping Use    Vaping Use: Some days    Substances: Always   Substance Use Topics    Alcohol use: Yes     Comment: occassional    Drug use: No         Review of Systems: pertinent ROS listed in HPI, all others negative       PHYSICAL EXAM:    Vitals:    23 1521   BP: 139/83   Pulse: 94   Resp: 18   Temp:    SpO2: 98%       GENERAL: Obese, alert, answers questions appropriately  HEAD:  Normocephalic. Atraumatic. EYES:   No scleral icterus. PERRL. LUNGS: Nonlabored breathing  CARDIOVASCULAR: RR  ABDOMEN:  Soft, non-distended, mildly tender, no guarding or rigidity  EXTREMITIES:   MAEx4.    SKIN:  Warm and dry  NEUROLOGIC:  GCS 15      ASSESSMENT/PLAN:  60 y.o. female with postoperative abdominal pain  -No evidence of infection, leak or obstruction, bilirubin and ALT AST normal    Plan will be  -No plans to admit from a surgical standpoint  -Continue postoperative pain management  -Follow-up in office in 2 weeks     discussed with Dr. Bo Sotelo. Nati Briones DO  Surgery Resident PGY-1  1/26/2023  5:27 PM     ATTENDING PHYSICIAN PROGRESS NOTE      I have examined the patient, reviewed the record, and discussed the case with the Resident. I have reviewed all relevant labs and imaging data. Please refer to the resident's note. I agree with the assessment and plan with the following corrections/ additions. The following summarizes my clinical findings and independent assessment.      Eliud Norris MD

## 2023-01-27 PROCEDURE — 6370000000 HC RX 637 (ALT 250 FOR IP): Performed by: INTERNAL MEDICINE

## 2023-01-27 PROCEDURE — 6360000002 HC RX W HCPCS: Performed by: INTERNAL MEDICINE

## 2023-01-27 PROCEDURE — G0378 HOSPITAL OBSERVATION PER HR: HCPCS

## 2023-01-27 PROCEDURE — 96372 THER/PROPH/DIAG INJ SC/IM: CPT

## 2023-01-27 RX ORDER — ENOXAPARIN SODIUM 100 MG/ML
30 INJECTION SUBCUTANEOUS 2 TIMES DAILY
Status: DISCONTINUED | OUTPATIENT
Start: 2023-01-27 | End: 2023-01-28 | Stop reason: HOSPADM

## 2023-01-27 RX ADMIN — ENOXAPARIN SODIUM 40 MG: 100 INJECTION SUBCUTANEOUS at 08:13

## 2023-01-27 RX ADMIN — ENOXAPARIN SODIUM 30 MG: 100 INJECTION SUBCUTANEOUS at 20:28

## 2023-01-27 RX ADMIN — PANTOPRAZOLE SODIUM 40 MG: 40 TABLET, DELAYED RELEASE ORAL at 05:30

## 2023-01-27 RX ADMIN — ACETAMINOPHEN 650 MG: 325 TABLET ORAL at 08:13

## 2023-01-27 RX ADMIN — DULOXETINE HYDROCHLORIDE 120 MG: 60 CAPSULE, DELAYED RELEASE ORAL at 08:12

## 2023-01-27 RX ADMIN — SENNOSIDES 17.2 MG: 8.6 TABLET, COATED ORAL at 20:28

## 2023-01-27 RX ADMIN — LEVOTHYROXINE SODIUM 175 MCG: 0.12 TABLET ORAL at 08:13

## 2023-01-27 RX ADMIN — SUCRALFATE 1 G: 1 TABLET ORAL at 08:12

## 2023-01-27 RX ADMIN — GABAPENTIN 600 MG: 300 CAPSULE ORAL at 20:27

## 2023-01-27 RX ADMIN — LORAZEPAM 0.5 MG: 0.5 TABLET ORAL at 01:39

## 2023-01-27 RX ADMIN — TRAZODONE HYDROCHLORIDE 50 MG: 50 TABLET ORAL at 20:27

## 2023-01-27 RX ADMIN — GABAPENTIN 300 MG: 300 CAPSULE ORAL at 08:12

## 2023-01-27 RX ADMIN — CYCLOBENZAPRINE 10 MG: 10 TABLET, FILM COATED ORAL at 20:27

## 2023-01-27 RX ADMIN — CYCLOBENZAPRINE 10 MG: 10 TABLET, FILM COATED ORAL at 08:12

## 2023-01-27 RX ADMIN — SUCRALFATE 1 G: 1 TABLET ORAL at 20:27

## 2023-01-27 RX ADMIN — ALUMINUM HYDROXIDE, MAGNESIUM HYDROXIDE, AND SIMETHICONE 5 ML: 200; 200; 20 SUSPENSION ORAL at 14:03

## 2023-01-27 ASSESSMENT — PAIN SCALES - GENERAL
PAINLEVEL_OUTOF10: 6
PAINLEVEL_OUTOF10: 4
PAINLEVEL_OUTOF10: 4
PAINLEVEL_OUTOF10: 8
PAINLEVEL_OUTOF10: 6

## 2023-01-27 ASSESSMENT — PAIN DESCRIPTION - LOCATION
LOCATION: ABDOMEN
LOCATION: HEAD
LOCATION: ABDOMEN

## 2023-01-27 ASSESSMENT — PAIN DESCRIPTION - DESCRIPTORS
DESCRIPTORS: ACHING;DISCOMFORT
DESCRIPTORS: ACHING;DISCOMFORT;SORE
DESCRIPTORS: ACHING;DULL;DISCOMFORT

## 2023-01-27 ASSESSMENT — PAIN DESCRIPTION - ORIENTATION
ORIENTATION: RIGHT;MID
ORIENTATION: RIGHT

## 2023-01-27 ASSESSMENT — PAIN - FUNCTIONAL ASSESSMENT
PAIN_FUNCTIONAL_ASSESSMENT: ACTIVITIES ARE NOT PREVENTED
PAIN_FUNCTIONAL_ASSESSMENT: ACTIVITIES ARE NOT PREVENTED

## 2023-01-27 NOTE — H&P
[address] [address]                              HISTORY AND PHYSICAL    PATIENT NAME: New Callahan                 :        1960  MED REC NO:   [id number]                            ROOM:       0503  ACCOUNT NO:   [de-identified]                           ADMIT DATE: 2023  PROVIDER:     Stephanie Hernandez MD    CHIEF COMPLAINT:  Abdominal pain. HISTORY OF PRESENT ILLNESS:  This is a 67-year-old woman who presented  to the emergency room with increased mid-right abdominal pain. She was  crying in pain. She was seen by the ER physician and ordered a CAT  scan, which did not show any evidence of abscess or complications from a  recent laparoscopic cholecystectomy that she has undergone. She was  seen by surgical staff, who did not feel that anything acute in terms of  complications from her surgery was happening. She does not have any  fever, chills, elevated white count, or signs of sepsis. No signs of  abscess or anything leak or any other complications noted on CAT scan. Surgery recommended continued postoperative pain care. She has been  taking oxycodone for pain. Not surprisingly she does have some bowel  complaints with some battling between constipation and loose bowel  movements. Her last bowel movement was yesterday morning and she did  report that it was on the loose side. Otherwise, the patient is lying  in bed fairly comfortably in no acute distress. No nausea or vomiting. No abnormality in her amylase, lipase, or significant elevation in her  liver tests.     PAST MEDICAL HISTORY:  Significant for multiple sclerosis,  gastroesophageal reflux disease, hypothyroidism, chronic depression with  generalized anxiety, osteopenia, chronic venous insufficiency, chronic  anemia with a history of iron-deficiency anemia, history of vitamin D  deficiency, and chronic physical disability due to her MS and multiple  hip and leg fractures in the past.    PAST SURGICAL HISTORY:  Includes a gastric volvulus and paraesophageal  hernia repair with fundoplication in 40/7046, right hip arthroplasty in  08/2016, and recent laparoscopic cholecystectomy. SOCIAL HISTORY:  Includes the fact that she is a never smoker. She does  not drink any significant amounts of alcohol and she is on disability. MEDICATIONS:  Includes gabapentin 300 mg one in the morning and two in  the evening. Levothyroxine 175 mcg daily, pantoprazole 40 mg daily,  duloxetine 120 mg daily, lorazepam 0.5 mg every 12 hours as needed,  Zofran 4 mg every eight hours as needed, sucralfate 1 gm twice a day,  Flexeril 10 mg b.i.d., and also has been taking oxycodone 10 mg q.6  hours p.r.n. for recent postsurgical pain. FAMILY MEDICAL HISTORY:  Noncontributory. REVIEW OF SYSTEMS:  SKIN:  Reveals no new or changing moles, rashes, or lesions. LUNGS:  No shortness breath, cough, or wheezing. CARDIOVASCULAR:  No chest pain, palpitations, or history of angina, MI,  or CHF. No history of cardiac arrhythmia. GI:  As above. :  No dysuria, hematuria, frequency, or problems with recurrent UTIs. MUSCULOSKELETAL:  She does have significant osteoarthritis and  significant disabilities from previous tibial and proximal hip fractures  that she sustained at the time of a fall. NEUROLOGIC:  No history of CVA or TIA symptoms. No paresthesias. No  headaches. She does have a history multiple sclerosis, but has been  stable with no evidence of progression for many years. PSYCHIATRIC:  She has a long history of significant depression and  anxiety symptoms. She has chronically followed with Psychiatry and a  counselor in the past.  She has no current issues with worsening  depression or suicidal issues. PHYSICAL EXAMINATION:  GENERAL  This is a 23-year-old woman lying in bed comfortably, in no  acute distress.   VITAL SIGNS:  Her vital signs are stable. She is afebrile. HEENT:  Head:  Normocephalic and atraumatic. Eyes:  PERRLA. Extraocular movements intact without nystagmus. Throat:  Oral and  buccal mucosa are moist without oral ulcer, exudate, or lesion. NECK:  No goiter, bruits, or lymphadenopathy. CHEST:  Symmetrical excursions with inspiration. BACK:  No CVA or spine tenderness. HEART:  Has a regular rate and rhythm without murmur, rub, gallop, or  JVD. LUNGS:  Clear to auscultation and percussion bilaterally. No wheezes,  rubs, rales, or use of accessory respiratory muscles. ABDOMEN:  Mildly tender in her right mid-upper quadrant areas. No  guarding. No rebound. There is no rigidity. Her belly is soft. Her  incisions from previous recent laparoscopic cholecystectomy are all  healing well with no evidence of erythema or signs of purulence or  infection. She has bowel sounds in all four quadrants. EXTREMITIES:  No clubbing or cyanosis. She has trace to 1+ lower  extremity edema, which is chronic and unchanged. No redness or signs of  infection. Full range of motion of all extremities. NEUROLOGIC:  Cranial nerves II through XII are grossly intact with a  grossly normal sensory exam.  She has 4/5 strength in her proximal legs  and her gait cannot be assessed safely. ASSESSMENT AND PLAN:  1. Acute abdominal pain, mostly in the right mid-upper quadrant. It  appears that this is simply a post-surgical pain. Surgery recommended  continued postoperative pain management. Nothing was noted on the CAT  scan or on physical exam to suggest any other complications. We will  continue her on oxycodone for pain control. 2.  Chronic anemia. The patient's hemoglobin is around 9 to 10 and that  is where it is chronically and that is where it is now. This is stable  with no further workup needed at this time. 3.  Chronic depression with anxiety.   We will continue on her usual  doses of her antidepressants including trazodone and Cymbalta. 4.  Multiple sclerosis. This is chronic and stable and she is on no  specific therapy except for some Flexeril, which she takes for muscle  spasm.         Matteo Augustin MD    D: 01/27/2023 12:15:01       T: 01/27/2023 12:18:56     [email or web address]  Job#: [id number]     Doc#: [id number]    CC:

## 2023-01-27 NOTE — PROGRESS NOTES
[facility] Quality Flow/Interdisciplinary Rounds Progress Note        Quality Flow Rounds held on January 27, 2023    Disciplines Attending:  Bedside Nurse, , , and Nursing Unit Leadership    Deandra Bojorquez was admitted on 1/26/2023 10:53 AM    Anticipated Discharge Date:       Disposition:    Agustin Score:  Agustin Scale Score: 15    Readmission Risk              Risk of Unplanned Readmission:  0           Discussed patient goal for the day, patient clinical progression, and barriers to discharge.   The following Goal(s) of the Day/Commitment(s) have been identified:  Diagnostics - Report Results and Labs - Report Results      Cindy Moss RN  January 27, 2023

## 2023-01-27 NOTE — PROGRESS NOTES
Physical Therapy  Facility/Department: [facility] MED SURG    Name: Paulie Polanco  : 1960  MRN: [id number]  Date of Service: 2023    PT consult was received and eval was attempted this am.  After speaking with pt she states she lives at home with her son. She has PMH of MS and fragile bones (especially in RLE) as she has had many surgeries on RLE due to fractures. Pt states she is mostly bed bound at home unless her son or home aides assist her OOB using a sit to stand lift to transfer to her electric WC. She states she requires assist for ADLs. Pt states she has been through PT in the past and is no longer able to stand or walk and does not feel PT can offer her anymore at this time. Will discharge pt from our service as she appears to be at her functional baseline. Holland Espinoza., P.T.   License Number: [id number]

## 2023-01-27 NOTE — PROGRESS NOTES
Message sent via perfect serve to Dr. Sam Whiteside regarding admission orders, read 8:56 PM    Electronically signed by Ike Gar RN on 1/26/2023 at 8:56 PM

## 2023-01-27 NOTE — CARE COORDINATION
Introduced my self and provided explanation of CM role to patient. Patient is awake, alert, and aware of current diagnosis and treatment plan including gen surg consult, pt/ot evals, dc planning. She voices she resides at home with her son who does cook, shop, etc, however, he does not assist with adl's. She has assistance from [facility]  for aide services and from [facility] for catheter care. She adds she requires ambulance transport for any transfers, etc.  She indicates a desire for a short term rehab stay and spontaneously names [facility] as facility of choice. Call to same, spoke with Ruddy Gomez, facility is out of network. A list of providers is reviewed and patient names [facility] as next facility of choice. PT/OT pending, will ask for intake personnel to review once evals are posted. Patient will require prior auth prior to facility transfer. Will follow along with  and assist with discharge planning as necessary. The Plan for Transition of Care is related to the following treatment goals: SNF    The Patient   was provided with a choice of provider and agrees   with the discharge plan. [x] Yes [] No    Freedom of choice list was provided with basic dialogue that supports the patient's individualized plan of care/goals, treatment preferences and shares the quality data associated with the providers. [x] Yes [] No    Noe Lerma. SEFERINO Aguilera RN  [facility] Case Management  [phone]    Met with patient and communicated that therapy participation would be necessary for short term rehab stay. She voiced that she sees no benefit in it and would just like to return home then. Verified active status with Juliana at [facility]. Agency will need called at time of discharge. [facility] and ambulance form placed in lite chart. Message sent to primary care communicating patient's plan for return to home. Noe Lerma.  SEFERINO Aguilera RN  [facility] Case Management  [phone]    Discharge orders noted.  Non emergency transportation has been arranged with [facility],  time 930PM.  Bbedside nurse notified of scheduled  time. Ana Maria Aguilera, MSN RN  [facility] Case Management  [phone]

## 2023-01-27 NOTE — PROGRESS NOTES
Dr. Tiffany Liu MD,    Your patient is on a medication that requires a weight dose adjustment. Body Weight Function Assessment:    Date Body Weight IBW  Adjusted BW SCr  CrCl Dialysis status   1/26/2023 (!) 340 lb (154.2 kg)  Ideal body weight: 57 kg (125 lb 10.6 oz)  Adjusted ideal body weight: 95.9 kg (211 lb 6.4 oz) Serum creatinine: 0.5 mg/dL 01/26/23 1131  Estimated creatinine clearance: 174 mL/min N/A       Pharmacy has weight dose-adjusted the following medication(s):    Date Previous Order Adjusted Order   1/26/2023 Lovenox 40 mg Sub-Q daily Lovenox 40 mg Sub-Q bid       These changes were made per protocol according to the [facility] Weight Based Dose Adjustment Policy. *Please note this dose may need readjusted if your patient's condition changes. Please contact pharmacy with any questions regarding these changes.     Cholo Sawyer [facility]  1/26/2023  9:32 PM

## 2023-01-27 NOTE — PROGRESS NOTES
GENERAL SURGERY  DAILY PROGRESS NOTE  1/27/2023    CHIEF COMPLAINT:  Chief Complaint   Patient presents with    Abdominal Pain     RUQ pain radiating into her back. Had her gallbladder taken out on the 11th. Friday her daughter was helping her change and she rolled over and something felt like it tore in her abd. Nausea and vomiting. SUBJECTIVE:  Patient continues to have some diarrhea. Abdominal pain seems to be better than yesterday. OBJECTIVE:  /74   Pulse 88   Temp 98.7 °F (37.1 °C) (Oral)   Resp 19   Ht 5' 5\" (1.651 m)   Wt 283 lb 4.8 oz (128.5 kg)   SpO2 95%   BMI 47.14 kg/m²     GENERAL:  NAD. A&Ox3. LUNGS:  No increased work of breathing on room air  CARDIOVASCULAR: RR, normotensive  ABDOMEN:  Soft, non-distended, mildly tender. No guarding, rigidity, rebound.     ASSESSMENT/PLAN:  60 y.o. female with postoperative pain    -No surgical interventions planned  -Continue multimodal pain management  -Okay for low-fat diet from a surgical standpoint    Discussed with Dr. Candie Delatorre, DO  Surgery Resident PGY-1  1/27/2023  8:48 AM

## 2023-01-27 NOTE — PLAN OF CARE
Problem: ABCDS Injury Assessment  Goal: Absence of physical injury  1/27/2023 1126 by Lexi Hutson RN  Outcome: Progressing  1/26/2023 2210 by Lyndsey Mills RN  Outcome: Progressing     Problem: Skin/Tissue Integrity  Goal: Absence of new skin breakdown  Description: 1. Monitor for areas of redness and/or skin breakdown  2. Assess vascular access sites hourly  3. Every 4-6 hours minimum:  Change oxygen saturation probe site  4. Every 4-6 hours:  If on nasal continuous positive airway pressure, respiratory therapy assess nares and determine need for appliance change or resting period.   1/27/2023 1126 by Lexi Hutson RN  Outcome: Progressing  1/26/2023 2210 by Lyndsey Mills RN  Outcome: Progressing     Problem: Discharge Planning  Goal: Discharge to home or other facility with appropriate resources  1/27/2023 1126 by Lexi Hutson RN  Outcome: Progressing  1/26/2023 2210 by Lyndsey Mills RN  Outcome: Progressing  Flowsheets (Taken 1/26/2023 1743 by Dixon Almanza RN)  Discharge to home or other facility with appropriate resources: Refer to discharge planning if patient needs post-hospital services based on physician order or complex needs related to functional status, cognitive ability or social support system     Problem: Pain  Goal: Verbalizes/displays adequate comfort level or baseline comfort level  1/27/2023 1126 by Lexi Hutson RN  Outcome: Progressing  1/26/2023 2210 by Lyndsey Mills RN  Outcome: Progressing     Problem: Safety - Adult  Goal: Free from fall injury  1/27/2023 1126 by Lexi Hutson RN  Outcome: Progressing  1/26/2023 2210 by Lyndsey Mills RN  Outcome: Progressing

## 2023-01-27 NOTE — PROGRESS NOTES
Bariatric bed delivered, patient refusing bed at this time.  Electronically signed by Carmelo Pacheco RN on 1/27/2023 at 1:49 PM

## 2023-01-27 NOTE — PROGRESS NOTES
Occupational Therapy  Date:1/27/2023  Patient Name: Alycia Cohen  Room: 4044/0272-N     Occupational Therapy (OT) order received, patient's medical record reviewed, and OT evaluation attempted this date; patient reported that she lives with her son and is non-ambulatory (power wheelchair used). Patient noted that she has consistent assistance for completion of ADLs, functional transfers (with use of sit-to-stand lift), and IADLs, from her son and aides; denied having need for additional DME/AE. No OT evaluation completed secondary to patient at her baseline level of functioning with regard to ADLs and functional transfers. Patient anticipates being discharged home later this date. Mary Alice Lombardo, OTR/L  License Number: [id number]

## 2023-01-28 VITALS
RESPIRATION RATE: 18 BRPM | SYSTOLIC BLOOD PRESSURE: 131 MMHG | TEMPERATURE: 98.2 F | WEIGHT: 283.5 LBS | HEART RATE: 90 BPM | DIASTOLIC BLOOD PRESSURE: 74 MMHG | BODY MASS INDEX: 47.23 KG/M2 | HEIGHT: 65 IN | OXYGEN SATURATION: 98 %

## 2023-01-28 PROCEDURE — G0378 HOSPITAL OBSERVATION PER HR: HCPCS

## 2023-01-28 NOTE — PROGRESS NOTES
[facility], spoke with [facility]. [facility] states  with be delayed \"multiple hours. \" Notified [facility] that patient has to be home before 5am as her son has to work in the morning and nobody will be there to let her in after 6. [facility] states he will notify if anything changes. Call to [facility] to notify of ambulance  time change. [facility] states that whenever she gets home they will be there & to keep him updated. 0140- Attempt to call [facility] to notify patient leaving via [facility] with no success, voicemail left with callback number. 0149- Call to [facility], was able to reach at this time. Notified that patient was discharged & on her way home at this time.     Electronically signed by Nadya Dempsey RN on 1/27/2023 at 9:26 PM

## 2023-01-30 NOTE — DISCHARGE SUMMARY
[address] [address]                               DISCHARGE SUMMARY    PATIENT NAME: Dionte Taylor                 :        1960  MED REC NO:   [id number]                            ROOM:       0503  ACCOUNT NO:   [de-identified]                           ADMIT DATE: 2023  PROVIDER:     Nan Payne MD                  [address] [place] [address] DATE: 2023    FINAL DIAGNOSES:  Include:  1. Abdominal pain, which is postoperative in nature. 2.  Chronic anemia. 3.  Chronic MS.    COURSE OF ILLNESS:  This is a 62-year woman, who presented in tears to  the emergency room with severe right-sided mid and upper quadrant  abdominal pain. She is about a week to 10 days out from laparoscopic  cholecystectomy that was otherwise uncomplicated. She had been on  oxycodone for pain. CAT scan did not show any evidence of any obvious  abscess or postoperative complication. She was evaluated by her  surgeon. She did not feel there is anything other than postoperative  pain that was in play rather than any complications from the surgery. The patient was given IV narcotic pain control, which did help and on  the day of discharge, the patient's overall pain was improving. She was  managing her pain with oxycodone. The patient did not want to pursue  skilled nursing level care because she did not feel like she could _____  participate in a subacute rehab setting. Given the lack of any acute  complications with surgery and overall management of pain with oral  narcotic and no further inpatient plans and stability both medically and  surgically, the patient was felt to be safe and could be safely  discharged home. She has home nursing and home PT set up already, I  believe through [facility]. She lives with her son and daughter-in-law in  her home as well. There are no changes in her usual medications.   She  is stable and will require follow up in approximately one week. We will  do this as a house-call as she is unable to make it into the office  given her overall chronic debilitating _____ from her MS and previous  leg fractures, which keep her from being able to ambulate. DISCHARGE MEDICATIONS:  Include:  Cymbalta 60 mg two tablets daily,  gabapentin 300 mg one tablet in the morning and two in the evening,  trazodone 50 mg at bedtime, iron one tablet daily, pantoprazole 40 mg  daily, Carafate 1 gm b.i.d., and lorazepam 0.5 mg every 12 hours as  needed.       Jairon George MD    D: 01/28/2023 8:48:38       T: 01/28/2023 8:51:11     [email or web address]  Job#: [id number]     Doc#: [id number]

## 2023-05-04 LAB
ALBUMIN SERPL-MCNC: 3.8 G/DL (ref 3.5–5.2)
ALP SERPL-CCNC: 100 U/L (ref 35–104)
ALT SERPL-CCNC: 8 U/L (ref 0–32)
ANION GAP SERPL CALCULATED.3IONS-SCNC: 14 MMOL/L (ref 7–16)
AST SERPL-CCNC: 13 U/L (ref 0–31)
BASOPHILS # BLD: 0.1 E9/L (ref 0–0.2)
BASOPHILS NFR BLD: 2.4 % (ref 0–2)
BILIRUB SERPL-MCNC: 0.2 MG/DL (ref 0–1.2)
BUN SERPL-MCNC: 12 MG/DL (ref 6–23)
CALCIUM SERPL-MCNC: 8.9 MG/DL (ref 8.6–10.2)
CHLORIDE SERPL-SCNC: 102 MMOL/L (ref 98–107)
CHOLESTEROL, TOTAL: 204 MG/DL (ref 0–199)
CO2 SERPL-SCNC: 23 MMOL/L (ref 22–29)
CREAT SERPL-MCNC: 0.6 MG/DL (ref 0.5–1)
EOSINOPHIL # BLD: 0.15 E9/L (ref 0.05–0.5)
EOSINOPHIL NFR BLD: 3.6 % (ref 0–6)
ERYTHROCYTE [DISTWIDTH] IN BLOOD BY AUTOMATED COUNT: 18.3 FL (ref 11.5–15)
GLUCOSE SERPL-MCNC: 102 MG/DL (ref 74–99)
HCT VFR BLD AUTO: 29.9 % (ref 34–48)
HDLC SERPL-MCNC: 50 MG/DL
HGB BLD-MCNC: 8.9 G/DL (ref 11.5–15.5)
IMM GRANULOCYTES # BLD: 0.02 E9/L
IMM GRANULOCYTES NFR BLD: 0.5 % (ref 0–5)
IRON SATN MFR SERPL: 5 % (ref 15–50)
IRON SERPL-MCNC: 19 MCG/DL (ref 37–145)
LDLC SERPL CALC-MCNC: 137 MG/DL (ref 0–99)
LYMPHOCYTES # BLD: 1.06 E9/L (ref 1.5–4)
LYMPHOCYTES NFR BLD: 25.6 % (ref 20–42)
MCH RBC QN AUTO: 23.7 PG (ref 26–35)
MCHC RBC AUTO-ENTMCNC: 29.8 % (ref 32–34.5)
MCV RBC AUTO: 79.5 FL (ref 80–99.9)
MONOCYTES # BLD: 0.33 E9/L (ref 0.1–0.95)
MONOCYTES NFR BLD: 8 % (ref 2–12)
NEUTROPHILS # BLD: 2.48 E9/L (ref 1.8–7.3)
NEUTS SEG NFR BLD: 59.9 % (ref 43–80)
PLATELET # BLD AUTO: 311 E9/L (ref 130–450)
PMV BLD AUTO: 9.5 FL (ref 7–12)
POTASSIUM SERPL-SCNC: 4.3 MMOL/L (ref 3.5–5)
PROT SERPL-MCNC: 7.4 G/DL (ref 6.4–8.3)
RBC # BLD AUTO: 3.76 E12/L (ref 3.5–5.5)
SODIUM SERPL-SCNC: 139 MMOL/L (ref 132–146)
TIBC SERPL-MCNC: 375 MCG/DL (ref 250–450)
TRIGL SERPL-MCNC: 86 MG/DL (ref 0–149)
TSH SERPL-MCNC: 2.84 UIU/ML (ref 0.27–4.2)
VLDLC SERPL CALC-MCNC: 17 MG/DL
WBC # BLD: 4.1 E9/L (ref 4.5–11.5)

## 2023-05-16 ENCOUNTER — OFFICE VISIT (OUTPATIENT)
Dept: NEUROLOGY | Age: 63
End: 2023-05-16
Payer: MEDICARE

## 2023-05-16 DIAGNOSIS — R26.2 DIFFICULTY IN WALKING: ICD-10-CM

## 2023-05-16 DIAGNOSIS — R15.9 BOWEL AND BLADDER INCONTINENCE: ICD-10-CM

## 2023-05-16 DIAGNOSIS — G35 MULTIPLE SCLEROSIS (HCC): Primary | ICD-10-CM

## 2023-05-16 DIAGNOSIS — R13.10 DYSPHAGIA, UNSPECIFIED TYPE: ICD-10-CM

## 2023-05-16 DIAGNOSIS — R32 BOWEL AND BLADDER INCONTINENCE: ICD-10-CM

## 2023-05-16 PROCEDURE — 99214 OFFICE O/P EST MOD 30 MIN: CPT | Performed by: NURSE PRACTITIONER

## 2023-05-16 PROCEDURE — 1036F TOBACCO NON-USER: CPT | Performed by: NURSE PRACTITIONER

## 2023-05-16 PROCEDURE — G8417 CALC BMI ABV UP PARAM F/U: HCPCS | Performed by: NURSE PRACTITIONER

## 2023-05-16 PROCEDURE — G8427 DOCREV CUR MEDS BY ELIG CLIN: HCPCS | Performed by: NURSE PRACTITIONER

## 2023-05-16 PROCEDURE — 3017F COLORECTAL CA SCREEN DOC REV: CPT | Performed by: NURSE PRACTITIONER

## 2023-05-16 RX ORDER — GABAPENTIN 300 MG/1
600 CAPSULE ORAL NIGHTLY
Qty: 60 CAPSULE | Refills: 5 | Status: SHIPPED | OUTPATIENT
Start: 2023-05-16 | End: 2023-06-15

## 2023-05-16 RX ORDER — CYCLOBENZAPRINE HCL 10 MG
10 TABLET ORAL 3 TIMES DAILY
Qty: 90 TABLET | Refills: 5 | Status: SHIPPED | OUTPATIENT
Start: 2023-05-16

## 2023-05-16 RX ORDER — GABAPENTIN 300 MG/1
300 CAPSULE ORAL 3 TIMES DAILY
Qty: 90 CAPSULE | Refills: 5 | Status: SHIPPED | OUTPATIENT
Start: 2023-05-16 | End: 2023-06-15

## 2023-05-16 NOTE — PROGRESS NOTES
[facility]. Brian Hart M.D., F.A.C.P. Kathleen Monge, DNP, APRN, ACNS-BC  Osito Dumont. Nico Krishnamurthy, MSN, APRN-FNP-C  Martinez Oakley, MSN, APRN-FNP-C  CHARLY Sheldon, PA-C  Clarissa Pozo, MSN, APRN-FNP-C  [address], [facility]  [place], [address]  Phone: [phone]  Fax: [phone]       Laverne Paz is a 60 y.o. right handed female     Patient follows for MS    She was a longtime patient of Dr. Jose F Govea in [place]. Previous to that she was seeing Dr. Jose F Calhoun at the [facility] at [facility]. She was diagnosed with MS back in March 16, 2000. For the past 19 years she was on Avelox and steroids. She is no longer on DMT therapy. She believes her last MS exacerbation was 6 weeks ago when her vision became blurry and her right side became numb and tingly. She also had some issues with numbness to the right side of her tongue but she did not seek medical treatment and her symptoms did resolve. Patient has been in a wheelchair for the past 5 years. She is able to move her bilateral upper extremities but her right arm is weaker than her left. She is unable to lift or move her right leg but she is able to lift and move her left leg. She notes no vision changes since being diagnosed with MS and no red desaturation. She does have bowel/bladder incontinence due to her MS. She has seen urology in the past along with having a bladder sling but still notes incontinence at times. She can tell when she has to use bathroom she just has no control with holding it in. She lives with her son and daughter-in-law, her daughter-in-law is her caregiver. Her last MRI brain was back in April 2017 which was noted to have several MS lesions bilaterally but no active lesions. She cannot remember the last time she had any MRIs of her spine, but does note that she does have MS lesions in her cervical spine. She also has complaints of difficulty swallowing.   She has

## 2023-06-27 ENCOUNTER — TELEPHONE (OUTPATIENT)
Dept: NEUROLOGY | Age: 63
End: 2023-06-27

## 2023-09-13 ENCOUNTER — ANESTHESIA (OUTPATIENT)
Dept: OPERATING ROOM | Age: 63
End: 2023-09-13
Payer: MEDICARE

## 2023-09-13 ENCOUNTER — HOSPITAL ENCOUNTER (EMERGENCY)
Age: 63
Discharge: HOME | End: 2023-09-14
Attending: EMERGENCY MEDICINE
Payer: MEDICARE

## 2023-09-13 ENCOUNTER — ANESTHESIA EVENT (OUTPATIENT)
Dept: OPERATING ROOM | Age: 63
End: 2023-09-13
Payer: MEDICARE

## 2023-09-13 VITALS
WEIGHT: 293 LBS | OXYGEN SATURATION: 94 % | DIASTOLIC BLOOD PRESSURE: 74 MMHG | SYSTOLIC BLOOD PRESSURE: 130 MMHG | HEART RATE: 86 BPM | BODY MASS INDEX: 54.91 KG/M2 | RESPIRATION RATE: 18 BRPM | TEMPERATURE: 97.7 F

## 2023-09-13 DIAGNOSIS — K22.2 ESOPHAGEAL OBSTRUCTION DUE TO FOOD IMPACTION: Primary | ICD-10-CM

## 2023-09-13 DIAGNOSIS — T18.128A ESOPHAGEAL OBSTRUCTION DUE TO FOOD IMPACTION: Primary | ICD-10-CM

## 2023-09-13 PROCEDURE — 3600007502: Performed by: SURGERY

## 2023-09-13 PROCEDURE — 6360000002 HC RX W HCPCS

## 2023-09-13 PROCEDURE — 99283 EMERGENCY DEPT VISIT LOW MDM: CPT

## 2023-09-13 PROCEDURE — 2709999900 HC NON-CHARGEABLE SUPPLY: Performed by: SURGERY

## 2023-09-13 PROCEDURE — 2580000003 HC RX 258

## 2023-09-13 PROCEDURE — 2580000003 HC RX 258: Performed by: NURSE ANESTHETIST, CERTIFIED REGISTERED

## 2023-09-13 PROCEDURE — 7100000001 HC PACU RECOVERY - ADDTL 15 MIN: Performed by: SURGERY

## 2023-09-13 PROCEDURE — 6360000002 HC RX W HCPCS: Performed by: NURSE ANESTHETIST, CERTIFIED REGISTERED

## 2023-09-13 PROCEDURE — 7100000000 HC PACU RECOVERY - FIRST 15 MIN: Performed by: SURGERY

## 2023-09-13 PROCEDURE — 2500000003 HC RX 250 WO HCPCS: Performed by: NURSE ANESTHETIST, CERTIFIED REGISTERED

## 2023-09-13 PROCEDURE — 3700000001 HC ADD 15 MINUTES (ANESTHESIA): Performed by: SURGERY

## 2023-09-13 PROCEDURE — 3700000000 HC ANESTHESIA ATTENDED CARE: Performed by: SURGERY

## 2023-09-13 PROCEDURE — 96375 TX/PRO/DX INJ NEW DRUG ADDON: CPT

## 2023-09-13 PROCEDURE — 6370000000 HC RX 637 (ALT 250 FOR IP): Performed by: ANESTHESIOLOGY

## 2023-09-13 PROCEDURE — 96376 TX/PRO/DX INJ SAME DRUG ADON: CPT

## 2023-09-13 PROCEDURE — 6360000002 HC RX W HCPCS: Performed by: EMERGENCY MEDICINE

## 2023-09-13 PROCEDURE — 3600007512: Performed by: SURGERY

## 2023-09-13 PROCEDURE — 96365 THER/PROPH/DIAG IV INF INIT: CPT

## 2023-09-13 RX ORDER — SODIUM CHLORIDE 9 MG/ML
INJECTION, SOLUTION INTRAVENOUS CONTINUOUS PRN
Status: DISCONTINUED | OUTPATIENT
Start: 2023-09-13 | End: 2023-09-13 | Stop reason: SDUPTHER

## 2023-09-13 RX ORDER — LIDOCAINE HYDROCHLORIDE 20 MG/ML
INJECTION, SOLUTION EPIDURAL; INFILTRATION; INTRACAUDAL; PERINEURAL PRN
Status: DISCONTINUED | OUTPATIENT
Start: 2023-09-13 | End: 2023-09-13 | Stop reason: SDUPTHER

## 2023-09-13 RX ORDER — DEXAMETHASONE SODIUM PHOSPHATE 4 MG/ML
INJECTION, SOLUTION INTRA-ARTICULAR; INTRALESIONAL; INTRAMUSCULAR; INTRAVENOUS; SOFT TISSUE PRN
Status: DISCONTINUED | OUTPATIENT
Start: 2023-09-13 | End: 2023-09-13 | Stop reason: SDUPTHER

## 2023-09-13 RX ORDER — ONDANSETRON 2 MG/ML
4 INJECTION INTRAMUSCULAR; INTRAVENOUS ONCE
Status: COMPLETED | OUTPATIENT
Start: 2023-09-13 | End: 2023-09-13

## 2023-09-13 RX ORDER — SODIUM CHLORIDE 0.9 % (FLUSH) 0.9 %
5-40 SYRINGE (ML) INJECTION PRN
Status: DISCONTINUED | OUTPATIENT
Start: 2023-09-13 | End: 2023-09-14 | Stop reason: HOSPADM

## 2023-09-13 RX ORDER — PROCHLORPERAZINE EDISYLATE 5 MG/ML
5 INJECTION INTRAMUSCULAR; INTRAVENOUS
Status: DISCONTINUED | OUTPATIENT
Start: 2023-09-13 | End: 2023-09-14 | Stop reason: HOSPADM

## 2023-09-13 RX ORDER — ONDANSETRON 2 MG/ML
INJECTION INTRAMUSCULAR; INTRAVENOUS
Status: COMPLETED
Start: 2023-09-13 | End: 2023-09-13

## 2023-09-13 RX ORDER — SODIUM CHLORIDE 0.9 % (FLUSH) 0.9 %
5-40 SYRINGE (ML) INJECTION EVERY 12 HOURS SCHEDULED
Status: DISCONTINUED | OUTPATIENT
Start: 2023-09-13 | End: 2023-09-14 | Stop reason: HOSPADM

## 2023-09-13 RX ORDER — ONDANSETRON 2 MG/ML
INJECTION INTRAMUSCULAR; INTRAVENOUS PRN
Status: DISCONTINUED | OUTPATIENT
Start: 2023-09-13 | End: 2023-09-13 | Stop reason: SDUPTHER

## 2023-09-13 RX ORDER — SODIUM CHLORIDE 9 MG/ML
INJECTION, SOLUTION INTRAVENOUS PRN
Status: DISCONTINUED | OUTPATIENT
Start: 2023-09-13 | End: 2023-09-14 | Stop reason: HOSPADM

## 2023-09-13 RX ORDER — SODIUM CHLORIDE 0.9 % (FLUSH) 0.9 %
SYRINGE (ML) INJECTION
Status: COMPLETED
Start: 2023-09-13 | End: 2023-09-13

## 2023-09-13 RX ORDER — PROPOFOL 10 MG/ML
INJECTION, EMULSION INTRAVENOUS PRN
Status: DISCONTINUED | OUTPATIENT
Start: 2023-09-13 | End: 2023-09-13 | Stop reason: SDUPTHER

## 2023-09-13 RX ADMIN — SODIUM CHLORIDE: 9 INJECTION, SOLUTION INTRAVENOUS at 23:03

## 2023-09-13 RX ADMIN — ONDANSETRON 4 MG: 2 INJECTION INTRAMUSCULAR; INTRAVENOUS at 21:53

## 2023-09-13 RX ADMIN — ONDANSETRON 4 MG: 2 INJECTION INTRAMUSCULAR; INTRAVENOUS at 23:10

## 2023-09-13 RX ADMIN — LIDOCAINE HYDROCHLORIDE 100 MG: 20 INJECTION, SOLUTION EPIDURAL; INFILTRATION; INTRACAUDAL; PERINEURAL at 23:07

## 2023-09-13 RX ADMIN — PROPOFOL 50 MG: 10 INJECTION, EMULSION INTRAVENOUS at 23:14

## 2023-09-13 RX ADMIN — BENZOCAINE AND MENTHOL 1 LOZENGE: 15; 3.6 LOZENGE ORAL at 23:51

## 2023-09-13 RX ADMIN — Medication: at 22:20

## 2023-09-13 RX ADMIN — GLUCAGON 1 MG: KIT at 20:18

## 2023-09-13 RX ADMIN — GLUCAGON 1 MG: KIT at 21:08

## 2023-09-13 RX ADMIN — PROPOFOL 200 MG: 10 INJECTION, EMULSION INTRAVENOUS at 23:08

## 2023-09-13 RX ADMIN — DEXAMETHASONE SODIUM PHOSPHATE 10 MG: 4 INJECTION, SOLUTION INTRAMUSCULAR; INTRAVENOUS at 23:10

## 2023-09-13 ASSESSMENT — LIFESTYLE VARIABLES: SMOKING_STATUS: 0

## 2023-09-13 ASSESSMENT — PAIN DESCRIPTION - DESCRIPTORS: DESCRIPTORS: SORE

## 2023-09-13 ASSESSMENT — PAIN SCALES - GENERAL
PAINLEVEL_OUTOF10: 5
PAINLEVEL_OUTOF10: 0

## 2023-09-13 ASSESSMENT — PAIN DESCRIPTION - LOCATION: LOCATION: THROAT

## 2023-09-14 NOTE — ED NOTES
2nd round of glucagon administered from doctors orders. Pt states no relief from first administration.       Evelin eHndrickson, [place]  09/13/23 2653

## 2023-09-14 NOTE — ED NOTES
Returned from surgery- pas called for transport home.  Eta 45 minutes     Michael Sloan RN  09/14/23 8923

## 2023-09-14 NOTE — ED NOTES
Patients consent was signed and witnessed. Consent is with patient, ready for procedure. No further issues at this time.       Thea Faulkner  09/13/23 [id number]

## 2023-09-14 NOTE — ANESTHESIA PRE PROCEDURE
Department of Anesthesiology  Preprocedure Note       Name:  Ariana Cornejo   Age:  58 y.o.  :  1960                                          MRN:  [id number]         Date:  2023      Surgeon: Kathryn Alvarado):  Anne-Marie Dangelo MD    Procedure: Procedure(s):  EGD FOREIGN BODY REMOVAL    Medications prior to admission:   Prior to Admission medications    Medication Sig Start Date End Date Taking? Authorizing Provider   cyclobenzaprine (FLEXERIL) 10 MG tablet Take 1 tablet by mouth 3 times daily 23   Coretha Pump, APRN - CNP   gabapentin (NEURONTIN) 300 MG capsule Take 1 capsule by mouth 3 times daily for 30 days. **SEE OTHER ORDER** 5/16/23 6/15/23  Coretha Pump, APRN - CNP   gabapentin (NEURONTIN) 300 MG capsule Take 2 capsules by mouth nightly for 30 days. **SEE OTHER ORDER** 5/16/23 6/15/23  Coretha Pump, APRN - CNP   esomeprazole (NEXIUM) 40 MG delayed release capsule Take 40 mg by mouth every morning    Historical Provider, MD   traZODone (DESYREL) 50 MG tablet Take 50 mg by mouth nightly    Historical Provider, MD   acetaminophen (TYLENOL) 650 MG extended release tablet Take 1,300 mg by mouth 2 times daily    Historical Provider, MD   sucralfate (CARAFATE) 1 GM tablet Take 1 g by mouth in the morning and 1 g in the evening. Historical Provider, MD   LORazepam (ATIVAN) 0.5 MG tablet Take 0.5 mg by mouth every 12 hours as needed (ANXIETY/SLEEP).     Historical Provider, MD   aluminum & magnesium hydroxide-simethicone (MAALOX) 200-200-20 MG/5ML SUSP suspension Take 5 mLs by mouth every 6 hours as needed for Indigestion    Historical Provider, MD   levothyroxine (SYNTHROID) 175 MCG tablet Take 175 mcg by mouth every morning    Historical Provider, MD   DULoxetine (CYMBALTA) 60 MG extended release capsule Take 120 mg by mouth every morning    Historical Provider, MD   senna (SENOKOT) 8.6 MG tablet Take 2 tablets by mouth nightly     Historical Provider, MD       Current medications:

## 2023-09-14 NOTE — ED PROVIDER NOTES
[facility]  EMERGENCY DEPARTMENT ENCOUNTER        Pt Name: Zeke Pollard  MRN: [id number]  [address] [facility] 1960  Date of evaluation: 9/13/2023  Provider: Epifanio Reardon DO  PCP: Yvette Perdomo MD  Note Started: 8:11 PM EDT 9/13/23    CHIEF COMPLAINT       Chief Complaint   Patient presents with    Choking     Piece of steak stuck around 7       HISTORY OF PRESENT ILLNESS: 1 or more Elements   History From: Patient    Limitations to history : None    Zeke Pollard is a 58 y.o. female who presents to the emergency department for esophageal food bolus. The patient states that about an hour ago she was eating steak when a piece got stuck in her throat. She states that had no bones. She states that since this event she has been unable to swallow any of her saliva. No difficulty breathing. No shortness of breath. Nursing Notes were all reviewed and agreed with or any disagreements were addressed in the HPI. REVIEW OF EXTERNAL NOTE :       PDMP Monitoring:    Last PDMP Nilo as Reviewed:  Review User Review Instant Review Result          Last Controlled Substance Monitoring Documentation      [facility] ED from 12/14/2016 in [facility] ED   Comments [facility] filed at 12/15/2016 1117          Urine Drug Screenings (1 yr)       Urine Drug Screen  Collected: 12/14/2016 10:30 PM (Final result)              Serum Drug Screen  Collected: 12/14/2016  9:03 PM (Final result)                  Medication Contract and Consent for Opioid Use Documents Filed        No documents found                      REVIEW OF SYSTEMS :           Positives and Pertinent negatives as per HPI.      SURGICAL HISTORY     Past Surgical History:   Procedure Laterality Date    BLADDER SURGERY      CHOLECYSTECTOMY, LAPAROSCOPIC N/A 1/16/2023    LAPAROSCOPIC ROBOTIC XI CHOLECYSTECTOMY performed by Jerris Olszewski, MD at [address] [facility]      random teeth extraction    DILATION masses. Musculoskeletal: Moves all extremities x 4. Warm and well perfused, no clubbing, no cyanosis, no edema. Capillary refill <3 seconds  Integument: skin warm and dry. No rashes. Neurologic: GCS 15, no focal deficits,   Psychiatric: Normal Affect            DIAGNOSTIC RESULTS   LABS:    Labs Reviewed - No data to display    As interpreted by me, the above displayed labs are abnormal. All other labs obtained during this visit were within normal range or not returned as of this dictation. EKG Interpretation  Interpreted by emergency department physician, Genevieve Perez DO              RADIOLOGY:   Non-plain film images such as CT, Ultrasound and MRI are read by the radiologist. Plain radiographic images are visualized and preliminarily interpreted by the ED Provider with the below findings:        Interpretation per the Radiologist below, if available at the time of this note:    No orders to display     No results found. No results found. PROCEDURES   Unless otherwise noted below, none          CRITICAL CARE TIME (.cct)       PAST MEDICAL HISTORY/Chronic Conditions Affecting Care      has a past medical history of Acquired hypothyroidism (9/14/2016), Acute blood loss as cause of postoperative anemia (11/19/2018), Anemia, Anticoagulant long-term use, Anxiety, Arthritis, Blood transfusion, Chronic back pain, Depression, GERD (gastroesophageal reflux disease), blood clots, Lymphedema of lower extremity (4/17/2014), Migraine, Multiple sclerosis ([address]) (2000), Obesity, Osteoarthritis, PE (pulmonary thromboembolism) ([address]) (02/2016), Peripheral vascular disease ([address]), Prominent abdominal aortic pulse (4/17/2014), Pulmonary embolism ([address]) (05/2016), Swelling of lower limb (4/17/2014), Thyroid disease, Urinary incontinence, and Varicose veins of lower extremities (4/17/2014).      EMERGENCY DEPARTMENT COURSE    Vitals:    Vitals:    09/13/23 2110 09/13/23 2206 09/13/23 2221 09/13/23 2229   BP:

## 2023-09-14 NOTE — CONSULTS
GENERAL SURGERY  CONSULT NOTE  9/13/2023    Physician Consulted: Dr. Casimiro Temple  Reason for Consult: esophageal foreign body  Referring Physician: Dr. Chelsi BERMAN  Mateus Ho is a 58 y.o. female who presents for evaluation of esophageal foreign body. Was eating steak tonight and felt something stuck in her throat. Never had this happen before. Has had EGD in the past for other reasons with Dr. Casimiro Temple. Unable to tolerate her own secretions.       Past Medical History:   Diagnosis Date    Acquired hypothyroidism 9/14/2016    Acute blood loss as cause of postoperative anemia 11/19/2018    Anemia     Anticoagulant long-term use     Anxiety     Arthritis     Blood transfusion     Chronic back pain     Depression     GERD (gastroesophageal reflux disease)     Hx of blood clots     Lymphedema of lower extremity 4/17/2014    Migraine     Multiple sclerosis ([address]) 2000    Obesity     Osteoarthritis     PE (pulmonary thromboembolism) ([address]) 02/2016    Peripheral vascular disease ([address])     vericose veins    Prominent abdominal aortic pulse 4/17/2014    Pulmonary embolism ([address]) 05/2016    Swelling of lower limb 4/17/2014    Thyroid disease     Urinary incontinence     has a indwelling catheter    Varicose veins of lower extremities 4/17/2014       Past Surgical History:   Procedure Laterality Date    BLADDER SURGERY      CHOLECYSTECTOMY, LAPAROSCOPIC N/A 1/16/2023    LAPAROSCOPIC ROBOTIC XI CHOLECYSTECTOMY performed by Mateusz Gaines MD at [address] [facility] teeth extraction    DILATION AND CURETTAGE OF UTERUS      ENDOSCOPY, COLON, DIAGNOSTIC      FRACTURE SURGERY      HERNIA REPAIR      OTHER SURGICAL HISTORY  09/15/2015    LAPAROSCOPIC HIATAL HERNIA REPAIR WITH FUNDOPLICATION WITH MYOFASCIAL FLAP    AR OPEN TREATMENT FRACTURE DISTAL TIBIA & FIBULA Right 11/16/2018    OPEN REDUCTION INTERNAL FIXATION RIGHT DISTAL FEMUR, RIGHT DISTAL TIBIA performed by Issac Siddiqui DO

## 2023-09-14 NOTE — H&P
GENERAL SURGERY  HISTORY AND PHYSICAL  9/13/2023    Physician Consulted: Dr. Miroslava Higgins  Reason for Consult: esophageal foreign body  Referring Physician: Dr. Efra Fallon    [facility] is a 58 y.o. female who presents for evaluation of esophageal foreign body. Was eating steak tonight and felt something stuck in her throat. Never had this happen before. Has had EGD in the past for other reasons with Dr. Miroslava Higgins. Unable to tolerate her own secretions.       Past Medical History:   Diagnosis Date    Acquired hypothyroidism 9/14/2016    Acute blood loss as cause of postoperative anemia 11/19/2018    Anemia     Anticoagulant long-term use     Anxiety     Arthritis     Blood transfusion     Chronic back pain     Depression     GERD (gastroesophageal reflux disease)     Hx of blood clots     Lymphedema of lower extremity 4/17/2014    Migraine     Multiple sclerosis ([address]) 2000    Obesity     Osteoarthritis     PE (pulmonary thromboembolism) ([address]) 02/2016    Peripheral vascular disease ([address])     vericose veins    Prominent abdominal aortic pulse 4/17/2014    Pulmonary embolism ([address]) 05/2016    Swelling of lower limb 4/17/2014    Thyroid disease     Urinary incontinence     has a indwelling catheter    Varicose veins of lower extremities 4/17/2014       Past Surgical History:   Procedure Laterality Date    BLADDER SURGERY      CHOLECYSTECTOMY, LAPAROSCOPIC N/A 1/16/2023    LAPAROSCOPIC ROBOTIC XI CHOLECYSTECTOMY performed by Janis Dominique MD at [address] [facility]      random teeth extraction    DILATION AND CURETTAGE OF UTERUS      ENDOSCOPY, COLON, DIAGNOSTIC      FRACTURE SURGERY      HERNIA REPAIR      OTHER SURGICAL HISTORY  09/15/2015    LAPAROSCOPIC HIATAL HERNIA REPAIR WITH FUNDOPLICATION WITH MYOFASCIAL FLAP    FL OPEN TREATMENT FRACTURE DISTAL TIBIA & FIBULA Right 11/16/2018    OPEN REDUCTION INTERNAL FIXATION RIGHT DISTAL FEMUR, RIGHT DISTAL TIBIA performed by Abdoulaye Childers DULoxetine (CYMBALTA) 60 MG extended release capsule Take 120 mg by mouth every morning    Historical Provider, MD   senna (SENOKOT) 8.6 MG tablet Take 2 tablets by mouth nightly     Historical Provider, MD       Allergies   Allergen Reactions    Fentanyl Hives and Itching     PATCH-- itchy w/ red \"dots\" all over back and arms     Ferritin Other (See Comments)     Broke out \"into a sweat, my blood pressure shot up, I felt like lead on my chest and hard to breath. \"       Family History   Problem Relation Age of Onset    Cancer Mother     Dementia Mother     Obesity Father     Diabetes Father     High Blood Pressure Father     Stroke Father     Lupus Sister     High Blood Pressure Sister     Arthritis Sister     Diabetes Sister        Social History     Tobacco Use    Smoking status: Former     Packs/day: .25     Types: Cigarettes     Start date: 1977     Quit date: 2004     Years since quittin.4    Smokeless tobacco: Never   Vaping Use    Vaping Use: Some days    Substances: Always   Substance Use Topics    Alcohol use: Yes     Comment: occassional    Drug use: No         General ROS: negative for - chills, fatigue or malaise  ENT ROS: negative for - hearing change, nasal congestion or nasal discharge  Allergy and Immunology ROS: negative for - hives, itchy/watery eyes or nasal congestion  Hematological and Lymphatic ROS: negative for - blood clots, blood transfusions, bruising or fatigue  Endocrine ROS: negative for - malaise/lethargy, mood swings, palpitations or polydipsia/polyuria  Respiratory ROS: negative for - sputum changes, stridor, tachypnea or wheezing  Cardiovascular ROS: negative for - irregular heartbeat, loss of consciousness, murmur or orthopnea  Gastrointestinal ROS: negative for - constipation, diarrhea, gas/bloating, heartburn or hematemesis  Genito-Urinary ROS: negative for -  genital discharge, genital ulcers or hematuria  Musculoskeletal ROS: negative for - gait disturbance,

## 2023-09-14 NOTE — OP NOTE
Operative Note      Patient: Mateus Ho  YOB: 1960  MRN: [id number]    Date of Procedure: 9/13/2023    Pre-Op Diagnosis Codes:     * Foreign body in esophagus, initial encounter [T18.108A]    Post-Op Diagnosis: same, hiatal hernia       Procedure(s):  EGD FOREIGN BODY REMOVAL    Surgeon(s):  Elizabeth Nguyen MD    Assistant:   * No surgical staff found *    Anesthesia: General    Estimated Blood Loss (mL): Minimal    Complications: None    Specimens:   * No specimens in log *    Implants:  * No implants in log *      Drains: * No LDAs found *      Detailed Description of Procedure:       Procedure Details     Informed consent was obtained for the procedure, including conscious sedation. Risks of pancreatitis, infection, perforation, hemorrhage, adverse drug reaction and aspiration were discussed. The patient was placed in the left lateral decubitus position. The patient was monitored continuously with ECG tracing, pulse oximetry, blood pressure monitoring, and direct observation. The gastroscope was inserted into the mouth and advanced under direct vision to second portion of the duodenum. A careful inspection was made as the gastroscope was withdrawn, including a retroflexed view of the proximal stomach; findings and interventions are described below. Appropriate photodocumentation was obtained. Findings:  steak bolus; patient coughed up majority of it prior to induction, small pieces of foodstuffs remain in hiatal hernia and mid esopahgus, no esohpagitis, large hiatal hernia, normal duodenum to d2, mild gastritis    Attending Attestation: I was present and scrubbed for the entire procedure.     Impression:    Hiatal hernia  Food impaction resolved    Recommendations:  Ppi  Dietary modifications with smaller bites and more frequent chewing  Ok for [employer]   Discussed with ED attending and charge RN; she will be monitored in PACU and sent back to ED for discharge    Baer Payne MD

## 2023-09-14 NOTE — ED NOTES
Pt presented to ED via EMS from private residence. Pt stated that she was eating steak and a piece got stuck in her throat. No difficulty breathing at this time. Will continue to monitor pt.       Shauna Fisher RN  09/13/23 2034

## 2023-09-14 NOTE — ANESTHESIA POSTPROCEDURE EVALUATION
Department of Anesthesiology  Postprocedure Note    Patient: Kaye Hickman  MRN: [id number]  YOB: 1960  Date of evaluation: 9/13/2023      Procedure Summary     Date: 09/13/23 Room / Location: [facility] OR 05 / [facility]    Anesthesia Start: 2303 Anesthesia Stop: 2333    Procedure: EGD FOREIGN BODY REMOVAL (Esophagus) Diagnosis:       Foreign body in esophagus, initial encounter      (Foreign body in esophagus, initial encounter [T18.108A])    Surgeons: Juana Pruitt MD Responsible Provider: Luz Marina Ames MD    Anesthesia Type: General ASA Status: 3 - Emergent          Anesthesia Type: General    Jessee Phase I: Jessee Score: 9    Jessee Phase II:        Anesthesia Post Evaluation    Patient location during evaluation: PACU  Patient participation: complete - patient participated  Level of consciousness: awake and alert  Pain score: 0  Airway patency: patent  Nausea & Vomiting: no vomiting and no nausea  Complications: no  Cardiovascular status: hemodynamically stable  Respiratory status: spontaneous ventilation, nonlabored ventilation and acceptable  Hydration status: stable  Pain management: adequate

## 2023-11-14 LAB
BASOPHILS # BLD: 0.1 K/UL (ref 0–0.2)
BASOPHILS NFR BLD: 2 % (ref 0–2)
EOSINOPHIL # BLD: 0.24 K/UL (ref 0.05–0.5)
EOSINOPHILS RELATIVE PERCENT: 4 % (ref 0–6)
ERYTHROCYTE [DISTWIDTH] IN BLOOD BY AUTOMATED COUNT: 14.6 % (ref 11.5–15)
HCT VFR BLD AUTO: 42 % (ref 34–48)
HGB BLD-MCNC: 13.3 G/DL (ref 11.5–15.5)
IMM GRANULOCYTES # BLD AUTO: 0.03 K/UL (ref 0–0.58)
IMM GRANULOCYTES NFR BLD: 1 % (ref 0–5)
LYMPHOCYTES NFR BLD: 1.48 K/UL (ref 1.5–4)
LYMPHOCYTES RELATIVE PERCENT: 25 % (ref 20–42)
MCH RBC QN AUTO: 31.2 PG (ref 26–35)
MCHC RBC AUTO-ENTMCNC: 31.7 G/DL (ref 32–34.5)
MCV RBC AUTO: 98.6 FL (ref 80–99.9)
MONOCYTES NFR BLD: 0.68 K/UL (ref 0.1–0.95)
MONOCYTES NFR BLD: 12 % (ref 2–12)
NEUTROPHILS NFR BLD: 57 % (ref 43–80)
NEUTS SEG NFR BLD: 3.29 K/UL (ref 1.8–7.3)
PLATELET # BLD AUTO: 320 K/UL (ref 130–450)
PMV BLD AUTO: 9.5 FL (ref 7–12)
RBC # BLD AUTO: 4.26 M/UL (ref 3.5–5.5)
WBC OTHER # BLD: 5.8 K/UL (ref 4.5–11.5)

## 2023-12-28 RX ORDER — CYCLOBENZAPRINE HCL 10 MG
10 TABLET ORAL 3 TIMES DAILY
Qty: 90 TABLET | Refills: 5 | Status: SHIPPED | OUTPATIENT
Start: 2023-12-28

## 2024-02-29 ENCOUNTER — OFFICE VISIT (OUTPATIENT)
Dept: NEUROLOGY | Age: 64
End: 2024-02-29
Payer: MEDICARE

## 2024-02-29 VITALS
HEIGHT: 65 IN | SYSTOLIC BLOOD PRESSURE: 136 MMHG | BODY MASS INDEX: 48.82 KG/M2 | WEIGHT: 293 LBS | OXYGEN SATURATION: 98 % | DIASTOLIC BLOOD PRESSURE: 81 MMHG | HEART RATE: 72 BPM

## 2024-02-29 DIAGNOSIS — R26.2 DIFFICULTY IN WALKING: ICD-10-CM

## 2024-02-29 DIAGNOSIS — M79.601 PAIN IN BOTH UPPER EXTREMITIES: Primary | ICD-10-CM

## 2024-02-29 DIAGNOSIS — G35 MULTIPLE SCLEROSIS (HCC): ICD-10-CM

## 2024-02-29 DIAGNOSIS — R15.9 BOWEL AND BLADDER INCONTINENCE: ICD-10-CM

## 2024-02-29 DIAGNOSIS — R32 BOWEL AND BLADDER INCONTINENCE: ICD-10-CM

## 2024-02-29 DIAGNOSIS — M79.602 PAIN IN BOTH UPPER EXTREMITIES: Primary | ICD-10-CM

## 2024-02-29 PROCEDURE — G8427 DOCREV CUR MEDS BY ELIG CLIN: HCPCS | Performed by: NURSE PRACTITIONER

## 2024-02-29 PROCEDURE — G8484 FLU IMMUNIZE NO ADMIN: HCPCS | Performed by: NURSE PRACTITIONER

## 2024-02-29 PROCEDURE — 1036F TOBACCO NON-USER: CPT | Performed by: NURSE PRACTITIONER

## 2024-02-29 PROCEDURE — G8417 CALC BMI ABV UP PARAM F/U: HCPCS | Performed by: NURSE PRACTITIONER

## 2024-02-29 PROCEDURE — 99214 OFFICE O/P EST MOD 30 MIN: CPT | Performed by: NURSE PRACTITIONER

## 2024-02-29 PROCEDURE — 3017F COLORECTAL CA SCREEN DOC REV: CPT | Performed by: NURSE PRACTITIONER

## 2024-02-29 RX ORDER — CYCLOBENZAPRINE HCL 10 MG
10 TABLET ORAL 3 TIMES DAILY PRN
Qty: 90 TABLET | Refills: 5 | Status: SHIPPED | OUTPATIENT
Start: 2024-02-29

## 2024-02-29 RX ORDER — GABAPENTIN 300 MG/1
300 CAPSULE ORAL 4 TIMES DAILY
Qty: 120 CAPSULE | Refills: 3 | Status: SHIPPED | OUTPATIENT
Start: 2024-02-29 | End: 2024-06-28

## 2024-03-14 LAB
BACTERIA URNS QL MICRO: ABNORMAL
RBC #/AREA URNS HPF: ABNORMAL /HPF
WBC #/AREA URNS HPF: ABNORMAL /HPF

## 2024-03-14 NOTE — PROGRESS NOTES
3/13/2024 patient called in to confirm her arrival time due to her needing to secure scheduled  transportation for DOS. Gave her the arrival time of 1115 - informed her this could possibly change but OR  would be notified to hold her time if at all possible.     Spoke to Pamela in OR scheduling - requested OR start time on 3/21/2024 be held to as close to 1321 as possible due to patient's transportation issue.

## 2024-03-17 LAB
MICROORGANISM SPEC CULT: ABNORMAL
MICROORGANISM SPEC CULT: ABNORMAL
SPECIMEN DESCRIPTION: ABNORMAL

## 2024-03-19 RX ORDER — OFLOXACIN 3 MG/ML
SOLUTION/ DROPS OPHTHALMIC
COMMUNITY
Start: 2024-02-20

## 2024-03-19 RX ORDER — CLONAZEPAM 0.5 MG/1
TABLET ORAL
COMMUNITY
Start: 2023-12-27 | End: 2024-03-19

## 2024-03-19 RX ORDER — KETOROLAC TROMETHAMINE 5 MG/ML
SOLUTION OPHTHALMIC
COMMUNITY
Start: 2024-02-20

## 2024-03-19 RX ORDER — NITROFURANTOIN MACROCRYSTALS 50 MG/1
50 CAPSULE ORAL 3 TIMES DAILY
COMMUNITY

## 2024-03-19 RX ORDER — PREDNISOLONE ACETATE 10 MG/ML
SUSPENSION/ DROPS OPHTHALMIC
COMMUNITY
Start: 2024-02-20

## 2024-03-19 NOTE — PROGRESS NOTES
[facility] PRE-ADMISSION TESTING INSTRUCTIONS    The Preadmission Testing patient is instructed accordingly using the following criteria (check applicable):    ARRIVAL INSTRUCTIONS:  [x] Parking the day of Surgery is located in the Main Entrance lot.  Upon entering the door, make an immediate right to the surgery reception desk    [x] Bring photo ID and insurance card    [] Bring in a copy of Living will or Durable Power of  papers.    [x] Please be sure to arrange for a responsible adult to provide transportation to and from the hospital    [x] Please arrange for a responsible adult to be with you for the 24 hour period post procedure due to having anesthesia    [x] If you awake am of surgery not feeling well or have temperature >100 please call [phone]    GENERAL INSTRUCTIONS:    [x] No solid foods after midnight, may have up to 8oz of water until 4 hours prior to surgery. No gum, no candy, no mints. NPO time:1100       [x] You may brush your teeth, do not swallow any toothpaste    [x] Take medications as instructed     [x] Stop herbal supplements and vitamins 5 days prior to procedure    [] Follow preop dosing of blood thinners per physician instructions    [] Take 1/2 dose of evening insulin, but no insulin after midnight    [] No oral diabetic medications after midnight    [] If diabetic and have low blood sugar or feel symptomatic, take 1-2oz apple juice only    [] Bring inhalers day of surgery    [] Bring urine specimen day of surgery    [x] Shower or bath with soap, lather and rinse well, AM of Surgery, no lotion, powders or creams to surgical site    [] Follow bowel prep as instructed per surgeon    [x] No tobacco products within 24 hours of surgery     [x] No alcohol or illegal drug use, marijuana included, within 24 hours of surgery.    [] Jewelry, body piercing's, eyeglasses, contact lenses and dentures are not permitted into surgery (bring cases)      [x] Please

## 2024-03-19 NOTE — PROGRESS NOTES
Patient scheduled for OR 3/21/24.  Hx of MS with right sided weakness, indwelling bojorquez. To be transported via [facility] in wheelchair and will be accompanied with her son Jaquan ([phone]).  Pt unable to stand.  Prefers sit to stand lift if available.

## 2024-03-19 NOTE — PROGRESS NOTES
Spoke with CANDIS Guzman regarding ESBL urine.  Per Megan, pt will need to be in CONTACT ISOLATION ALYSA Meléndez in surgery scheduling notified.

## 2024-03-20 PROBLEM — H25.811 COMBINED FORMS OF AGE-RELATED CATARACT OF RIGHT EYE: Status: ACTIVE | Noted: 2024-03-20

## 2024-03-20 LAB
ALBUMIN SERPL-MCNC: 4 G/DL (ref 3.5–5.2)
ALP SERPL-CCNC: 94 U/L (ref 35–104)
ALT SERPL-CCNC: 8 U/L (ref 0–32)
ANION GAP SERPL CALCULATED.3IONS-SCNC: 11 MMOL/L (ref 7–16)
AST SERPL-CCNC: 14 U/L (ref 0–31)
BASOPHILS # BLD: 0.06 K/UL (ref 0–0.2)
BASOPHILS NFR BLD: 2 % (ref 0–2)
BILIRUB SERPL-MCNC: 0.2 MG/DL (ref 0–1.2)
BUN SERPL-MCNC: 9 MG/DL (ref 6–23)
CALCIUM SERPL-MCNC: 9.4 MG/DL (ref 8.6–10.2)
CHLORIDE SERPL-SCNC: 98 MMOL/L (ref 98–107)
CHOLEST SERPL-MCNC: 189 MG/DL
CO2 SERPL-SCNC: 28 MMOL/L (ref 22–29)
CREAT SERPL-MCNC: 0.7 MG/DL (ref 0.5–1)
EOSINOPHIL # BLD: 0.22 K/UL (ref 0.05–0.5)
EOSINOPHILS RELATIVE PERCENT: 6 % (ref 0–6)
ERYTHROCYTE [DISTWIDTH] IN BLOOD BY AUTOMATED COUNT: 13.7 % (ref 11.5–15)
GFR SERPL CREATININE-BSD FRML MDRD: >60 ML/MIN/1.73M2
GLUCOSE SERPL-MCNC: 120 MG/DL (ref 74–99)
HCT VFR BLD AUTO: 44.1 % (ref 34–48)
HDLC SERPL-MCNC: 44 MG/DL
HGB BLD-MCNC: 14.1 G/DL (ref 11.5–15.5)
IMM GRANULOCYTES # BLD AUTO: <0.03 K/UL (ref 0–0.58)
IMM GRANULOCYTES NFR BLD: 0 % (ref 0–5)
LDLC SERPL CALC-MCNC: 120 MG/DL
LYMPHOCYTES NFR BLD: 1.22 K/UL (ref 1.5–4)
LYMPHOCYTES RELATIVE PERCENT: 33 % (ref 20–42)
MCH RBC QN AUTO: 30.3 PG (ref 26–35)
MCHC RBC AUTO-ENTMCNC: 32 G/DL (ref 32–34.5)
MCV RBC AUTO: 94.6 FL (ref 80–99.9)
MONOCYTES NFR BLD: 0.3 K/UL (ref 0.1–0.95)
MONOCYTES NFR BLD: 8 % (ref 2–12)
NEUTROPHILS NFR BLD: 51 % (ref 43–80)
NEUTS SEG NFR BLD: 1.87 K/UL (ref 1.8–7.3)
PLATELET # BLD AUTO: 265 K/UL (ref 130–450)
PMV BLD AUTO: 10.1 FL (ref 7–12)
POTASSIUM SERPL-SCNC: 4.3 MMOL/L (ref 3.5–5)
PROT SERPL-MCNC: 7.5 G/DL (ref 6.4–8.3)
RBC # BLD AUTO: 4.66 M/UL (ref 3.5–5.5)
SODIUM SERPL-SCNC: 137 MMOL/L (ref 132–146)
TRIGL SERPL-MCNC: 124 MG/DL
TSH SERPL DL<=0.05 MIU/L-ACNC: 3.43 UIU/ML (ref 0.27–4.2)
VLDLC SERPL CALC-MCNC: 25 MG/DL
WBC OTHER # BLD: 3.7 K/UL (ref 4.5–11.5)

## 2024-03-20 RX ORDER — ACETAMINOPHEN 500 MG
500 TABLET ORAL
Status: CANCELLED | OUTPATIENT
Start: 2024-03-20 | End: 2024-03-21

## 2024-03-21 ENCOUNTER — ANESTHESIA EVENT (OUTPATIENT)
Dept: OPERATING ROOM | Age: 64
End: 2024-03-21
Payer: MEDICARE

## 2024-03-21 ENCOUNTER — ANESTHESIA (OUTPATIENT)
Dept: OPERATING ROOM | Age: 64
End: 2024-03-21
Payer: MEDICARE

## 2024-03-21 ENCOUNTER — HOSPITAL ENCOUNTER (OUTPATIENT)
Age: 64
Setting detail: OUTPATIENT SURGERY
Discharge: OTHER INSTITUTION | End: 2024-03-21
Attending: OPHTHALMOLOGY | Admitting: OPHTHALMOLOGY
Payer: MEDICARE

## 2024-03-21 VITALS
HEIGHT: 65 IN | RESPIRATION RATE: 20 BRPM | HEART RATE: 77 BPM | DIASTOLIC BLOOD PRESSURE: 68 MMHG | OXYGEN SATURATION: 97 % | SYSTOLIC BLOOD PRESSURE: 122 MMHG | BODY MASS INDEX: 48.82 KG/M2 | WEIGHT: 293 LBS | TEMPERATURE: 97.6 F

## 2024-03-21 PROCEDURE — 6370000000 HC RX 637 (ALT 250 FOR IP): Performed by: OPHTHALMOLOGY

## 2024-03-21 PROCEDURE — 2709999900 HC NON-CHARGEABLE SUPPLY: Performed by: OPHTHALMOLOGY

## 2024-03-21 PROCEDURE — 3700000001 HC ADD 15 MINUTES (ANESTHESIA): Performed by: OPHTHALMOLOGY

## 2024-03-21 PROCEDURE — 6360000002 HC RX W HCPCS: Performed by: NURSE ANESTHETIST, CERTIFIED REGISTERED

## 2024-03-21 PROCEDURE — 3600000002 HC SURGERY LEVEL 2 BASE: Performed by: OPHTHALMOLOGY

## 2024-03-21 PROCEDURE — V2632 POST CHMBR INTRAOCULAR LENS: HCPCS | Performed by: OPHTHALMOLOGY

## 2024-03-21 PROCEDURE — 6360000002 HC RX W HCPCS: Performed by: OPHTHALMOLOGY

## 2024-03-21 PROCEDURE — 3700000000 HC ANESTHESIA ATTENDED CARE: Performed by: OPHTHALMOLOGY

## 2024-03-21 PROCEDURE — 2580000003 HC RX 258: Performed by: OPHTHALMOLOGY

## 2024-03-21 PROCEDURE — 3600000012 HC SURGERY LEVEL 2 ADDTL 15MIN: Performed by: OPHTHALMOLOGY

## 2024-03-21 PROCEDURE — 7100000011 HC PHASE II RECOVERY - ADDTL 15 MIN: Performed by: OPHTHALMOLOGY

## 2024-03-21 PROCEDURE — 2580000003 HC RX 258: Performed by: NURSE ANESTHETIST, CERTIFIED REGISTERED

## 2024-03-21 PROCEDURE — 2500000003 HC RX 250 WO HCPCS: Performed by: OPHTHALMOLOGY

## 2024-03-21 PROCEDURE — 7100000010 HC PHASE II RECOVERY - FIRST 15 MIN: Performed by: OPHTHALMOLOGY

## 2024-03-21 DEVICE — STERILE UV AND BLUE LIGHT FILTERING ACRYLIC FOLDABLE ASPHERIC POSTERIOR CHAMBER INTRAOCULAR LENS
Type: IMPLANTABLE DEVICE | Site: EYE | Status: FUNCTIONAL
Brand: CLAREON

## 2024-03-21 RX ORDER — CIPROFLOXACIN HYDROCHLORIDE 3.5 MG/ML
SOLUTION/ DROPS TOPICAL PRN
Status: DISCONTINUED | OUTPATIENT
Start: 2024-03-21 | End: 2024-03-21 | Stop reason: ALTCHOICE

## 2024-03-21 RX ORDER — PHENYLEPHRINE HYDROCHLORIDE 25 MG/ML
1 SOLUTION/ DROPS OPHTHALMIC PRN
Status: DISCONTINUED | OUTPATIENT
Start: 2024-03-21 | End: 2024-03-21 | Stop reason: HOSPADM

## 2024-03-21 RX ORDER — CYCLOPENTOLATE HYDROCHLORIDE 10 MG/ML
1 SOLUTION/ DROPS OPHTHALMIC SEE ADMIN INSTRUCTIONS
Status: DISCONTINUED | OUTPATIENT
Start: 2024-03-21 | End: 2024-03-21 | Stop reason: HOSPADM

## 2024-03-21 RX ORDER — PREDNISOLONE ACETATE 10 MG/ML
SUSPENSION/ DROPS OPHTHALMIC PRN
Status: DISCONTINUED | OUTPATIENT
Start: 2024-03-21 | End: 2024-03-21 | Stop reason: ALTCHOICE

## 2024-03-21 RX ORDER — SODIUM CHLORIDE 9 MG/ML
INJECTION, SOLUTION INTRAVENOUS CONTINUOUS PRN
Status: DISCONTINUED | OUTPATIENT
Start: 2024-03-21 | End: 2024-03-21 | Stop reason: SDUPTHER

## 2024-03-21 RX ORDER — KETOROLAC TROMETHAMINE 5 MG/ML
1 SOLUTION OPHTHALMIC SEE ADMIN INSTRUCTIONS
Status: COMPLETED | OUTPATIENT
Start: 2024-03-21 | End: 2024-03-21

## 2024-03-21 RX ORDER — PREDNISOLONE ACETATE 10 MG/ML
1 SUSPENSION/ DROPS OPHTHALMIC PRN
Status: COMPLETED | OUTPATIENT
Start: 2024-03-21 | End: 2024-03-21

## 2024-03-21 RX ORDER — MIDAZOLAM HYDROCHLORIDE 1 MG/ML
INJECTION INTRAMUSCULAR; INTRAVENOUS PRN
Status: DISCONTINUED | OUTPATIENT
Start: 2024-03-21 | End: 2024-03-21 | Stop reason: SDUPTHER

## 2024-03-21 RX ORDER — KETOROLAC TROMETHAMINE 5 MG/ML
SOLUTION OPHTHALMIC PRN
Status: DISCONTINUED | OUTPATIENT
Start: 2024-03-21 | End: 2024-03-21 | Stop reason: ALTCHOICE

## 2024-03-21 RX ORDER — CIPROFLOXACIN HYDROCHLORIDE 3.5 MG/ML
1 SOLUTION/ DROPS TOPICAL SEE ADMIN INSTRUCTIONS
Status: COMPLETED | OUTPATIENT
Start: 2024-03-21 | End: 2024-03-21

## 2024-03-21 RX ORDER — TROPICAMIDE 10 MG/ML
1 SOLUTION/ DROPS OPHTHALMIC PRN
Status: DISCONTINUED | OUTPATIENT
Start: 2024-03-21 | End: 2024-03-21 | Stop reason: HOSPADM

## 2024-03-21 RX ORDER — TIMOLOL MALEATE 5 MG/ML
SOLUTION/ DROPS OPHTHALMIC PRN
Status: DISCONTINUED | OUTPATIENT
Start: 2024-03-21 | End: 2024-03-21 | Stop reason: ALTCHOICE

## 2024-03-21 RX ORDER — TETRACAINE HYDROCHLORIDE 5 MG/ML
SOLUTION OPHTHALMIC PRN
Status: DISCONTINUED | OUTPATIENT
Start: 2024-03-21 | End: 2024-03-21 | Stop reason: ALTCHOICE

## 2024-03-21 RX ORDER — TETRACAINE HYDROCHLORIDE 5 MG/ML
1 SOLUTION OPHTHALMIC SEE ADMIN INSTRUCTIONS
Status: COMPLETED | OUTPATIENT
Start: 2024-03-21 | End: 2024-03-21

## 2024-03-21 RX ADMIN — CIPROFLOXACIN 1 DROP: 3 SOLUTION OPHTHALMIC at 13:47

## 2024-03-21 RX ADMIN — Medication 1 DROP: at 13:52

## 2024-03-21 RX ADMIN — PHENYLEPHRINE HYDROCHLORIDE 1 DROP: 25 SOLUTION/ DROPS OPHTHALMIC at 14:19

## 2024-03-21 RX ADMIN — MIDAZOLAM 2 MG: 1 INJECTION INTRAMUSCULAR; INTRAVENOUS at 15:25

## 2024-03-21 RX ADMIN — MIDAZOLAM 1 MG: 1 INJECTION INTRAMUSCULAR; INTRAVENOUS at 15:44

## 2024-03-21 RX ADMIN — TETRACAINE HYDROCHLORIDE 1 DROP: 5 SOLUTION OPHTHALMIC at 13:52

## 2024-03-21 RX ADMIN — PHENYLEPHRINE HYDROCHLORIDE 1 DROP: 25 SOLUTION/ DROPS OPHTHALMIC at 14:14

## 2024-03-21 RX ADMIN — Medication 1 DROP: at 13:47

## 2024-03-21 RX ADMIN — SODIUM CHLORIDE: 9 INJECTION, SOLUTION INTRAVENOUS at 14:56

## 2024-03-21 RX ADMIN — CIPROFLOXACIN 1 DROP: 3 SOLUTION OPHTHALMIC at 13:57

## 2024-03-21 RX ADMIN — TETRACAINE HYDROCHLORIDE 1 DROP: 5 SOLUTION OPHTHALMIC at 13:53

## 2024-03-21 RX ADMIN — KETOROLAC TROMETHAMINE 1 DROP: 5 SOLUTION/ DROPS OPHTHALMIC at 13:57

## 2024-03-21 RX ADMIN — CIPROFLOXACIN 1 DROP: 3 SOLUTION OPHTHALMIC at 13:52

## 2024-03-21 RX ADMIN — TETRACAINE HYDROCHLORIDE 1 DROP: 5 SOLUTION OPHTHALMIC at 13:57

## 2024-03-21 RX ADMIN — KETOROLAC TROMETHAMINE 1 DROP: 5 SOLUTION/ DROPS OPHTHALMIC at 13:52

## 2024-03-21 RX ADMIN — Medication 1 DROP: at 13:57

## 2024-03-21 ASSESSMENT — PAIN - FUNCTIONAL ASSESSMENT: PAIN_FUNCTIONAL_ASSESSMENT: 0-10

## 2024-03-21 ASSESSMENT — PAIN SCALES - GENERAL: PAINLEVEL_OUTOF10: 0

## 2024-03-21 ASSESSMENT — LIFESTYLE VARIABLES: SMOKING_STATUS: 0

## 2024-03-21 NOTE — ANESTHESIA PRE PROCEDURE
Department of Anesthesiology  Preprocedure Note       Name:  Jeannette Salinas   Age:  63 y.o.  :  1960                                          MRN:  [id number]         Date:  3/21/2024      Surgeon: Surgeon(s):  Niya Rivas MD    Procedure: Procedure(s):  RIGHT EYE CATARACT EXTRACTION IOL IMPLANT     ++CONTACT ISOLATION++ (KEEP TIME 1500)    Medications prior to admission:   Prior to Admission medications    Medication Sig Start Date End Date Taking? Authorizing Provider   nitrofurantoin (MACRODANTIN) 50 MG capsule Take 1 capsule by mouth in the morning, at noon, and at bedtime   Yes Spencer Martin MD   prednisoLONE acetate (PRED FORTE) 1 % ophthalmic suspension instill 1 drop into right eye three times a day for 1 week then i...  (REFER TO PRESCRIPTION NOTES). 24  Yes Spencer Martin MD   ofloxacin (OCUFLOX) 0.3 % solution instill 1 drop into right eye three times a day for 3 days PRE OP...  (REFER TO PRESCRIPTION NOTES). 24  Yes Spencer Martin MD   ketorolac (ACULAR) 0.5 % ophthalmic solution instill 1 drop INTO AFFECTED EYE(S) three times a day STARTING TH...  (REFER TO PRESCRIPTION NOTES). 24  Yes Spencer Martin MD   gabapentin (NEURONTIN) 300 MG capsule Take 1 capsule by mouth 4 times daily for 120 days. 24  Terra Foley APRN - CNP   cyclobenzaprine (FLEXERIL) 10 MG tablet Take 1 tablet by mouth 3 times daily as needed for Muscle spasms 24   Terra Foley APRN - CNP   esomeprazole (NEXIUM) 40 MG delayed release capsule Take 1 capsule by mouth every morning    Spencer Martin MD   traZODone (DESYREL) 50 MG tablet Take 1 tablet by mouth nightly    Spencer Martin MD   acetaminophen (TYLENOL) 650 MG extended release tablet Take 2 tablets by mouth 2 times daily    Spencer Martin MD   sucralfate (CARAFATE) 1 GM tablet Take 1 tablet by mouth in the morning and 1 tablet in the evening.    Spencer Martin MD

## 2024-03-21 NOTE — OP NOTE
Operative Note      Patient: Jeannette Salinas  YOB: 1960  MRN: [id number]    Date of Procedure: 3/21/2024    Pre-Op Diagnosis Codes:     * Combined forms of age-related cataract of right eye [H25.811]    Post-Op Diagnosis: Same       Procedure(s):  RIGHT EYE CATARACT EXTRACTION IOL IMPLANT     ++CONTACT ISOLATION++ (KEEP TIME 1500)    Surgeon(s):  Niya Rivas MD    Assistant:   * No surgical staff found *    Anesthesia: Monitor Anesthesia Care    Estimated Blood Loss (mL): None    Complications: None    Specimens:   * No specimens in log *    Implants:  Implant Name Type Inv. Item Serial No.  Lot No. LRB No. Used Action   LENS CLAREON IOL 23.0D - [id number]  LENS CLAREON IOL 23.0D [id number] [employer]  Right 1 Implanted         Drains: * No LDAs found *    Findings: Dense Mature combined form cataract Right eye    Detailed Description of Procedure:  See Attached           Surgeon: Niya Rivas M.D.           PREOPERATIVE DIAGNOSIS:  Mature cataract,   right    eye.  POSTOPERATIVE DIAGNOSIS:  Mature cataract,    right eye.  OPERATION:  Cataract extraction using phacoemulsification with topical anesthesia and placement of soft foldable posterior chamber lens implant, Gilbert, Model# [id number] , +23.0  diopters.    ANESTHESIA:  Topical anesthesia with local monitored anesthesia care.    INDICATIONS:  Difficulty with reading and/or driving.    SURGEON’S DESCRIPTION OF OPERATION    The patient, a 63 y.o. female was dilated in the pre-op area and also received topical anesthetic medication.  The patient was then brought to the Operating Room, placed in the supine position and prepped and draped in the usual sterile fashion. The patient’s eyelid was prepped with a 50/50 mixture of Betadine solution. One drop of 5% Betadine ophthalmic solution was placed in the operative eye.  A time out procedure was performed verifying correct patient, correct site, and correct lens with

## 2024-03-21 NOTE — DISCHARGE INSTRUCTIONS
DISCHARGE INSTRUCTIONS CATARACT EXTRACTION    EYE MEDICATION - Has drops at Home/Brought with her:  Ocuflox (one drop in operated eye) morning, afternoon, and evening for one week.  Ketorolac (one drop in operated eye) morning, afternoon, and evening for one week and then twice a day until gone.  Pred Forte 1% (one drop in operated eye) morning, afternoon, and evening for one week then twice a day for 3 weeks.  Eye shield on at bedtime for one week. No eye pads under shield  DO  Wash your hands prior to administering eye drops.  Wait 5 minutes in between eye drops.  You may watch TV and read.  You may do light housework.  You may drive after 2 days as vision permits  May sleep on either side.  May shower, have hair done. Avoid getting dirty water in operated eye.  Check with your physician prior to resuming any strenuous aerobic activity.  DO NOT  Do not rub your eye.  Avoid heavy lifting greater than 25 lbs.  No swimming for one week.  WHAT TO EXPECT  Vision normally may be blurry day of surgery; vision will progressively improve. Glasses will be prescribed in about 4 weeks.  Drooping upper lid, tearing, scratching, itching and sensitivity to bright light are common. Wear dark glasses for comfort if your eye is light sensitive. Apply lukewarm compress for 10 minutes to comfort your eye if needed.  You may see some floaters which are quite common.  Call my office and talk to a nurse immediately, [phone], if you have sudden increases in pain or discharge, blurred or diminished vision in short period of time, or bump the eye. After 5 p.m. your physician may be reached through the [facility] at [phone].    POST OP APPOINTMENT   Your post op appointment is on Fri 3-22-24 at 8:30 a.m. at the [facility] office. If unable to make it, please call office [phone] to inform us when you have transportation and we will change appt.    Niya Rivas MD 3/21/2024 4:05 PM

## 2024-03-21 NOTE — ANESTHESIA POSTPROCEDURE EVALUATION
Department of Anesthesiology  Postprocedure Note    Patient: Jeannette Salinas  MRN: [id number]  YOB: 1960  Date of evaluation: 3/21/2024    Procedure Summary       Date: 03/21/24 Room / Location: [address]    Anesthesia Start: 1456 Anesthesia Stop: 1609    Procedure: RIGHT EYE CATARACT EXTRACTION IOL IMPLANT     ++CONTACT ISOLATION++ (KEEP TIME 1500) (Right: Eye) Diagnosis:       Combined forms of age-related cataract of right eye      (Combined forms of age-related cataract of right eye [H25.811])    Surgeons: Niya Rivas MD Responsible Provider: Haley Arnold DO    Anesthesia Type: MAC ASA Status: 3            Anesthesia Type: MAC    Jessee Phase I: Jessee Score: 10    Jessee Phase II: Jessee Score: 10    Anesthesia Post Evaluation    Patient location during evaluation: PACU  Patient participation: complete - patient participated  Level of consciousness: awake and alert  Nausea & Vomiting: no vomiting and no nausea  Cardiovascular status: hemodynamically stable  Respiratory status: acceptable and spontaneous ventilation  Hydration status: stable  Pain management: adequate    No notable events documented.

## 2024-04-03 ENCOUNTER — HOSPITAL ENCOUNTER (OUTPATIENT)
Dept: NEUROLOGY | Age: 64
Discharge: HOME | End: 2024-04-03
Payer: MEDICARE

## 2024-04-03 VITALS — WEIGHT: 293 LBS | BODY MASS INDEX: 48.82 KG/M2 | HEIGHT: 65 IN

## 2024-04-03 PROCEDURE — 95885 MUSC TST DONE W/NERV TST LIM: CPT | Performed by: PHYSICAL MEDICINE & REHABILITATION

## 2024-04-03 PROCEDURE — 95861 NEEDLE EMG 2 EXTREMITIES: CPT

## 2024-04-03 PROCEDURE — 95909 NRV CNDJ TST 5-6 STUDIES: CPT

## 2024-04-03 PROCEDURE — 95886 MUSC TEST DONE W/N TEST COMP: CPT | Performed by: PHYSICAL MEDICINE & REHABILITATION

## 2024-04-03 PROCEDURE — 95910 NRV CNDJ TEST 7-8 STUDIES: CPT | Performed by: PHYSICAL MEDICINE & REHABILITATION

## 2024-04-03 NOTE — PROCEDURES
pollicis brevis N None None None None N N N Decr   L. First dorsal interosseous N None None None None N N N N   R. Abductor pollicis brevis N None None None None N N N Decr   R. First dorsal interosseous N None None None None N N N N          Study Limitations:  body habitus, patient unable to get out of power chair     Summary of Findings:    1. Sensory nerve conduction studies of bilateral median nerves showed prolonged peak wrist latency, normal amplitude and slowing across the wrist. All other nerves tested showed normal peak latencies, normal SNAP amplitudes, and normal conduction velocities.     2. Motor nerve conduction studies of the left median nerve showed prolonged distal latency, normal amplitude. All other nerves tested showed normal distal latencies, normal CMAP amplitudes, and normal conduction velocities.     3. F-wave studies of all nerves tested revealed normal latencies.     4. EMG was performed with monopolar needle in multiple muscles outlined above. There was reduced recruitment in bilateral abductor pollicis brevis muscles. All other muscles tested revealed normal insertional activity, without evidence of abnormal spontaneous activity. All MUAP's were of normal amplitude and duration with a full recruitment pattern. No increase in polyphasic potentials was noted.        Diagnostic Interpretation:     This study was abnormal.     1. There is electrodiagnostic evidence for left sensorimotor median mononeuropathy at or about the wrist, primarily demyelinating in nature, mild to moderate in severity, without evidence of active denervation. It is chronic in duration. This is consistent with the clinical diagnosis of carpal tunnel syndrome. Prognosis for recovery of demyelinating lesions is good.     2. There is electrodiagnostic evidence for right sensory median mononeuropathy at or about the wrist, primarily demyelinating in nature, mild in severity, without evidence of active denervation. It is

## 2024-04-04 ENCOUNTER — PREP FOR PROCEDURE (OUTPATIENT)
Dept: SURGERY | Age: 64
End: 2024-04-04

## 2024-04-04 RX ORDER — SODIUM CHLORIDE 9 MG/ML
INJECTION, SOLUTION INTRAVENOUS CONTINUOUS
Status: CANCELLED | OUTPATIENT
Start: 2024-04-04

## 2024-04-04 NOTE — H&P
[facility]  [address]  [place] [address]    General Surgery Visit/H&P   Signed    Patient: Jeannette Salinas  MR#: [id number]  : 1960  Acct:[id number]  Age/Sex: 63 / F  ADM Date: 24  Loc: SPS.514            Provider Location:   cc: Dr. Linus Schwab~        Intake  Vital Signs      24  14:35  Height   5 ft 5 in  BP   107/76  Mean Arterial Pressure   86  Blood Pressure Location   Right Lower Arm  Position   Sitting  Respiration   16  Pulse Rate   77  Pulse Source   Monitor    Intake  Visit Reasons: Acid Reflux  Current Pain: No  Date of Last Colonoscopy: 14  Allergies    fentanyl Allergy (Verified 24 14:39)  Unknown      Safety Concerns: Feels Safe At This Time    [facility]  Medical History (Updated 24 @ 08:55 by Sarbjit Uriostegui MD)    Suprapubic catheter  Multiple sclerosis      Surgical History (Updated 23 @ 13:57 by Anna Metzger)    Hx of fracture of lower leg  open treatment tibia and fibia  Hx of total hip arthroplasty  Right  Hx of hernia repair  Hx of cholecystectomy  Hx of tonsillectomy  Hx of colonoscopy      Social History (Updated 23 @ 14:08 by Anna Metzger)  Types:  Coffee and Other   Smoking Status:  Former Smoker   Alcohol Intake:  Current         HPI  History of Present Illness:   Patient is a 63-year-old female who is here for routine follow-up.  I am treating patient for gastroesophageal reflux disease.  She is on a PPI and Carafate.  When she takes both of those medications her reflux is controlled.  She ran out of her Carafate and developed some reflux symptoms.  Denies any abdominal pain.  My office has refilled her Carafate which she has restarted and feels fine.  Denies any complaints today.  H&P for Procedures  H&P for Procedure: No    ROS  Const'l  Review of Systems Unobtainable: All Systems Reviewed and Are Unremarkable Except as Noted in HPI and Below  Neuro  Neurological: Denies Other (Neurologic

## 2024-04-11 PROBLEM — H25.812 COMBINED FORMS OF AGE-RELATED CATARACT OF LEFT EYE: Status: ACTIVE | Noted: 2024-04-11

## 2024-04-25 NOTE — PROGRESS NOTES
[facility] PRE-ADMISSION TESTING INSTRUCTIONS    The Preadmission Testing patient is instructed accordingly using the following criteria (check applicable):    ARRIVAL INSTRUCTIONS:  [x] Parking the day of Surgery is located in the Main Entrance lot.  Upon entering the door, make an immediate right to the surgery reception desk    [x] Bring photo ID and insurance card    [] Bring in a copy of Living will or Durable Power of  papers.    [x] Please be sure to arrange for a responsible adult to provide transportation to and from the hospital    [x] Please arrange for a responsible adult to be with you for the 24 hour period post procedure due to having anesthesia    [x] If you awake am of surgery not feeling well or have temperature >100 please call [phone]    GENERAL INSTRUCTIONS:    [x] No solid foods after midnight, may have up to 8oz of water until 4 hours prior to surgery. No gum, no candy, no mints. NPO time:0800       [x] You may brush your teeth, do not swallow any toothpaste    [x] Take medications as instructed     [x] Stop herbal supplements and vitamins 5 days prior to procedure    [x] Follow preop dosing of blood thinners per physician instructions    [] Take 1/2 dose of evening insulin, but no insulin after midnight    [] No oral diabetic medications after midnight    [] If diabetic and have low blood sugar or feel symptomatic, take 1-2oz apple juice only    [] Bring inhalers day of surgery    [] Bring urine specimen day of surgery    [x] Shower or bath with soap, lather and rinse well, AM of Surgery, no lotion, powders or creams to surgical site    [] Follow bowel prep as instructed per surgeon    [x] No tobacco products within 24 hours of surgery     [x] No alcohol or illegal drug use, marijuana included, within 24 hours of surgery.    [x] Jewelry, body piercing's, eyeglasses, contact lenses and dentures are not permitted into surgery (bring cases)      [x] Please

## 2024-04-25 NOTE — PROGRESS NOTES
Dr Arnold notified pt scheduled for EGD 4/29/24 hx multiple sclerosis wheelchair dependent, BMI  54.08. Pt to be evaluated in ENDO dept DOS.

## 2024-04-27 ENCOUNTER — ANESTHESIA EVENT (OUTPATIENT)
Dept: ENDOSCOPY | Age: 64
End: 2024-04-27
Payer: MEDICARE

## 2024-04-27 NOTE — ANESTHESIA PRE PROCEDURE
Department of Anesthesiology  Preprocedure Note       Name:  Jeannette Salinas   Age:  63 y.o.  :  1960                                          MRN:  [id number]         Date:  2024      Surgeon: Surgeon(s):  Sarbjit Uriostegui MD    Procedure: Procedure(s):  ESOPHAGOGASTRODUODENOSCOPY   +++CONTACT ISOLATION+++    Medications prior to admission:   Prior to Admission medications    Medication Sig Start Date End Date Taking? Authorizing Provider   prednisoLONE acetate (PRED FORTE) 1 % ophthalmic suspension instill 1 drop into right eye three times a day for 1 week then i...  (REFER TO PRESCRIPTION NOTES). 24   Spencer Martin MD   ofloxacin (OCUFLOX) 0.3 % solution instill 1 drop into right eye three times a day for 3 days PRE OP...  (REFER TO PRESCRIPTION NOTES). 24   Spencer Martin MD   ketorolac (ACULAR) 0.5 % ophthalmic solution instill 1 drop INTO AFFECTED EYE(S) three times a day STARTING TH...  (REFER TO PRESCRIPTION NOTES). 24   Spencer Martin MD   gabapentin (NEURONTIN) 300 MG capsule Take 1 capsule by mouth 4 times daily for 120 days. 24  Terra Foley APRN - CNP   cyclobenzaprine (FLEXERIL) 10 MG tablet Take 1 tablet by mouth 3 times daily as needed for Muscle spasms 24   Terra Foley APRN - CNP   esomeprazole (NEXIUM) 40 MG delayed release capsule Take 1 capsule by mouth every morning    Spencer Martin MD   traZODone (DESYREL) 50 MG tablet Take 1 tablet by mouth nightly    Spencer Martin MD   acetaminophen (TYLENOL) 650 MG extended release tablet Take 2 tablets by mouth 2 times daily    Spencer Martin MD   sucralfate (CARAFATE) 1 GM tablet Take 1 tablet by mouth in the morning and 1 tablet in the evening.    Spencer Martin MD   LORazepam (ATIVAN) 0.5 MG tablet Take 1 tablet by mouth every 12 hours as needed (ANXIETY/SLEEP).    Spencer Martin MD   aluminum & magnesium hydroxide-simethicone (MAALOX)

## 2024-04-29 ENCOUNTER — HOSPITAL ENCOUNTER (OUTPATIENT)
Age: 64
Setting detail: OUTPATIENT SURGERY
Discharge: HOME | End: 2024-04-29
Attending: SURGERY | Admitting: SURGERY
Payer: MEDICARE

## 2024-04-29 ENCOUNTER — ANESTHESIA (OUTPATIENT)
Dept: ENDOSCOPY | Age: 64
End: 2024-04-29
Payer: MEDICARE

## 2024-04-29 VITALS
HEIGHT: 65 IN | BODY MASS INDEX: 48.82 KG/M2 | WEIGHT: 293 LBS | RESPIRATION RATE: 20 BRPM | SYSTOLIC BLOOD PRESSURE: 121 MMHG | OXYGEN SATURATION: 93 % | HEART RATE: 75 BPM | DIASTOLIC BLOOD PRESSURE: 54 MMHG | TEMPERATURE: 98 F

## 2024-04-29 DIAGNOSIS — K21.9 GASTROESOPHAGEAL REFLUX DISEASE WITHOUT ESOPHAGITIS: ICD-10-CM

## 2024-04-29 PROCEDURE — 6360000002 HC RX W HCPCS

## 2024-04-29 PROCEDURE — 3609012400 HC EGD TRANSORAL BIOPSY SINGLE/MULTIPLE: Performed by: SURGERY

## 2024-04-29 PROCEDURE — 88305 TISSUE EXAM BY PATHOLOGIST: CPT

## 2024-04-29 PROCEDURE — 7100000011 HC PHASE II RECOVERY - ADDTL 15 MIN: Performed by: SURGERY

## 2024-04-29 PROCEDURE — 7100000010 HC PHASE II RECOVERY - FIRST 15 MIN: Performed by: SURGERY

## 2024-04-29 PROCEDURE — 3700000001 HC ADD 15 MINUTES (ANESTHESIA): Performed by: SURGERY

## 2024-04-29 PROCEDURE — 2500000003 HC RX 250 WO HCPCS

## 2024-04-29 PROCEDURE — 2709999900 HC NON-CHARGEABLE SUPPLY: Performed by: SURGERY

## 2024-04-29 PROCEDURE — 2580000003 HC RX 258

## 2024-04-29 PROCEDURE — 3700000000 HC ANESTHESIA ATTENDED CARE: Performed by: SURGERY

## 2024-04-29 RX ORDER — SODIUM CHLORIDE 0.9 % (FLUSH) 0.9 %
5-40 SYRINGE (ML) INJECTION PRN
Status: DISCONTINUED | OUTPATIENT
Start: 2024-04-29 | End: 2024-04-29 | Stop reason: HOSPADM

## 2024-04-29 RX ORDER — SODIUM CHLORIDE 0.9 % (FLUSH) 0.9 %
5-40 SYRINGE (ML) INJECTION EVERY 12 HOURS SCHEDULED
Status: DISCONTINUED | OUTPATIENT
Start: 2024-04-29 | End: 2024-04-29 | Stop reason: HOSPADM

## 2024-04-29 RX ORDER — SODIUM CHLORIDE 9 MG/ML
INJECTION, SOLUTION INTRAVENOUS PRN
Status: DISCONTINUED | OUTPATIENT
Start: 2024-04-29 | End: 2024-04-29 | Stop reason: HOSPADM

## 2024-04-29 RX ORDER — ONDANSETRON 2 MG/ML
4 INJECTION INTRAMUSCULAR; INTRAVENOUS
Status: DISCONTINUED | OUTPATIENT
Start: 2024-04-29 | End: 2024-04-29 | Stop reason: HOSPADM

## 2024-04-29 RX ORDER — NALOXONE HYDROCHLORIDE 0.4 MG/ML
INJECTION, SOLUTION INTRAMUSCULAR; INTRAVENOUS; SUBCUTANEOUS PRN
Status: DISCONTINUED | OUTPATIENT
Start: 2024-04-29 | End: 2024-04-29 | Stop reason: HOSPADM

## 2024-04-29 RX ORDER — LIDOCAINE HYDROCHLORIDE 20 MG/ML
INJECTION, SOLUTION EPIDURAL; INFILTRATION; INTRACAUDAL; PERINEURAL PRN
Status: DISCONTINUED | OUTPATIENT
Start: 2024-04-29 | End: 2024-04-29 | Stop reason: SDUPTHER

## 2024-04-29 RX ORDER — PROPOFOL 10 MG/ML
INJECTION, EMULSION INTRAVENOUS PRN
Status: DISCONTINUED | OUTPATIENT
Start: 2024-04-29 | End: 2024-04-29 | Stop reason: SDUPTHER

## 2024-04-29 RX ORDER — SODIUM CHLORIDE 9 MG/ML
INJECTION, SOLUTION INTRAVENOUS CONTINUOUS PRN
Status: DISCONTINUED | OUTPATIENT
Start: 2024-04-29 | End: 2024-04-29 | Stop reason: SDUPTHER

## 2024-04-29 RX ORDER — SODIUM CHLORIDE 9 MG/ML
INJECTION, SOLUTION INTRAVENOUS CONTINUOUS
Status: DISCONTINUED | OUTPATIENT
Start: 2024-04-29 | End: 2024-04-29 | Stop reason: HOSPADM

## 2024-04-29 RX ADMIN — LIDOCAINE HYDROCHLORIDE 60 MG: 20 INJECTION, SOLUTION EPIDURAL; INFILTRATION; INTRACAUDAL; PERINEURAL at 12:37

## 2024-04-29 RX ADMIN — PROPOFOL 80 MG: 10 INJECTION, EMULSION INTRAVENOUS at 12:37

## 2024-04-29 RX ADMIN — SODIUM CHLORIDE: 9 INJECTION, SOLUTION INTRAVENOUS at 12:36

## 2024-04-29 ASSESSMENT — PAIN - FUNCTIONAL ASSESSMENT
PAIN_FUNCTIONAL_ASSESSMENT: 0-10
PAIN_FUNCTIONAL_ASSESSMENT: NONE - DENIES PAIN

## 2024-04-29 NOTE — H&P
[facility]  [address]  [place] [address]     General Surgery Visit/H&P   Signed     Patient: Jeannette Salinas  MR#: [id number]  : 1960  Acct:[id number]  Age/Sex: 63 / F  ADM Date: 24  Loc: SPS.514            Provider Location:   cc: Dr. Linus Schwab~           Intake  Vital Signs                24  14:35  Height    5 ft 5 in  BP         107/76  Mean Arterial Pressure             86  Blood Pressure Location          Right Lower Arm  Position             Sitting  Respiration        16  Pulse Rate        77  Pulse Source     Monitor     Intake  Visit Reasons: Acid Reflux  Current Pain: No  Date of Last Colonoscopy: 14  Allergies     fentanyl Allergy (Verified 24 14:39)  Unknown        Safety Concerns: Feels Safe At This Time     [facility]  Medical History (Updated 24 @ 08:55 by Sarbjit Uriostegui MD)     Suprapubic catheter  Multiple sclerosis        Surgical History (Updated 23 @ 13:57 by Anna Metzger)     Hx of fracture of lower leg  open treatment tibia and fibia  Hx of total hip arthroplasty  Right  Hx of hernia repair  Hx of cholecystectomy  Hx of tonsillectomy  Hx of colonoscopy        Social History (Updated 23 @ 14:08 by Anna Metzger)  Types:  Coffee and Other   Smoking Status:  Former Smoker   Alcohol Intake:  Current            HPI  History of Present Illness:   Patient is a 63-year-old female who is here for routine follow-up.  I am treating patient for gastroesophageal reflux disease.  She is on a PPI and Carafate.  When she takes both of those medications her reflux is controlled.  She ran out of her Carafate and developed some reflux symptoms.  Denies any abdominal pain.  My office has refilled her Carafate which she has restarted and feels fine.  Denies any complaints today.  H&P for Procedures  H&P for Procedure: No     ROS  Const'l  Review of Systems Unobtainable: All Systems Reviewed and Are Unremarkable Except

## 2024-04-29 NOTE — OP NOTE
ESOPHAGOGASTRODUODENOSCOPY PROCEDURE NOTE    DATE OF PROCEDURE: 4/29/2024    PREOPERATIVE DIAGNOSIS:  stomach issues     POSTOPERATIVE DIAGNOSIS/FINDINGS:  retained food, 4 cm hiatal hernia     SURGEON: Sarbjit Uriostegui MD    ASSISTANT: None    OPERATION: Esophagogastroduodenoscopy bx    ANESTHESIA: Local monitored anesthesia.     CONDITION: Stable    COMPLICATIONS: None.     Specimens Removed: as above    EBL: None    BRIEF HISTORY:  This is a 63 y.o. female who presents with the complaint of stomach issues.  It was recommended the patient undergo an esophagogastrduodenoscopy.  The risks/benefits/alternatives/expected outcomes were explained the the patient.  The patient verbalized understanding and agreed to proceed.    PROCEDURE:  The patient was brought into the endoscopy suite and placed in the left lateral decubitus position.  A bite block was placed in the patients mouth.  After the initiation of LMAC anesthesia, a gastroscope was inserted into the patient's mouth and passed into the esophagus and into the stomach.  Immediately upon entering the stomach the note of retained food and gastritis was made.  The scope was advanced through the pylorus into the first and second portion of the duodenum making the note of normal mucosa.  The scope was then withdrawn back into the stomach where it was retroflexed.  There was 4 cm  hiatal hernia noted.  The scope was then straightened out and bx was done. The scope was then withdrawn the entire length of the esophagus, making the note of a normal esophagus without masses, ulcerations, or lesions noted.  The scope was withdrawn entirely.  The patient tolerated the procedure well and there were no complications.        Sarbjit Uriostegui MD, MD  4/29/2024  op

## 2024-04-29 NOTE — DISCHARGE INSTRUCTIONS
have a fever.     You cannot pass stools or gas.   Watch closely for changes in your health, and be sure to contact your doctor if:    Your throat still hurts after a day or two.     You do not get better as expected.   Where can you learn more?  Go to https://www.[employer].net/patientEd and enter J454 to learn more about \"Upper GI Endoscopy: What to Expect at Home.\"  Current as of: October 19, 2023               Content Version: 14.0  © 2006-2024 [employer].   Care instructions adapted under license by [employer]. If you have questions about a medical condition or this instruction, always ask your healthcare professional. [employer] disclaims any warranty or liability for your use of this information.

## 2024-04-29 NOTE — ANESTHESIA POSTPROCEDURE EVALUATION
Department of Anesthesiology  Postprocedure Note    Patient: Jeannette Sailnas  MRN: [id number]  YOB: 1960  Date of evaluation: 4/29/2024    Procedure Summary       Date: 04/29/24 Room / Location: [address]    Anesthesia Start: 1236 Anesthesia Stop: 1245    Procedure: ESOPHAGOGASTRODUODENOSCOPY   +++CONTACT ISOLATION+++ Diagnosis:       Gastroesophageal reflux disease without esophagitis      (Gastroesophageal reflux disease without esophagitis [K21.9])    Surgeons: Sarbjit Uriostegui MD Responsible Provider: Jonah Magana MD    Anesthesia Type: MAC ASA Status: 3            Anesthesia Type: MAC    Jessee Phase I: Jessee Score: 10    Jessee Phase II: Jessee Score: 10    Anesthesia Post Evaluation    Patient location during evaluation: PACU  Patient participation: complete - patient participated  Level of consciousness: awake and alert  Pain score: 2  Airway patency: patent  Nausea & Vomiting: no nausea and no vomiting  Cardiovascular status: blood pressure returned to baseline  Respiratory status: acceptable  Hydration status: euvolemic  Pain management: adequate    No notable events documented.

## 2024-05-02 LAB — SURGICAL PATHOLOGY REPORT: NORMAL

## 2024-05-10 NOTE — PROGRESS NOTES
Dr. Lyons will update H&P da of surgery per Chikis [facility].

## 2024-05-10 NOTE — PROGRESS NOTES
[facility] PRE-ADMISSION TESTING INSTRUCTIONS    The Preadmission Testing patient is instructed accordingly using the following criteria (check applicable):    ARRIVAL INSTRUCTIONS:  [x] Parking the day of Surgery is located in the Main Entrance lot.  Upon entering the door, make an immediate right to the surgery reception desk    [x] Bring photo ID and insurance card    [] Bring in a copy of Living will or Durable Power of  papers.    [x] Please be sure to arrange for responsible adult to provide transportation to and from the hospital    [x] Please arrange for responsible adult to be with you for the 24 hour period post procedure due to having anesthesia    [x] If you awake am of surgery not feeling well or have temperature >100 please call [phone]    GENERAL INSTRUCTIONS:    [x] May have clear liquids until 4 hours prior to surgery. Examples include water, fruit juices (no pulp), jello, popsicles, black coffee or tea, beef or chicken broth.NPO [id number]       May have light meal 6 hours prior to surgery. Example include toast and clear liquids. No solids after 0900       No gum, candy or mints.    [x] You may brush your teeth, but do not swallow any water    [x] Take medications as instructed with 1-2 oz of water    [x] Stop herbal supplements and vitamins 5 days prior to procedure    [x] Follow preop dosing of blood thinners per physician instructions    [] Take 1/2 dose of evening insulin, but no insulin after midnight    [] No oral diabetic medications after midnight    [] If diabetic and have low blood sugar or feel symptomatic, take 1-2oz apple juice only    [] Bring inhalers day of surgery    [] Bring C-PAP/ Bi-Pap day of surgery    [] Bring urine specimen day of surgery    [x] Shower or bath with soap, lather and rinse well, AM of Surgery, no lotion, powders or creams to surgical site    [] Follow bowel prep as instructed per surgeon    [x] No tobacco products within

## 2024-05-13 PROBLEM — Z96.1 PSEUDOPHAKIA OF RIGHT EYE: Status: ACTIVE | Noted: 2024-03-20

## 2024-05-14 ENCOUNTER — ANESTHESIA (OUTPATIENT)
Dept: OPERATING ROOM | Age: 64
End: 2024-05-14
Payer: MEDICARE

## 2024-05-14 ENCOUNTER — ANESTHESIA EVENT (OUTPATIENT)
Dept: OPERATING ROOM | Age: 64
End: 2024-05-14
Payer: MEDICARE

## 2024-05-14 ENCOUNTER — HOSPITAL ENCOUNTER (OUTPATIENT)
Age: 64
Setting detail: OUTPATIENT SURGERY
Discharge: HOME | End: 2024-05-14
Attending: OPHTHALMOLOGY | Admitting: OPHTHALMOLOGY
Payer: MEDICARE

## 2024-05-14 VITALS
HEART RATE: 75 BPM | TEMPERATURE: 97 F | OXYGEN SATURATION: 96 % | RESPIRATION RATE: 16 BRPM | HEIGHT: 65 IN | SYSTOLIC BLOOD PRESSURE: 157 MMHG | BODY MASS INDEX: 48.82 KG/M2 | WEIGHT: 293 LBS | DIASTOLIC BLOOD PRESSURE: 79 MMHG

## 2024-05-14 PROCEDURE — 6370000000 HC RX 637 (ALT 250 FOR IP): Performed by: OPHTHALMOLOGY

## 2024-05-14 PROCEDURE — 2709999900 HC NON-CHARGEABLE SUPPLY: Performed by: OPHTHALMOLOGY

## 2024-05-14 PROCEDURE — 2500000003 HC RX 250 WO HCPCS: Performed by: OPHTHALMOLOGY

## 2024-05-14 PROCEDURE — 6360000002 HC RX W HCPCS: Performed by: OPHTHALMOLOGY

## 2024-05-14 PROCEDURE — 2580000003 HC RX 258: Performed by: OPHTHALMOLOGY

## 2024-05-14 PROCEDURE — 6360000002 HC RX W HCPCS: Performed by: NURSE ANESTHETIST, CERTIFIED REGISTERED

## 2024-05-14 PROCEDURE — 3600000002 HC SURGERY LEVEL 2 BASE: Performed by: OPHTHALMOLOGY

## 2024-05-14 PROCEDURE — 3700000000 HC ANESTHESIA ATTENDED CARE: Performed by: OPHTHALMOLOGY

## 2024-05-14 PROCEDURE — V2632 POST CHMBR INTRAOCULAR LENS: HCPCS | Performed by: OPHTHALMOLOGY

## 2024-05-14 PROCEDURE — 6370000000 HC RX 637 (ALT 250 FOR IP)

## 2024-05-14 PROCEDURE — 3600000012 HC SURGERY LEVEL 2 ADDTL 15MIN: Performed by: OPHTHALMOLOGY

## 2024-05-14 PROCEDURE — 2580000003 HC RX 258: Performed by: NURSE ANESTHETIST, CERTIFIED REGISTERED

## 2024-05-14 PROCEDURE — 3700000001 HC ADD 15 MINUTES (ANESTHESIA): Performed by: OPHTHALMOLOGY

## 2024-05-14 PROCEDURE — 7100000010 HC PHASE II RECOVERY - FIRST 15 MIN: Performed by: OPHTHALMOLOGY

## 2024-05-14 PROCEDURE — 7100000011 HC PHASE II RECOVERY - ADDTL 15 MIN: Performed by: OPHTHALMOLOGY

## 2024-05-14 DEVICE — STERILE UV AND BLUE LIGHT FILTERING ACRYLIC FOLDABLE ASPHERIC POSTERIOR CHAMBER INTRAOCULAR LENS
Type: IMPLANTABLE DEVICE | Site: EYE | Status: FUNCTIONAL
Brand: CLAREON

## 2024-05-14 RX ORDER — KETOROLAC TROMETHAMINE 5 MG/ML
SOLUTION OPHTHALMIC PRN
Status: DISCONTINUED | OUTPATIENT
Start: 2024-05-14 | End: 2024-05-14 | Stop reason: ALTCHOICE

## 2024-05-14 RX ORDER — MIDAZOLAM HYDROCHLORIDE 1 MG/ML
INJECTION INTRAMUSCULAR; INTRAVENOUS PRN
Status: DISCONTINUED | OUTPATIENT
Start: 2024-05-14 | End: 2024-05-14 | Stop reason: SDUPTHER

## 2024-05-14 RX ORDER — PREDNISOLONE ACETATE 10 MG/ML
1 SUSPENSION/ DROPS OPHTHALMIC
Status: COMPLETED | OUTPATIENT
Start: 2024-05-14 | End: 2024-05-14

## 2024-05-14 RX ORDER — FENTANYL CITRATE 50 UG/ML
INJECTION, SOLUTION INTRAMUSCULAR; INTRAVENOUS PRN
Status: DISCONTINUED | OUTPATIENT
Start: 2024-05-14 | End: 2024-05-14 | Stop reason: SDUPTHER

## 2024-05-14 RX ORDER — CIPROFLOXACIN HYDROCHLORIDE 3.5 MG/ML
1 SOLUTION/ DROPS TOPICAL EVERY 5 MIN PRN
Status: COMPLETED | OUTPATIENT
Start: 2024-05-14 | End: 2024-05-14

## 2024-05-14 RX ORDER — TIMOLOL MALEATE 5 MG/ML
SOLUTION/ DROPS OPHTHALMIC PRN
Status: DISCONTINUED | OUTPATIENT
Start: 2024-05-14 | End: 2024-05-14 | Stop reason: ALTCHOICE

## 2024-05-14 RX ORDER — PREDNISOLONE ACETATE 10 MG/ML
SUSPENSION/ DROPS OPHTHALMIC PRN
Status: DISCONTINUED | OUTPATIENT
Start: 2024-05-14 | End: 2024-05-14 | Stop reason: ALTCHOICE

## 2024-05-14 RX ORDER — SODIUM CHLORIDE 9 MG/ML
INJECTION, SOLUTION INTRAVENOUS CONTINUOUS PRN
Status: DISCONTINUED | OUTPATIENT
Start: 2024-05-14 | End: 2024-05-14 | Stop reason: SDUPTHER

## 2024-05-14 RX ORDER — PHENYLEPHRINE HYDROCHLORIDE 25 MG/ML
1 SOLUTION/ DROPS OPHTHALMIC EVERY 5 MIN PRN
Status: DISCONTINUED | OUTPATIENT
Start: 2024-05-14 | End: 2024-05-14 | Stop reason: HOSPADM

## 2024-05-14 RX ORDER — PROPARACAINE HYDROCHLORIDE 5 MG/ML
1 SOLUTION/ DROPS OPHTHALMIC EVERY 5 MIN PRN
Status: COMPLETED | OUTPATIENT
Start: 2024-05-14 | End: 2024-05-14

## 2024-05-14 RX ORDER — TETRACAINE HYDROCHLORIDE 5 MG/ML
SOLUTION OPHTHALMIC PRN
Status: DISCONTINUED | OUTPATIENT
Start: 2024-05-14 | End: 2024-05-14 | Stop reason: ALTCHOICE

## 2024-05-14 RX ORDER — TROPICAMIDE 10 MG/ML
1 SOLUTION/ DROPS OPHTHALMIC EVERY 5 MIN PRN
Status: DISCONTINUED | OUTPATIENT
Start: 2024-05-14 | End: 2024-05-14 | Stop reason: HOSPADM

## 2024-05-14 RX ORDER — TIMOLOL MALEATE 5 MG/ML
1 SOLUTION/ DROPS OPHTHALMIC EVERY 5 MIN PRN
Status: COMPLETED | OUTPATIENT
Start: 2024-05-14 | End: 2024-05-14

## 2024-05-14 RX ORDER — BALANCED SALT SOLUTION ENRICHED WITH BICARBONATE, DEXTROSE, AND GLUTATHIONE
KIT INTRAOCULAR PRN
Status: DISCONTINUED | OUTPATIENT
Start: 2024-05-14 | End: 2024-05-14 | Stop reason: ALTCHOICE

## 2024-05-14 RX ORDER — ACETAMINOPHEN 500 MG
500 TABLET ORAL
Status: DISCONTINUED | OUTPATIENT
Start: 2024-05-14 | End: 2024-05-14 | Stop reason: HOSPADM

## 2024-05-14 RX ORDER — CYCLOPENTOLATE HYDROCHLORIDE 10 MG/ML
1 SOLUTION/ DROPS OPHTHALMIC EVERY 5 MIN PRN
Status: COMPLETED | OUTPATIENT
Start: 2024-05-14 | End: 2024-05-14

## 2024-05-14 RX ORDER — KETOROLAC TROMETHAMINE 5 MG/ML
1 SOLUTION OPHTHALMIC EVERY 5 MIN PRN
Status: COMPLETED | OUTPATIENT
Start: 2024-05-14 | End: 2024-05-14

## 2024-05-14 RX ORDER — CIPROFLOXACIN HYDROCHLORIDE 3.5 MG/ML
SOLUTION/ DROPS TOPICAL PRN
Status: DISCONTINUED | OUTPATIENT
Start: 2024-05-14 | End: 2024-05-14 | Stop reason: ALTCHOICE

## 2024-05-14 RX ADMIN — TIMOLOL MALEATE 1 DROP: 5 SOLUTION OPHTHALMIC at 13:37

## 2024-05-14 RX ADMIN — CIPROFLOXACIN 1 DROP: 3 SOLUTION OPHTHALMIC at 13:17

## 2024-05-14 RX ADMIN — Medication 1 DROP: at 14:11

## 2024-05-14 RX ADMIN — Medication 1 DROP: at 13:21

## 2024-05-14 RX ADMIN — Medication 1 DROP: at 13:17

## 2024-05-14 RX ADMIN — PHENYLEPHRINE HYDROCHLORIDE 1 DROP: 25 SOLUTION/ DROPS OPHTHALMIC at 13:38

## 2024-05-14 RX ADMIN — PHENYLEPHRINE HYDROCHLORIDE 1 DROP: 25 SOLUTION/ DROPS OPHTHALMIC at 14:11

## 2024-05-14 RX ADMIN — CIPROFLOXACIN 1 DROP: 3 SOLUTION OPHTHALMIC at 14:11

## 2024-05-14 RX ADMIN — Medication 1 DROP: at 13:16

## 2024-05-14 RX ADMIN — MIDAZOLAM 2 MG: 1 INJECTION INTRAMUSCULAR; INTRAVENOUS at 14:39

## 2024-05-14 RX ADMIN — CIPROFLOXACIN 1 DROP: 3 SOLUTION OPHTHALMIC at 13:38

## 2024-05-14 RX ADMIN — Medication 1 DROP: at 13:37

## 2024-05-14 RX ADMIN — MIDAZOLAM 1 MG: 1 INJECTION INTRAMUSCULAR; INTRAVENOUS at 14:26

## 2024-05-14 RX ADMIN — SODIUM CHLORIDE: 9 INJECTION, SOLUTION INTRAVENOUS at 14:09

## 2024-05-14 RX ADMIN — FENTANYL CITRATE 25 MCG: 50 INJECTION, SOLUTION INTRAMUSCULAR; INTRAVENOUS at 14:24

## 2024-05-14 RX ADMIN — Medication 1 DROP: at 13:38

## 2024-05-14 RX ADMIN — PHENYLEPHRINE HYDROCHLORIDE 1 DROP: 25 SOLUTION/ DROPS OPHTHALMIC at 13:17

## 2024-05-14 RX ADMIN — TIMOLOL MALEATE 1 DROP: 5 SOLUTION OPHTHALMIC at 13:17

## 2024-05-14 ASSESSMENT — PAIN - FUNCTIONAL ASSESSMENT
PAIN_FUNCTIONAL_ASSESSMENT: ACTIVITIES ARE NOT PREVENTED
PAIN_FUNCTIONAL_ASSESSMENT: 0-10

## 2024-05-14 ASSESSMENT — LIFESTYLE VARIABLES: SMOKING_STATUS: 0

## 2024-05-14 NOTE — DISCHARGE INSTRUCTIONS
DISCHARGE INSTRUCTIONS CATARACT EXTRACTION    EYE MEDICATION -Has At Home  Ocuflox (one drop in operated eye) morning, afternoon, and evening for one week.  Ketorolac (one drop in operated eye) morning, afternoon, and evening for one week and then twice a day until gone.  Pred Forte 1% (one drop in operated eye) morning, afternoon, and evening for one week then twice a day for 3 weeks.  Eye shield on at bedtime for one week. No eye pads under shield  DO  Wash your hands prior to administering eye drops.  Wait 5 minutes in between eye drops.  You may watch TV and read.  You may do light housework.  You may drive after 2 days as vision permits  May sleep on either side.  May shower, have hair done. Avoid getting dirty water in operated eye.  Check with your physician prior to resuming any strenuous aerobic activity.  DO NOT  Do not rub your eye.  Avoid heavy lifting greater than 25 lbs.  No swimming for one week.  WHAT TO EXPECT  Vision normally may be blurry day of surgery; vision will progressively improve. Glasses will be prescribed in about 4 weeks.  Drooping upper lid, tearing, scratching, itching and sensitivity to bright light are common. Wear dark glasses for comfort if your eye is light sensitive. Apply lukewarm compress for 10 minutes to comfort your eye if needed.  You may see some floaters which are quite common.  Call my office and talk to a nurse immediately, [phone], if you have sudden increases in pain or discharge, blurred or diminished vision in short period of time, or bump the eye. After 5 p.m. your physician may be reached through the [facility] at [phone].    POST OP APPOINTMENT   Your post op appointment is on Wed 5-15-24 at 1:00 p.m. at the [facility] [address] office    Niya Rivas MD 5/14/2024 3:00 PM

## 2024-05-14 NOTE — ANESTHESIA POSTPROCEDURE EVALUATION
Department of Anesthesiology  Postprocedure Note    Patient: Jeannette Salinas  MRN: [id number]  YOB: 1960  Date of evaluation: 5/14/2024    Procedure Summary       Date: 05/14/24 Room / Location: [address]    Anesthesia Start: 1418 Anesthesia Stop: 1502    Procedure: LEFT EYE CATARACT EXTRACTION INTRAOCULAR LENS IMPLANT (Left: Eye) Diagnosis:       Combined forms of age-related cataract of left eye      (Combined forms of age-related cataract of left eye [H25.812])    Surgeons: Niya Rivas MD Responsible Provider: Haley Arnold DO    Anesthesia Type: MAC ASA Status: 3            Anesthesia Type: No value filed.    Jessee Phase I: Jessee Score: 10    Jessee Phase II:      Anesthesia Post Evaluation    Patient location during evaluation: bedside  Patient participation: complete - patient participated  Level of consciousness: awake  Pain score: 0  Airway patency: patent  Nausea & Vomiting: no nausea and no vomiting  Cardiovascular status: blood pressure returned to baseline and hemodynamically stable  Respiratory status: acceptable  Hydration status: euvolemic  Pain management: adequate        No notable events documented.  
Initial (On Arrival)

## 2024-05-14 NOTE — ANESTHESIA PRE PROCEDURE
Department of Anesthesiology  Preprocedure Note       Name:  Jeannette Salinas   Age:  63 y.o.  :  1960                                          MRN:  [id number]         Date:  2024      Surgeon: Surgeon(s):  Niya Rivas MD    Procedure: Procedure(s):  LEFT EYE CATARACT EXTRACTION INTRAOCULAR LENS IMPLANT    Medications prior to admission:   Prior to Admission medications    Medication Sig Start Date End Date Taking? Authorizing Provider   prednisoLONE acetate (PRED FORTE) 1 % ophthalmic suspension instill 1 drop into right eye three times a day for 1 week then i...  (REFER TO PRESCRIPTION NOTES). 24   Spencer Martin MD   ofloxacin (OCUFLOX) 0.3 % solution instill 1 drop into right eye three times a day for 3 days PRE OP...  (REFER TO PRESCRIPTION NOTES). 24   Spencer Martin MD   ketorolac (ACULAR) 0.5 % ophthalmic solution instill 1 drop INTO AFFECTED EYE(S) three times a day STARTING TH...  (REFER TO PRESCRIPTION NOTES). 24   Spencer Martin MD   gabapentin (NEURONTIN) 300 MG capsule Take 1 capsule by mouth 4 times daily for 120 days. 24  Terra Foley APRN - CNP   cyclobenzaprine (FLEXERIL) 10 MG tablet Take 1 tablet by mouth 3 times daily as needed for Muscle spasms 24   Terra Foley APRN - CNP   esomeprazole (NEXIUM) 40 MG delayed release capsule Take 1 capsule by mouth every morning    Spencer Martin MD   traZODone (DESYREL) 50 MG tablet Take 1 tablet by mouth nightly    Spencer Martin MD   acetaminophen (TYLENOL) 650 MG extended release tablet Take 2 tablets by mouth 2 times daily    Spencer Martin MD   sucralfate (CARAFATE) 1 GM tablet Take 1 tablet by mouth in the morning and 1 tablet in the evening.    Spencer Martin MD   LORazepam (ATIVAN) 0.5 MG tablet Take 1 tablet by mouth every 12 hours as needed (ANXIETY/SLEEP).    Spencer Martin MD   aluminum & magnesium hydroxide-simethicone (MAALOX)

## 2024-05-14 NOTE — OP NOTE
Operative Note      Patient: Jeannette Salinas  YOB: 1960  MRN: [id number]    Date of Procedure: 5/14/2024    Pre-Op Diagnosis Codes:     * Combined forms of age-related cataract of left eye [H25.812]    Post-Op Diagnosis: Same       Procedure(s):  LEFT EYE CATARACT EXTRACTION INTRAOCULAR LENS IMPLANT    Surgeon(s):  Niya Rivas MD    Assistant:   * No surgical staff found *    Anesthesia: Monitor Anesthesia Care    Estimated Blood Loss (mL): None    Complications: None    Specimens:   * No specimens in log *    Implants:  Implant Name Type Inv. Item Serial No.  Lot No. LRB No. Used Action   LENS CLAREON IOL 23.0D - [id number]  LENS CLAREON IOL 23.0D [id number] [employer]  Left 1 Implanted         Drains:   Urinary Catheter 2 Way (Active)   Site Assessment [place] 05/14/24 1300   Urine Color Yellow 05/14/24 1300   Urine Appearance Cloudy 05/14/24 1300   Collection Container Standard 05/14/24 1300       Findings:  Infection Present At Time Of Surgery (PATOS) (choose all levels that have infection present):  No infection present  Other Findings: Mature combined form cataract Left eye    Detailed Description of Procedure: See Attached           Surgeon: Niya Rivas M.D.           PREOPERATIVE DIAGNOSIS:  Mature cataract,   left    eye.  POSTOPERATIVE DIAGNOSIS:  Mature cataract,    left eye.  OPERATION:  Cataract extraction using phacoemulsification with topical anesthesia and placement of soft foldable posterior chamber lens implant, [employer], Model# [id number] , +23.0  diopters.    ANESTHESIA:  Topical anesthesia with local monitored anesthesia care.    INDICATIONS:  Difficulty with reading and/or driving.    SURGEON’S DESCRIPTION OF OPERATION    The patient, a 63 y.o. female was dilated in the pre-op area and also received topical anesthetic medication.  The patient was then brought to the Operating Room, placed in the supine position and prepped and draped in the usual

## 2024-06-12 ENCOUNTER — PREP FOR PROCEDURE (OUTPATIENT)
Dept: SURGERY | Age: 64
End: 2024-06-12

## 2024-06-12 RX ORDER — SODIUM CHLORIDE 9 MG/ML
INJECTION, SOLUTION INTRAVENOUS CONTINUOUS
Status: CANCELLED | OUTPATIENT
Start: 2024-06-12

## 2024-06-12 NOTE — H&P
06/28/23 @ 14:08 by Anna Metzger)  Have You Traveled Out of the [place] in the Last Month:  No   Types:  Coffee and Other   Smoking Status:  Former Smoker   Smoking Start Date:  01/01/77   Smoking Cessation Date:  11/01/04   Alcohol Intake:  Current         HPI  History of Present Illness:   Patient presents for a screening colonoscopy.  Denied hx of colon polyps.  Denied any family history of colon cancer.  Denied any changes in bowel habits.  Denied any blood per rectum.  Denied any melena.  Denied any diarrhea.  Denied any weight loss.  Denied any nausea or vomiting.  No other complains.  Reports constipation.  Also complains of nausea.  Feels sick.  On PPI and carafate which is helping GERD.    H&P for Procedures  H&P for Procedure: Yes  Long/Short H&P: Short H&P  Chief Complaint: constipation, nausea    ROS  Const'l  Constitutional: Denies Anorexia, Denies Fatigue, Denies Night Sweats, Denies Weight Gain, Denies Weight Loss and Denies Other (anxiety)  Eyes  Eyes: Denies Other (Eye symptoms)  ENT  ENT: Denies Other (Ear symptoms, nasal symptoms, mouth or throat symptoms)  Cardio  Cardiovascular: Denies Other (Hypertension and other cardiovascular problems)  Respiratory  Respiratory: Denies Other (Respiratory symptoms)  GI  Gastrointestinal: Denies Other (Hepatitis, liver disease and other gastrointestinal symptoms)  MS  Musculoskeletal: Denies Other (Musculoskeletal symptoms)  Skin  Skin: Denies Other (Skin, hair and nail symptoms)  Breast  Breast: Denies Other (Breast problems)  Neuro  Neurological: Denies Other (Neurologic symptoms)  Psych  Psychiatric: Denies Depression and Denies Other (Substance abuse)  Endocrine  Endocrine: Denies Other (Diabetes, kidney disease and thyroid disease)  Hematologic/Lymphatic  Hematologic/Lymphatic: Denies Other (Anemia, blood disease, cancer and past tranfusion)  Allergic/     Immunologic  Allergic/Immunologic: Denies Other (Immunosuppression

## 2024-06-25 ENCOUNTER — HOSPITAL ENCOUNTER (OUTPATIENT)
Dept: NUCLEAR MEDICINE | Age: 64
Discharge: HOME | End: 2024-06-25
Attending: SURGERY
Payer: MEDICARE

## 2024-06-25 DIAGNOSIS — K21.9 GASTROESOPHAGEAL REFLUX DISEASE WITHOUT ESOPHAGITIS: ICD-10-CM

## 2024-06-25 PROCEDURE — 78264 GASTRIC EMPTYING IMG STUDY: CPT

## 2024-06-25 PROCEDURE — A9541 TC99M SULFUR COLLOID: HCPCS | Performed by: RADIOLOGY

## 2024-06-25 PROCEDURE — 3430000000 HC RX DIAGNOSTIC RADIOPHARMACEUTICAL: Performed by: RADIOLOGY

## 2024-06-25 RX ADMIN — TECHNETIUM TC 99M SULFUR COLLOID 1 MILLICURIE: KIT at 11:15

## 2024-07-11 LAB
BACTERIA URNS QL MICRO: ABNORMAL
BILIRUB UR QL STRIP: NEGATIVE
CLARITY UR: CLEAR
COLOR UR: YELLOW
EPI CELLS #/AREA URNS HPF: ABNORMAL /HPF
GLUCOSE UR STRIP-MCNC: NEGATIVE MG/DL
HGB UR QL STRIP.AUTO: NEGATIVE
KETONES UR STRIP-MCNC: NEGATIVE MG/DL
LEUKOCYTE ESTERASE UR QL STRIP: ABNORMAL
NITRITE UR QL STRIP: NEGATIVE
PH UR STRIP: 6 [PH] (ref 5–9)
PROT UR STRIP-MCNC: NEGATIVE MG/DL
RBC #/AREA URNS HPF: ABNORMAL /HPF
SP GR UR STRIP: 1.01 (ref 1–1.03)
UROBILINOGEN UR STRIP-ACNC: 0.2 EU/DL (ref 0–1)
WBC #/AREA URNS HPF: ABNORMAL /HPF

## 2024-07-14 LAB
MICROORGANISM SPEC CULT: ABNORMAL
SPECIMEN DESCRIPTION: ABNORMAL

## 2024-07-19 ENCOUNTER — APPOINTMENT (OUTPATIENT)
Dept: CT IMAGING | Age: 64
End: 2024-07-19
Payer: MEDICARE

## 2024-07-19 ENCOUNTER — HOSPITAL ENCOUNTER (INPATIENT)
Age: 64
LOS: 4 days | Discharge: HOME | End: 2024-07-23
Attending: EMERGENCY MEDICINE | Admitting: INTERNAL MEDICINE
Payer: MEDICARE

## 2024-07-19 DIAGNOSIS — R10.9 ABDOMINAL PAIN, UNSPECIFIED ABDOMINAL LOCATION: Primary | ICD-10-CM

## 2024-07-19 DIAGNOSIS — K92.2 GASTROINTESTINAL HEMORRHAGE, UNSPECIFIED GASTROINTESTINAL HEMORRHAGE TYPE: ICD-10-CM

## 2024-07-19 LAB
ALBUMIN SERPL-MCNC: 3.4 G/DL (ref 3.5–5.2)
ALP SERPL-CCNC: 103 U/L (ref 35–104)
ALT SERPL-CCNC: <5 U/L (ref 0–32)
ANION GAP SERPL CALCULATED.3IONS-SCNC: 9 MMOL/L (ref 7–16)
AST SERPL-CCNC: 16 U/L (ref 0–31)
BACTERIA URNS QL MICRO: ABNORMAL
BASOPHILS # BLD: 0.07 K/UL (ref 0–0.2)
BASOPHILS NFR BLD: 1 % (ref 0–2)
BILIRUB SERPL-MCNC: 0.4 MG/DL (ref 0–1.2)
BILIRUB UR QL STRIP: NEGATIVE
BUN SERPL-MCNC: 9 MG/DL (ref 6–23)
CALCIUM SERPL-MCNC: 8.4 MG/DL (ref 8.6–10.2)
CHLORIDE SERPL-SCNC: 98 MMOL/L (ref 98–107)
CLARITY UR: ABNORMAL
CO2 SERPL-SCNC: 26 MMOL/L (ref 22–29)
COLOR UR: ABNORMAL
CREAT SERPL-MCNC: 0.5 MG/DL (ref 0.5–1)
EOSINOPHIL # BLD: 0.02 K/UL (ref 0.05–0.5)
EOSINOPHILS RELATIVE PERCENT: 0 % (ref 0–6)
ERYTHROCYTE [DISTWIDTH] IN BLOOD BY AUTOMATED COUNT: 13.8 % (ref 11.5–15)
GFR, ESTIMATED: >90 ML/MIN/1.73M2
GLUCOSE SERPL-MCNC: 105 MG/DL (ref 74–99)
GLUCOSE UR STRIP-MCNC: NEGATIVE MG/DL
HCT VFR BLD AUTO: 37.1 % (ref 34–48)
HGB BLD-MCNC: 12.1 G/DL (ref 11.5–15.5)
HGB UR QL STRIP.AUTO: NEGATIVE
IMM GRANULOCYTES # BLD AUTO: <0.03 K/UL (ref 0–0.58)
IMM GRANULOCYTES NFR BLD: 0 % (ref 0–5)
KETONES UR STRIP-MCNC: ABNORMAL MG/DL
LACTATE BLDV-SCNC: 1.1 MMOL/L (ref 0.5–1.9)
LEUKOCYTE ESTERASE UR QL STRIP: ABNORMAL
LIPASE SERPL-CCNC: 22 U/L (ref 13–60)
LYMPHOCYTES NFR BLD: 0.91 K/UL (ref 1.5–4)
LYMPHOCYTES RELATIVE PERCENT: 16 % (ref 20–42)
MCH RBC QN AUTO: 30.4 PG (ref 26–35)
MCHC RBC AUTO-ENTMCNC: 32.6 G/DL (ref 32–34.5)
MCV RBC AUTO: 93.2 FL (ref 80–99.9)
MONOCYTES NFR BLD: 0.43 K/UL (ref 0.1–0.95)
MONOCYTES NFR BLD: 8 % (ref 2–12)
NEUTROPHILS NFR BLD: 74 % (ref 43–80)
NEUTS SEG NFR BLD: 4.13 K/UL (ref 1.8–7.3)
NITRITE UR QL STRIP: NEGATIVE
PH UR STRIP: >9 [PH] (ref 5–9)
PLATELET # BLD AUTO: 296 K/UL (ref 130–450)
PMV BLD AUTO: 9.2 FL (ref 7–12)
POTASSIUM SERPL-SCNC: 3.9 MMOL/L (ref 3.5–5)
PROT SERPL-MCNC: 7.6 G/DL (ref 6.4–8.3)
PROT UR STRIP-MCNC: 30 MG/DL
RBC # BLD AUTO: 3.98 M/UL (ref 3.5–5.5)
RBC #/AREA URNS HPF: ABNORMAL /HPF
SODIUM SERPL-SCNC: 133 MMOL/L (ref 132–146)
SP GR UR STRIP: 1.01 (ref 1–1.03)
UROBILINOGEN UR STRIP-ACNC: 0.2 EU/DL (ref 0–1)
WBC #/AREA URNS HPF: ABNORMAL /HPF
WBC OTHER # BLD: 5.6 K/UL (ref 4.5–11.5)

## 2024-07-19 PROCEDURE — 85025 COMPLETE CBC W/AUTO DIFF WBC: CPT

## 2024-07-19 PROCEDURE — 83690 ASSAY OF LIPASE: CPT

## 2024-07-19 PROCEDURE — 74174 CTA ABD&PLVS W/CONTRAST: CPT

## 2024-07-19 PROCEDURE — 83605 ASSAY OF LACTIC ACID: CPT

## 2024-07-19 PROCEDURE — 6360000002 HC RX W HCPCS: Performed by: EMERGENCY MEDICINE

## 2024-07-19 PROCEDURE — 81001 URINALYSIS AUTO W/SCOPE: CPT

## 2024-07-19 PROCEDURE — 2580000003 HC RX 258: Performed by: INTERNAL MEDICINE

## 2024-07-19 PROCEDURE — 87088 URINE BACTERIA CULTURE: CPT

## 2024-07-19 PROCEDURE — 87086 URINE CULTURE/COLONY COUNT: CPT

## 2024-07-19 PROCEDURE — 2060000000 HC ICU INTERMEDIATE R&B

## 2024-07-19 PROCEDURE — 96375 TX/PRO/DX INJ NEW DRUG ADDON: CPT

## 2024-07-19 PROCEDURE — 2580000003 HC RX 258: Performed by: EMERGENCY MEDICINE

## 2024-07-19 PROCEDURE — 6360000004 HC RX CONTRAST MEDICATION: Performed by: RADIOLOGY

## 2024-07-19 PROCEDURE — 6370000000 HC RX 637 (ALT 250 FOR IP): Performed by: INTERNAL MEDICINE

## 2024-07-19 PROCEDURE — 96374 THER/PROPH/DIAG INJ IV PUSH: CPT

## 2024-07-19 PROCEDURE — 80053 COMPREHEN METABOLIC PANEL: CPT

## 2024-07-19 PROCEDURE — 99285 EMERGENCY DEPT VISIT HI MDM: CPT

## 2024-07-19 RX ORDER — MAGNESIUM SULFATE IN WATER 40 MG/ML
2000 INJECTION, SOLUTION INTRAVENOUS PRN
Status: DISCONTINUED | OUTPATIENT
Start: 2024-07-19 | End: 2024-07-23 | Stop reason: HOSPADM

## 2024-07-19 RX ORDER — CLONAZEPAM 0.5 MG/1
0.5 TABLET ORAL 2 TIMES DAILY
Status: DISCONTINUED | OUTPATIENT
Start: 2024-07-19 | End: 2024-07-23 | Stop reason: HOSPADM

## 2024-07-19 RX ORDER — SODIUM CHLORIDE 0.9 % (FLUSH) 0.9 %
5-40 SYRINGE (ML) INJECTION EVERY 12 HOURS SCHEDULED
Status: DISCONTINUED | OUTPATIENT
Start: 2024-07-19 | End: 2024-07-23 | Stop reason: HOSPADM

## 2024-07-19 RX ORDER — LEVOTHYROXINE SODIUM 0.07 MG/1
150 TABLET ORAL EVERY MORNING
Status: DISCONTINUED | OUTPATIENT
Start: 2024-07-20 | End: 2024-07-23 | Stop reason: HOSPADM

## 2024-07-19 RX ORDER — ACETAMINOPHEN 650 MG/1
650 SUPPOSITORY RECTAL EVERY 6 HOURS PRN
Status: DISCONTINUED | OUTPATIENT
Start: 2024-07-19 | End: 2024-07-23 | Stop reason: HOSPADM

## 2024-07-19 RX ORDER — MORPHINE SULFATE 4 MG/ML
4 INJECTION, SOLUTION INTRAMUSCULAR; INTRAVENOUS ONCE
Status: COMPLETED | OUTPATIENT
Start: 2024-07-19 | End: 2024-07-19

## 2024-07-19 RX ORDER — ONDANSETRON 2 MG/ML
4 INJECTION INTRAMUSCULAR; INTRAVENOUS ONCE
Status: DISCONTINUED | OUTPATIENT
Start: 2024-07-19 | End: 2024-07-23 | Stop reason: HOSPADM

## 2024-07-19 RX ORDER — POTASSIUM CHLORIDE 7.45 MG/ML
10 INJECTION INTRAVENOUS PRN
Status: DISCONTINUED | OUTPATIENT
Start: 2024-07-19 | End: 2024-07-23 | Stop reason: HOSPADM

## 2024-07-19 RX ORDER — TRAZODONE HYDROCHLORIDE 50 MG/1
50 TABLET ORAL NIGHTLY
Status: DISCONTINUED | OUTPATIENT
Start: 2024-07-19 | End: 2024-07-23 | Stop reason: HOSPADM

## 2024-07-19 RX ORDER — PANTOPRAZOLE SODIUM 40 MG/10ML
80 INJECTION, POWDER, LYOPHILIZED, FOR SOLUTION INTRAVENOUS ONCE
Status: COMPLETED | OUTPATIENT
Start: 2024-07-19 | End: 2024-07-19

## 2024-07-19 RX ORDER — NITROFURANTOIN MACROCRYSTALS 100 MG/1
100 CAPSULE ORAL 2 TIMES DAILY
Status: ON HOLD | COMMUNITY
Start: 2024-07-15 | End: 2024-07-23 | Stop reason: HOSPADM

## 2024-07-19 RX ORDER — DULOXETIN HYDROCHLORIDE 60 MG/1
120 CAPSULE, DELAYED RELEASE ORAL EVERY MORNING
Status: DISCONTINUED | OUTPATIENT
Start: 2024-07-20 | End: 2024-07-23 | Stop reason: HOSPADM

## 2024-07-19 RX ORDER — 0.9 % SODIUM CHLORIDE 0.9 %
500 INTRAVENOUS SOLUTION INTRAVENOUS ONCE
Status: COMPLETED | OUTPATIENT
Start: 2024-07-19 | End: 2024-07-19

## 2024-07-19 RX ORDER — CLONAZEPAM 0.5 MG/1
0.5 TABLET ORAL 2 TIMES DAILY PRN
Status: ON HOLD | COMMUNITY
Start: 2024-07-12

## 2024-07-19 RX ORDER — GABAPENTIN 300 MG/1
300 CAPSULE ORAL 4 TIMES DAILY
Status: ON HOLD | COMMUNITY

## 2024-07-19 RX ORDER — ACETAMINOPHEN 325 MG/1
650 TABLET ORAL EVERY 6 HOURS PRN
Status: DISCONTINUED | OUTPATIENT
Start: 2024-07-19 | End: 2024-07-23 | Stop reason: HOSPADM

## 2024-07-19 RX ORDER — ONDANSETRON 4 MG/1
4 TABLET, ORALLY DISINTEGRATING ORAL EVERY 8 HOURS PRN
Status: DISCONTINUED | OUTPATIENT
Start: 2024-07-19 | End: 2024-07-23 | Stop reason: HOSPADM

## 2024-07-19 RX ORDER — POLYETHYLENE GLYCOL 3350 17 G/17G
17 POWDER, FOR SOLUTION ORAL DAILY PRN
Status: DISCONTINUED | OUTPATIENT
Start: 2024-07-19 | End: 2024-07-22

## 2024-07-19 RX ORDER — SODIUM CHLORIDE 0.9 % (FLUSH) 0.9 %
5-40 SYRINGE (ML) INJECTION PRN
Status: DISCONTINUED | OUTPATIENT
Start: 2024-07-19 | End: 2024-07-23 | Stop reason: HOSPADM

## 2024-07-19 RX ORDER — GABAPENTIN 300 MG/1
300 CAPSULE ORAL 2 TIMES DAILY
Status: DISCONTINUED | OUTPATIENT
Start: 2024-07-19 | End: 2024-07-23 | Stop reason: HOSPADM

## 2024-07-19 RX ORDER — ONDANSETRON 2 MG/ML
4 INJECTION INTRAMUSCULAR; INTRAVENOUS EVERY 6 HOURS PRN
Status: DISCONTINUED | OUTPATIENT
Start: 2024-07-19 | End: 2024-07-23 | Stop reason: HOSPADM

## 2024-07-19 RX ORDER — PANTOPRAZOLE SODIUM 40 MG/1
40 TABLET, DELAYED RELEASE ORAL
Status: DISCONTINUED | OUTPATIENT
Start: 2024-07-20 | End: 2024-07-23 | Stop reason: HOSPADM

## 2024-07-19 RX ORDER — SODIUM CHLORIDE 9 MG/ML
INJECTION, SOLUTION INTRAVENOUS PRN
Status: DISCONTINUED | OUTPATIENT
Start: 2024-07-19 | End: 2024-07-23 | Stop reason: HOSPADM

## 2024-07-19 RX ORDER — POTASSIUM CHLORIDE 20 MEQ/1
40 TABLET, EXTENDED RELEASE ORAL PRN
Status: DISCONTINUED | OUTPATIENT
Start: 2024-07-19 | End: 2024-07-23 | Stop reason: HOSPADM

## 2024-07-19 RX ORDER — OMEPRAZOLE 40 MG/1
40 CAPSULE, DELAYED RELEASE ORAL EVERY MORNING
Status: ON HOLD | COMMUNITY
End: 2024-07-22 | Stop reason: HOSPADM

## 2024-07-19 RX ADMIN — TRAZODONE HYDROCHLORIDE 50 MG: 50 TABLET ORAL at 20:26

## 2024-07-19 RX ADMIN — ONDANSETRON 4 MG: 4 TABLET, ORALLY DISINTEGRATING ORAL at 23:01

## 2024-07-19 RX ADMIN — MORPHINE SULFATE 4 MG: 4 INJECTION, SOLUTION INTRAMUSCULAR; INTRAVENOUS at 13:35

## 2024-07-19 RX ADMIN — MEROPENEM 1000 MG: 1 INJECTION INTRAVENOUS at 17:33

## 2024-07-19 RX ADMIN — SODIUM CHLORIDE 500 ML: 9 INJECTION, SOLUTION INTRAVENOUS at 13:36

## 2024-07-19 RX ADMIN — IOPAMIDOL 75 ML: 755 INJECTION, SOLUTION INTRAVENOUS at 15:47

## 2024-07-19 RX ADMIN — ACETAMINOPHEN 650 MG: 325 TABLET ORAL at 20:25

## 2024-07-19 RX ADMIN — CLONAZEPAM 0.5 MG: 0.5 TABLET ORAL at 20:26

## 2024-07-19 RX ADMIN — PANTOPRAZOLE SODIUM 80 MG: 40 INJECTION, POWDER, FOR SOLUTION INTRAVENOUS at 13:34

## 2024-07-19 RX ADMIN — SODIUM CHLORIDE, PRESERVATIVE FREE 10 ML: 5 INJECTION INTRAVENOUS at 20:28

## 2024-07-19 RX ADMIN — GABAPENTIN 300 MG: 300 CAPSULE ORAL at 20:26

## 2024-07-19 ASSESSMENT — PAIN SCALES - GENERAL
PAINLEVEL_OUTOF10: 8
PAINLEVEL_OUTOF10: 10

## 2024-07-19 ASSESSMENT — PAIN - FUNCTIONAL ASSESSMENT: PAIN_FUNCTIONAL_ASSESSMENT: 0-10

## 2024-07-19 ASSESSMENT — PAIN DESCRIPTION - ORIENTATION
ORIENTATION: LOWER;UPPER;MID
ORIENTATION: LOWER;UPPER;MID

## 2024-07-19 ASSESSMENT — PAIN DESCRIPTION - DESCRIPTORS
DESCRIPTORS: BURNING
DESCRIPTORS: BURNING

## 2024-07-19 ASSESSMENT — LIFESTYLE VARIABLES
HOW OFTEN DO YOU HAVE A DRINK CONTAINING ALCOHOL: NEVER
HOW MANY STANDARD DRINKS CONTAINING ALCOHOL DO YOU HAVE ON A TYPICAL DAY: PATIENT DOES NOT DRINK

## 2024-07-19 ASSESSMENT — PAIN DESCRIPTION - LOCATION
LOCATION: ABDOMEN
LOCATION: ABDOMEN

## 2024-07-19 NOTE — ED PROVIDER NOTES
[facility] EMERGENCY DEPARTMENT  EMERGENCY DEPARTMENT ENCOUNTER        Pt Name: Jeannette Salinas  MRN: [id number]  Birthdate 1960  Date of evaluation: 7/19/2024  Provider: Nito La MD  PCP: Linus Schwab MD  Note Started: 1:18 PM EDT 7/19/24    CHIEF COMPLAINT       Chief Complaint   Patient presents with    Abdominal Pain     Patient reports nausea, vomiting and tar like stools. EMS states patient was vomiting dark colored emesis. Patient has only been able to have bowel movements by use of enemas. EMS gave 15 mg of Toradol and 4 mg of zofran en route.        HISTORY OF PRESENT ILLNESS: 1 or more Elements        Limitations to history : None    Jeannette Salinas is a 64 y.o. female who presents for abdominal pain with nausea and vomiting.  Reports abdominal pain with distention for the last few days. Reports she feels bloated. Having dark tarry stool and some coffee ground emesis today as well. Recent EGD by Dr. Uriostegui. She denies fever, chills, chest pain, sob, back pain or other complaints. She is not on blood thinners. She is scheduled for a colonoscopy on 8/5. She also reports she is being treated for an ESBL UTI with macrobid.  She says she is concerned it is not getting better.      Nursing Notes were all reviewed and agreed with or any disagreements were addressed in the HPI.      REVIEW OF EXTERNAL NOTE :     4/29/24 surgery EGD notes           Chart Review/External Note Review    Last Echo reviewed by Me:  No results found for: \"LVEF\", \"LVEFMODE\"          Controlled Substance Monitoring:    Acute and Chronic Pain Monitoring:   RX Monitoring Attestation Periodic Controlled Substance Monitoring   3/24/2016   4:22 PM The Prescription Monitoring Report for this patient was reviewed today. No signs of potential drug abuse or diversion identified.           REVIEW OF SYSTEMS :      Positives and Pertinent negatives as per HPI.     SURGICAL HISTORY

## 2024-07-19 NOTE — PROGRESS NOTES
Database initiated. Patient is A&O comes in from home with son. States she uses electric wheelchair and is RA at baseline. She has home health aides. She has been very constipated.

## 2024-07-19 NOTE — ED NOTES
.ED to Inpatient Handoff Report    Notified floor that electronic handoff available and patient ready for transport to room 404.    Safety Risks: Risk of falls    Patient in Restraints: no    Constant Observer or Patient : no    Telemetry Monitoring Ordered: Yes          Order to transfer to unit without monitor: YES    Last MEWS: 2 Time completed: 1735    Deterioration Index: 21.07    Vitals:    07/19/24 1312 07/19/24 1314 07/19/24 1318 07/19/24 1735   BP: (!) 136/100   (!) 97/54   Pulse: 80   87   Resp: 20   16   Temp: 98.3 °F (36.8 °C)   98.6 °F (37 °C)   TempSrc: Oral      SpO2: 96%   95%   Weight:   (!) 147.4 kg (325 lb)    Height:  1.651 m (5' 5\") 1.651 m (5' 5\")        Opportunity for questions and clarification was provided.

## 2024-07-20 LAB
ALBUMIN SERPL-MCNC: 2 G/DL (ref 3.5–5.2)
ALBUMIN SERPL-MCNC: 3 G/DL (ref 3.5–5.2)
ALP SERPL-CCNC: 59 U/L (ref 35–104)
ALP SERPL-CCNC: 92 U/L (ref 35–104)
ALT SERPL-CCNC: 5 U/L (ref 0–32)
ALT SERPL-CCNC: 8 U/L (ref 0–32)
ANION GAP SERPL CALCULATED.3IONS-SCNC: 10 MMOL/L (ref 7–16)
ANION GAP SERPL CALCULATED.3IONS-SCNC: 9 MMOL/L (ref 7–16)
AST SERPL-CCNC: 12 U/L (ref 0–31)
AST SERPL-CCNC: 19 U/L (ref 0–31)
BILIRUB SERPL-MCNC: 0.2 MG/DL (ref 0–1.2)
BILIRUB SERPL-MCNC: 0.3 MG/DL (ref 0–1.2)
BUN SERPL-MCNC: 6 MG/DL (ref 6–23)
BUN SERPL-MCNC: 7 MG/DL (ref 6–23)
CALCIUM SERPL-MCNC: 5.3 MG/DL (ref 8.6–10.2)
CALCIUM SERPL-MCNC: 8.3 MG/DL (ref 8.6–10.2)
CHLORIDE SERPL-SCNC: 100 MMOL/L (ref 98–107)
CHLORIDE SERPL-SCNC: 113 MMOL/L (ref 98–107)
CO2 SERPL-SCNC: 18 MMOL/L (ref 22–29)
CO2 SERPL-SCNC: 24 MMOL/L (ref 22–29)
CREAT SERPL-MCNC: 0.4 MG/DL (ref 0.5–1)
CREAT SERPL-MCNC: 0.6 MG/DL (ref 0.5–1)
ERYTHROCYTE [DISTWIDTH] IN BLOOD BY AUTOMATED COUNT: 14.1 % (ref 11.5–15)
GFR, ESTIMATED: >90 ML/MIN/1.73M2
GFR, ESTIMATED: >90 ML/MIN/1.73M2
GLUCOSE SERPL-MCNC: 59 MG/DL (ref 74–99)
GLUCOSE SERPL-MCNC: 83 MG/DL (ref 74–99)
HCT VFR BLD AUTO: 35.3 % (ref 34–48)
HGB BLD-MCNC: 11.2 G/DL (ref 11.5–15.5)
MCH RBC QN AUTO: 29.9 PG (ref 26–35)
MCHC RBC AUTO-ENTMCNC: 31.7 G/DL (ref 32–34.5)
MCV RBC AUTO: 94.1 FL (ref 80–99.9)
PLATELET # BLD AUTO: 265 K/UL (ref 130–450)
PMV BLD AUTO: 9.5 FL (ref 7–12)
POTASSIUM SERPL-SCNC: 2.4 MMOL/L (ref 3.5–5)
POTASSIUM SERPL-SCNC: 3.9 MMOL/L (ref 3.5–5)
PROT SERPL-MCNC: 4.5 G/DL (ref 6.4–8.3)
PROT SERPL-MCNC: 7 G/DL (ref 6.4–8.3)
RBC # BLD AUTO: 3.75 M/UL (ref 3.5–5.5)
SODIUM SERPL-SCNC: 134 MMOL/L (ref 132–146)
SODIUM SERPL-SCNC: 140 MMOL/L (ref 132–146)
WBC OTHER # BLD: 5.1 K/UL (ref 4.5–11.5)

## 2024-07-20 PROCEDURE — 80053 COMPREHEN METABOLIC PANEL: CPT

## 2024-07-20 PROCEDURE — 2580000003 HC RX 258: Performed by: STUDENT IN AN ORGANIZED HEALTH CARE EDUCATION/TRAINING PROGRAM

## 2024-07-20 PROCEDURE — 2580000003 HC RX 258: Performed by: INTERNAL MEDICINE

## 2024-07-20 PROCEDURE — 85027 COMPLETE CBC AUTOMATED: CPT

## 2024-07-20 PROCEDURE — 6370000000 HC RX 637 (ALT 250 FOR IP): Performed by: INTERNAL MEDICINE

## 2024-07-20 PROCEDURE — 2500000003 HC RX 250 WO HCPCS: Performed by: STUDENT IN AN ORGANIZED HEALTH CARE EDUCATION/TRAINING PROGRAM

## 2024-07-20 PROCEDURE — 2060000000 HC ICU INTERMEDIATE R&B

## 2024-07-20 PROCEDURE — 6360000002 HC RX W HCPCS: Performed by: STUDENT IN AN ORGANIZED HEALTH CARE EDUCATION/TRAINING PROGRAM

## 2024-07-20 RX ADMIN — GABAPENTIN 300 MG: 300 CAPSULE ORAL at 20:21

## 2024-07-20 RX ADMIN — ACETAMINOPHEN 650 MG: 325 TABLET ORAL at 18:13

## 2024-07-20 RX ADMIN — MEROPENEM 1000 MG: 1 INJECTION INTRAVENOUS at 18:11

## 2024-07-20 RX ADMIN — SODIUM CHLORIDE, PRESERVATIVE FREE 10 ML: 5 INJECTION INTRAVENOUS at 20:21

## 2024-07-20 RX ADMIN — ACETAMINOPHEN 650 MG: 325 TABLET ORAL at 04:01

## 2024-07-20 RX ADMIN — DULOXETINE HYDROCHLORIDE 120 MG: 60 CAPSULE, DELAYED RELEASE ORAL at 07:55

## 2024-07-20 RX ADMIN — CLONAZEPAM 0.5 MG: 0.5 TABLET ORAL at 07:55

## 2024-07-20 RX ADMIN — GABAPENTIN 300 MG: 300 CAPSULE ORAL at 07:55

## 2024-07-20 RX ADMIN — LEVOTHYROXINE SODIUM 150 MCG: 75 TABLET ORAL at 05:10

## 2024-07-20 RX ADMIN — CLONAZEPAM 0.5 MG: 0.5 TABLET ORAL at 20:21

## 2024-07-20 RX ADMIN — TRAZODONE HYDROCHLORIDE 50 MG: 50 TABLET ORAL at 20:21

## 2024-07-20 RX ADMIN — MICONAZOLE NITRATE: 20 POWDER TOPICAL at 11:26

## 2024-07-20 RX ADMIN — PANTOPRAZOLE SODIUM 40 MG: 40 TABLET, DELAYED RELEASE ORAL at 15:28

## 2024-07-20 RX ADMIN — SODIUM CHLORIDE, PRESERVATIVE FREE 10 ML: 5 INJECTION INTRAVENOUS at 07:55

## 2024-07-20 RX ADMIN — MICONAZOLE NITRATE: 20 POWDER TOPICAL at 20:21

## 2024-07-20 RX ADMIN — MEROPENEM 1000 MG: 1 INJECTION INTRAVENOUS at 11:26

## 2024-07-20 RX ADMIN — PANTOPRAZOLE SODIUM 40 MG: 40 TABLET, DELAYED RELEASE ORAL at 05:10

## 2024-07-20 ASSESSMENT — PAIN DESCRIPTION - DESCRIPTORS
DESCRIPTORS: ACHING

## 2024-07-20 ASSESSMENT — PAIN DESCRIPTION - LOCATION
LOCATION: HEAD
LOCATION: ABDOMEN
LOCATION: HEAD

## 2024-07-20 ASSESSMENT — PAIN SCALES - GENERAL
PAINLEVEL_OUTOF10: 2
PAINLEVEL_OUTOF10: 3
PAINLEVEL_OUTOF10: 3
PAINLEVEL_OUTOF10: 10

## 2024-07-20 ASSESSMENT — PAIN DESCRIPTION - ORIENTATION
ORIENTATION: LEFT
ORIENTATION: MID
ORIENTATION: MID

## 2024-07-20 ASSESSMENT — PAIN - FUNCTIONAL ASSESSMENT: PAIN_FUNCTIONAL_ASSESSMENT: PREVENTS OR INTERFERES SOME ACTIVE ACTIVITIES AND ADLS

## 2024-07-20 NOTE — CONSULTS
General Surgery Consult    Patient's Name/Date of Birth: Jeannette Salinas / 1960    Date: July 20, 2024     Consulting Surgeon: Moody DURAN    PCP: Linus Schwab MD     Chief Complaint:     HPI:   Jeannette Salinas is a 64 y.o. female who presents for evaluation of melena.  Patient is known to me.  Hx of recent endoscopy.  Reports constipation for 2 weeks.  Last BM showed melena per patient.  Also has some epigastric pain but also some lower abdominal pain.  Denied any nausea or vomiting to me.        Past Medical History:   Diagnosis Date    Acquired hypothyroidism 09/14/2016    Acute blood loss as cause of postoperative anemia 11/19/2018    Anemia     Anxiety     Arthritis     Blood transfusion     Cataract, right eye     Chronic back pain     Depression     GERD (gastroesophageal reflux disease)     Hx of blood clots     Migraine     Multiple sclerosis (HCC) 2000    electric wheelchair. total  susan lift    Obesity     Osteoarthritis     Peripheral vascular disease (HCC)     vericose veins    Prominent abdominal aortic pulse 04/17/2014    Pulmonary embolism (HCC) 05/2016    TIA (transient ischemic attack)     Urinary incontinence     has a indwelling catheter    Varicose veins of lower extremities 04/17/2014       Past Surgical History:   Procedure Laterality Date    BLADDER SURGERY      CHOLECYSTECTOMY, LAPAROSCOPIC N/A 01/16/2023    LAPAROSCOPIC ROBOTIC XI CHOLECYSTECTOMY performed by Sarbjit Uriostegui MD at [facility] OR    COLONOSCOPY      DENTAL SURGERY      random teeth extraction    DILATION AND CURETTAGE OF UTERUS      ENDOSCOPY, COLON, DIAGNOSTIC      EYE SURGERY Right 3/21/2024    RIGHT EYE CATARACT EXTRACTION IOL IMPLANT     ++CONTACT ISOLATION++ (KEEP TIME 1500) performed by Niya Rivas MD at [facility] OR    EYE SURGERY Left 5/14/2024    LEFT EYE CATARACT EXTRACTION INTRAOCULAR LENS IMPLANT performed by Niya Rivas MD at [facility] OR    FRACTURE SURGERY      HERNIA REPAIR      OTHER SURGICAL

## 2024-07-20 NOTE — CONSULTS
[facility]  [facility]    Consultation Note     Admit Date: 7/19/2024  1:01 PM    Reason for Consult:   ESBL E. coli complicated UTI    Attending Physician:  Linus Schwab MD     Chief Complaint: Abdominal pain, nausea vomiting    HISTORY OF PRESENT ILLNESS:   The patient is a 64 y.o.  female known to the Infectious Diseases service. The patient presented to the ER yesterday with abdominal pain nausea vomiting for a few days.  Feels cold and clammy sometimes and it gets better.  No fever per se.  No cough or shortness of breath.  She lives alone at home and has an aide that is helping her.  No diarrhea she has constipation gets enema every couple weeks.    Since admission patient is afebrile hemodynamically stable saturating well on room air.  Labs showed normal white count at 5.1 hemoglobin is 11.2 platelet count is 265 LFTs are normal BMP is normal no blood cultures were drawn UA shows pyuria urine culture is in process.  CT abdomen pelvis diffuse bladder wall thickening.  Patient received 1 dose of meropenem last night and I got consult for antibiotic management.    Past Medical History:        Diagnosis Date    Acquired hypothyroidism 09/14/2016    Acute blood loss as cause of postoperative anemia 11/19/2018    Anemia     Anxiety     Arthritis     Blood transfusion     Cataract, right eye     Chronic back pain     Depression     GERD (gastroesophageal reflux disease)     Hx of blood clots     Migraine     Multiple sclerosis (HCC) 2000    electric wheelchair. total  susan lift    Obesity     Osteoarthritis     Peripheral vascular disease (HCC)     vericose veins    Prominent abdominal aortic pulse 04/17/2014    Pulmonary embolism (HCC) 05/2016    TIA (transient ischemic attack)     Urinary incontinence     has a indwelling catheter    Varicose veins of lower extremities 04/17/2014       Past Surgical History:        Procedure Laterality Date    BLADDER SURGERY       Meds:sodium chloride flush, sodium chloride, potassium chloride **OR** potassium alternative oral replacement **OR** potassium chloride, magnesium sulfate, ondansetron **OR** ondansetron, polyethylene glycol, acetaminophen **OR** acetaminophen    Allergies:  Fentanyl and Ferritin    Social History:   Social History     Socioeconomic History    Marital status:    Tobacco Use    Smoking status: Former     Current packs/day: 0.00     Average packs/day: 0.3 packs/day for 26.9 years (6.7 ttl pk-yrs)     Types: Cigarettes     Start date: 1977     Quit date: 2004     Years since quittin.2    Smokeless tobacco: Never   Vaping Use    Vaping Use: Some days    Start date: 2023    Substances: Nicotine   Substance and Sexual Activity    Alcohol use: Yes     Comment: occassional    Drug use: No    Sexual activity: Defer     Social Determinants of Health     Food Insecurity: No Food Insecurity (2024)    Hunger Vital Sign     Worried About Running Out of Food in the Last Year: Never true     Ran Out of Food in the Last Year: Never true   Transportation Needs: No Transportation Needs (2024)    PRAPARE - Transportation     Lack of Transportation (Medical): No     Lack of Transportation (Non-Medical): No   Housing Stability: Low Risk  (2024)    Housing Stability Vital Sign     Unable to Pay for Housing in the Last Year: No     Number of Places Lived in the Last Year: 1     Unstable Housing in the Last Year: No       Family History:       Problem Relation Age of Onset    Cancer Mother     Dementia Mother     Obesity Father     Diabetes Father     High Blood Pressure Father     Stroke Father     Lupus Sister     High Blood Pressure Sister     Arthritis Sister     Diabetes Sister    . Otherwise non-pertinent to the chief complaint.    REVIEW OF SYSTEMS:    As mentioned in HPI, all other systems negative.       PHYSICAL EXAM:    Vitals:    /84   Pulse 80   Temp 98 °F (36.7 °C) (Oral)

## 2024-07-20 NOTE — H&P
[place] [address]                           HISTORY & PHYSICAL      PATIENT NAME: CHARLY GONZALEZ           : 1960  MED REC NO: [id number]                        ROOM: 0404  ACCOUNT NO: [id number]                       ADMIT DATE: 2024  PROVIDER: Linus Schwab MD      CHIEF COMPLAINT:  Vomiting blood.    HISTORY OF PRESENT ILLNESS:  This is a 64-year-old woman who presented to the emergency room after having an episode of vomiting with dark red blood noted in the emesis.  She also complained of pain across her upper abdomen.  She also reports a bowel movement yesterday morning that was dark, almost black in color.  She follows Dr. Uriostegui as an outpatient and has had endoscopies in the fairly recent past that showed gastritis without obvious ulceration.  She has been on pantoprazole and sucralfate as an outpatient.  She does not use any nonsteroidal anti-inflammatory medications.  Her hemoglobin was noted to be a little bit decreased from 14 down to 12 and this morning is down further to 11.  She does not appear to be having any active ongoing bleeding at this time.  She is not on any additional blood thinners.  Presently, she is lying in bed comfortably, in no acute distress.  Her vital signs are stable and her abdominal pain is overall improved from yesterday.  Her only present complaint is a mild headache.  In addition to the above, the patient was recently noted to have a urinary tract infection with an extended spectrum beta lactamase E coli.  Attempted management with nitrofurantoin on outpatient basis does not seem to be adequately managing this complicated UTI.  It is complicated by chronic indwelling Lloyd, which she has changed monthly.  This Lloyd is in place due to history of chronic urinary retention and neurogenic bladder from her remote history of multiple sclerosis.  She was given a dose

## 2024-07-20 NOTE — PROGRESS NOTES
[facility] Skin Assessment     NAME:  Jeannette Salinas  YOB: 1960  MEDICAL RECORD NUMBER:  [id number]    The patient is being assessed for  Admission    I agree that at least one RN has performed a thorough Head to Toe Skin Assessment on the patient. ALL assessment sites listed below have been assessed.      Areas assessed by both nurses:    Head, Face, Ears, Shoulders, Back, Chest, Arms, Elbows, Hands, Sacrum. Buttock, Coccyx, Ischium, Legs. Feet and Heels, and Under Medical Devices         Does the Patient have a Wound? Yes wound(s) were present on assessment. LDA wound assessment was Initiated and completed by RN       Agustin Prevention initiated by RN: Yes  Wound Care Orders initiated by RN: No    Pressure Injury (Stage 3,4, Unstageable, DTI, NWPT, and Complex wounds) if present, place Wound referral order by RN under : No    New Ostomies, if present place, Ostomy referral order under : No     Nurse 1 eSignature: Electronically signed by Estiven Cook RN on 7/20/24 at 1:20 AM EDT    **SHARE this note so that the co-signing nurse can place an eSignature**    Nurse 2 eSignature: Electronically signed by Omar Hale RN on 7/20/24 at 1:21 AM EDT

## 2024-07-20 NOTE — PLAN OF CARE
Problem: Discharge Planning  Goal: Discharge to home or other facility with appropriate resources  Outcome: Progressing     Problem: ABCDS Injury Assessment  Goal: Absence of physical injury  Outcome: Progressing     Problem: Skin/Tissue Integrity  Goal: Absence of new skin breakdown  Description: 1.  Monitor for areas of redness and/or skin breakdown  2.  Assess vascular access sites hourly  3.  Every 4-6 hours minimum:  Change oxygen saturation probe site  4.  Every 4-6 hours:  If on nasal continuous positive airway pressure, respiratory therapy assess nares and determine need for appliance change or resting period.  Outcome: Progressing     Problem: Pain  Goal: Verbalizes/displays adequate comfort level or baseline comfort level  Outcome: Progressing     Problem: Safety - Adult  Goal: Free from fall injury  Outcome: Progressing

## 2024-07-21 LAB
ALBUMIN SERPL-MCNC: 2.9 G/DL (ref 3.5–5.2)
ALP SERPL-CCNC: 87 U/L (ref 35–104)
ALT SERPL-CCNC: 10 U/L (ref 0–32)
ANION GAP SERPL CALCULATED.3IONS-SCNC: 7 MMOL/L (ref 7–16)
AST SERPL-CCNC: 19 U/L (ref 0–31)
BILIRUB SERPL-MCNC: 0.2 MG/DL (ref 0–1.2)
BUN SERPL-MCNC: 8 MG/DL (ref 6–23)
CALCIUM SERPL-MCNC: 8.4 MG/DL (ref 8.6–10.2)
CHLORIDE SERPL-SCNC: 102 MMOL/L (ref 98–107)
CO2 SERPL-SCNC: 27 MMOL/L (ref 22–29)
CREAT SERPL-MCNC: 0.6 MG/DL (ref 0.5–1)
ERYTHROCYTE [DISTWIDTH] IN BLOOD BY AUTOMATED COUNT: 13.8 % (ref 11.5–15)
GFR, ESTIMATED: >90 ML/MIN/1.73M2
GLUCOSE SERPL-MCNC: 87 MG/DL (ref 74–99)
HCT VFR BLD AUTO: 34.5 % (ref 34–48)
HGB BLD-MCNC: 11 G/DL (ref 11.5–15.5)
MCH RBC QN AUTO: 30.2 PG (ref 26–35)
MCHC RBC AUTO-ENTMCNC: 31.9 G/DL (ref 32–34.5)
MCV RBC AUTO: 94.8 FL (ref 80–99.9)
PLATELET # BLD AUTO: 239 K/UL (ref 130–450)
PMV BLD AUTO: 9.5 FL (ref 7–12)
POTASSIUM SERPL-SCNC: 3.7 MMOL/L (ref 3.5–5)
PROT SERPL-MCNC: 6.5 G/DL (ref 6.4–8.3)
RBC # BLD AUTO: 3.64 M/UL (ref 3.5–5.5)
SODIUM SERPL-SCNC: 136 MMOL/L (ref 132–146)
WBC OTHER # BLD: 3.9 K/UL (ref 4.5–11.5)

## 2024-07-21 PROCEDURE — 6360000002 HC RX W HCPCS: Performed by: STUDENT IN AN ORGANIZED HEALTH CARE EDUCATION/TRAINING PROGRAM

## 2024-07-21 PROCEDURE — 2580000003 HC RX 258: Performed by: STUDENT IN AN ORGANIZED HEALTH CARE EDUCATION/TRAINING PROGRAM

## 2024-07-21 PROCEDURE — 80053 COMPREHEN METABOLIC PANEL: CPT

## 2024-07-21 PROCEDURE — 6370000000 HC RX 637 (ALT 250 FOR IP): Performed by: INTERNAL MEDICINE

## 2024-07-21 PROCEDURE — 85027 COMPLETE CBC AUTOMATED: CPT

## 2024-07-21 PROCEDURE — 2580000003 HC RX 258: Performed by: INTERNAL MEDICINE

## 2024-07-21 PROCEDURE — 2060000000 HC ICU INTERMEDIATE R&B

## 2024-07-21 RX ADMIN — TRAZODONE HYDROCHLORIDE 50 MG: 50 TABLET ORAL at 20:27

## 2024-07-21 RX ADMIN — MEROPENEM 1000 MG: 1 INJECTION INTRAVENOUS at 10:16

## 2024-07-21 RX ADMIN — MEROPENEM 1000 MG: 1 INJECTION INTRAVENOUS at 03:44

## 2024-07-21 RX ADMIN — SODIUM CHLORIDE, PRESERVATIVE FREE 10 ML: 5 INJECTION INTRAVENOUS at 07:48

## 2024-07-21 RX ADMIN — ACETAMINOPHEN 650 MG: 325 TABLET ORAL at 05:53

## 2024-07-21 RX ADMIN — MICONAZOLE NITRATE: 20 POWDER TOPICAL at 20:28

## 2024-07-21 RX ADMIN — CLONAZEPAM 0.5 MG: 0.5 TABLET ORAL at 07:48

## 2024-07-21 RX ADMIN — LEVOTHYROXINE SODIUM 150 MCG: 75 TABLET ORAL at 05:53

## 2024-07-21 RX ADMIN — MICONAZOLE NITRATE: 20 POWDER TOPICAL at 07:49

## 2024-07-21 RX ADMIN — ACETAMINOPHEN 650 MG: 325 TABLET ORAL at 15:08

## 2024-07-21 RX ADMIN — DULOXETINE HYDROCHLORIDE 120 MG: 60 CAPSULE, DELAYED RELEASE ORAL at 07:48

## 2024-07-21 RX ADMIN — POLYETHYLENE GLYCOL 3350 17 G: 17 POWDER, FOR SOLUTION ORAL at 20:27

## 2024-07-21 RX ADMIN — GABAPENTIN 300 MG: 300 CAPSULE ORAL at 20:27

## 2024-07-21 RX ADMIN — GABAPENTIN 300 MG: 300 CAPSULE ORAL at 07:48

## 2024-07-21 RX ADMIN — PANTOPRAZOLE SODIUM 40 MG: 40 TABLET, DELAYED RELEASE ORAL at 05:53

## 2024-07-21 RX ADMIN — SODIUM CHLORIDE, PRESERVATIVE FREE 10 ML: 5 INJECTION INTRAVENOUS at 20:27

## 2024-07-21 RX ADMIN — MEROPENEM 1000 MG: 1 INJECTION INTRAVENOUS at 18:04

## 2024-07-21 RX ADMIN — PANTOPRAZOLE SODIUM 40 MG: 40 TABLET, DELAYED RELEASE ORAL at 15:08

## 2024-07-21 RX ADMIN — CLONAZEPAM 0.5 MG: 0.5 TABLET ORAL at 20:27

## 2024-07-21 ASSESSMENT — PAIN SCALES - GENERAL
PAINLEVEL_OUTOF10: 3
PAINLEVEL_OUTOF10: 3
PAINLEVEL_OUTOF10: 2

## 2024-07-21 ASSESSMENT — PAIN DESCRIPTION - LOCATION
LOCATION: HEAD;RIB CAGE
LOCATION: ABDOMEN

## 2024-07-21 ASSESSMENT — PAIN - FUNCTIONAL ASSESSMENT: PAIN_FUNCTIONAL_ASSESSMENT: ACTIVITIES ARE NOT PREVENTED

## 2024-07-21 ASSESSMENT — PAIN DESCRIPTION - ORIENTATION
ORIENTATION: RIGHT
ORIENTATION: LEFT

## 2024-07-21 ASSESSMENT — PAIN DESCRIPTION - DESCRIPTORS
DESCRIPTORS: ACHING
DESCRIPTORS: ACHING

## 2024-07-21 NOTE — PROGRESS NOTES
Subjective:    The patient is awake and alert.  No problems overnight.  Denies chest pain, angina, and dyspnea.  Denies abdominal pain.  Tolerating diet.  No nausea or vomiting. Only complaint is mild HA.. No fever or chills. No evidence of further GI bleeding. Hb stable at 11.1.    Objective:    /76   Pulse 69   Temp 97.7 °F (36.5 °C) (Oral)   Resp 18   Ht 1.651 m (5' 5\")   Wt 132.8 kg (292 lb 12.3 oz)   SpO2 96%   BMI 48.72 kg/m²   Neck: No goiter, bruit, or LA  Heart:  RRR, no murmurs, gallops, or rubs.  Lungs:  CTA bilaterally, no wheeze, rales or rhonchi  Abd: bowel sounds present, nontender, nondistended, no masses  Extrem:  No clubbing, cyanosis, 1+ chronic lower leg edema, 2+ peripheral pulses, FROM    CBC:   Lab Results   Component Value Date/Time    WBC 3.9 07/21/2024 03:21 AM    RBC 3.64 07/21/2024 03:21 AM    HGB 11.0 07/21/2024 03:21 AM    HCT 34.5 07/21/2024 03:21 AM    MCV 94.8 07/21/2024 03:21 AM    MCH 30.2 07/21/2024 03:21 AM    MCHC 31.9 07/21/2024 03:21 AM    RDW 13.8 07/21/2024 03:21 AM     07/21/2024 03:21 AM    MPV 9.5 07/21/2024 03:21 AM     CMP:    Lab Results   Component Value Date/Time     07/21/2024 03:21 AM    K 3.7 07/21/2024 03:21 AM    K 3.6 01/19/2023 03:00 AM     07/21/2024 03:21 AM    CO2 27 07/21/2024 03:21 AM    BUN 8 07/21/2024 03:21 AM    CREATININE 0.6 07/21/2024 03:21 AM    GFRAA >60 08/18/2022 02:15 AM    LABGLOM >90 07/21/2024 03:21 AM    LABGLOM >60 03/20/2024 11:20 AM    GLUCOSE 87 07/21/2024 03:21 AM    GLUCOSE 94 05/30/2012 09:06 PM    CALCIUM 8.4 07/21/2024 03:21 AM    BILITOT 0.2 07/21/2024 03:21 AM    ALKPHOS 87 07/21/2024 03:21 AM    AST 19 07/21/2024 03:21 AM    ALT 10 07/21/2024 03:21 AM          Current Facility-Administered Medications:     meropenem (MERREM) 1,000 mg in sodium chloride 0.9 % 100 mL IVPB, 1,000 mg, IntraVENous, Q8H, Igor Segovia MD, Stopped at 07/21/24 0726    miconazole (MICOTIN) 2 % powder, , Topical, BID,

## 2024-07-21 NOTE — PROGRESS NOTES
Infectious Disease  Progress Note  [facility]    Chief Complaint: UTI    Subjective:  she has epigastric pain mostly today. No fever. Tolerating antibiotics well.     Scheduled Meds:   meropenem  1,000 mg IntraVENous Q8H    miconazole   Topical BID    ondansetron  4 mg IntraVENous Once    clonazePAM  0.5 mg Oral BID    DULoxetine  120 mg Oral QAM    gabapentin  300 mg Oral BID    levothyroxine  150 mcg Oral QAM    pantoprazole  40 mg Oral BID AC    traZODone  50 mg Oral Nightly    sodium chloride flush  5-40 mL IntraVENous 2 times per day     Continuous Infusions:   sodium chloride       PRN Meds:sodium chloride flush, sodium chloride, potassium chloride **OR** potassium alternative oral replacement **OR** potassium chloride, magnesium sulfate, ondansetron **OR** ondansetron, polyethylene glycol, acetaminophen **OR** acetaminophen    Prior to Admission medications    Medication Sig Start Date End Date Taking? Authorizing Provider   gabapentin (NEURONTIN) 300 MG capsule Take 1 capsule by mouth 4 times daily.   Yes Spencer Martin MD   omeprazole (PRILOSEC) 40 MG delayed release capsule Take 1 capsule by mouth every morning   Yes Spencer Martin MD   clonazePAM (KLONOPIN) 0.5 MG tablet Take 1 tablet by mouth 2 times daily. 7/12/24  Yes Spencer Martin MD   nitrofurantoin (MACRODANTIN) 100 MG capsule Take 1 capsule by mouth 2 times daily 7/15/24 7/24/24 Yes Spencer Martin MD   cyclobenzaprine (FLEXERIL) 10 MG tablet Take 1 tablet by mouth 3 times daily as needed for Muscle spasms 6/10/24   Terra Foley, APRN - CNP   traZODone (DESYREL) 50 MG tablet Take 1 tablet by mouth nightly    ProviderSpencer MD   acetaminophen (TYLENOL) 650 MG extended release tablet Take 2 tablets by mouth 2 times daily    Spencer Martin MD   sucralfate (CARAFATE) 1 GM tablet Take 2 tablets by mouth 2 times daily    Spencer Martin MD   aluminum & magnesium hydroxide-simethicone (MAALOX) 200-200-20

## 2024-07-22 LAB
ALBUMIN SERPL-MCNC: 2.9 G/DL (ref 3.5–5.2)
ALP SERPL-CCNC: 90 U/L (ref 35–104)
ALT SERPL-CCNC: 25 U/L (ref 0–32)
ANION GAP SERPL CALCULATED.3IONS-SCNC: 7 MMOL/L (ref 7–16)
AST SERPL-CCNC: 43 U/L (ref 0–31)
BILIRUB SERPL-MCNC: 0.2 MG/DL (ref 0–1.2)
BUN SERPL-MCNC: 12 MG/DL (ref 6–23)
CALCIUM SERPL-MCNC: 8.6 MG/DL (ref 8.6–10.2)
CHLORIDE SERPL-SCNC: 101 MMOL/L (ref 98–107)
CO2 SERPL-SCNC: 28 MMOL/L (ref 22–29)
CREAT SERPL-MCNC: 0.6 MG/DL (ref 0.5–1)
ERYTHROCYTE [DISTWIDTH] IN BLOOD BY AUTOMATED COUNT: 14 % (ref 11.5–15)
GFR, ESTIMATED: >90 ML/MIN/1.73M2
GLUCOSE SERPL-MCNC: 104 MG/DL (ref 74–99)
HCT VFR BLD AUTO: 37 % (ref 34–48)
HGB BLD-MCNC: 11.6 G/DL (ref 11.5–15.5)
MCH RBC QN AUTO: 30 PG (ref 26–35)
MCHC RBC AUTO-ENTMCNC: 31.4 G/DL (ref 32–34.5)
MCV RBC AUTO: 95.6 FL (ref 80–99.9)
PLATELET # BLD AUTO: 257 K/UL (ref 130–450)
PMV BLD AUTO: 9.4 FL (ref 7–12)
POTASSIUM SERPL-SCNC: 4.1 MMOL/L (ref 3.5–5)
PROT SERPL-MCNC: 6.5 G/DL (ref 6.4–8.3)
RBC # BLD AUTO: 3.87 M/UL (ref 3.5–5.5)
SODIUM SERPL-SCNC: 136 MMOL/L (ref 132–146)
WBC OTHER # BLD: 4.5 K/UL (ref 4.5–11.5)

## 2024-07-22 PROCEDURE — 6360000002 HC RX W HCPCS: Performed by: STUDENT IN AN ORGANIZED HEALTH CARE EDUCATION/TRAINING PROGRAM

## 2024-07-22 PROCEDURE — 6370000000 HC RX 637 (ALT 250 FOR IP): Performed by: INTERNAL MEDICINE

## 2024-07-22 PROCEDURE — 2060000000 HC ICU INTERMEDIATE R&B

## 2024-07-22 PROCEDURE — 80053 COMPREHEN METABOLIC PANEL: CPT

## 2024-07-22 PROCEDURE — 6370000000 HC RX 637 (ALT 250 FOR IP): Performed by: STUDENT IN AN ORGANIZED HEALTH CARE EDUCATION/TRAINING PROGRAM

## 2024-07-22 PROCEDURE — 36415 COLL VENOUS BLD VENIPUNCTURE: CPT

## 2024-07-22 PROCEDURE — 2580000003 HC RX 258: Performed by: INTERNAL MEDICINE

## 2024-07-22 PROCEDURE — 6370000000 HC RX 637 (ALT 250 FOR IP)

## 2024-07-22 PROCEDURE — 2580000003 HC RX 258: Performed by: STUDENT IN AN ORGANIZED HEALTH CARE EDUCATION/TRAINING PROGRAM

## 2024-07-22 PROCEDURE — 85027 COMPLETE CBC AUTOMATED: CPT

## 2024-07-22 RX ORDER — PANTOPRAZOLE SODIUM 40 MG/1
40 TABLET, DELAYED RELEASE ORAL
Qty: 30 TABLET | Refills: 0 | Status: ON HOLD | OUTPATIENT
Start: 2024-07-22 | End: 2024-08-21

## 2024-07-22 RX ORDER — POLYETHYLENE GLYCOL 3350 17 G/17G
17 POWDER, FOR SOLUTION ORAL 2 TIMES DAILY
Status: DISCONTINUED | OUTPATIENT
Start: 2024-07-22 | End: 2024-07-23 | Stop reason: HOSPADM

## 2024-07-22 RX ORDER — SENNOSIDES A AND B 8.6 MG/1
1 TABLET, FILM COATED ORAL NIGHTLY
Status: DISCONTINUED | OUTPATIENT
Start: 2024-07-22 | End: 2024-07-23 | Stop reason: HOSPADM

## 2024-07-22 RX ORDER — GRANULES FOR ORAL 3 G/1
3 POWDER ORAL ONCE
Status: COMPLETED | OUTPATIENT
Start: 2024-07-22 | End: 2024-07-22

## 2024-07-22 RX ADMIN — LEVOTHYROXINE SODIUM 150 MCG: 75 TABLET ORAL at 06:14

## 2024-07-22 RX ADMIN — MICONAZOLE NITRATE: 20 POWDER TOPICAL at 09:08

## 2024-07-22 RX ADMIN — GABAPENTIN 300 MG: 300 CAPSULE ORAL at 09:06

## 2024-07-22 RX ADMIN — SODIUM CHLORIDE, PRESERVATIVE FREE 10 ML: 5 INJECTION INTRAVENOUS at 09:08

## 2024-07-22 RX ADMIN — POLYETHYLENE GLYCOL 3350 17 G: 17 POWDER, FOR SOLUTION ORAL at 20:39

## 2024-07-22 RX ADMIN — MICONAZOLE NITRATE: 20 POWDER TOPICAL at 20:39

## 2024-07-22 RX ADMIN — PANTOPRAZOLE SODIUM 40 MG: 40 TABLET, DELAYED RELEASE ORAL at 16:32

## 2024-07-22 RX ADMIN — MEROPENEM 1000 MG: 1 INJECTION INTRAVENOUS at 03:24

## 2024-07-22 RX ADMIN — MAGNESIUM HYDROXIDE 30 ML: 400 SUSPENSION ORAL at 15:08

## 2024-07-22 RX ADMIN — POLYETHYLENE GLYCOL 3350 17 G: 17 POWDER, FOR SOLUTION ORAL at 09:06

## 2024-07-22 RX ADMIN — TRAZODONE HYDROCHLORIDE 50 MG: 50 TABLET ORAL at 20:39

## 2024-07-22 RX ADMIN — GABAPENTIN 300 MG: 300 CAPSULE ORAL at 20:39

## 2024-07-22 RX ADMIN — CLONAZEPAM 0.5 MG: 0.5 TABLET ORAL at 09:06

## 2024-07-22 RX ADMIN — DULOXETINE HYDROCHLORIDE 120 MG: 60 CAPSULE, DELAYED RELEASE ORAL at 09:06

## 2024-07-22 RX ADMIN — CLONAZEPAM 0.5 MG: 0.5 TABLET ORAL at 20:39

## 2024-07-22 RX ADMIN — SODIUM CHLORIDE, PRESERVATIVE FREE 10 ML: 5 INJECTION INTRAVENOUS at 03:25

## 2024-07-22 RX ADMIN — PANTOPRAZOLE SODIUM 40 MG: 40 TABLET, DELAYED RELEASE ORAL at 06:14

## 2024-07-22 RX ADMIN — GRANULES FOR ORAL SOLUTION 1 PACKET: 3 POWDER ORAL at 15:08

## 2024-07-22 RX ADMIN — ANORECTAL OINTMENT: 15.7; .44; 24; 20.6 OINTMENT TOPICAL at 15:07

## 2024-07-22 RX ADMIN — SENNOSIDES 8.6 MG: 8.6 TABLET, COATED ORAL at 20:39

## 2024-07-22 RX ADMIN — MEROPENEM 1000 MG: 1 INJECTION INTRAVENOUS at 11:56

## 2024-07-22 ASSESSMENT — PAIN SCALES - GENERAL
PAINLEVEL_OUTOF10: 0
PAINLEVEL_OUTOF10: 0

## 2024-07-22 NOTE — CARE COORDINATION
Nursing staff communicates primary care's intention to discharge patient.  As such, non-emergency transportation has been arranged with [facility] -  time 630 PM.  Gilberto Aguilera, MSN RN  [facility] Case Management  [phone]

## 2024-07-22 NOTE — PROGRESS NOTES
Subjective:    The patient is awake and alert.  No problems overnight.  Denies chest pain, angina, and dyspnea.  Denies abdominal pain.  Tolerating diet.  No nausea or vomiting. No further evidence of GI bleeding. AM labs pending but Hb has been stable around 11 since admission. She remains on iv meropenem for her UTI. Urine culture growing proteus and E.coli (which will almost certainly be ESBL).  ID and surgery on case.    Objective:    /64   Pulse 76   Temp 98.1 °F (36.7 °C) (Axillary)   Resp 20   Ht 1.651 m (5' 5\")   Wt 132.8 kg (292 lb 12.3 oz)   SpO2 97%   BMI 48.72 kg/m²   Neck: No goiter, bruit, or LA  Heart:  RRR, no murmurs, gallops, or rubs.  Lungs:  CTA bilaterally, no wheeze, rales or rhonchi  Abd: bowel sounds present, nontender, nondistended, no masses  Extrem:  No clubbing, cyanosis, or edema, 2+ peripheral pulses, FROM    CBC:   Lab Results   Component Value Date/Time    WBC 4.5 07/22/2024 05:15 AM    RBC 3.87 07/22/2024 05:15 AM    HGB 11.6 07/22/2024 05:15 AM    HCT 37.0 07/22/2024 05:15 AM    MCV 95.6 07/22/2024 05:15 AM    MCH 30.0 07/22/2024 05:15 AM    MCHC 31.4 07/22/2024 05:15 AM    RDW 14.0 07/22/2024 05:15 AM     07/22/2024 05:15 AM    MPV 9.4 07/22/2024 05:15 AM     CMP:    Lab Results   Component Value Date/Time     07/21/2024 03:21 AM    K 3.7 07/21/2024 03:21 AM    K 3.6 01/19/2023 03:00 AM     07/21/2024 03:21 AM    CO2 27 07/21/2024 03:21 AM    BUN 8 07/21/2024 03:21 AM    CREATININE 0.6 07/21/2024 03:21 AM    GFRAA >60 08/18/2022 02:15 AM    LABGLOM >90 07/21/2024 03:21 AM    LABGLOM >60 03/20/2024 11:20 AM    GLUCOSE 87 07/21/2024 03:21 AM    GLUCOSE 94 05/30/2012 09:06 PM    CALCIUM 8.4 07/21/2024 03:21 AM    BILITOT 0.2 07/21/2024 03:21 AM    ALKPHOS 87 07/21/2024 03:21 AM    AST 19 07/21/2024 03:21 AM    ALT 10 07/21/2024 03:21 AM          Current Facility-Administered Medications:     meropenem (MERREM) 1,000 mg in sodium chloride 0.9 % 100 mL

## 2024-07-22 NOTE — FLOWSHEET NOTE
Inpatient Wound Care (initial consult) 404    Admit Date: 7/19/2024  1:01 PM    Reason for consult:  abdomen, coccyx    Patient is laying down in bed, awake, alert and oriented. Patient can roll to right with no assistance and assistance of one person to left. Patient declined heel protectors and pillow under calves to float heels. Per patient \"I am bed bound.\"    Significant history:  per H&P    CHIEF COMPLAINT:  Vomiting blood.     HISTORY OF PRESENT ILLNESS:  This is a 64-year-old woman who presented to the emergency room after having an episode of vomiting with dark red blood noted in the emesis.  She also complained of pain across her upper abdomen.  She also reports a bowel movement yesterday morning that was dark, almost black in color.  She follows Dr. Uriostegui as an outpatient and has had endoscopies in the fairly recent past that showed gastritis without obvious ulceration.  She has been on pantoprazole and sucralfate as an outpatient.  She does not use any nonsteroidal anti-inflammatory medications.  Her hemoglobin was noted to be a little bit decreased from 14 down to 12 and this morning is down further to 11.  She does not appear to be having any active ongoing bleeding at this time.  She is not on any additional blood thinners.  Presently, she is lying in bed comfortably, in no acute distress.  Her vital signs are stable and her abdominal pain is overall improved from yesterday.  Her only present complaint is a mild headache.  In addition to the above, the patient was recently noted to have a urinary tract infection with an extended spectrum beta lactamase E coli.  Attempted management with nitrofurantoin on outpatient basis does not seem to be adequately managing this complicated UTI.  It is complicated by chronic indwelling Lloyd, which she has changed monthly.  This Lloyd is in place due to history of chronic urinary retention and neurogenic bladder from her remote history of multiple sclerosis.   She was given a dose of meropenem in the emergency room.  Infectious Disease has been consulted.    Past Medical History:   Diagnosis Date    Acquired hypothyroidism 09/14/2016    Acute blood loss as cause of postoperative anemia 11/19/2018    Anemia     Anxiety     Arthritis     Blood transfusion     Cataract, right eye     Chronic back pain     Depression     GERD (gastroesophageal reflux disease)     Hx of blood clots     Migraine     Multiple sclerosis ([facility]) 2000    electric wheelchair. total  susan lift    Obesity     Osteoarthritis     Peripheral vascular disease (HCC)     vericose veins    Prominent abdominal aortic pulse 04/17/2014    Pulmonary embolism ([facility]) 05/2016    TIA (transient ischemic attack)     Urinary incontinence     has a indwelling catheter    Varicose veins of lower extremities 04/17/2014     Findings:     07/22/24 1127   Skin Integumentary    Skin Integrity Rash;Redness   Location groins   Skin Integrity Site 2   Skin Integrity Location 2 Redness   Location 2 abdomen and breast folds   Skin Integrity Site 3   Skin Integrity Location 3   (Contact irritant dermtitis related to incontinence of urine/stool)    Location 3   gluteal cleft,           bilateral buttock folds       **Informed Consent**    The patient has given verbal consent to have photos taken of wounds and inserted into their chart as part of their permanent medical record for purposes of documentation, treatment management and/or medical review.   All Images taken on 7/22/24 of patient name: Jeannette Salinas were transmitted and stored on secured Epic  Site located within Media Folder Tab by a registered Epic-[employer] Mobile Application Device.     Plan: Antifungal powder to groins, abdomen fold, breast folds  Calmoseptine to bilateral buttocks, buttock folds and posterior thigh  Comfort glide  Wedges  Heel protectors  Patient will need continued preventative care    Leanne Titus RN 7/22/2024 1:09 PM

## 2024-07-22 NOTE — PROGRESS NOTES
GENERAL SURGERY  DAILY PROGRESS NOTE    Patient's Name/Date of Birth: Jeannette Salinas / 1960    Date: 2024     Chief Complaint   Patient presents with    Abdominal Pain     Patient reports nausea, vomiting and tar like stools. EMS states patient was vomiting dark colored emesis. Patient has only been able to have bowel movements by use of enemas. EMS gave 15 mg of Toradol and 4 mg of zofran en route.         Subjective:  Pt resting in bed. Has been tolerating a diet without nausea or emesis. No bowel movement      Objective:  Last 24Hrs  Temp  Av °F (36.7 °C)  Min: 97.6 °F (36.4 °C)  Max: 98.3 °F (36.8 °C)  Resp  Av.7  Min: 18  Max: 20  Pulse  Av.2  Min: 76  Max: 90  Systolic (24hrs), Av , Min:100 , Max:136     Diastolic (24hrs), Av, Min:64, Max:91    SpO2  Av.5 %  Min: 94 %  Max: 99 %    I/O last 3 completed shifts:  In: -   Out: 1600 [Urine:1600]      General: In no acute distress  Cardiovascular: Warm throughout, no edema  Respiratory: no respiratory distress, equal chest rise  Abdomen: soft,  nontender, nondistended  Skin: no obvious rashes or lesions appreciated, no jaundice  Extremities: atraumatic      CBC  Recent Labs     24  0336 24  0321 24  0515   WBC 5.1 3.9* 4.5   RBC 3.75 3.64 3.87   HGB 11.2* 11.0* 11.6   HCT 35.3 34.5 37.0   MCV 94.1 94.8 95.6   MCH 29.9 30.2 30.0   MCHC 31.7* 31.9* 31.4*   RDW 14.1 13.8 14.0    239 257   MPV 9.5 9.5 9.4       CMP  Recent Labs     24  0642 24  0321 24  0515    136 136   K 3.9 3.7 4.1    102 101   CO2 24 27 28   BUN 7 8 12   CREATININE 0.6 0.6 0.6   GLUCOSE 83 87 104*   CALCIUM 8.3* 8.4* 8.6   BILITOT 0.3 0.2 0.2   ALKPHOS 92 87 90   AST 19 19 43*   ALT 8 10 25         Assessment/Plan:    Patient Active Problem List   Diagnosis    MS (multiple sclerosis) (HCC)    History of pulmonary embolus (PE)    Acquired hypothyroidism    Closed fracture of distal end of right

## 2024-07-22 NOTE — CARE COORDINATION
Introduced my self and provided explanation of CM role to patient.  Patient is awake, alert, and aware of current diagnosis and treatment plan including ID and gen surgery consults.  She voices she resides at home with her son and completes her adl's with assistance from home health aides.  Aides are present from [facility] 3hrs in Am, 2 hrs in PM, 7 days/week.  She states she is bed or w/c bound.  She indicates her desire for a return to home.  She declines nursing facility referral.    Patient is established with a pcp and denies any issue with retail pharmaceutical coverage.  She will require ambulance transport to home.  Explained ELOS of <24 hours.  Gilberto Aguilera, MSN RN  [facility] Case Management  [phone]

## 2024-07-22 NOTE — PROGRESS NOTES
Pt tearful and anxious, states she does not want to be discharged until she sees Dr Uriostegui, states she is refusing discharge at this time, physicians ambulance cancelled at this time

## 2024-07-22 NOTE — PROGRESS NOTES
Infectious Disease  Progress Note  [facility]    Chief Complaint: UTI    Subjective:  she has epigastric pain mostly today. Not able to have BM. Requesting to have coloscopy inpatient. No fever. Tolerating antibiotics well.     Scheduled Meds:   polyethylene glycol  17 g Oral BID    senna  1 tablet Oral Nightly    menthol-zinc oxide   Topical BID    magnesium hydroxide  30 mL Oral Once    fosfomycin tromethamine  3 g Oral Once    miconazole   Topical BID    ondansetron  4 mg IntraVENous Once    clonazePAM  0.5 mg Oral BID    DULoxetine  120 mg Oral QAM    gabapentin  300 mg Oral BID    levothyroxine  150 mcg Oral QAM    pantoprazole  40 mg Oral BID AC    traZODone  50 mg Oral Nightly    sodium chloride flush  5-40 mL IntraVENous 2 times per day     Continuous Infusions:   sodium chloride       PRN Meds:menthol-zinc oxide **AND** menthol-zinc oxide, sodium chloride flush, sodium chloride, potassium chloride **OR** potassium alternative oral replacement **OR** potassium chloride, magnesium sulfate, ondansetron **OR** ondansetron, acetaminophen **OR** acetaminophen    Prior to Admission medications    Medication Sig Start Date End Date Taking? Authorizing Provider   pantoprazole (PROTONIX) 40 MG tablet Take 1 tablet by mouth 2 times daily (before meals) 7/22/24 8/21/24 Yes Samantha Gruber,    gabapentin (NEURONTIN) 300 MG capsule Take 1 capsule by mouth 4 times daily.   Yes Spencer Martin MD   clonazePAM (KLONOPIN) 0.5 MG tablet Take 1 tablet by mouth 2 times daily. 7/12/24  Yes Spencer Martin MD   nitrofurantoin (MACRODANTIN) 100 MG capsule Take 1 capsule by mouth 2 times daily 7/15/24 7/24/24 Yes Spencer Martin MD   cyclobenzaprine (FLEXERIL) 10 MG tablet Take 1 tablet by mouth 3 times daily as needed for Muscle spasms 6/10/24   Terra Foley APRN - CNP   traZODone (DESYREL) 50 MG tablet Take 1 tablet by mouth nightly    Spencer Martin MD   acetaminophen (TYLENOL) 650 MG extended release

## 2024-07-22 NOTE — ACP (ADVANCE CARE PLANNING)
Advance Care Planning   Healthcare Decision Maker:    Primary Decision Maker: Kristine Salinas - Child - [phone]    Secondary Decision Maker: Jaquan Navarrete - Child - [phone]    Click here to complete Healthcare Decision Makers including selection of the Healthcare Decision Maker Relationship (ie \"Primary\").

## 2024-07-23 VITALS
RESPIRATION RATE: 18 BRPM | TEMPERATURE: 98 F | WEIGHT: 292.77 LBS | BODY MASS INDEX: 48.78 KG/M2 | OXYGEN SATURATION: 96 % | SYSTOLIC BLOOD PRESSURE: 140 MMHG | HEART RATE: 90 BPM | HEIGHT: 65 IN | DIASTOLIC BLOOD PRESSURE: 84 MMHG

## 2024-07-23 LAB
ALBUMIN SERPL-MCNC: 2.9 G/DL (ref 3.5–5.2)
ALP SERPL-CCNC: 102 U/L (ref 35–104)
ALT SERPL-CCNC: 46 U/L (ref 0–32)
ANION GAP SERPL CALCULATED.3IONS-SCNC: 8 MMOL/L (ref 7–16)
AST SERPL-CCNC: 68 U/L (ref 0–31)
BILIRUB SERPL-MCNC: 0.2 MG/DL (ref 0–1.2)
BUN SERPL-MCNC: 10 MG/DL (ref 6–23)
CALCIUM SERPL-MCNC: 8.4 MG/DL (ref 8.6–10.2)
CHLORIDE SERPL-SCNC: 100 MMOL/L (ref 98–107)
CO2 SERPL-SCNC: 27 MMOL/L (ref 22–29)
CREAT SERPL-MCNC: 0.6 MG/DL (ref 0.5–1)
ERYTHROCYTE [DISTWIDTH] IN BLOOD BY AUTOMATED COUNT: 14 % (ref 11.5–15)
GFR, ESTIMATED: >90 ML/MIN/1.73M2
GLUCOSE SERPL-MCNC: 94 MG/DL (ref 74–99)
HCT VFR BLD AUTO: 37.4 % (ref 34–48)
HGB BLD-MCNC: 11.8 G/DL (ref 11.5–15.5)
MCH RBC QN AUTO: 30.3 PG (ref 26–35)
MCHC RBC AUTO-ENTMCNC: 31.6 G/DL (ref 32–34.5)
MCV RBC AUTO: 95.9 FL (ref 80–99.9)
MICROORGANISM SPEC CULT: ABNORMAL
MICROORGANISM SPEC CULT: ABNORMAL
PLATELET # BLD AUTO: 274 K/UL (ref 130–450)
PMV BLD AUTO: 9.2 FL (ref 7–12)
POTASSIUM SERPL-SCNC: 4.4 MMOL/L (ref 3.5–5)
PROT SERPL-MCNC: 6.8 G/DL (ref 6.4–8.3)
RBC # BLD AUTO: 3.9 M/UL (ref 3.5–5.5)
SODIUM SERPL-SCNC: 135 MMOL/L (ref 132–146)
SPECIMEN DESCRIPTION: ABNORMAL
WBC OTHER # BLD: 4.1 K/UL (ref 4.5–11.5)

## 2024-07-23 PROCEDURE — 80053 COMPREHEN METABOLIC PANEL: CPT

## 2024-07-23 PROCEDURE — 6370000000 HC RX 637 (ALT 250 FOR IP)

## 2024-07-23 PROCEDURE — 36415 COLL VENOUS BLD VENIPUNCTURE: CPT

## 2024-07-23 PROCEDURE — 85027 COMPLETE CBC AUTOMATED: CPT

## 2024-07-23 PROCEDURE — 6370000000 HC RX 637 (ALT 250 FOR IP): Performed by: INTERNAL MEDICINE

## 2024-07-23 RX ORDER — POLYETHYLENE GLYCOL 3350 17 G/17G
17 POWDER, FOR SOLUTION ORAL 2 TIMES DAILY
Qty: 527 G | Refills: 1 | Status: ON HOLD | OUTPATIENT
Start: 2024-07-23 | End: 2024-08-22

## 2024-07-23 RX ORDER — ENEMA 19; 7 G/133ML; G/133ML
1 ENEMA RECTAL
Status: COMPLETED | OUTPATIENT
Start: 2024-07-23 | End: 2024-07-23

## 2024-07-23 RX ADMIN — ANORECTAL OINTMENT: 15.7; .44; 24; 20.6 OINTMENT TOPICAL at 07:48

## 2024-07-23 RX ADMIN — MICONAZOLE NITRATE: 20 POWDER TOPICAL at 07:48

## 2024-07-23 RX ADMIN — Medication 1 ENEMA: at 11:10

## 2024-07-23 RX ADMIN — LEVOTHYROXINE SODIUM 150 MCG: 75 TABLET ORAL at 07:48

## 2024-07-23 RX ADMIN — DULOXETINE HYDROCHLORIDE 120 MG: 60 CAPSULE, DELAYED RELEASE ORAL at 07:47

## 2024-07-23 RX ADMIN — CLONAZEPAM 0.5 MG: 0.5 TABLET ORAL at 07:47

## 2024-07-23 RX ADMIN — PANTOPRAZOLE SODIUM 40 MG: 40 TABLET, DELAYED RELEASE ORAL at 07:47

## 2024-07-23 RX ADMIN — GABAPENTIN 300 MG: 300 CAPSULE ORAL at 07:47

## 2024-07-23 RX ADMIN — POLYETHYLENE GLYCOL 3350 17 G: 17 POWDER, FOR SOLUTION ORAL at 07:47

## 2024-07-23 NOTE — DISCHARGE SUMMARY
[place] [address]                            DISCHARGE SUMMARY      PATIENT NAME: CHARLY GONZALEZ           : 1960  MED REC NO: [id number]                        ROOM: 0404  ACCOUNT NO: [id number]                       ADMIT DATE: 2024  PROVIDER: Linus Schwab MD      FINAL DIAGNOSES:    1. Gastrointestinal bleed.  2. Acute blood loss anemia.  3. Complicated urinary tract infection with extended spectrum beta lactamase Escherichia coli.  4. Neurogenic bladder.  5. Chronic constipation.    COURSE OF ILLNESS:  This is a 64-year-old woman who is essentially bed bound at home from complications of previous MS and orthopedic injuries, who presents to the emergency room with a complaint of upper abdominal pain and a report of melenic stool and hematemesis.  During this admission, she did not have any further melanic stools and did not have any further vomiting.  There was no evidence of any ongoing bleeding as her hemoglobin did decrease from 14 in the past down to 12 and stayed stable throughout the admission at hemoglobin level 11.  Her hemoglobin at the time of discharge was 11.6.  She was already on Carafate and PPI.  These were continued throughout the admission.  She has had an EGD in the fairly recent past that showed gastritis, and she is scheduled for colonoscopy in the near future as an outpatient.  She was seen by Dr. Uriostegui.  She follows her as an outpatient, and no inpatient procedures were felt to be needed at this time.  As noted, she is already scheduled for an outpatient colonoscopy.  Shortly prior to this admission, the patient was started on nitrofurantoin for ESBL E.  coli.  Her urine culture showed persistence of this infection, and she was started on meropenem IV and received multiple doses throughout the admission, including a final dose of fosfomycin, and Infectious Disease felt would

## 2024-07-23 NOTE — PROGRESS NOTES
Subjective:    The patient is awake and alert.  No problems overnight.  Denies chest pain, angina, and dyspnea.  Denies abdominal pain.  Tolerating diet.  No nausea or vomiting. Id completed treatment for UTI and signs ed off. Surgery has no inpatient plans. No further bleeding. Hb improved to 11.6 yesterday. No fever ot chills. Pt still c/o issues with chronic constipation. She is set up for colonoscopy already in near future.    Objective:    /80   Pulse 98   Temp 98 °F (36.7 °C) (Oral)   Resp 18   Ht 1.651 m (5' 5\")   Wt 132.8 kg (292 lb 12.3 oz)   SpO2 95%   BMI 48.72 kg/m²   Neck: No goiter, bruit, or LA  Heart:  RRR, no murmurs, gallops, or rubs.  Lungs:  CTA bilaterally, no wheeze, rales or rhonchi  Abd: bowel sounds present, mild tenderness without G/R. Abdomen I soft. No masses.  Extrem:  No clubbing, cyanosis, chronic 1+ lower leg edema b/l,liz, 2+ peripheral pulses, FROM    CBC:   Lab Results   Component Value Date/Time    WBC 4.5 07/22/2024 05:15 AM    RBC 3.87 07/22/2024 05:15 AM    HGB 11.6 07/22/2024 05:15 AM    HCT 37.0 07/22/2024 05:15 AM    MCV 95.6 07/22/2024 05:15 AM    MCH 30.0 07/22/2024 05:15 AM    MCHC 31.4 07/22/2024 05:15 AM    RDW 14.0 07/22/2024 05:15 AM     07/22/2024 05:15 AM    MPV 9.4 07/22/2024 05:15 AM     CMP:    Lab Results   Component Value Date/Time     07/22/2024 05:15 AM    K 4.1 07/22/2024 05:15 AM    K 3.6 01/19/2023 03:00 AM     07/22/2024 05:15 AM    CO2 28 07/22/2024 05:15 AM    BUN 12 07/22/2024 05:15 AM    CREATININE 0.6 07/22/2024 05:15 AM    GFRAA >60 08/18/2022 02:15 AM    LABGLOM >90 07/22/2024 05:15 AM    LABGLOM >60 03/20/2024 11:20 AM    GLUCOSE 104 07/22/2024 05:15 AM    GLUCOSE 94 05/30/2012 09:06 PM    CALCIUM 8.6 07/22/2024 05:15 AM    BILITOT 0.2 07/22/2024 05:15 AM    ALKPHOS 90 07/22/2024 05:15 AM    AST 43 07/22/2024 05:15 AM    ALT 25 07/22/2024 05:15 AM          Current Facility-Administered Medications:     polyethylene

## 2024-07-23 NOTE — PLAN OF CARE
Problem: Discharge Planning  Goal: Discharge to home or other facility with appropriate resources  7/23/2024 1017 by Devika Jasmine RN  Outcome: Completed  7/23/2024 0850 by Devika Jasmine RN  Outcome: Progressing  7/23/2024 0239 by Matthew Newton RN  Outcome: Progressing     Problem: ABCDS Injury Assessment  Goal: Absence of physical injury  7/23/2024 1017 by Devika Jasmine RN  Outcome: Completed  7/23/2024 0850 by Devika Jasmine RN  Outcome: Progressing  7/23/2024 0239 by Matthew Netwon RN  Outcome: Progressing     Problem: Skin/Tissue Integrity  Goal: Absence of new skin breakdown  Description: 1.  Monitor for areas of redness and/or skin breakdown  2.  Assess vascular access sites hourly  3.  Every 4-6 hours minimum:  Change oxygen saturation probe site  4.  Every 4-6 hours:  If on nasal continuous positive airway pressure, respiratory therapy assess nares and determine need for appliance change or resting period.  7/23/2024 1017 by Devika Jasmine RN  Outcome: Completed  7/23/2024 0850 by Devika Jasmine RN  Outcome: Progressing  7/23/2024 0239 by Matthew Newton RN  Outcome: Progressing     Problem: Pain  Goal: Verbalizes/displays adequate comfort level or baseline comfort level  7/23/2024 1017 by Devika Jasmine RN  Outcome: Completed  7/23/2024 0850 by Devika Jasmine RN  Outcome: Progressing  7/23/2024 0239 by Matthew Newton RN  Outcome: Progressing     Problem: Safety - Adult  Goal: Free from fall injury  7/23/2024 1017 by Devika Jasmine RN  Outcome: Completed  7/23/2024 0850 by Devika Jasmine RN  Outcome: Progressing

## 2024-07-23 NOTE — PROGRESS NOTES
Physician Progress Note      PATIENT:               CHARLY GONZALEZ  CSN #:                  [id number]  :                       1960  ADMIT DATE:       2024 1:01 PM  DISCH DATE:        2024 12:43 PM  RESPONDING  PROVIDER #:        Linus Díaz MD          QUERY TEXT:    Pt admitted with UTI.  Pt noted to have chronic indwelling urinary catheter.   If possible, please document in the progress notes and discharge summary if   you are evaluating and/or treating any of the following:    The medical record reflects the following:  Risk Factors: Chronic bojorquez, history of UTI  Clinical Indicators: Per internal med progress note on  \"...Complicated   UTI- proteus and ESBL E.coli UTI complicating chronic indwelling bojorquez...\"  Treatment: IV Meropenem now transitioned to PO Monourol, ID consult    Thank you,  Annette Wren, RN, BSN, CDIS  Clinical Documentation Integrity  [email or web address]  Options provided:  -- UTI due to chronic indwelling urinary catheter  -- UTI not due to indwelling urinary catheter  -- Other - I will add my own diagnosis  -- Disagree - Not applicable / Not valid  -- Disagree - Clinically unable to determine / Unknown  -- Refer to Clinical Documentation Reviewer    PROVIDER RESPONSE TEXT:    UTI is due to the chronic indwelling urinary catheter.    Query created by: Annette Wren on 2024 3:10 PM      Electronically signed by:  Linus Díaz MD 2024 2:59 PM

## 2024-07-23 NOTE — CARE COORDINATION
Social Work:    Physician ambulance was arranged to transport Jeannette back to her home at 11:00 a.m.. Daughter Kristine is aware and will be there to accept Jeannette back to the home. Kristine advises that they have an wheelchair lift and ambulance can sit Jeannette up & utilize the lift.  Social work updated Jeannette who requested social work notify [facility] ([phone]) of discharge home. Social work notified [facility] of discharge time today.    Electronically signed by PRABHJOT Cortes on 7/23/2024 at 10:11 AM

## 2024-07-23 NOTE — PROGRESS NOTES
Spoke with Dr Schwab clarifying gabapentin on discharge. Also about patient and family's request for a fleets enema before discharge. New orders given.  Devika Jasmine RN

## 2024-07-23 NOTE — PLAN OF CARE
Problem: Discharge Planning  Goal: Discharge to home or other facility with appropriate resources  7/23/2024 0850 by Devika Jasmine RN  Outcome: Progressing  7/23/2024 0239 by Matthew Newton RN  Outcome: Progressing     Problem: ABCDS Injury Assessment  Goal: Absence of physical injury  7/23/2024 0850 by Devika Jasmine RN  Outcome: Progressing  7/23/2024 0239 by Matthew Newton RN  Outcome: Progressing     Problem: Skin/Tissue Integrity  Goal: Absence of new skin breakdown  Description: 1.  Monitor for areas of redness and/or skin breakdown  2.  Assess vascular access sites hourly  3.  Every 4-6 hours minimum:  Change oxygen saturation probe site  4.  Every 4-6 hours:  If on nasal continuous positive airway pressure, respiratory therapy assess nares and determine need for appliance change or resting period.  7/23/2024 0850 by Devika Jasmine RN  Outcome: Progressing  7/23/2024 0239 by Matthew Newton RN  Outcome: Progressing     Problem: Pain  Goal: Verbalizes/displays adequate comfort level or baseline comfort level  7/23/2024 0850 by Devika Jasmine RN  Outcome: Progressing  7/23/2024 0239 by Matthew Newton RN  Outcome: Progressing     Problem: Safety - Adult  Goal: Free from fall injury  Outcome: Progressing

## 2024-08-04 ENCOUNTER — HOSPITAL ENCOUNTER (OUTPATIENT)
Age: 64
Setting detail: OBSERVATION
Discharge: HOME | End: 2024-08-05
Attending: SURGERY | Admitting: SURGERY
Payer: MEDICARE

## 2024-08-04 PROBLEM — K59.00 CONSTIPATION: Status: ACTIVE | Noted: 2024-08-04

## 2024-08-04 PROCEDURE — G0379 DIRECT REFER HOSPITAL OBSERV: HCPCS

## 2024-08-04 PROCEDURE — G0378 HOSPITAL OBSERVATION PER HR: HCPCS

## 2024-08-04 PROCEDURE — 6360000002 HC RX W HCPCS: Performed by: STUDENT IN AN ORGANIZED HEALTH CARE EDUCATION/TRAINING PROGRAM

## 2024-08-04 PROCEDURE — 2580000003 HC RX 258: Performed by: SURGERY

## 2024-08-04 PROCEDURE — 6370000000 HC RX 637 (ALT 250 FOR IP): Performed by: STUDENT IN AN ORGANIZED HEALTH CARE EDUCATION/TRAINING PROGRAM

## 2024-08-04 PROCEDURE — 96374 THER/PROPH/DIAG INJ IV PUSH: CPT

## 2024-08-04 RX ORDER — TRAZODONE HYDROCHLORIDE 50 MG/1
50 TABLET ORAL NIGHTLY
Status: DISCONTINUED | OUTPATIENT
Start: 2024-08-04 | End: 2024-08-05 | Stop reason: HOSPADM

## 2024-08-04 RX ORDER — CYCLOBENZAPRINE HCL 10 MG
10 TABLET ORAL 3 TIMES DAILY PRN
Status: DISCONTINUED | OUTPATIENT
Start: 2024-08-04 | End: 2024-08-05 | Stop reason: HOSPADM

## 2024-08-04 RX ORDER — SODIUM CHLORIDE 9 MG/ML
INJECTION, SOLUTION INTRAVENOUS CONTINUOUS
Status: DISCONTINUED | OUTPATIENT
Start: 2024-08-04 | End: 2024-08-05 | Stop reason: HOSPADM

## 2024-08-04 RX ORDER — SUCRALFATE 1 G/1
2 TABLET ORAL 2 TIMES DAILY
Status: DISCONTINUED | OUTPATIENT
Start: 2024-08-04 | End: 2024-08-05 | Stop reason: HOSPADM

## 2024-08-04 RX ORDER — ONDANSETRON 2 MG/ML
4 INJECTION INTRAMUSCULAR; INTRAVENOUS EVERY 6 HOURS PRN
Status: DISCONTINUED | OUTPATIENT
Start: 2024-08-04 | End: 2024-08-05 | Stop reason: HOSPADM

## 2024-08-04 RX ORDER — ACETAMINOPHEN 325 MG/1
650 TABLET ORAL EVERY 6 HOURS PRN
Status: DISCONTINUED | OUTPATIENT
Start: 2024-08-04 | End: 2024-08-05 | Stop reason: HOSPADM

## 2024-08-04 RX ORDER — DULOXETIN HYDROCHLORIDE 60 MG/1
120 CAPSULE, DELAYED RELEASE ORAL EVERY MORNING
Status: DISCONTINUED | OUTPATIENT
Start: 2024-08-05 | End: 2024-08-05 | Stop reason: HOSPADM

## 2024-08-04 RX ORDER — CLONAZEPAM 0.5 MG/1
0.5 TABLET ORAL 2 TIMES DAILY PRN
Status: DISCONTINUED | OUTPATIENT
Start: 2024-08-04 | End: 2024-08-05 | Stop reason: HOSPADM

## 2024-08-04 RX ORDER — GABAPENTIN 300 MG/1
300 CAPSULE ORAL 4 TIMES DAILY
Status: DISCONTINUED | OUTPATIENT
Start: 2024-08-04 | End: 2024-08-05 | Stop reason: HOSPADM

## 2024-08-04 RX ORDER — LEVOTHYROXINE SODIUM 0.07 MG/1
150 TABLET ORAL EVERY MORNING
Status: DISCONTINUED | OUTPATIENT
Start: 2024-08-05 | End: 2024-08-05 | Stop reason: HOSPADM

## 2024-08-04 RX ORDER — PANTOPRAZOLE SODIUM 40 MG/1
40 TABLET, DELAYED RELEASE ORAL
Status: DISCONTINUED | OUTPATIENT
Start: 2024-08-05 | End: 2024-08-05 | Stop reason: HOSPADM

## 2024-08-04 RX ADMIN — ACETAMINOPHEN 650 MG: 325 TABLET ORAL at 23:16

## 2024-08-04 RX ADMIN — CLONAZEPAM 0.5 MG: 0.5 TABLET ORAL at 21:54

## 2024-08-04 RX ADMIN — CYCLOBENZAPRINE 10 MG: 10 TABLET, FILM COATED ORAL at 21:54

## 2024-08-04 RX ADMIN — TRAZODONE HYDROCHLORIDE 50 MG: 50 TABLET ORAL at 21:50

## 2024-08-04 RX ADMIN — ONDANSETRON 4 MG: 2 INJECTION INTRAMUSCULAR; INTRAVENOUS at 20:24

## 2024-08-04 RX ADMIN — SODIUM CHLORIDE: 9 INJECTION, SOLUTION INTRAVENOUS at 18:48

## 2024-08-04 RX ADMIN — GABAPENTIN 300 MG: 300 CAPSULE ORAL at 21:50

## 2024-08-04 ASSESSMENT — PAIN DESCRIPTION - DESCRIPTORS
DESCRIPTORS: DISCOMFORT;TINGLING
DESCRIPTORS: ACHING;DISCOMFORT
DESCRIPTORS: ACHING;DISCOMFORT

## 2024-08-04 ASSESSMENT — PAIN DESCRIPTION - FREQUENCY: FREQUENCY: CONTINUOUS

## 2024-08-04 ASSESSMENT — PAIN SCALES - GENERAL
PAINLEVEL_OUTOF10: 3
PAINLEVEL_OUTOF10: 7
PAINLEVEL_OUTOF10: 7

## 2024-08-04 ASSESSMENT — PAIN DESCRIPTION - ORIENTATION
ORIENTATION: RIGHT;LEFT
ORIENTATION: RIGHT;LEFT

## 2024-08-04 ASSESSMENT — PAIN DESCRIPTION - PAIN TYPE: TYPE: CHRONIC PAIN

## 2024-08-04 ASSESSMENT — PAIN DESCRIPTION - LOCATION
LOCATION: BACK;KNEE
LOCATION: BACK;ABDOMEN;FOOT
LOCATION: HAND;KNEE

## 2024-08-04 NOTE — PROGRESS NOTES
Dr. Reyes paged regarding nausea medications and patients home meds, awaiting call back. Electronically signed by Rose Newsome RN on 8/4/2024 at 7:03 PM

## 2024-08-04 NOTE — PROGRESS NOTES
4 Eyes Skin Assessment     NAME:  Jeannette Salinas  YOB: 1960  MEDICAL RECORD NUMBER:  [id number]    The patient is being assessed for  Admission    I agree that at least one RN has performed a thorough Head to Toe Skin Assessment on the patient. ALL assessment sites listed below have been assessed.      Areas assessed by both nurses:    Head, Face, Ears, Shoulders, Back, Chest, Arms, Elbows, Hands, Sacrum. Buttock, Coccyx, Ischium, and Legs. Feet and Heels        Does the Patient have a Wound? Yes wound(s) were present on assessment. LDA wound assessment was Initiated and completed by RN       Agustin Prevention initiated by RN: Yes  Wound Care Orders initiated by RN: No    Pressure Injury (Stage 3,4, Unstageable, DTI, NWPT, and Complex wounds) if present, place Wound referral order by RN under : No    New Ostomies, if present place, Ostomy referral order under : No     Nurse 1 eSignature: Electronically signed by Radha Mello RN on 8/4/24 at 7:01 PM EDT    **SHARE this note so that the co-signing nurse can place an eSignature**    Nurse 2 eSignature: Electronically signed by Puneet Luciano RN on 8/4/24 at 7:01 PM EDT

## 2024-08-05 ENCOUNTER — ANESTHESIA EVENT (OUTPATIENT)
Dept: ENDOSCOPY | Age: 64
End: 2024-08-05
Payer: MEDICARE

## 2024-08-05 ENCOUNTER — ANESTHESIA (OUTPATIENT)
Dept: ENDOSCOPY | Age: 64
End: 2024-08-05
Payer: MEDICARE

## 2024-08-05 VITALS
BODY MASS INDEX: 48.32 KG/M2 | SYSTOLIC BLOOD PRESSURE: 130 MMHG | RESPIRATION RATE: 18 BRPM | HEART RATE: 73 BPM | DIASTOLIC BLOOD PRESSURE: 63 MMHG | OXYGEN SATURATION: 98 % | HEIGHT: 65 IN | WEIGHT: 290 LBS | TEMPERATURE: 98.8 F

## 2024-08-05 LAB
ANION GAP SERPL CALCULATED.3IONS-SCNC: 8 MMOL/L (ref 7–16)
BASOPHILS # BLD: 0.07 K/UL (ref 0–0.2)
BASOPHILS NFR BLD: 2 % (ref 0–2)
BUN SERPL-MCNC: 3 MG/DL (ref 6–23)
CALCIUM SERPL-MCNC: 8.5 MG/DL (ref 8.6–10.2)
CHLORIDE SERPL-SCNC: 106 MMOL/L (ref 98–107)
CO2 SERPL-SCNC: 29 MMOL/L (ref 22–29)
CREAT SERPL-MCNC: 0.6 MG/DL (ref 0.5–1)
EOSINOPHIL # BLD: 0.24 K/UL (ref 0.05–0.5)
EOSINOPHILS RELATIVE PERCENT: 6 % (ref 0–6)
ERYTHROCYTE [DISTWIDTH] IN BLOOD BY AUTOMATED COUNT: 13.9 % (ref 11.5–15)
GFR, ESTIMATED: >90 ML/MIN/1.73M2
HCT VFR BLD AUTO: 36.1 % (ref 34–48)
HGB BLD-MCNC: 11.3 G/DL (ref 11.5–15.5)
IMM GRANULOCYTES # BLD AUTO: <0.03 K/UL (ref 0–0.58)
IMM GRANULOCYTES NFR BLD: 0 % (ref 0–5)
LYMPHOCYTES NFR BLD: 1.14 K/UL (ref 1.5–4)
LYMPHOCYTES RELATIVE PERCENT: 30 % (ref 20–42)
MCH RBC QN AUTO: 29.7 PG (ref 26–35)
MCHC RBC AUTO-ENTMCNC: 31.3 G/DL (ref 32–34.5)
MCV RBC AUTO: 94.8 FL (ref 80–99.9)
MONOCYTES NFR BLD: 0.36 K/UL (ref 0.1–0.95)
MONOCYTES NFR BLD: 9 % (ref 2–12)
NEUTROPHILS NFR BLD: 52 % (ref 43–80)
NEUTS SEG NFR BLD: 2.01 K/UL (ref 1.8–7.3)
PLATELET # BLD AUTO: 295 K/UL (ref 130–450)
PMV BLD AUTO: 9.3 FL (ref 7–12)
POTASSIUM SERPL-SCNC: 3.8 MMOL/L (ref 3.5–5)
RBC # BLD AUTO: 3.81 M/UL (ref 3.5–5.5)
SODIUM SERPL-SCNC: 143 MMOL/L (ref 132–146)
WBC OTHER # BLD: 3.8 K/UL (ref 4.5–11.5)

## 2024-08-05 PROCEDURE — 3700000001 HC ADD 15 MINUTES (ANESTHESIA): Performed by: SURGERY

## 2024-08-05 PROCEDURE — 6370000000 HC RX 637 (ALT 250 FOR IP): Performed by: STUDENT IN AN ORGANIZED HEALTH CARE EDUCATION/TRAINING PROGRAM

## 2024-08-05 PROCEDURE — 36415 COLL VENOUS BLD VENIPUNCTURE: CPT

## 2024-08-05 PROCEDURE — 2580000003 HC RX 258: Performed by: NURSE ANESTHETIST, CERTIFIED REGISTERED

## 2024-08-05 PROCEDURE — 85025 COMPLETE CBC W/AUTO DIFF WBC: CPT

## 2024-08-05 PROCEDURE — 6370000000 HC RX 637 (ALT 250 FOR IP)

## 2024-08-05 PROCEDURE — 3700000000 HC ANESTHESIA ATTENDED CARE: Performed by: SURGERY

## 2024-08-05 PROCEDURE — 3609027000 HC COLONOSCOPY: Performed by: SURGERY

## 2024-08-05 PROCEDURE — 80048 BASIC METABOLIC PNL TOTAL CA: CPT

## 2024-08-05 PROCEDURE — 2709999900 HC NON-CHARGEABLE SUPPLY: Performed by: SURGERY

## 2024-08-05 PROCEDURE — 7100000010 HC PHASE II RECOVERY - FIRST 15 MIN: Performed by: SURGERY

## 2024-08-05 PROCEDURE — 6360000002 HC RX W HCPCS: Performed by: STUDENT IN AN ORGANIZED HEALTH CARE EDUCATION/TRAINING PROGRAM

## 2024-08-05 PROCEDURE — G0378 HOSPITAL OBSERVATION PER HR: HCPCS

## 2024-08-05 PROCEDURE — 2580000003 HC RX 258: Performed by: SURGERY

## 2024-08-05 PROCEDURE — 7100000011 HC PHASE II RECOVERY - ADDTL 15 MIN: Performed by: SURGERY

## 2024-08-05 PROCEDURE — 6360000002 HC RX W HCPCS: Performed by: NURSE ANESTHETIST, CERTIFIED REGISTERED

## 2024-08-05 RX ORDER — PROPOFOL 10 MG/ML
INJECTION, EMULSION INTRAVENOUS PRN
Status: DISCONTINUED | OUTPATIENT
Start: 2024-08-05 | End: 2024-08-05 | Stop reason: SDUPTHER

## 2024-08-05 RX ORDER — SODIUM CHLORIDE 9 MG/ML
INJECTION, SOLUTION INTRAVENOUS CONTINUOUS PRN
Status: DISCONTINUED | OUTPATIENT
Start: 2024-08-05 | End: 2024-08-05 | Stop reason: SDUPTHER

## 2024-08-05 RX ADMIN — GABAPENTIN 300 MG: 300 CAPSULE ORAL at 08:23

## 2024-08-05 RX ADMIN — PROPOFOL 300 MG: 10 INJECTION, EMULSION INTRAVENOUS at 12:10

## 2024-08-05 RX ADMIN — POTASSIUM BICARBONATE 20 MEQ: 782 TABLET, EFFERVESCENT ORAL at 09:59

## 2024-08-05 RX ADMIN — GABAPENTIN 300 MG: 300 CAPSULE ORAL at 16:29

## 2024-08-05 RX ADMIN — DULOXETINE HYDROCHLORIDE 120 MG: 60 CAPSULE, DELAYED RELEASE ORAL at 08:23

## 2024-08-05 RX ADMIN — ACETAMINOPHEN 650 MG: 325 TABLET ORAL at 09:58

## 2024-08-05 RX ADMIN — SODIUM CHLORIDE: 9 INJECTION, SOLUTION INTRAVENOUS at 05:15

## 2024-08-05 RX ADMIN — SODIUM CHLORIDE: 9 INJECTION, SOLUTION INTRAVENOUS at 12:04

## 2024-08-05 RX ADMIN — CLONAZEPAM 0.5 MG: 0.5 TABLET ORAL at 13:37

## 2024-08-05 RX ADMIN — PANTOPRAZOLE SODIUM 40 MG: 40 TABLET, DELAYED RELEASE ORAL at 16:29

## 2024-08-05 RX ADMIN — ONDANSETRON 4 MG: 2 INJECTION INTRAMUSCULAR; INTRAVENOUS at 05:12

## 2024-08-05 RX ADMIN — GABAPENTIN 300 MG: 300 CAPSULE ORAL at 13:35

## 2024-08-05 ASSESSMENT — ENCOUNTER SYMPTOMS
VOMITING: 0
DIARRHEA: 0
COUGH: 0
SHORTNESS OF BREATH: 0
ABDOMINAL PAIN: 0
CHEST TIGHTNESS: 0
BLOOD IN STOOL: 0
NAUSEA: 0

## 2024-08-05 ASSESSMENT — PAIN SCALES - GENERAL
PAINLEVEL_OUTOF10: 0
PAINLEVEL_OUTOF10: 0
PAINLEVEL_OUTOF10: 3
PAINLEVEL_OUTOF10: 0

## 2024-08-05 ASSESSMENT — PAIN DESCRIPTION - DESCRIPTORS: DESCRIPTORS: ACHING;DISCOMFORT;SORE

## 2024-08-05 ASSESSMENT — PAIN DESCRIPTION - LOCATION: LOCATION: BACK;HEAD

## 2024-08-05 ASSESSMENT — LIFESTYLE VARIABLES: SMOKING_STATUS: 0

## 2024-08-05 NOTE — PLAN OF CARE
Problem: Safety - Adult  Goal: Free from fall injury  8/5/2024 1632 by Marina Myers RN  Outcome: Adequate for Discharge  8/5/2024 0936 by Marina Myers RN  Outcome: Progressing     Problem: ABCDS Injury Assessment  Goal: Absence of physical injury  Outcome: Adequate for Discharge     Problem: Skin/Tissue Integrity  Goal: Absence of new skin breakdown  8/5/2024 1632 by Marina Myers RN  Outcome: Adequate for Discharge  8/5/2024 0936 by Marina Myers RN  Outcome: Progressing     Problem: Pain  Goal: Verbalizes/displays adequate comfort level or baseline comfort level  8/5/2024 1632 by Marina Myers RN  Outcome: Adequate for Discharge  8/5/2024 0936 by Marina Myers RN  Outcome: Progressing

## 2024-08-05 NOTE — DISCHARGE SUMMARY
(before meals)  Qty: 30 tablet, Refills: 0      gabapentin (NEURONTIN) 300 MG capsule Take 1 capsule by mouth 4 times daily.      clonazePAM (KLONOPIN) 0.5 MG tablet Take 1 tablet by mouth 2 times daily as needed for Anxiety.      cyclobenzaprine (FLEXERIL) 10 MG tablet Take 1 tablet by mouth 3 times daily as needed for Muscle spasms  Qty: 90 tablet, Refills: 5      traZODone (DESYREL) 50 MG tablet Take 1 tablet by mouth nightly      acetaminophen (TYLENOL) 650 MG extended release tablet Take 2 tablets by mouth 2 times daily      sucralfate (CARAFATE) 1 GM tablet Take 2 tablets by mouth 2 times daily      aluminum & magnesium hydroxide-simethicone (MAALOX) 200-200-20 MG/5ML SUSP suspension Take 5 mLs by mouth every 6 hours as needed for Indigestion      levothyroxine (SYNTHROID) 150 MCG tablet Take 1 tablet by mouth every morning      DULoxetine (CYMBALTA) 60 MG extended release capsule Take 2 capsules by mouth every morning      senna (SENOKOT) 8.6 MG tablet Take 2 tablets by mouth nightly           Colonoscopy: What to Expect at Home  Your Recovery  After a colonoscopy, you'll stay at the clinic until you wake up. Then you can go home. But you'll need to arrange for a ride. Your doctor will tell you when you can eat and do your other usual activities.  Your doctor will talk to you about when you'll need your next colonoscopy. Your doctor can help you decide how often you need to be checked. This will depend on the results of your test and your risk for colorectal cancer.  After the test, you may be bloated or have gas pains. You may need to pass gas. If a biopsy was done or a polyp was removed, you may have streaks of blood in your stool (feces) for a few days. Problems such as heavy rectal bleeding may not occur until several weeks after the test. This isn't common. But it can happen after polyps are removed.  This care sheet gives you a general idea about how long it will take for you to recover. But each person

## 2024-08-05 NOTE — OP NOTE
COLONOSCOPY PROCEDURE NOTE    DATE OF PROCEDURE: 8/5/2024    PREOPERATIVE DIAGNOSIS:  constipation     POSTOPERATIVE DIAGNOSIS/FINDINGS:  melanosis coli    SURGEON: Sarbjit Uriostegui MD    ASSISTANT: None    OPERATION: Total Colonoscopy     ANESTHESIA: Local monitored anesthesia.     CONDITION: Stable    COMPLICATIONS: None.     Specimens Removed: as above    EBL: None      BRIEF HISTORY:  This is a 64 y.o. female who presents with the complaint of constipation.  It was recommended the patient undergo a colonoscopy.  The risks/benefits/alternatives/expected outcomes were explained the the patient.  The patient verbalized understanding and agreed to proceed.    PROCEDURE:  The patient was brought into the endoscopy suite and placed in the left lateral decubitus position.  A digital rectal exam was performed after the initiation of LMAC anesthesia and failed to reveal any obstructing masses or lesions.  A colonoscope was inserted into the patient's anus and passed through the rectum, sigmoid, descending, transverse, and ascending colon all the way to the level of the cecum.  Visualization of the cecum was confirmed by visualization of the ileo-cecal valve, confluence of the tinea, and by visualization of the light in the RLQ on the anterior abdominal wall.  The scope was then withdrawn the entire length of the colon.  There were no masses, polyps, or lesions noted.  Upon reaching the anus, the scope was retroflexed.  There were no significant hemorrhoids noted.  The scope was straightened and withdrawn entirely.  The patient tolerated the procedure well and there were no complications.        Sarbjit Uriostegui MD, MD  8/5/2024

## 2024-08-05 NOTE — CARE COORDINATION
Social Work discharge planning  SW met with pt, who said her son lives with her. She uses a motorized wc. She has an electric elevator to get into her home. She has home health aide Through [facility]. She said she saw her PCP last week.   Pt said she will need [facility] to bring her home at discharge. Ambulette forms in folder.  Electronically signed by CHINA Chowdhury on 8/5/2024 at 11:27 AM

## 2024-08-05 NOTE — ANESTHESIA POSTPROCEDURE EVALUATION
Department of Anesthesiology  Postprocedure Note    Patient: Jeannette Salinas  MRN: [id number]  YOB: 1960  Date of evaluation: 8/5/2024    Procedure Summary       Date: 08/05/24 Room / Location: [address]    Anesthesia Start: 1204 Anesthesia Stop: 1225    Procedure: COLONOSCOPY DIAGNOSTIC   +++CONTACT ISOLATION+++ Diagnosis:       Constipation, unspecified constipation type      (Constipation, unspecified constipation type [K59.00])    Surgeons: Sarbjit Uriostegui MD Responsible Provider: Lety Link DO    Anesthesia Type: MAC ASA Status: 3            Anesthesia Type: No value filed.    Jessee Phase I:      Jessee Phase II:      Anesthesia Post Evaluation    Patient location during evaluation: bedside  Patient participation: complete - patient participated  Level of consciousness: awake and alert  Pain score: 0  Airway patency: patent  Nausea & Vomiting: no nausea and no vomiting  Cardiovascular status: blood pressure returned to baseline  Respiratory status: acceptable  Hydration status: euvolemic  Pain management: adequate        No notable events documented.

## 2024-08-05 NOTE — CARE COORDINATION
Social Work discharge planning  Spoke to Alyson at [facility] to [facility] up wc van, and advised pt has electric wc here and elevator lift to get in her home.  Alyson said if wc  is here, and thinks pt's wc is too heavy for their wc van lift, other arrangements will need made.  Ambulette forms in folder.   Charge Rn Willa aware of 5p .  Electronically signed by CHINA Chowdhury on 8/5/2024 at 1:54 PM     Addendum  Notified PAS pt moved to Room 524 for .  Electronically signed by CHINA Chowdhury on 8/5/2024 at 2:16 PM

## 2024-08-05 NOTE — PROGRESS NOTES
Page out to Dr. Reyes regarding tylenol 1300mg that she takes BID that needs ordered. Awaiting call back.   Electronically signed by Mayra Orozco RN on 8/4/2024 at 10:14 PM

## 2024-08-05 NOTE — PROGRESS NOTES
[facility] here for transport in own wheelchair. Verbal and written discharge instructions reviewed with patient, voiced understanding.

## 2024-08-05 NOTE — PLAN OF CARE
Problem: Safety - Adult  Goal: Free from fall injury  8/5/2024 0936 by Marina Myers RN  Outcome: Progressing  8/5/2024 0127 by Mayra Orozco RN  Outcome: Progressing     Problem: ABCDS Injury Assessment  Goal: Absence of physical injury  8/5/2024 0127 by Mayra Orozco RN  Outcome: Progressing     Problem: Skin/Tissue Integrity  Goal: Absence of new skin breakdown  8/5/2024 0936 by Marina Myers RN  Outcome: Progressing  8/5/2024 0127 by Mayra Orozco RN  Outcome: Progressing     Problem: Pain  Goal: Verbalizes/displays adequate comfort level or baseline comfort level  8/5/2024 0936 by Marina Myers RN  Outcome: Progressing  8/5/2024 0127 by Mayra Orozco RN  Outcome: Progressing

## 2024-08-05 NOTE — PROGRESS NOTES
Stool is yellow and clear with just a few small brown flecks.  Pt complaining of nausea and abd pain.  Medicated with zofran.    Electronically signed by Mayra Orozco RN on 8/5/2024 at 5:33 AM

## 2024-08-05 NOTE — PATIENT CARE CONFERENCE
[facility] Quality Flow/Interdisciplinary Rounds Progress Note        Quality Flow Rounds held on August 5, 2024    Disciplines Attending:  Bedside Nurse, , , and Nursing Unit Leadership    Jeannette Salinas was admitted on 8/4/2024  2:52 PM    Anticipated Discharge Date:       Disposition:    Agustin Score:  Agustin Scale Score: 14    Readmission Risk              Risk of Unplanned Readmission:  0           Discussed patient goal for the day, patient clinical progression, and barriers to discharge.  The following Goal(s) of the Day/Commitment(s) have been identified:  Diagnostics - Report Results and Labs - Report Results      Willa Sparrow RN  August 5, 2024

## 2024-08-05 NOTE — ANESTHESIA PRE PROCEDURE
\"[id number]\"     Type & Screen (If Applicable):  No results found for: \"LABABO\"    Drug/Infectious Status (If Applicable):  No results found for: \"HIV\", \"HEPCAB\"    COVID-19 Screening (If Applicable): No results found for: \"COVID19\"        Anesthesia Evaluation  Patient summary reviewed and Nursing notes reviewed   no history of anesthetic complications:   Airway: Mallampati: I  TM distance: >3 FB   Neck ROM: full  Mouth opening: > = 3 FB   Dental:    (+) upper dentures  Comment: No upper teeth    Pulmonary:normal exam        (-) not a current smoker (former smoker)                           Cardiovascular:Negative CV ROS  Exercise tolerance: poor (<4 METS)        ECG reviewed               Beta Blocker:  Not on Beta Blocker         Neuro/Psych:   (+) neuromuscular disease: multiple sclerosis, TIA, headaches: migraine headachesdepression/anxiety    (-) psychiatric history           GI/Hepatic/Renal:   (+) GERD:, PUD, morbid obesity          Endo/Other:    (+) hypothyroidism: arthritis:..                 Abdominal:             Vascular:   + PVD, aortic or cerebral.       Other Findings:             Anesthesia Plan      MAC     ASA 3       Induction: intravenous.    MIPS: Prophylactic antiemetics administered.  Anesthetic plan and risks discussed with patient.      Plan discussed with CRNA.                    ANTOINE VALERO DO   8/5/2024    Agree with above  ERICA Ziegler - RAMÍREZ        
Ears: no ear pain and no hearing problems.Nose: no nasal congestion and no nasal drainage.Mouth/Throat: no dysphagia, no hoarseness and no throat pain.Neck: no lumps, no pain, no stiffness and no swollen glands.

## 2024-08-20 RX ORDER — GABAPENTIN 300 MG/1
300 CAPSULE ORAL 4 TIMES DAILY
Qty: 120 CAPSULE | Refills: 2 | Status: SHIPPED | OUTPATIENT
Start: 2024-08-20 | End: 2024-11-18

## 2024-08-20 RX ORDER — CYCLOBENZAPRINE HCL 10 MG
10 TABLET ORAL 3 TIMES DAILY PRN
Qty: 90 TABLET | Refills: 5 | Status: SHIPPED | OUTPATIENT
Start: 2024-08-20

## 2024-10-24 ENCOUNTER — OFFICE VISIT (OUTPATIENT)
Dept: NEUROLOGY | Age: 64
End: 2024-10-24
Payer: MEDICARE

## 2024-10-24 VITALS — OXYGEN SATURATION: 97 % | HEART RATE: 82 BPM | SYSTOLIC BLOOD PRESSURE: 138 MMHG | DIASTOLIC BLOOD PRESSURE: 82 MMHG

## 2024-10-24 DIAGNOSIS — G47.01 INSOMNIA DUE TO MEDICAL CONDITION: ICD-10-CM

## 2024-10-24 DIAGNOSIS — R26.2 DIFFICULTY IN WALKING: ICD-10-CM

## 2024-10-24 DIAGNOSIS — R15.9 BOWEL AND BLADDER INCONTINENCE: ICD-10-CM

## 2024-10-24 DIAGNOSIS — R32 BOWEL AND BLADDER INCONTINENCE: ICD-10-CM

## 2024-10-24 DIAGNOSIS — G35 MULTIPLE SCLEROSIS (HCC): Primary | ICD-10-CM

## 2024-10-24 PROCEDURE — G8400 PT W/DXA NO RESULTS DOC: HCPCS | Performed by: NURSE PRACTITIONER

## 2024-10-24 PROCEDURE — 1090F PRES/ABSN URINE INCON ASSESS: CPT | Performed by: NURSE PRACTITIONER

## 2024-10-24 PROCEDURE — 1123F ACP DISCUSS/DSCN MKR DOCD: CPT | Performed by: NURSE PRACTITIONER

## 2024-10-24 PROCEDURE — 0509F URINE INCON PLAN DOCD: CPT | Performed by: NURSE PRACTITIONER

## 2024-10-24 PROCEDURE — G8417 CALC BMI ABV UP PARAM F/U: HCPCS | Performed by: NURSE PRACTITIONER

## 2024-10-24 PROCEDURE — 1036F TOBACCO NON-USER: CPT | Performed by: NURSE PRACTITIONER

## 2024-10-24 PROCEDURE — 3017F COLORECTAL CA SCREEN DOC REV: CPT | Performed by: NURSE PRACTITIONER

## 2024-10-24 PROCEDURE — G8484 FLU IMMUNIZE NO ADMIN: HCPCS | Performed by: NURSE PRACTITIONER

## 2024-10-24 PROCEDURE — G8427 DOCREV CUR MEDS BY ELIG CLIN: HCPCS | Performed by: NURSE PRACTITIONER

## 2024-10-24 PROCEDURE — 99214 OFFICE O/P EST MOD 30 MIN: CPT | Performed by: NURSE PRACTITIONER

## 2024-10-24 RX ORDER — GABAPENTIN 300 MG/1
300 CAPSULE ORAL 4 TIMES DAILY
Qty: 120 CAPSULE | Refills: 5 | Status: SHIPPED | OUTPATIENT
Start: 2024-10-24 | End: 2025-04-22

## 2024-10-24 RX ORDER — BACLOFEN 10 MG/1
10 TABLET ORAL 3 TIMES DAILY
Qty: 90 TABLET | Refills: 5 | Status: SHIPPED | OUTPATIENT
Start: 2024-10-24

## 2024-10-24 RX ORDER — DOXEPIN HYDROCHLORIDE 25 MG/1
25 CAPSULE ORAL NIGHTLY
Qty: 30 CAPSULE | Refills: 2 | Status: SHIPPED | OUTPATIENT
Start: 2024-10-24

## 2024-10-24 NOTE — PROGRESS NOTES
today    Assessment:     MS > 20 years  --- Not currently on DMT therapy  --- Was on Avonex and steroids  --- EDSS 8.5--- in wheelchair/scooter bound for most of the day and is cared for  --- Incontinent of bladder/bowel--- has Lloyd catheter   --- having bladder and bowel spasms on flexeril will try and switch her to baclofen   --- Right side weakness > left side  --- Recent MS exacerbation vs stroke ruled out with MRI brain and C spine     Insomnia  --- will try her on doxepin  --- advised her to cut her cymbalta dose in half to 60 mg daily so it was can weaned off slowly    Plan:     Start doxepin 25 mg nightly   --- advised to wean off cymbalta dose from 120 mg daily to 60 mg daily     Continue gabapentin 300 mg 4 times daily    Discontinue flexeril    Start baclofen 10 mg TID     Follow-up in 6 months     Call with any questions or concerns      ERICA Gracia CNP  11:08 AM  10/24/2024

## 2025-01-31 ENCOUNTER — HOSPITAL ENCOUNTER (OUTPATIENT)
Age: 65
Setting detail: OBSERVATION
Discharge: HOME HEALTH | End: 2025-02-01
Attending: STUDENT IN AN ORGANIZED HEALTH CARE EDUCATION/TRAINING PROGRAM | Admitting: INTERNAL MEDICINE
Payer: MEDICARE

## 2025-01-31 ENCOUNTER — APPOINTMENT (OUTPATIENT)
Dept: CT IMAGING | Age: 65
End: 2025-01-31
Payer: MEDICARE

## 2025-01-31 DIAGNOSIS — R10.84 GENERALIZED ABDOMINAL PAIN: Primary | ICD-10-CM

## 2025-01-31 DIAGNOSIS — R91.1 RIGHT LOWER LOBE PULMONARY NODULE: ICD-10-CM

## 2025-01-31 PROBLEM — R10.9 INTRACTABLE ABDOMINAL PAIN: Status: ACTIVE | Noted: 2025-01-31

## 2025-01-31 LAB
ALBUMIN SERPL-MCNC: 3.9 G/DL (ref 3.5–5.2)
ALP SERPL-CCNC: 75 U/L (ref 35–104)
ALT SERPL-CCNC: 6 U/L (ref 0–32)
ANION GAP SERPL CALCULATED.3IONS-SCNC: 11 MMOL/L (ref 7–16)
AST SERPL-CCNC: 16 U/L (ref 0–31)
BASOPHILS # BLD: 0.11 K/UL (ref 0–0.2)
BASOPHILS NFR BLD: 3 % (ref 0–2)
BILIRUB SERPL-MCNC: 0.4 MG/DL (ref 0–1.2)
BNP SERPL-MCNC: 96 PG/ML (ref 0–125)
BUN SERPL-MCNC: 11 MG/DL (ref 6–23)
CALCIUM SERPL-MCNC: 9 MG/DL (ref 8.6–10.2)
CHLORIDE SERPL-SCNC: 100 MMOL/L (ref 98–107)
CO2 SERPL-SCNC: 26 MMOL/L (ref 22–29)
CREAT SERPL-MCNC: 0.6 MG/DL (ref 0.5–1)
EKG ATRIAL RATE: 68 BPM
EKG P AXIS: 65 DEGREES
EKG P-R INTERVAL: 188 MS
EKG Q-T INTERVAL: 430 MS
EKG QRS DURATION: 92 MS
EKG QTC CALCULATION (BAZETT): 457 MS
EKG R AXIS: -11 DEGREES
EKG T AXIS: 49 DEGREES
EKG VENTRICULAR RATE: 68 BPM
EOSINOPHIL # BLD: 0.05 K/UL (ref 0.05–0.5)
EOSINOPHILS RELATIVE PERCENT: 1 % (ref 0–6)
ERYTHROCYTE [DISTWIDTH] IN BLOOD BY AUTOMATED COUNT: 17.5 % (ref 11.5–15)
GFR, ESTIMATED: >90 ML/MIN/1.73M2
GLUCOSE SERPL-MCNC: 105 MG/DL (ref 74–99)
HCT VFR BLD AUTO: 37.1 % (ref 34–48)
HGB BLD-MCNC: 11.9 G/DL (ref 11.5–15.5)
IMM GRANULOCYTES # BLD AUTO: <0.03 K/UL (ref 0–0.58)
IMM GRANULOCYTES NFR BLD: 0 % (ref 0–5)
LACTATE BLDV-SCNC: 1.3 MMOL/L (ref 0.5–2.2)
LIPASE SERPL-CCNC: 40 U/L (ref 13–60)
LYMPHOCYTES NFR BLD: 0.87 K/UL (ref 1.5–4)
LYMPHOCYTES RELATIVE PERCENT: 20 % (ref 20–42)
MCH RBC QN AUTO: 29.6 PG (ref 26–35)
MCHC RBC AUTO-ENTMCNC: 32.1 G/DL (ref 32–34.5)
MCV RBC AUTO: 92.3 FL (ref 80–99.9)
MONOCYTES NFR BLD: 0.26 K/UL (ref 0.1–0.95)
MONOCYTES NFR BLD: 6 % (ref 2–12)
NEUTROPHILS NFR BLD: 70 % (ref 43–80)
NEUTS SEG NFR BLD: 3.01 K/UL (ref 1.8–7.3)
PLATELET # BLD AUTO: 279 K/UL (ref 130–450)
PMV BLD AUTO: 9.4 FL (ref 7–12)
POTASSIUM SERPL-SCNC: 3.6 MMOL/L (ref 3.5–5)
PROT SERPL-MCNC: 8.1 G/DL (ref 6.4–8.3)
RBC # BLD AUTO: 4.02 M/UL (ref 3.5–5.5)
SODIUM SERPL-SCNC: 137 MMOL/L (ref 132–146)
TROPONIN I SERPL HS-MCNC: 8 NG/L (ref 0–9)
WBC OTHER # BLD: 4.3 K/UL (ref 4.5–11.5)

## 2025-01-31 PROCEDURE — 6360000002 HC RX W HCPCS: Performed by: STUDENT IN AN ORGANIZED HEALTH CARE EDUCATION/TRAINING PROGRAM

## 2025-01-31 PROCEDURE — 6360000004 HC RX CONTRAST MEDICATION: Performed by: RADIOLOGY

## 2025-01-31 PROCEDURE — 6370000000 HC RX 637 (ALT 250 FOR IP): Performed by: INTERNAL MEDICINE

## 2025-01-31 PROCEDURE — 85025 COMPLETE CBC W/AUTO DIFF WBC: CPT

## 2025-01-31 PROCEDURE — 83880 ASSAY OF NATRIURETIC PEPTIDE: CPT

## 2025-01-31 PROCEDURE — 80053 COMPREHEN METABOLIC PANEL: CPT

## 2025-01-31 PROCEDURE — 2580000003 HC RX 258: Performed by: STUDENT IN AN ORGANIZED HEALTH CARE EDUCATION/TRAINING PROGRAM

## 2025-01-31 PROCEDURE — 2500000003 HC RX 250 WO HCPCS: Performed by: INTERNAL MEDICINE

## 2025-01-31 PROCEDURE — G0378 HOSPITAL OBSERVATION PER HR: HCPCS

## 2025-01-31 PROCEDURE — 93010 ELECTROCARDIOGRAM REPORT: CPT | Performed by: INTERNAL MEDICINE

## 2025-01-31 PROCEDURE — 83690 ASSAY OF LIPASE: CPT

## 2025-01-31 PROCEDURE — 96374 THER/PROPH/DIAG INJ IV PUSH: CPT

## 2025-01-31 PROCEDURE — 99285 EMERGENCY DEPT VISIT HI MDM: CPT

## 2025-01-31 PROCEDURE — 96376 TX/PRO/DX INJ SAME DRUG ADON: CPT

## 2025-01-31 PROCEDURE — 96361 HYDRATE IV INFUSION ADD-ON: CPT

## 2025-01-31 PROCEDURE — 83605 ASSAY OF LACTIC ACID: CPT

## 2025-01-31 PROCEDURE — 96375 TX/PRO/DX INJ NEW DRUG ADDON: CPT

## 2025-01-31 PROCEDURE — 84484 ASSAY OF TROPONIN QUANT: CPT

## 2025-01-31 PROCEDURE — 93005 ELECTROCARDIOGRAM TRACING: CPT | Performed by: STUDENT IN AN ORGANIZED HEALTH CARE EDUCATION/TRAINING PROGRAM

## 2025-01-31 PROCEDURE — 71275 CT ANGIOGRAPHY CHEST: CPT

## 2025-01-31 PROCEDURE — 6370000000 HC RX 637 (ALT 250 FOR IP): Performed by: STUDENT IN AN ORGANIZED HEALTH CARE EDUCATION/TRAINING PROGRAM

## 2025-01-31 PROCEDURE — 74177 CT ABD & PELVIS W/CONTRAST: CPT

## 2025-01-31 RX ORDER — ONDANSETRON 2 MG/ML
4 INJECTION INTRAMUSCULAR; INTRAVENOUS ONCE
Status: COMPLETED | OUTPATIENT
Start: 2025-01-31 | End: 2025-01-31

## 2025-01-31 RX ORDER — PANTOPRAZOLE SODIUM 40 MG/10ML
40 INJECTION, POWDER, LYOPHILIZED, FOR SOLUTION INTRAVENOUS ONCE
Status: COMPLETED | OUTPATIENT
Start: 2025-01-31 | End: 2025-01-31

## 2025-01-31 RX ORDER — ACETAMINOPHEN 500 MG
500 TABLET ORAL EVERY 6 HOURS PRN
Status: DISCONTINUED | OUTPATIENT
Start: 2025-01-31 | End: 2025-02-01 | Stop reason: HOSPADM

## 2025-01-31 RX ORDER — MORPHINE SULFATE 4 MG/ML
4 INJECTION, SOLUTION INTRAMUSCULAR; INTRAVENOUS ONCE
Status: COMPLETED | OUTPATIENT
Start: 2025-01-31 | End: 2025-01-31

## 2025-01-31 RX ORDER — PANTOPRAZOLE SODIUM 40 MG/1
40 TABLET, DELAYED RELEASE ORAL
Status: DISCONTINUED | OUTPATIENT
Start: 2025-02-01 | End: 2025-02-01 | Stop reason: HOSPADM

## 2025-01-31 RX ORDER — DICYCLOMINE HYDROCHLORIDE 10 MG/1
10 CAPSULE ORAL ONCE
Status: COMPLETED | OUTPATIENT
Start: 2025-01-31 | End: 2025-01-31

## 2025-01-31 RX ORDER — IOPAMIDOL 755 MG/ML
75 INJECTION, SOLUTION INTRAVASCULAR
Status: COMPLETED | OUTPATIENT
Start: 2025-01-31 | End: 2025-01-31

## 2025-01-31 RX ORDER — GABAPENTIN 300 MG/1
300 CAPSULE ORAL 4 TIMES DAILY
Status: DISCONTINUED | OUTPATIENT
Start: 2025-01-31 | End: 2025-02-01 | Stop reason: HOSPADM

## 2025-01-31 RX ORDER — LEVOTHYROXINE SODIUM 75 UG/1
150 TABLET ORAL EVERY MORNING
Status: DISCONTINUED | OUTPATIENT
Start: 2025-02-01 | End: 2025-02-01 | Stop reason: HOSPADM

## 2025-01-31 RX ORDER — ENOXAPARIN SODIUM 100 MG/ML
40 INJECTION SUBCUTANEOUS DAILY
Status: DISCONTINUED | OUTPATIENT
Start: 2025-02-01 | End: 2025-02-01 | Stop reason: HOSPADM

## 2025-01-31 RX ORDER — BACLOFEN 10 MG/1
10 TABLET ORAL 3 TIMES DAILY
Status: DISCONTINUED | OUTPATIENT
Start: 2025-01-31 | End: 2025-02-01 | Stop reason: HOSPADM

## 2025-01-31 RX ORDER — DOXEPIN HYDROCHLORIDE 25 MG/1
25 CAPSULE ORAL NIGHTLY
Status: DISCONTINUED | OUTPATIENT
Start: 2025-01-31 | End: 2025-02-01 | Stop reason: HOSPADM

## 2025-01-31 RX ORDER — DULOXETIN HYDROCHLORIDE 60 MG/1
60 CAPSULE, DELAYED RELEASE ORAL EVERY MORNING
Status: DISCONTINUED | OUTPATIENT
Start: 2025-02-01 | End: 2025-02-01 | Stop reason: HOSPADM

## 2025-01-31 RX ORDER — SENNOSIDES A AND B 8.6 MG/1
2 TABLET, FILM COATED ORAL NIGHTLY
Status: DISCONTINUED | OUTPATIENT
Start: 2025-01-31 | End: 2025-02-01 | Stop reason: HOSPADM

## 2025-01-31 RX ORDER — 0.9 % SODIUM CHLORIDE 0.9 %
1000 INTRAVENOUS SOLUTION INTRAVENOUS ONCE
Status: COMPLETED | OUTPATIENT
Start: 2025-01-31 | End: 2025-01-31

## 2025-01-31 RX ORDER — SUCRALFATE 1 G/1
2 TABLET ORAL 2 TIMES DAILY
Status: DISCONTINUED | OUTPATIENT
Start: 2025-01-31 | End: 2025-02-01 | Stop reason: HOSPADM

## 2025-01-31 RX ORDER — CLONAZEPAM 0.5 MG/1
0.5 TABLET ORAL 2 TIMES DAILY PRN
Status: DISCONTINUED | OUTPATIENT
Start: 2025-01-31 | End: 2025-02-01 | Stop reason: HOSPADM

## 2025-01-31 RX ADMIN — SODIUM CHLORIDE 1000 ML: 9 INJECTION, SOLUTION INTRAVENOUS at 11:59

## 2025-01-31 RX ADMIN — SENNOSIDES 17.2 MG: 8.6 TABLET, COATED ORAL at 22:57

## 2025-01-31 RX ADMIN — ONDANSETRON 4 MG: 2 INJECTION, SOLUTION INTRAMUSCULAR; INTRAVENOUS at 16:27

## 2025-01-31 RX ADMIN — SUCRALFATE 2 G: 1 TABLET ORAL at 22:56

## 2025-01-31 RX ADMIN — GABAPENTIN 300 MG: 300 CAPSULE ORAL at 22:57

## 2025-01-31 RX ADMIN — PANTOPRAZOLE SODIUM 40 MG: 40 INJECTION, POWDER, FOR SOLUTION INTRAVENOUS at 18:05

## 2025-01-31 RX ADMIN — ONDANSETRON 4 MG: 2 INJECTION, SOLUTION INTRAMUSCULAR; INTRAVENOUS at 12:00

## 2025-01-31 RX ADMIN — DICYCLOMINE HYDROCHLORIDE 10 MG: 10 CAPSULE ORAL at 18:05

## 2025-01-31 RX ADMIN — MORPHINE SULFATE 4 MG: 4 INJECTION, SOLUTION INTRAMUSCULAR; INTRAVENOUS at 12:01

## 2025-01-31 RX ADMIN — ACETAMINOPHEN 500 MG: 500 TABLET ORAL at 22:57

## 2025-01-31 RX ADMIN — IOPAMIDOL 75 ML: 755 INJECTION, SOLUTION INTRAVENOUS at 13:51

## 2025-01-31 RX ADMIN — MORPHINE SULFATE 4 MG: 4 INJECTION, SOLUTION INTRAMUSCULAR; INTRAVENOUS at 16:27

## 2025-01-31 RX ADMIN — BACLOFEN 10 MG: 10 TABLET ORAL at 22:57

## 2025-01-31 ASSESSMENT — ENCOUNTER SYMPTOMS
BACK PAIN: 0
ABDOMINAL DISTENTION: 0
VOMITING: 0
WHEEZING: 0
COUGH: 0
SINUS PRESSURE: 0
CONSTIPATION: 1
ABDOMINAL PAIN: 1
EYE PAIN: 0
DIARRHEA: 0
EYE REDNESS: 0
NAUSEA: 1
SHORTNESS OF BREATH: 1
SORE THROAT: 0
EYE DISCHARGE: 0
RECTAL PAIN: 1

## 2025-01-31 ASSESSMENT — PAIN DESCRIPTION - LOCATION: LOCATION: ABDOMEN

## 2025-01-31 ASSESSMENT — PAIN DESCRIPTION - DESCRIPTORS: DESCRIPTORS: CRAMPING;PRESSURE

## 2025-01-31 ASSESSMENT — PAIN SCALES - GENERAL: PAINLEVEL_OUTOF10: 9

## 2025-01-31 NOTE — ED PROVIDER NOTES
Department of Emergency Medicine   ED  Provider Note  Admit Date/RoomTime: 1/31/2025 11:10 AM  ED Room: 03/03              [facility]     Jeannette Salinas is a 64 y.o. female with a PMHx significant for  MS, Pulmonary embolism (not on anticoagulation)  who presents for evaluation of constipation abdominal pressure / pain, beginning prior to arrival.  The complaint has been persistent, moderate in severity, and worsened by nothing.   The patient states that she hasn't had a bowel movement since last Friday, has history of GI problems, where she goes from constipation to diarrhea. Patient does endorse pressure / discomfort near her rectum and lower abdomen. She has been taking a \"vegetable laxative\" for movement. Notes that she has tried taking some miralax here and there, but does not take it daily because she goes from constipation to diarrhea.   Patient notes that she has had a hernia mesh / repair surgery before. Denies history of bowel obstruction in the past.   Notes that she has only passed a little bit of gas since 5 to 6 days ago. Last BM was 7 days ago.   She does have shortness of breath, that comes and goes as well.     Review of Systems   Constitutional:  Positive for chills. Negative for fever.   HENT:  Negative for ear pain, sinus pressure and sore throat.    Eyes:  Negative for pain, discharge and redness.   Respiratory:  Positive for shortness of breath. Negative for cough and wheezing.    Cardiovascular:  Negative for chest pain.   Gastrointestinal:  Positive for abdominal pain, constipation, nausea and rectal pain. Negative for abdominal distention, diarrhea and vomiting.   Genitourinary:  Negative for dysuria and frequency.   Musculoskeletal:  Negative for arthralgias and back pain.   Skin:  Negative for rash and wound.   Neurological:  Negative for weakness and headaches.   Hematological:  Negative for adenopathy.   All other systems reviewed and are negative.       Physical Exam  Vitals and nursing

## 2025-02-01 VITALS
OXYGEN SATURATION: 97 % | RESPIRATION RATE: 18 BRPM | HEART RATE: 71 BPM | SYSTOLIC BLOOD PRESSURE: 128 MMHG | TEMPERATURE: 97.5 F | HEIGHT: 65 IN | WEIGHT: 279.5 LBS | DIASTOLIC BLOOD PRESSURE: 91 MMHG | BODY MASS INDEX: 46.57 KG/M2

## 2025-02-01 PROCEDURE — 6370000000 HC RX 637 (ALT 250 FOR IP): Performed by: SURGERY

## 2025-02-01 PROCEDURE — 96372 THER/PROPH/DIAG INJ SC/IM: CPT

## 2025-02-01 PROCEDURE — 6360000002 HC RX W HCPCS: Performed by: INTERNAL MEDICINE

## 2025-02-01 PROCEDURE — G0378 HOSPITAL OBSERVATION PER HR: HCPCS

## 2025-02-01 PROCEDURE — 2500000003 HC RX 250 WO HCPCS: Performed by: INTERNAL MEDICINE

## 2025-02-01 PROCEDURE — 6370000000 HC RX 637 (ALT 250 FOR IP): Performed by: INTERNAL MEDICINE

## 2025-02-01 RX ORDER — DICYCLOMINE HCL 20 MG
20 TABLET ORAL 2 TIMES DAILY
Qty: 60 TABLET | Refills: 3 | Status: SHIPPED | OUTPATIENT
Start: 2025-02-01

## 2025-02-01 RX ORDER — POLYETHYLENE GLYCOL 3350 17 G/17G
17 POWDER, FOR SOLUTION ORAL DAILY
Status: DISCONTINUED | OUTPATIENT
Start: 2025-02-01 | End: 2025-02-01 | Stop reason: HOSPADM

## 2025-02-01 RX ADMIN — ACETAMINOPHEN 500 MG: 500 TABLET ORAL at 13:47

## 2025-02-01 RX ADMIN — DULOXETINE 60 MG: 60 CAPSULE, DELAYED RELEASE ORAL at 09:27

## 2025-02-01 RX ADMIN — GABAPENTIN 300 MG: 300 CAPSULE ORAL at 13:45

## 2025-02-01 RX ADMIN — BACLOFEN 10 MG: 10 TABLET ORAL at 09:27

## 2025-02-01 RX ADMIN — SUCRALFATE 2 G: 1 TABLET ORAL at 09:27

## 2025-02-01 RX ADMIN — BACLOFEN 10 MG: 10 TABLET ORAL at 13:45

## 2025-02-01 RX ADMIN — GABAPENTIN 300 MG: 300 CAPSULE ORAL at 09:27

## 2025-02-01 RX ADMIN — POLYETHYLENE GLYCOL 3350 17 G: 17 POWDER, FOR SOLUTION ORAL at 09:27

## 2025-02-01 RX ADMIN — ENOXAPARIN SODIUM 40 MG: 100 INJECTION SUBCUTANEOUS at 09:26

## 2025-02-01 RX ADMIN — PANTOPRAZOLE SODIUM 40 MG: 40 TABLET, DELAYED RELEASE ORAL at 06:12

## 2025-02-01 RX ADMIN — ACETAMINOPHEN 500 MG: 500 TABLET ORAL at 04:51

## 2025-02-01 RX ADMIN — MICONAZOLE NITRATE: 20 POWDER TOPICAL at 09:27

## 2025-02-01 RX ADMIN — LEVOTHYROXINE SODIUM 150 MCG: 75 TABLET ORAL at 11:46

## 2025-02-01 ASSESSMENT — PAIN DESCRIPTION - ONSET: ONSET: ON-GOING

## 2025-02-01 ASSESSMENT — PAIN DESCRIPTION - FREQUENCY: FREQUENCY: CONTINUOUS

## 2025-02-01 ASSESSMENT — PAIN - FUNCTIONAL ASSESSMENT: PAIN_FUNCTIONAL_ASSESSMENT: ACTIVITIES ARE NOT PREVENTED

## 2025-02-01 ASSESSMENT — PAIN DESCRIPTION - PAIN TYPE: TYPE: CHRONIC PAIN

## 2025-02-01 ASSESSMENT — PAIN SCALES - GENERAL
PAINLEVEL_OUTOF10: 10
PAINLEVEL_OUTOF10: 4

## 2025-02-01 ASSESSMENT — PAIN DESCRIPTION - DESCRIPTORS
DESCRIPTORS: ACHING
DESCRIPTORS: ACHING

## 2025-02-01 ASSESSMENT — PAIN DESCRIPTION - ORIENTATION
ORIENTATION: ANTERIOR
ORIENTATION: MID

## 2025-02-01 ASSESSMENT — PAIN DESCRIPTION - LOCATION
LOCATION: HEAD
LOCATION: HEAD

## 2025-02-01 NOTE — PROGRESS NOTES
Spiritual Health History and Assessment/Progress Note  [facility]    Attempted Encounter,  ,  ,      Name: Jeannette Salinas MRN: [id number]    Age: 64 y.o.     Sex: female   Language: English   Sikhism: Muslim   Intractable abdominal pain     Date: 2/1/2025                           Spiritual Assessment began in [address] MED SURG        Referral/Consult From: Rounding   Encounter Overview/Reason: Attempted Encounter  Service Provided For: Patient    Naila, Belief, Meaning:   Patient unable to assess at this time  Family/Friends No family/friends present      Importance and Influence:  Patient unable to assess at this time  Family/Friends No family/friends present    Community:  Patient Unable to assess. Patient was sleeping.   prayed a silent prayer for the patient and left devotional material and information about  services.  Family/Friends No family/friends present    Assessment and Plan of Care:     Patient Interventions include: Unable to assess. Patient was sleeping.   prayed a silent prayer for the patient and left devotional material and information about  services.  Family/Friends Interventions include: No family/friends present    Patient Plan of Care: Spiritual Care available upon further referral  Family/Friends Plan of Care: No family/friends present    Electronically signed by Chaplain Pankaj on 2/1/2025 at 1:58 PM

## 2025-02-01 NOTE — CARE COORDINATION
Social Work discharge planning  Met with pt, who lives in 1 story with electric elevator to enter. She has a power wc and susan lift at home. She said her son lives with her and is at work until 5p today. Pt said her aide is available from 1-5p today and pt requested to go home then. Spoke to Kt at [facility] and  scheduled for 1-3p. Pt notified and said she will tell her aide. Pt also active with [facility]. SW called answering service for [place] [phone] [phone] no answer. Called [phone] and spoke to Nilo with [facility] re: discharge today. Asked Rn for resume [facility] order.   Electronically signed by CHINA Chowdhury on 2/1/2025 at 11:21 AM

## 2025-02-01 NOTE — PROGRESS NOTES
Called Dr. Schwab about admission orders and he stated patient will be discharged in the morning most likely.    Electronically signed by Laine Moreno RN on 1/31/2025 at 10:34 PM

## 2025-02-01 NOTE — CONSULTS
Consultation    Patient's Name/Date of Birth: Jeannette Salinas / 1960    Date: February 1, 2025     PCP: Linus Schwab MD     Reason for consult:    Lower abdominal pain      History of Present Illness:    The patient is a 64-year-old white male who presents with multiple issues including abdominal pain.  The patient states that she has had suprapubic abdominal pain over the last 6 weeks.  The patient states that she has issues with bowel movements.  She may have a bowel movement every few days.  She states that the symptoms improve with bowel movements.  The patient has had frequent urinary tract infections.  The patient has an indwelling catheter.  The radiographic workup including a CT of the abdomen and pelvis performed on 1/31/2025 did not reveal a source for her abdominal pain.      Past Medical History:   Diagnosis Date    Acquired hypothyroidism 09/14/2016    Acute blood loss as cause of postoperative anemia 11/19/2018    Anemia     Anxiety     Arthritis     Blood transfusion     Cataract, right eye     Chronic back pain     Depression     GERD (gastroesophageal reflux disease)     Hx of blood clots     Migraine     Multiple sclerosis (HCC) 2000    electric wheelchair. total  susan lift    Obesity     Osteoarthritis     Peripheral vascular disease (HCC)     vericose veins    Prominent abdominal aortic pulse 04/17/2014    Pulmonary embolism (HCC) 05/2016    TIA (transient ischemic attack)     Urinary incontinence     has a indwelling catheter    Varicose veins of lower extremities 04/17/2014      Past Surgical History:   Procedure Laterality Date    BLADDER SURGERY      CHOLECYSTECTOMY, LAPAROSCOPIC N/A 01/16/2023    LAPAROSCOPIC ROBOTIC XI CHOLECYSTECTOMY performed by Sarbjit Uriostegui MD at [facility] OR    COLONOSCOPY      COLONOSCOPY N/A 8/5/2024    COLONOSCOPY DIAGNOSTIC performed by Sarbjit Uriostegui MD at [facility] ENDOSCOPY    DENTAL SURGERY      random teeth extraction    DILATION AND CURETTAGE OF  capsule 300 mg  300 mg Oral 4x Daily Linus Schwab MD   300 mg at 25    levothyroxine (SYNTHROID) tablet 150 mcg  150 mcg Oral QAM Linus Schwab MD        pantoprazole (PROTONIX) tablet 40 mg  40 mg Oral BID AC Linus Schwab MD   40 mg at 25 0612    senna (SENOKOT) tablet 17.2 mg  2 tablet Oral Nightly Linus Schwab MD   17.2 mg at 25    sucralfate (CARAFATE) tablet 2 g  2 g Oral BID Linus Schwab MD   2 g at 25    enoxaparin (LOVENOX) injection 40 mg  40 mg SubCUTAneous Daily Linus Schwab MD        acetaminophen (TYLENOL) tablet 500 mg  500 mg Oral Q6H PRN Linus Schwab MD   500 mg at 25 0451     Facility-Administered Medications Ordered in Other Encounters   Medication Dose Route Frequency Provider Last Rate Last Admin    0.9 % sodium chloride bolus  250 mL IntraVENous Once Shahbaz Burgess MD           Social History     Tobacco Use    Smoking status: Former     Current packs/day: 0.00     Average packs/day: 0.3 packs/day for 26.9 years (6.7 ttl pk-yrs)     Types: Cigarettes     Start date: 1977     Quit date: 2004     Years since quittin.8    Smokeless tobacco: Never   Substance Use Topics    Alcohol use: Yes     Comment: occassional        Labs:  Lab Results   Component Value Date    WBC 4.3 (L) 2025    HCT 37.1 2025    HGB 11.9 2025     2025      Lab Results   Component Value Date     2025    K 3.6 2025     2025    CO2 26 2025    BUN 11 2025    CREATININE 0.6 2025    GLUCOSE 105 (H) 2025       Lab Results   Component Value Date    BILIDIR <0.2 2023    AST 16 2025    ALT 6 2025    ALKPHOS 75 2025    AMYLASE 48 2015    LIPASE 40 2025        Review of Systems:    Other than stated above in the HPI is negative      Physical exam: BP (!) 128/91   Pulse 71   Temp 97.5 °F (36.4 °C) (Oral)   Resp

## 2025-02-01 NOTE — ED NOTES
ED to Inpatient Handoff Report    Notified Laine that electronic handoff available and patient ready for transport to room 521.    Safety Risks: Risk of falls    Patient in Restraints: no    Constant Observer or Patient : no    Telemetry Monitoring Ordered: No          Order to transfer to unit without monitor: NA    Last MEWS: 0 Time completed: 2015    Deterioration Index: 17.91    Vitals:    01/31/25 1315 01/31/25 1330 01/31/25 1915 01/31/25 2015   BP: (!) 149/86  138/78 126/81   Pulse: 62 73 63 60   Resp: 15 17 16 13   Temp:    98.2 °F (36.8 °C)   SpO2: 100% 100% 97% 97%   Weight:           Opportunity for questions and clarification was provided.

## 2025-02-01 NOTE — PROGRESS NOTES
Pt. Stated she called her son and notified him of discharge today and what time she will be home.    Electronically signed by Nalini Ferrell RN on 2/1/2025 at 11:48 AM

## 2025-02-01 NOTE — ACP (ADVANCE CARE PLANNING)
Advance Care Planning   Healthcare Decision Maker:    Primary Decision Maker: Kristine Salinas - Child - [phone]    Secondary Decision Maker: Jaquan Navarrete - Child - [phone]    Click here to complete Healthcare Decision Makers including selection of the Healthcare Decision Maker Relationship (ie \"Primary\").

## 2025-02-01 NOTE — H&P
[place] [address]                           HISTORY & PHYSICAL      PATIENT NAME: CHARLY GONZALEZ           : 1960  MED REC NO: [id number]                        ROOM: [facility]  ACCOUNT NO: [id number]                       ADMIT DATE: 2025  PROVIDER: Linus Schwab MD      CHIEF COMPLAINT:  Abdominal pain.    HISTORY OF PRESENT ILLNESS:  This is a 64-year-old woman who presented to the emergency room with complaint of constipation and diffuse abdominal pain, which by her report was severe.  She was given a GI cocktail, some dicyclomine, which did seem to help.  She did have a fairly full bowel movement in the emergency room, which also seemed to help.  She does turner some issues with chronic constipation.  She had a normal colonoscopy in 2024 and relatively normal EGD in 2024.  She is followed by Dr. Uriostegui as an outpatient.  She does have a significant past medical history that is marked by complaints from her multiple sclerosis and significant past history of falls and broken bones in her legs that has left her basically bedbound.  She is unable to walk, spends most of her time in her bed and sometimes in an electric wheelchair.  As noted, she does have chronic issues with constipation, which she tries to manage with MiraLAX and Metamucil.  She is chronically disabled as noted.  She does not appear to have any active ongoing multiple sclerosis symptoms, and she is on no anti-MS medication.  She is followed by Terra Foley [facility].  She has a history of hiatal hernia with fundoplication in the past, is on treatment for gastroesophageal reflux disease.  Presently, the patient is lying in bed fairly comfortably.  She is in no acute distress.  She slept all night with no pain issues.  Upon awakening her this morning, she did start to cry and whimper about the severity of her

## 2025-02-01 NOTE — PROGRESS NOTES
4 Eyes Skin Assessment     NAME:  Jeannette Salinas  YOB: 1960  MEDICAL RECORD NUMBER:  [id number]    The patient is being assessed for  Admission    I agree that at least one RN has performed a thorough Head to Toe Skin Assessment on the patient. ALL assessment sites listed below have been assessed.      Areas assessed by both nurses:    Head, Face, Ears, Shoulders, Back, Chest, Arms, Elbows, Hands, Sacrum. Buttock, Coccyx, Ischium, and Legs. Feet and Heels        Does the Patient have a Wound? No noted wound(s)       Agustin Prevention initiated by RN: Yes  Wound Care Orders initiated by RN: No    Pressure Injury (Stage 3,4, Unstageable, DTI, NWPT, and Complex wounds) if present, place Wound referral order by RN under : No    New Ostomies, if present place, Ostomy referral order under : No     Nurse 1 eSignature: Electronically signed by Laine Moreno RN on 2/1/25 at 1:29 AM EST    **SHARE this note so that the co-signing nurse can place an eSignature**    Nurse 2 eSignature: Electronically signed by Sarah Lizama RN on 2/1/25 at 1:29 AM EST

## 2025-02-01 NOTE — PLAN OF CARE
Problem: Skin/Tissue Integrity  Goal: Skin integrity remains intact  Description: 1.  Monitor for areas of redness and/or skin breakdown  2.  Assess vascular access sites hourly  3.  Every 4-6 hours minimum:  Change oxygen saturation probe site  4.  Every 4-6 hours:  If on nasal continuous positive airway pressure, respiratory therapy assess nares and determine need for appliance change or resting period  2/1/2025 1117 by Nalini Ferrell RN  Outcome: Progressing     Problem: Safety - Adult  Goal: Free from fall injury  2/1/2025 1117 by Nalini Ferrell RN  Outcome: Progressing     Problem: ABCDS Injury Assessment  Goal: Absence of physical injury  2/1/2025 1117 by Nalini Ferrell RN  Outcome: Progressing     Problem: Pain  Goal: Verbalizes/displays adequate comfort level or baseline comfort level  2/1/2025 1117 by Nalini Ferrell RN  Outcome: Progressing

## 2025-02-04 NOTE — DISCHARGE SUMMARY
[place] [address]                            DISCHARGE SUMMARY      PATIENT NAME: CHARLY GONZALEZ           : 1960  MED REC NO: [id number]                        ROOM: 05  ACCOUNT NO: [id number]                       ADMIT DATE: 2025  PROVIDER: Linus Schwab MD      CHIEF COMPLAINT:  Abdominal pain.    FINAL DIAGNOSES:    1. Abdominal pain.   2. Chronic constipation.  3. Multiple sclerosis.    COURSE OF ILLNESS:  This is a 64-year-old woman who was admitted with pain mostly in her lower abdomen and rectum area or anal area.  She reports struggling with constipation for the last several days and has had a lot of pain across her lower abdomen.  *** constipation for this patient are certainly not new.  Past year, she has had a colonoscopy in 2024 that was essentially normal and an endoscopy in 2024 that was okay.  She takes some combination of MiraLAX and Metamucil generally as well as a stool softener for her chronic constipation issues.  Imaging including a CAT scan of her abdomen and CT of her chest were okay.  Her lab work including a CBC, a CMP, and lactic acid were all within normal limits as well.  The patient did have a large bowel movement while in the emergency room, which did seem to improve her pain, but due to her intractable pain that was continuing, the patient was admitted for observation.  No specific treatment for her bowel was started, although maintaining normal bowel movements as possible and avoiding constipation will be important ways to try to reduce her pain.  She does appear to have a lot of spastic pain that contributes to this complaint.  She was seen by Surgery, did not have any surgical options for her.  She is routinely followed by Dr. Uriostegui, and they also emphasized the importance of maintaining her bowel function.  Her abdominal exam was benign with only

## 2025-02-06 RX ORDER — DOXEPIN HYDROCHLORIDE 25 MG/1
CAPSULE ORAL
Qty: 30 CAPSULE | Refills: 2 | Status: SHIPPED | OUTPATIENT
Start: 2025-02-06

## 2025-02-11 ENCOUNTER — TELEPHONE (OUTPATIENT)
Dept: NEUROLOGY | Age: 65
End: 2025-02-11

## 2025-02-11 NOTE — TELEPHONE ENCOUNTER
Pt phnd would like to know if Terra Foley would like an MRI prior to her appt in April. Please call pt to advise [phone]

## 2025-02-19 RX ORDER — GABAPENTIN 300 MG/1
300 CAPSULE ORAL 4 TIMES DAILY
Qty: 120 CAPSULE | Refills: 5 | Status: SHIPPED | OUTPATIENT
Start: 2025-02-19 | End: 2025-08-18

## 2025-02-19 RX ORDER — DICYCLOMINE HCL 20 MG
TABLET ORAL
Qty: 180 TABLET | Refills: 10 | OUTPATIENT
Start: 2025-02-19

## 2025-02-19 RX ORDER — BACLOFEN 10 MG/1
TABLET ORAL
Qty: 270 TABLET | Refills: 10 | OUTPATIENT
Start: 2025-02-19

## 2025-02-19 RX ORDER — BACLOFEN 10 MG/1
10 TABLET ORAL 3 TIMES DAILY
Qty: 90 TABLET | Refills: 5 | Status: SHIPPED | OUTPATIENT
Start: 2025-02-19

## 2025-02-19 RX ORDER — DOXEPIN HYDROCHLORIDE 25 MG/1
CAPSULE ORAL
Qty: 90 CAPSULE | Refills: 10 | OUTPATIENT
Start: 2025-02-19

## 2025-02-19 RX ORDER — DOXEPIN HYDROCHLORIDE 25 MG/1
25 CAPSULE ORAL NIGHTLY
Qty: 30 CAPSULE | Refills: 2 | Status: SHIPPED | OUTPATIENT
Start: 2025-02-19

## 2025-02-19 RX ORDER — GABAPENTIN 300 MG/1
CAPSULE ORAL
Qty: 360 CAPSULE | Refills: 10 | OUTPATIENT
Start: 2025-02-19

## 2025-03-06 ENCOUNTER — TELEPHONE (OUTPATIENT)
Dept: ORTHOPEDIC SURGERY | Age: 65
End: 2025-03-06

## 2025-03-06 NOTE — TELEPHONE ENCOUNTER
Referral received for patient via external source.     Referral reason/diagnosis: left knee pain     Routed to providers for recommendations.    Future Appointments   Date Time Provider Department Center   4/29/2025  2:00 PM Terra Foley APRN - CNP [facility] -       Electronically signed by Alannah Nazario on 3/6/2025 at 10:47 AM

## 2025-03-19 ENCOUNTER — OFFICE VISIT (OUTPATIENT)
Dept: ORTHOPEDIC SURGERY | Age: 65
End: 2025-03-19

## 2025-03-19 VITALS
HEART RATE: 73 BPM | TEMPERATURE: 97.9 F | OXYGEN SATURATION: 96 % | HEIGHT: 65 IN | RESPIRATION RATE: 18 BRPM | WEIGHT: 279.8 LBS | BODY MASS INDEX: 46.62 KG/M2

## 2025-03-19 DIAGNOSIS — G89.29 CHRONIC PAIN OF LEFT KNEE: Primary | ICD-10-CM

## 2025-03-19 DIAGNOSIS — M17.12 PRIMARY OSTEOARTHRITIS OF LEFT KNEE: ICD-10-CM

## 2025-03-19 DIAGNOSIS — M25.562 CHRONIC PAIN OF LEFT KNEE: Primary | ICD-10-CM

## 2025-03-19 RX ORDER — LIDOCAINE HYDROCHLORIDE 10 MG/ML
3 INJECTION, SOLUTION INFILTRATION; PERINEURAL ONCE
Status: COMPLETED | OUTPATIENT
Start: 2025-03-19 | End: 2025-03-19

## 2025-03-19 RX ORDER — TRIAMCINOLONE ACETONIDE 40 MG/ML
40 INJECTION, SUSPENSION INTRA-ARTICULAR; INTRAMUSCULAR ONCE
Status: COMPLETED | OUTPATIENT
Start: 2025-03-19 | End: 2025-03-19

## 2025-03-19 RX ADMIN — LIDOCAINE HYDROCHLORIDE 3 ML: 10 INJECTION, SOLUTION INFILTRATION; PERINEURAL at 15:24

## 2025-03-19 RX ADMIN — TRIAMCINOLONE ACETONIDE 40 MG: 40 INJECTION, SUSPENSION INTRA-ARTICULAR; INTRAMUSCULAR at 15:24

## 2025-04-10 LAB — TSH SERPL DL<=0.05 MIU/L-ACNC: 5.4 UIU/ML (ref 0.27–4.2)

## 2025-04-29 ENCOUNTER — SCHEDULED TELEPHONE ENCOUNTER (OUTPATIENT)
Dept: NEUROLOGY | Age: 65
End: 2025-04-29

## 2025-04-29 DIAGNOSIS — G35 MULTIPLE SCLEROSIS (HCC): Primary | ICD-10-CM

## 2025-04-29 DIAGNOSIS — G47.01 INSOMNIA DUE TO MEDICAL CONDITION: ICD-10-CM

## 2025-04-29 RX ORDER — DOXEPIN HYDROCHLORIDE 50 MG/1
50 CAPSULE ORAL NIGHTLY
Qty: 30 CAPSULE | Refills: 5 | Status: SHIPPED | OUTPATIENT
Start: 2025-04-29

## 2025-04-29 NOTE — PROGRESS NOTES
[facility] NEUROLOGY   Terra Foley, MSN, APRN-CNP, [facility]     [address], [address]              [place] [address]      [phone]      Office Follow Up--Phone Visit    Jeannette Salinas is a 64 y.o. right handed female     The patient and/or health care decision maker is aware that that he/she may receive a bill for this telephone service, depending on his/her insurance coverage, and has provided verbal consent to proceed: Yes     I affirm this is a Patient Initiated Episode with an Established Patient who has not had a related appointment within my department in the past 7 days or scheduled within the next 24 hours.     The patient's identity was verified at the start of the call. Others involved in call:     Total Time: 11-20 minutes    Patient location: [place], [place]    Provider location: [place] office    Note: not billable if this call serves to triage the patient into an appointment for the relevant concern    HPI:     Patient follows for MS     She was a longtime patient of Dr. Altamirano in [place].  Previous to that she was seeing Dr. Álvarez at the [place] clinic at [facility].  She was diagnosed with MS back in March 16, 2000.  For the past 19 years she was on Avelox and steroids.  She is no longer on DMT therapy.  She believes her last MS exacerbation was 6 weeks ago when her vision became blurry and her right side became numb and tingly.  She also had some issues with numbness to the right side of her tongue but she did not seek medical treatment and her symptoms did resolve.  Patient has been in a wheelchair for the past 5 years.  She is able to move her bilateral upper extremities but her right arm is weaker than her left.  She is unable to lift or move her right leg but she is able to lift and move her left leg.  She notes no vision changes since being diagnosed with MS and no red desaturation.  She does have bowel/bladder incontinence due to her MS.  She has seen urology in the past

## 2025-07-29 LAB
ALBUMIN SERPL-MCNC: 3.2 G/DL (ref 3.5–5.2)
ALP SERPL-CCNC: 79 U/L (ref 35–104)
ALT SERPL-CCNC: <5 U/L (ref 0–35)
ANION GAP SERPL CALCULATED.3IONS-SCNC: 13 MMOL/L (ref 7–16)
AST SERPL-CCNC: 17 U/L (ref 0–35)
BILIRUB SERPL-MCNC: <0.2 MG/DL (ref 0–1.2)
BUN SERPL-MCNC: 8 MG/DL (ref 8–23)
CALCIUM SERPL-MCNC: 8.9 MG/DL (ref 8.8–10.2)
CHLORIDE SERPL-SCNC: 99 MMOL/L (ref 98–107)
CO2 SERPL-SCNC: 26 MMOL/L (ref 22–29)
CREAT SERPL-MCNC: 0.7 MG/DL (ref 0.5–1)
ERYTHROCYTE [DISTWIDTH] IN BLOOD BY AUTOMATED COUNT: 17.2 % (ref 11.5–15)
GFR, ESTIMATED: >90 ML/MIN/1.73M2
GLUCOSE SERPL-MCNC: 82 MG/DL (ref 74–99)
HCT VFR BLD AUTO: 32.6 % (ref 34–48)
HGB BLD-MCNC: 10.1 G/DL (ref 11.5–15.5)
MCH RBC QN AUTO: 29.1 PG (ref 26–35)
MCHC RBC AUTO-ENTMCNC: 31 G/DL (ref 32–34.5)
MCV RBC AUTO: 93.9 FL (ref 80–99.9)
PLATELET # BLD AUTO: 280 K/UL (ref 130–450)
PMV BLD AUTO: 9.7 FL (ref 7–12)
POTASSIUM SERPL-SCNC: 3 MMOL/L (ref 3.5–5.1)
PROT SERPL-MCNC: 6.7 G/DL (ref 6.4–8.3)
RBC # BLD AUTO: 3.47 M/UL (ref 3.5–5.5)
SODIUM SERPL-SCNC: 139 MMOL/L (ref 136–145)
TSH SERPL DL<=0.05 MIU/L-ACNC: 2.88 UIU/ML (ref 0.27–4.2)
WBC OTHER # BLD: 4.7 K/UL (ref 4.5–11.5)

## (undated) DEVICE — PACK PROC FLD MGMT SYS CENTURION CUST

## (undated) DEVICE — BNDG,ELSTC,MATRIX,STRL,6"X5YD,LF,HOOK&LP: Brand: MEDLINE

## (undated) DEVICE — GLASSES SAFETY PROTCT GRN

## (undated) DEVICE — PACK PROCEDURE SURG SURG CATARACT CUSTOM

## (undated) DEVICE — SPONGE GZ W4XL4IN RAYON POLY CVR W/NONWOVEN FAB STRL 2/PK

## (undated) DEVICE — SHIELD EYE W3XL2.5IN UNIV CLR PLAS LTWT

## (undated) DEVICE — FORCEPS BX OVL CUP FEN DISPOSABLE CAP L 160CM RAD JAW 4

## (undated) DEVICE — BANDAGE COMPR W4INXL10YD WHITE/BEIGE E MTRX HK LOOP CLSR

## (undated) DEVICE — GLOVE ORANGE PI 8   MSG9080

## (undated) DEVICE — KIT,ANTI FOG,W/SPONGE & FLUID,SOFT PACK: Brand: MEDLINE

## (undated) DEVICE — GRADUATE TRIANG MEASURE 1000ML BLK PRNT

## (undated) DEVICE — SPONGE GZ W4XL4IN RAYON POLY FILL CVR W/ NONWOVEN FAB

## (undated) DEVICE — INTENDED FOR TISSUE SEPARATION, AND OTHER PROCEDURES THAT REQUIRE A SHARP SURGICAL BLADE TO PUNCTURE OR CUT.: Brand: BARD-PARKER ® STAINLESS STEEL BLADES

## (undated) DEVICE — DRIP REDUCTION MANIFOLD

## (undated) DEVICE — DRAPE,LAP,CHOLE,W/TROUGHS,STERILE: Brand: MEDLINE

## (undated) DEVICE — DRAPE,REIN 53X77,STERILE: Brand: MEDLINE

## (undated) DEVICE — COVER,MAYO STAND,STERILE: Brand: MEDLINE

## (undated) DEVICE — SYRINGE, LUER LOCK, 5ML: Brand: MEDLINE

## (undated) DEVICE — GLOVE ORANGE PI 7   MSG9070

## (undated) DEVICE — SUCTION IRRIGATOR: Brand: ENDOWRIST

## (undated) DEVICE — SET ORTHO STD STORTSTD2

## (undated) DEVICE — GLOVE SURG SZ 75 CRM LTX FREE POLYISOPRENE POLYMER BEAD ANTI

## (undated) DEVICE — SOLUTION IRRIGATION BAL SALT SOLUTION 15 ML STRL BSS

## (undated) DEVICE — BLOCK BITE 60FR RUBBER ADLT DENTAL

## (undated) DEVICE — ALCON INTREPID CURVED 0.3MM POLYMER I/A TIP: Brand: ALCON, INTREPID

## (undated) DEVICE — GOWN,SIRUS,FABRNF,XL,20/CS: Brand: MEDLINE

## (undated) DEVICE — WARMER SCP 2 ANTIFOG LAP DISP

## (undated) DEVICE — SURGICAL PROCEDURE PACK BASIC

## (undated) DEVICE — Z INACTIVE USE 2660664 SOLUTION IRRIG 3000ML 0.9% SOD CHL USP UROMATIC PLAS CONT

## (undated) DEVICE — TUBING SUCT 12FR MAL ALUM SHFT FN CAP VENT UNIV CONN W/ OBT

## (undated) DEVICE — GLOVE ORANGE PI 7 1/2   MSG9075

## (undated) DEVICE — INSTRUMENT CLAMP TOWEL LARGE REUSABLE

## (undated) DEVICE — GLOVE SURG SZ 65 THK91MIL LTX FREE SYN POLYISOPRENE

## (undated) DEVICE — NEEDLE FLTR 18GA L1.5IN MEM THK5UM BLNT DISP

## (undated) DEVICE — SOLUTION IV IRRIG POUR BRL 0.9% SODIUM CHL 2F7124

## (undated) DEVICE — ZIMMER® STERILE DISPOSABLE TOURNIQUET CUFF WITH PROTECTIVE SLEEVE AND PLC, DUAL PORT, SINGLE BLADDER, 34 IN. (86 CM)

## (undated) DEVICE — PADDING,UNDERCAST,COTTON, 4"X4YD STERILE: Brand: MEDLINE

## (undated) DEVICE — SET LAMBOTTE

## (undated) DEVICE — BIT DRL L110MM DIA2.5MM ST G QUIK CPL NONRADIOPAQUE W/O STP

## (undated) DEVICE — CHLORAPREP 26ML ORANGE

## (undated) DEVICE — CLEARCUT® SLIT KNIFE INTREPID MICRO-COAXIAL SYSTEM 2.4 SB: Brand: CLEARCUT®; INTREPID

## (undated) DEVICE — CANNULA OPHTH 25GA 7 8IN ORNG 45DEG ANG 4MM FR END DOME SHP

## (undated) DEVICE — THE MONARCH® "D" CARTRIDGE IS A SINGLE-USE POLYPROPYLENE CARTRIDGE FOR POSTERIOR CHAMBER IOL DELIVERY: Brand: MONARCH® III

## (undated) DEVICE — SET PHACO

## (undated) DEVICE — NEEDLE,22GX1.5",REG,BEVEL: Brand: MEDLINE

## (undated) DEVICE — SYRINGE MED 3ML TRNSLUC BRL POLYPR GEN PURP W/ LUERLOCK TIP

## (undated) DEVICE — PACK PROCEDURE SURG GEN CUST

## (undated) DEVICE — ARM DRAPE

## (undated) DEVICE — PUMP SUC IRR TBNG L10FT W/ HNDPC ASSEMB STRYKEFLOW 2

## (undated) DEVICE — SOLUTION IRRIG 1000ML STRL H2O USP PLAS POUR BTL

## (undated) DEVICE — GOWN,BREATHABLE SLV,AURORA,XLG,STRL: Brand: MEDLINE

## (undated) DEVICE — BIT DRL L180MM DIA4.3MM QUIK CPL W/O STP REUSE

## (undated) DEVICE — 40436 HEAD REST OCULAR: Brand: 40436 HEAD REST OCULAR

## (undated) DEVICE — NEEDLE HYPO 30GA L0.5IN BGE POLYPR HUB S STL REG BVL STR

## (undated) DEVICE — MEDIUM-LARGE CLIP APPLIER: Brand: ENDOWRIST

## (undated) DEVICE — INSUFFLATION NEEDLE TO ESTABLISH PNEUMOPERITONEUM.: Brand: INSUFFLATION NEEDLE

## (undated) DEVICE — SET ORTHO STD STORTSTD1

## (undated) DEVICE — COLUMN DRAPE

## (undated) DEVICE — SPLINT KNEE UNIV FOR LESS THAN 36IN L24IN FOAM LAM E CNTCT

## (undated) DEVICE — PADDING CAST W6INXL4YD COT LO LINTING WYTEX

## (undated) DEVICE — CANNULA SEAL

## (undated) DEVICE — SYRINGE 20ML LL S/C 50

## (undated) DEVICE — DUAL HOSE W/CPC CONNECTORS: Brand: A.T.S.® TOURNIQUET SYSTEM

## (undated) DEVICE — SHEET,DRAPE,53X77,STERILE: Brand: MEDLINE

## (undated) DEVICE — KNIFE OPHTH D5MM 15DEG GRN MICUNITOM

## (undated) DEVICE — BIT DRL L300MM DIA3.2MM PERC QUIK CPL CALIB W/O STP REUSE

## (undated) DEVICE — DRAPE C ARM W41XL74IN UNIV MOB W RUBBERBAND CLP

## (undated) DEVICE — COVER,LIGHT HANDLE,FLX,2/PK: Brand: MEDLINE INDUSTRIES, INC.

## (undated) DEVICE — BIT DRL L145MM DIA3.2MM QUIK CPL W/O STP REUSE

## (undated) DEVICE — AGENT HEMSTAT W2XL4IN OXIDIZED REGENERATED CELOS ABSRB

## (undated) DEVICE — TROCAR: Brand: KII SHIELDED BLADED ACCESS SYSTEM

## (undated) DEVICE — ANCHOR TISSUE RETRIEVAL SYSTEM, BAG SIZE 125 ML, PORT SIZE 8 MM: Brand: ANCHOR TISSUE RETRIEVAL SYSTEM

## (undated) DEVICE — REAGENT TEST UREASE RAPD CLOTEST F/

## (undated) DEVICE — BLADE CLIPPER GEN PURP NS

## (undated) DEVICE — BIT DRL L200MM DIA2.8MM CALIB L100MM FOR 3.5MM VA LCP PROX

## (undated) DEVICE — DRAIN SURG 19FR 100% SIL RADPQ RND CHN FULL FLUT

## (undated) DEVICE — PAD PREP L 2 PLY 70% ISO ALC NONWOVEN SFT ABSRB TOP ANTISEP

## (undated) DEVICE — PATIENT RETURN ELECTRODE, SINGLE-USE, CONTACT QUALITY MONITORING, ADULT, WITH 9FT CORD, FOR PATIENTS WEIGING OVER 33LBS. (15KG): Brand: MEGADYNE

## (undated) DEVICE — BLADELESS OBTURATOR: Brand: WECK VISTA

## (undated) DEVICE — Device

## (undated) DEVICE — INSUFFLATION TUBING SET WITH FILTER, FUNNEL CONNECTOR AND LUER LOCK: Brand: JOSNOE MEDICAL INC

## (undated) DEVICE — 3M™ IOBAN™ 2 ANTIMICROBIAL INCISE DRAPE 6650EZ: Brand: IOBAN™ 2

## (undated) DEVICE — DOUBLE BASIN SET: Brand: MEDLINE INDUSTRIES, INC.

## (undated) DEVICE — TIP COVER ACCESSORY

## (undated) DEVICE — RACK TUBE CURETTE

## (undated) DEVICE — 1000 S-DRAPE TOWEL DRAPE 10/BX: Brand: STERI-DRAPE™

## (undated) DEVICE — DRESSING,GAUZE,XEROFORM,CURAD,5"X9",ST: Brand: CURAD

## (undated) DEVICE — 3M™ STERI-DRAPE™ U-DRAPE 1015: Brand: STERI-DRAPE™

## (undated) DEVICE — ELECTRODE PT RET AD L9FT HI MOIST COND ADH HYDRGEL CORDED

## (undated) DEVICE — TOWEL,OR,DSP,ST,BLUE,STD,6/PK,12PK/CS: Brand: MEDLINE

## (undated) DEVICE — CADIERE FORCEPS: Brand: ENDOWRIST

## (undated) DEVICE — Z DISCONTINUED USE 2275686 GLOVE SURG SZ 8 L12IN FNGR THK13MIL WHT ISOLEX POLYISOPRENE

## (undated) DEVICE — PROGRASP FORCEPS: Brand: ENDOWRIST

## (undated) DEVICE — TOTAL KNEE PK

## (undated) DEVICE — PERMANENT CAUTERY HOOK: Brand: ENDOWRIST

## (undated) DEVICE — Z CONVERTED USE 2275207 CLOTH PREP W7.5XL7.5IN 2% CHG SKIN ALC AND RNS FREE

## (undated) DEVICE — DRILL SYSTEM 7